# Patient Record
Sex: FEMALE | Race: WHITE | Employment: FULL TIME | URBAN - METROPOLITAN AREA
[De-identification: names, ages, dates, MRNs, and addresses within clinical notes are randomized per-mention and may not be internally consistent; named-entity substitution may affect disease eponyms.]

---

## 2017-03-13 ENCOUNTER — OFFICE VISIT (OUTPATIENT)
Dept: FAMILY MEDICINE CLINIC | Age: 47
End: 2017-03-13

## 2017-03-13 VITALS
DIASTOLIC BLOOD PRESSURE: 72 MMHG | OXYGEN SATURATION: 98 % | TEMPERATURE: 98.9 F | WEIGHT: 165.2 LBS | HEIGHT: 63 IN | SYSTOLIC BLOOD PRESSURE: 114 MMHG | BODY MASS INDEX: 29.27 KG/M2 | HEART RATE: 69 BPM | RESPIRATION RATE: 18 BRPM

## 2017-03-13 DIAGNOSIS — E55.9 VITAMIN D DEFICIENCY: ICD-10-CM

## 2017-03-13 DIAGNOSIS — R00.2 PALPITATIONS: ICD-10-CM

## 2017-03-13 DIAGNOSIS — F41.0 PANIC ATTACK: ICD-10-CM

## 2017-03-13 DIAGNOSIS — N92.1 MENORRHAGIA WITH IRREGULAR CYCLE: ICD-10-CM

## 2017-03-13 DIAGNOSIS — N30.00 ACUTE CYSTITIS WITHOUT HEMATURIA: Primary | ICD-10-CM

## 2017-03-13 DIAGNOSIS — F41.9 ANXIETY: ICD-10-CM

## 2017-03-13 DIAGNOSIS — E78.5 HYPERLIPIDEMIA LDL GOAL <130: ICD-10-CM

## 2017-03-13 LAB
BILIRUB UR QL STRIP: NEGATIVE
GLUCOSE UR-MCNC: NEGATIVE MG/DL
KETONES P FAST UR STRIP-MCNC: NORMAL MG/DL
PH UR STRIP: 5.5 [PH] (ref 4.6–8)
PROT UR QL STRIP: NEGATIVE MG/DL
SP GR UR STRIP: 1.02 (ref 1–1.03)
UA UROBILINOGEN AMB POC: NORMAL (ref 0.2–1)
URINALYSIS CLARITY POC: CLEAR
URINALYSIS COLOR POC: YELLOW
URINE BLOOD POC: NEGATIVE
URINE LEUKOCYTES POC: NEGATIVE
URINE NITRITES POC: NEGATIVE

## 2017-03-13 RX ORDER — CIPROFLOXACIN 250 MG/1
250 TABLET, FILM COATED ORAL EVERY 12 HOURS
Qty: 20 TAB | Refills: 0 | Status: SHIPPED | OUTPATIENT
Start: 2017-03-13 | End: 2017-03-23

## 2017-03-13 NOTE — MR AVS SNAPSHOT
Visit Information Date & Time Provider Department Dept. Phone Encounter #  
 3/13/2017 10:00 AM Renetta Noel  [facility] [phone] [id number] Your Appointments 5/22/2017  9:00 AM  
COMPLETE PHYSICAL with Star Nguyen  [facility] ([facility]) Appt Note: COMPLETE PHYSICAL  
 [phone]  
[phone]  
  
   
 [address] [phone] Upcoming Health Maintenance Date Due  
 PAP AKA CERVICAL CYTOLOGY 3/3/2017 BREAST CANCER SCRN MAMMOGRAM 8/28/2017 DTaP/Tdap/Td series (3 - Td) 6/23/2024 Allergies as of 3/13/2017  Review Complete On: 3/13/2017 By: Mellissa Owen LPN Severity Noted Reaction Type Reactions Teagan Medium 07/06/2015   Systemic Hives Soy Medium 07/06/2015   Systemic Hives Bees [Sting, Bee]  06/22/2010    Not Reported This Time Cephaeline  06/22/2010    Not Reported This Time Pt. States no allergy Keflex [Cephalexin]  10/18/2010    Rash Tomato  10/18/2010    Hives Pt. States no allergy Current Immunizations  Reviewed on 7/14/2015 Name Date Influenza Vaccine 10/8/2015, 10/10/2014 TDAP Vaccine 7/1/2006 Tdap 6/23/2014  7:45 PM  
  
 Not reviewed this visit You Were Diagnosed With   
  
 Codes Comments Acute cystitis without hematuria    -  Primary ICD-10-CM: N30.00 ICD-9-CM: 595.0 Vitals BP Pulse Temp Resp Height(growth percentile) Weight(growth percentile) 114/72 (BP 1 Location: Right arm, BP Patient Position: Sitting) 69 98.9 °F (37.2 °C) (Oral) 18 5' 3\" (1.6 m) 165 lb 3.2 oz (74.9 kg) LMP SpO2 BMI OB Status Smoking Status 02/10/2017 98% 29.26 kg/m2 Having regular periods Never Smoker BMI and BSA Data Body Mass Index Body Surface Area  
 29.26 kg/m 2 1.82 m 2 Preferred Pharmacy Pharmacy Name Phone [id number] [place], [address] [facility] [phone] Your Updated Medication List  
  
   
This list is accurate as of: 3/13/17 11:12 AM.  Always use your most recent med list.  
  
  
  
  
 ALPRAZolam 0.25 mg tablet Commonly known as:  Melene Reels Take 1 Tab by mouth two (2) times daily as needed for Anxiety. Max Daily Amount: 0.5 mg.  
  
 busPIRone 5 mg tablet Commonly known as:  BUSPAR Take 1 Tab by mouth two (2) times a day. Do not take with the Melene Reels  
  
 butalbital-acetaminophen-caffeine -40 mg per tablet Commonly known as:  Carlitos Gift Take 1 Tab by mouth every six (6) hours as needed for Pain. Max Daily Amount: 4 Tabs. cholecalciferol (vitamin D3) 2,000 unit Tab Take  by mouth. ciprofloxacin HCl 250 mg tablet Commonly known as:  CIPRO Take 1 Tab by mouth every twelve (12) hours for 10 days. EPINEPHrine 0.3 mg/0.3 mL injection Commonly known as:  EPIPEN 2-EDWIGE  
0.3 mL by IntraMUSCular route once as needed for up to 1 dose. fexofenadine 180 mg tablet Commonly known as:  Vijay Manuelito Take  by mouth. FLUoxetine 40 mg capsule Commonly known as:  PROzac  
60 mg daily. HYDROcodone-acetaminophen 5-325 mg per tablet Commonly known as:  Howard Songster Take 1 Tab by mouth nightly as needed for Pain. Max Daily Amount: 1 Tab. hydrocortisone 2.5 % rectal cream  
Commonly known as:  ANUSOL-HC Insert  into rectum two (2) times a day. mometasone 50 mcg/actuation nasal spray Commonly known as:  NASONEX  
2 Sprays daily. naproxen 500 mg tablet Commonly known as:  NAPROSYN Take 1 Tab by mouth two (2) times daily (with meals). SOMA 350 mg tablet Generic drug:  carisoprodol Take 350 mg by mouth four (4) times daily. Indications: Ortho-on-call Prescriptions Sent to Pharmacy  Refills  
 ciprofloxacin HCl (CIPRO) 250 mg tablet 0  
 Sig: Take 1 Tab by mouth every twelve (12) hours for 10 days. Class: Normal  
 Pharmacy: [id number] [facility], [address] [place] [address] #: [phone] Route: Oral  
  
We Performed the Following AMB POC URINALYSIS DIP STICK AUTO W/ MICRO [50794 CPT(R)] CULTURE, URINE [id number] CPT(R)] Patient Instructions Frequent Urination: Care Instructions Your Care Instructions An urge to urinate frequently but usually passing only small amounts of urine is a common symptom of urinary problems, such as urinary tract infections. The bladder may become inflamed. This can cause the urge to urinate. You may try to urinate more often than usual to try to soothe that urge. Frequent urination also may be caused by sexually transmitted infections (STIs) or kidney stones. Or it may happen when something irritates the tube that carries urine from the bladder to the outside of the body (urethra). It may also be a sign of diabetes. The cause may be hard to find. You may need tests. Follow-up care is a key part of your treatment and safety. Be sure to make and go to all appointments, and call your doctor if you are having problems. It's also a good idea to know your test results and keep a list of the medicines you take. How can you care for yourself at home? · Drink extra water and juices such as cranberry and blueberry juices for the next day or two. This will help make the urine less concentrated. And it may help wash out any bacteria that may be causing an infection. (If you have kidney, heart, or liver disease and have to limit fluids, talk with your doctor before you increase the amount of fluids you drink.) · Avoid drinks that are carbonated or have caffeine. They can irritate the bladder. For women: · Urinate right after you have sex. · After you go to the bathroom, wipe from front to back. · Avoid douches, bubble baths, and feminine hygiene sprays.  And avoid other feminine hygiene products that have deodorants. When should you call for help? Call your doctor now or seek immediate medical care if: 
· You have new symptoms, such as fever, nausea, or vomiting. · You have new or worse symptoms of a urinary problem. For example: ¨ You have blood or pus in your urine. ¨ You have chills or body aches. ¨ It hurts to urinate. ¨ You have groin or belly pain. ¨ You have pain in your back just below your rib cage (the flank area). Watch closely for changes in your health, and be sure to contact your doctor if you feel thirstier than usual. 
Where can you learn more? Go to http://keshawn-merissa.info/. Enter 959 [id number] in the search box to learn more about \"Frequent Urination: Care Instructions. \" Current as of: August 12, 2016 Content Version: 11.1 © 7836-6896 [employer]. Care instructions adapted under license by [employer] (which disclaims liability or warranty for this information). If you have questions about a medical condition or this instruction, always ask your healthcare professional. [place] [address] any warranty or liability for your use of this information. Introducing [facility]! Dear Delio Valerio: Thank you for requesting a Exaj[employer]le account. Our records indicate that you already have an active Exajoule account. You can access your account anytime at [email or web address] Did you know that you can access your hospital and ER discharge instructions at any time in Exajoule? You can also review all of your test results from your hospital stay or ER visit. Additional Information If you have questions, please visit the Frequently Asked Questions section of the Exaj[employer]le website at [email or web address] Remember, Exajoule is NOT to be used for urgent needs. For medical emergencies, dial 911. Now available from your iPhone and Android! Please provide this summary of care documentation to your next provider. Your primary care clinician is listed as Malaika Fraser. If you have any questions after today's visit, please call [phone].

## 2017-03-13 NOTE — PROGRESS NOTES
HISTORY OF PRESENT ILLNESS  HPI  Layne Henley is a 55 y.o. Female with history of anxiety, depression, and panic attacks who presents to office today for UTI. Pt complains of urinary frequency for the past two weeks. Pt states that she typically only drinks water and almond milk. Pt denies frequent UTIs but states that she has had them before. Pt denies burning with urination. Pt states that she had one night where she lay down in bed and experienced palpitations; she denies other episodes. Pt notes that she was on Abilifiy for two months for depression and stopped it one week ago due to side effects. Pt notes that she has been under a lot of stress recently due to her mother being in the hospital.            Past Medical History:   Diagnosis Date    Back pain, acute     Depression     H/O seasonal allergies     Neck pain, musculoskeletal      Past Surgical History:   Procedure Laterality Date    HX CERVICAL DISKECTOMY Left 2015    HX CERVICAL FUSION Left 2015     HX  SECTION      HX GYN  1998    laproscopsy     HX WISDOM TEETH EXTRACTION       Current Outpatient Prescriptions on File Prior to Visit   Medication Sig Dispense Refill    butalbital-acetaminophen-caffeine (FIORICET, ESGIC) -40 mg per tablet Take 1 Tab by mouth every six (6) hours as needed for Pain. Max Daily Amount: 4 Tabs. 20 Tab 0    FLUoxetine (PROZAC) 40 mg capsule 60 mg daily. 2    fexofenadine (ALLEGRA) 180 mg tablet Take  by mouth.  mometasone (NASONEX) 50 mcg/actuation nasal spray 2 Sprays daily.  cholecalciferol, vitamin D3, 2,000 unit tab Take  by mouth.  EPINEPHrine (EPIPEN 2-EDWIGE) 0.3 mg/0.3 mL (1:1,000) injection 0.3 mL by IntraMUSCular route once as needed for up to 1 dose. 2 Each 0     No current facility-administered medications on file prior to visit.       Allergies   Allergen Reactions    Ginger Hives    Soy Hives    Bees [Sting, Bee] Not Reported This Time    Cephaeline Not Reported This Time     Pt. States no allergy    Keflex [Cephalexin] Rash    Tomato Hives     Pt. States no allergy     Family History   Problem Relation Age of Onset    Cancer Father      pancreas, Liver    Cancer Maternal Grandmother     Breast Cancer Maternal Grandmother     Heart Disease Maternal Grandfather     Heart Disease Paternal Grandmother     Stroke Paternal Grandfather     Other Mother      meningioma    Hypertension Mother     Breast Cancer Mother      breast cancer    Anesth Problems Mother      SLOW TO WAKE UP    Stroke Mother     Hypertension Sister     No Known Problems Daughter     No Known Problems Daughter     No Known Problems Son      Social History     Social History    Marital status:      Spouse name: N/A    Number of children: N/A    Years of education: N/A     Social History Main Topics    Smoking status: Never Smoker    Smokeless tobacco: Never Used    Alcohol use 0.0 oz/week     0 Standard drinks or equivalent per week      Comment: occasional    Drug use: No    Sexual activity: Yes     Partners: Male     Birth control/ protection: Surgical      Comment:       Other Topics Concern    None     Social History Narrative             Review of Systems   Constitutional: Negative for chills, diaphoresis, fever, malaise/fatigue and weight loss. Eyes: Negative for blurred vision, double vision, pain and redness. Respiratory: Negative for cough, shortness of breath and wheezing. Cardiovascular: Negative for chest pain, palpitations, orthopnea, claudication, leg swelling and PND. Genitourinary: Positive for frequency. Skin: Negative for itching and rash. Neurological: Negative for dizziness, tingling, tremors, sensory change, speech change, focal weakness, seizures, loss of consciousness, weakness and headaches.      Results for orders placed or performed in visit on 03/13/17   CULTURE, URINE   Result Value Ref Range    Urine Culture, Routine       Lactobacillus species  25,000-50,000 colony forming units per mL      Urine Culture, Routine       Mixed urogenital suad  Less than 10,000 colonies/mL     LIPID PANEL   Result Value Ref Range    Cholesterol, total 192 100 - 199 mg/dL    Triglyceride 77 0 - 149 mg/dL    HDL Cholesterol 62 >39 mg/dL    VLDL, calculated 15 5 - 40 mg/dL    LDL, calculated 115 (H) 0 - 99 mg/dL   CBC W/O DIFF   Result Value Ref Range    WBC 6.2 3.4 - 10.8 x10E3/uL    RBC 4.45 3.77 - 5.28 x10E6/uL    HGB 13.2 11.1 - 15.9 g/dL    HCT 39.9 34.0 - 46.6 %    MCV 90 79 - 97 fL    MCH 29.7 26.6 - 33.0 pg    MCHC 33.1 31.5 - 35.7 g/dL    RDW 13.3 12.3 - 15.4 %    PLATELET 994 393 - 894 x10E3/uL   VITAMIN D, 25 HYDROXY   Result Value Ref Range    VITAMIN D, 25-HYDROXY 30.6 30.0 - 100.0 ng/mL   CVD REPORT   Result Value Ref Range    INTERPRETATION Note    AMB POC URINALYSIS DIP STICK AUTO W/ MICRO   Result Value Ref Range    Color (UA POC) Yellow     Clarity (UA POC) Clear     Glucose (UA POC) Negative Negative    Bilirubin (UA POC) Negative Negative    Ketones (UA POC) Trace Negative    Specific gravity (UA POC) 1.025 1.001 - 1.035    Blood (UA POC) Negative Negative    pH (UA POC) 5.5 4.6 - 8.0    Protein (UA POC) Negative Negative mg/dL    Urobilinogen (UA POC) 0.2 mg/dL 0.2 - 1    Nitrites (UA POC) Negative Negative    Leukocyte esterase (UA POC) Negative Negative             Physical Exam  Visit Vitals    /72 (BP 1 Location: Right arm, BP Patient Position: Sitting)    Pulse 69    Temp 98.9 °F (37.2 °C) (Oral)    Resp 18    Ht 5' 3\" (1.6 m)    Wt 165 lb 3.2 oz (74.9 kg)    LMP 02/10/2017    SpO2 98%    BMI 29.26 kg/m2       Head: Normocephalic, without obvious abnormality, atraumatic  Eyes: conjunctivae/corneas clear. PERRL, EOM's intact.    Neck: supple, symmetrical, trachea midline, no adenopathy, thyroid: not enlarged, symmetric, no tenderness/mass/nodules, no carotid bruit and no JVD  Lungs: clear to auscultation bilaterally  Heart: regular rate and rhythm, S1, S2 normal, no murmur, click, rub or gallop. I listened to the heart for 30-45 seconds and there was no ectopy  Extremities: extremities normal, atraumatic, no cyanosis or edema  Pulses: 2+ and symmetric  Lymph nodes: Cervical, supraclavicular, and axillary nodes normal.  Neurologic: Grossly normal        ASSESSMENT and PLAN    ICD-10-CM ICD-9-CM    1. Acute cystitis without hematuria N30.00 595.0 AMB POC URINALYSIS DIP STICK AUTO W/ MICRO      CULTURE, URINE      ciprofloxacin HCl (CIPRO) 250 mg tablet   2. Hyperlipidemia LDL goal <130 E78.5 272.4 LIPID PANEL      CVD REPORT   3. Vitamin D deficiency E55.9 268.9 VITAMIN D, 25 HYDROXY   4. Menorrhagia with irregular cycle N92.1 626.2 CBC W/O DIFF   5. Panic attack F41.0 300.01    6. Anxiety F41.9 300.00    7. Palpitations R00.2 785.1      Whigham was seen today for urinary frequency, palpitations and fatigue. Diagnoses and all orders for this visit:    Acute cystitis without hematuria  -     AMB POC URINALYSIS DIP STICK AUTO W/ MICRO  -     CULTURE, URINE  -     ciprofloxacin HCl (CIPRO) 250 mg tablet; Take 1 Tab by mouth every twelve (12) hours for 10 days. Hyperlipidemia LDL goal <130  -     LIPID PANEL  -     CVD REPORT    Vitamin D deficiency  -     VITAMIN D, 25 HYDROXY    Menorrhagia with irregular cycle  -     CBC W/O DIFF    Panic attack    Anxiety    Palpitations      Follow-up Disposition:  Return in about 6 months (around 9/13/2017), or if symptoms worsen or fail to improve, for F/U cholesterol. lab results and schedule of future lab studies reviewed with patient  reviewed diet, exercise and weight control  cardiovascular risk and specific lipid/LDL goals reviewed  reviewed medications and side effects in detail  Please call my office if there are any questions- [phone]. I will arrange for follow up after the lab tests done from today return  Recommended a weekly \"heart check. \" I went into detail how to do this. Call for refills on medications as needed. Discussed expected course/resolution/complications of diagnosis in detail with patient. Medication risks/benefits/costs/interactions/alternatives discussed with patient. Pt was given an after visit summary which includes diagnoses, current medications & vitals. Pt expressed understanding with the diagnosis and plan. Total 25 minutes,60 % counseling re: Reviewed symptoms, or lack thereof, of elevated cholesterol. She is fasting; she wanted to do her cholesterol today. I reassured her that there is no sign of any arrhythmia on exam. I suggested she check her pulses whenever this happens to see if it is regular or irregular, fast or slow, etc. I suggested she avoid bladder irritants, and I reviewed those with her. I gave her Cipro to take before we get the urine culture back. I did talk to her about her borderline cholesterol level and discussed the cardiac risk calculation we now do, and we will get back to her about that as well. Also, discussed symptoms of concern that were noted today in the note above, treatment options( including doing nothing), when to follow up before recommended time frame. Also, answered all questions. This document was written by Antonieta Fung, as dictated by Moira Jamison MD.  I have reviewed and agree with the above note and have made corrections where appropriate Jamey Saleh M.D.

## 2017-03-13 NOTE — PROGRESS NOTES
Chief Complaint   Patient presents with    Urinary Frequency     x 1 week    Palpitations     when lying down once    Fatigue     x 2 weeks     1. Have you been to the ER, urgent care clinic since your last visit? Hospitalized since your last visit? No    2. Have you seen or consulted any other health care providers outside of the [address] [place] [address] since your last visit? Include any pap smears or colon screening.  No

## 2017-03-13 NOTE — PATIENT INSTRUCTIONS
Frequent Urination: Care Instructions  Your Care Instructions  An urge to urinate frequently but usually passing only small amounts of urine is a common symptom of urinary problems, such as urinary tract infections. The bladder may become inflamed. This can cause the urge to urinate. You may try to urinate more often than usual to try to soothe that urge. Frequent urination also may be caused by sexually transmitted infections (STIs) or kidney stones. Or it may happen when something irritates the tube that carries urine from the bladder to the outside of the body (urethra). It may also be a sign of diabetes. The cause may be hard to find. You may need tests. Follow-up care is a key part of your treatment and safety. Be sure to make and go to all appointments, and call your doctor if you are having problems. It's also a good idea to know your test results and keep a list of the medicines you take. How can you care for yourself at home? · Drink extra water and juices such as cranberry and blueberry juices for the next day or two. This will help make the urine less concentrated. And it may help wash out any bacteria that may be causing an infection. (If you have kidney, heart, or liver disease and have to limit fluids, talk with your doctor before you increase the amount of fluids you drink.)  · Avoid drinks that are carbonated or have caffeine. They can irritate the bladder. For women:  · Urinate right after you have sex. · After you go to the bathroom, wipe from front to back. · Avoid douches, bubble baths, and feminine hygiene sprays. And avoid other feminine hygiene products that have deodorants. When should you call for help? Call your doctor now or seek immediate medical care if:  · You have new symptoms, such as fever, nausea, or vomiting. · You have new or worse symptoms of a urinary problem. For example:  ¨ You have blood or pus in your urine. ¨ You have chills or body aches.   ¨ It hurts to urinate. ¨ You have groin or belly pain. ¨ You have pain in your back just below your rib cage (the flank area). Watch closely for changes in your health, and be sure to contact your doctor if you feel thirstier than usual.  Where can you learn more? Go to http://keshawn-merissa.info/. Enter [id number] [id number] in the search box to learn more about \"Frequent Urination: Care Instructions. \"  Current as of: August 12, 2016  Content Version: 11.1  © 1976-5533 [employer]. Care instructions adapted under license by [employer] (which disclaims liability or warranty for this information). If you have questions about a medical condition or this instruction, always ask your healthcare professional. [address] any warranty or liability for your use of this information.

## 2017-03-14 LAB
25(OH)D3+25(OH)D2 SERPL-MCNC: 30.6 NG/ML (ref 30–100)
BACTERIA UR CULT: NORMAL
BACTERIA UR CULT: NORMAL
CHOLEST SERPL-MCNC: 192 MG/DL (ref 100–199)
ERYTHROCYTE [DISTWIDTH] IN BLOOD BY AUTOMATED COUNT: 13.3 % (ref 12.3–15.4)
HCT VFR BLD AUTO: 39.9 % (ref 34–46.6)
HDLC SERPL-MCNC: 62 MG/DL
HGB BLD-MCNC: 13.2 G/DL (ref 11.1–15.9)
INTERPRETATION, 910389: NORMAL
LDLC SERPL CALC-MCNC: 115 MG/DL (ref 0–99)
MCH RBC QN AUTO: 29.7 PG (ref 26.6–33)
MCHC RBC AUTO-ENTMCNC: 33.1 G/DL (ref 31.5–35.7)
MCV RBC AUTO: 90 FL (ref 79–97)
PLATELET # BLD AUTO: 310 X10E3/UL (ref 150–379)
RBC # BLD AUTO: 4.45 X10E6/UL (ref 3.77–5.28)
TRIGL SERPL-MCNC: 77 MG/DL (ref 0–149)
VLDLC SERPL CALC-MCNC: 15 MG/DL (ref 5–40)
WBC # BLD AUTO: 6.2 X10E3/UL (ref 3.4–10.8)

## 2017-03-15 NOTE — PROGRESS NOTES
Mild bladder infection, should respond to Cipro. Everything else is OK. Follow up as needed. Drink plenty of water to prevent future bladder infections.

## 2017-05-13 NOTE — TELEPHONE ENCOUNTER
Pt has not seen NP [place] in 2 years, she will need an appointment, unless Dr Familia Ansari or Bhargav Muñoz wants to refill

## 2017-05-15 RX ORDER — HYDROCORTISONE 25 MG/G
CREAM TOPICAL
Qty: 1 TUBE | Refills: 0 | Status: SHIPPED | OUTPATIENT
Start: 2017-05-15 | End: 2018-03-02

## 2017-05-22 ENCOUNTER — OFFICE VISIT (OUTPATIENT)
Dept: FAMILY MEDICINE CLINIC | Age: 47
End: 2017-05-22

## 2017-05-22 VITALS
HEART RATE: 78 BPM | HEIGHT: 63 IN | TEMPERATURE: 98.3 F | DIASTOLIC BLOOD PRESSURE: 72 MMHG | BODY MASS INDEX: 30.3 KG/M2 | OXYGEN SATURATION: 95 % | SYSTOLIC BLOOD PRESSURE: 108 MMHG | WEIGHT: 171 LBS | RESPIRATION RATE: 16 BRPM

## 2017-05-22 DIAGNOSIS — E55.9 VITAMIN D DEFICIENCY: ICD-10-CM

## 2017-05-22 DIAGNOSIS — Z00.00 ROUTINE GENERAL MEDICAL EXAMINATION AT A HEALTH CARE FACILITY: Primary | ICD-10-CM

## 2017-05-22 DIAGNOSIS — R63.5 WEIGHT GAIN: ICD-10-CM

## 2017-05-22 NOTE — PROGRESS NOTES
HISTORY OF PRESENT ILLNESS  Ashley Valencia is a 55 y.o. female. HPI  Chief Complaint   Patient presents with    Complete Physical     Yearly physical.     Ashley Valencia is a 55 y.o. female   Pt presents to office today for a complete physicval. Pt already had a labs 3/13/2017. Pt has been trying to cut out carbs and sweets, more fruits and vegies. Breakfast is raisen brand, fruit or protein bar in between, lunch prepared at work: salads avoid the carbs, snacks between lunch and dinner: fruit or protein bars such as kind Typical dinner is Meat, vegie, salad. Snacks between dinner and bedtime: fruit. Typically 4-5 fruits per day. Veggies per day: 4-5 Protein: 4-5 per day, non fruit carbs: 2-3 per day   Exercise: can only do walking 3 miles 3-4 times per week. The bike messes with her herniated disc. Cant do running or more strenuous exercise   Current goal is to get back in the 140s     States she is still struggling with depression and likely gained some weight with abilify by her psych. More recently she started on wellbutrin but that gave her leg cramps. Currently only on prozac. Next appt in June. She is finalizing divorce as well. Migraines have been fine. Current Outpatient Prescriptions   Medication Sig Dispense Refill    PROCTOSOL HC 2.5 % rectal cream INSERT INTO RECTUM TWO (2) TIMES A DAY. 1 Tube 0    butalbital-acetaminophen-caffeine (FIORICET, ESGIC) -40 mg per tablet Take 1 Tab by mouth every six (6) hours as needed for Pain. Max Daily Amount: 4 Tabs. 20 Tab 0    FLUoxetine (PROZAC) 40 mg capsule 60 mg daily. 2    fexofenadine (ALLEGRA) 180 mg tablet Take  by mouth.  mometasone (NASONEX) 50 mcg/actuation nasal spray 2 Sprays daily.  cholecalciferol, vitamin D3, 2,000 unit tab Take  by mouth.  EPINEPHrine (EPIPEN 2-EDWIGE) 0.3 mg/0.3 mL (1:1,000) injection 0.3 mL by IntraMUSCular route once as needed for up to 1 dose.  2 Each 0     Allergies   Allergen Reactions    Teagan Hives    Soy Hives    Bees [Sting, Bee] Not Reported This Time    Cephaeline Not Reported This Time     Pt. States no allergy    Keflex [Cephalexin] Rash    Tomato Hives     Pt. States no allergy     Past Medical History:   Diagnosis Date    Back pain, acute     Depression     H/O seasonal allergies     Neck pain, musculoskeletal      Past Surgical History:   Procedure Laterality Date    HX CERVICAL DISKECTOMY Left 2015    HX CERVICAL FUSION Left 2015     HX  SECTION      HX GYN  1998    laproscopsy     HX WISDOM TEETH EXTRACTION       Family History   Problem Relation Age of Onset    Cancer Father      pancreas, Liver    Cancer Maternal Grandmother     Breast Cancer Maternal Grandmother     Heart Disease Maternal Grandfather     Heart Disease Paternal Grandmother     Stroke Paternal Grandfather     Other Mother      meningioma    Hypertension Mother     Breast Cancer Mother      breast cancer    Anesth Problems Mother      SLOW TO WAKE UP    Stroke Mother     Hypertension Sister     No Known Problems Daughter     No Known Problems Daughter     No Known Problems Son      Social History   Substance Use Topics    Smoking status: Never Smoker    Smokeless tobacco: Never Used    Alcohol use 0.0 oz/week     0 Standard drinks or equivalent per week      Comment: occasional       Review of Systems   Constitutional: Negative. Negative for chills, diaphoresis, fever, malaise/fatigue and weight loss. HENT: Negative. Negative for congestion, ear discharge, ear pain, hearing loss, nosebleeds, sore throat and tinnitus. Eyes: Negative. Negative for blurred vision, double vision, photophobia, pain, discharge and redness. Respiratory: Negative. Negative for cough, hemoptysis, sputum production, shortness of breath, wheezing and stridor. Cardiovascular: Negative. Negative for chest pain, palpitations, orthopnea, claudication, leg swelling and PND. Gastrointestinal: Negative. Negative for abdominal pain, blood in stool, constipation, diarrhea, heartburn, melena, nausea and vomiting. Genitourinary: Negative. Negative for dysuria, flank pain, frequency, hematuria and urgency. Musculoskeletal: Positive for back pain and neck pain. Negative for falls, joint pain and myalgias. Skin: Negative. Negative for itching and rash. Neurological: Negative. Negative for dizziness, tingling, tremors, sensory change, speech change, focal weakness, seizures, loss of consciousness, weakness and headaches. Endo/Heme/Allergies: Negative. Negative for environmental allergies and polydipsia. Does not bruise/bleed easily. Psychiatric/Behavioral: Positive for depression. Negative for hallucinations, memory loss, substance abuse and suicidal ideas. The patient is not nervous/anxious and does not have insomnia. All other systems reviewed and are negative. Physical Exam   Constitutional: She is oriented to person, place, and time. She appears well-developed and well-nourished. No distress. HENT:   Head: Normocephalic and atraumatic. Right Ear: External ear normal. Tympanic membrane is not bulging. No middle ear effusion. Left Ear: External ear normal. Tympanic membrane is not bulging. No middle ear effusion. Nose: Nose normal. Right sinus exhibits no maxillary sinus tenderness and no frontal sinus tenderness. Left sinus exhibits no maxillary sinus tenderness and no frontal sinus tenderness. Mouth/Throat: Oropharynx is clear and moist. No oropharyngeal exudate. Eyes: Conjunctivae and EOM are normal. Pupils are equal, round, and reactive to light. Right eye exhibits no discharge. Left eye exhibits no discharge. No scleral icterus. Neck: Normal range of motion. Neck supple. No JVD present. No tracheal deviation present. No thyromegaly present. Cardiovascular: Normal rate, regular rhythm, normal heart sounds and intact distal pulses.   Exam reveals no gallop and no friction rub. No murmur heard. Pulmonary/Chest: Effort normal and breath sounds normal. No stridor. No respiratory distress. She has no wheezes. She has no rales. She exhibits no tenderness. Abdominal: Soft. Bowel sounds are normal. She exhibits no distension and no mass. There is no tenderness. There is no rebound and no guarding. Musculoskeletal: She exhibits no edema or tenderness. Lymphadenopathy:     She has no cervical adenopathy. Neurological: She is alert and oriented to person, place, and time. She has normal reflexes. Skin: Skin is warm and dry. No rash noted. She is not diaphoretic. Psychiatric: Her behavior is normal. Judgment and thought content normal.   Tearful when discussing marriage situation. Affect appropriate to mood. Patient appears well groomed. Maintains appropriate eye contact and behavior with provider throughout encounter. Nursing note and vitals reviewed. ASSESSMENT and PLAN    ICD-10-CM ICD-9-CM    1. Routine general medical examination at a health care facility Z00.00 V70.0    2. BMI 30.0-30.9,adult V75.09 W23.45 METABOLIC PANEL, BASIC      TSH RFX ON ABNORMAL TO FREE T4   3. Vitamin D deficiency E55.9 268.9 VITAMIN D, 25 HYDROXY   4. Weight gain T07.5 158.6 METABOLIC PANEL, BASIC      TSH RFX ON ABNORMAL TO FREE T4     Keli Reynolds was seen today for complete physical.    Diagnoses and all orders for this visit:    Routine general medical examination at a health care facility  Healthy lifestyle, prevention of heart diease and cancer screening and prevention discussed with patient as well as nutritional/exercise counseling was provided. Body mass index is 30.29 kg/(m^2). paps are done by [facility]. She is going to schedule the mammogram.   She is going through divorce. Will continue with counseling. Fu as needed on this.    BMI 69.5-08.3,VZETN  -     METABOLIC PANEL, BASIC  -     TSH RFX ON ABNORMAL TO FREE T4  Patient Instructions Calorie goal 3377-3595 calories per day  Add resistance training 20-30 mins 2-3 days per week  Add protein before noon, generally limit the fruits throughout the day as these do add up to extra calories. Almonds, greek yogurt, avocado, salmon, eggs, low fat string cheese. Vitamin D deficiency  -     VITAMIN D, 25 HYDROXY    Weight gain  -     METABOLIC PANEL, BASIC  -     TSH RFX ON ABNORMAL TO FREE T4      Follow-up Disposition:  Return in about 3 months (around 8/22/2017) for weight recheck .

## 2017-05-22 NOTE — MR AVS SNAPSHOT
Visit Information Date & Time Provider Department Dept. Phone Encounter #  
 5[facility]22/2017  [phone] [id number] Follow-up Instructions Return in about 3 months (around 8/22/2017) for weight recheck . Upcoming Health Maintenance Date Due  
 PAP AKA CERVICAL CYTOLOGY 3/3/2017 BREAST CANCER SCRN MAMMOGRAM 8/28/2017 INFLUENZA AGE 9 TO ADULT 8/1/2017 DTaP/Tdap/Td series (3 - Td) 6/23/2024 Allergies as of 5/22/2017  Review Complete On: 5/22/2017 By: Yarelis Elizalde LPN Severity Noted Reaction Type Reactions Teagan Medium 07/06/2015   Systemic Hives Soy Medium 07/06/2015   Systemic Hives Bees [Sting, Bee]  06/22/2010    Not Reported This Time Cephaeline  06/22/2010    Not Reported This Time Pt. States no allergy Keflex [Cephalexin]  10/18/2010    Rash Tomato  10/18/2010    Hives Pt. States no allergy Current Immunizations  Reviewed on 7/14/2015 Name Date Influenza Vaccine 10/8/2015, 10/10/2014 TDAP Vaccine 7/1/2006 Tdap 6/23/2014  7:45 PM  
  
 Not reviewed this visit You Were Diagnosed With   
  
 Codes Comments Routine general medical examination at a health care facility    -  Primary ICD-10-CM: Z00.00 ICD-9-CM: V70.0 BMI 30.0-30.9,adult     ICD-10-CM: Z68.30 ICD-9-CM: V85.30 Vitamin D deficiency     ICD-10-CM: E55.9 ICD-9-CM: 268.9 Weight gain     ICD-10-CM: R63.5 ICD-9-CM: 783.1 Vitals BP Pulse Temp Resp Height(growth percentile) Weight(growth percentile) 108/72 78 98.3 °F (36.8 °C) (Oral) 16 5' 3\" (1.6 m) 171 lb (77.6 kg) LMP SpO2 BMI OB Status Smoking Status 05/17/2017 95% 30.29 kg/m2 Having regular periods Never Smoker Vitals History BMI and BSA Data Body Mass Index Body Surface Area  
 30.29 kg/m 2 1.86 m 2 Preferred Pharmacy Pharmacy Name Phone [id number] [place], [address] [phone] Your Updated Medication List  
  
   
This list is accurate as of: 5/22/17 10:24 AM.  Always use your most recent med list.  
  
  
  
  
 butalbital-acetaminophen-caffeine -40 mg per tablet Commonly known as:  Alan Drummer Take 1 Tab by mouth every six (6) hours as needed for Pain. Max Daily Amount: 4 Tabs. cholecalciferol (vitamin D3) 2,000 unit Tab Take  by mouth. EPINEPHrine 0.3 mg/0.3 mL injection Commonly known as:  EPIPEN 2-EDWIGE  
0.3 mL by IntraMUSCular route once as needed for up to 1 dose. fexofenadine 180 mg tablet Commonly known as:  Kerry Derik Take  by mouth. FLUoxetine 40 mg capsule Commonly known as:  PROzac  
60 mg daily. mometasone 50 mcg/actuation nasal spray Commonly known as:  NASONEX  
2 Sprays daily. PROCTOSOL HC 2.5 % rectal cream  
Generic drug:  hydrocortisone INSERT INTO RECTUM TWO (2) TIMES A DAY. We Performed the Following METABOLIC PANEL, BASIC [11216 CPT(R)] TSH RFX ON ABNORMAL TO FREE T4 [[id number] Custom] VITAMIN D, 25 HYDROXY [id number] CPT(R)] Follow-up Instructions Return in about 3 months (around 8/22/2017) for weight recheck . Patient Instructions Calorie goal 9554-8414 calories per day Add resistance training 20-30 mins 2-3 days per week Add protein before noon, generally limit the fruits throughout the day as these do add up to extra calories. Almonds, greek yogurt, avocado, salmon, eggs, low fat string cheese. Introducing [facility]! Dear Makenzie Ramos: Thank you for requesting a [employer] account. Our records indicate that you already have an active [employer] account. You can access your account anytime at [email or web address] Did you know that you can access your hospital and ER discharge instructions at any time in [employer]?   You can also review all of your test results from your hospital stay or ER visit. Additional Information If you have questions, please visit the Frequently Asked Questions section of the [employer] website at [email or web address] Remember, [employer] is NOT to be used for urgent needs. For medical emergencies, dial 911. Now available from your iPhone and Android! Please provide this summary of care documentation to your next provider. Your primary care clinician is listed as Maria De Jesus Medina. If you have any questions after today's visit, please call [phone].

## 2017-05-22 NOTE — PROGRESS NOTES
Pt presents to office today for a complete physicval. Pt already had a labs 3/13/2017. Chief Complaint   Patient presents with    Complete Physical     Yearly physical.     1. Have you been to the ER, urgent care clinic since your last visit? Hospitalized since your last visit? No    2. Have you seen or consulted any other health care providers outside of the [address] [place] [address] since your last visit? Include any pap smears or colon screening.  No

## 2017-05-22 NOTE — PATIENT INSTRUCTIONS
Calorie goal 4517-3640 calories per day  Add resistance training 20-30 mins 2-3 days per week  Add protein before noon, generally limit the fruits throughout the day as these do add up to extra calories. Almonds, greek yogurt, avocado, salmon, eggs, low fat string cheese.

## 2017-05-23 LAB
25(OH)D3+25(OH)D2 SERPL-MCNC: 21.9 NG/ML (ref 30–100)
BUN SERPL-MCNC: 14 MG/DL (ref 6–24)
BUN/CREAT SERPL: 17 (ref 9–23)
CALCIUM SERPL-MCNC: 9.4 MG/DL (ref 8.7–10.2)
CHLORIDE SERPL-SCNC: 102 MMOL/L (ref 96–106)
CO2 SERPL-SCNC: 25 MMOL/L (ref 18–29)
CREAT SERPL-MCNC: 0.81 MG/DL (ref 0.57–1)
GLUCOSE SERPL-MCNC: 79 MG/DL (ref 65–99)
POTASSIUM SERPL-SCNC: 4.6 MMOL/L (ref 3.5–5.2)
SODIUM SERPL-SCNC: 140 MMOL/L (ref 134–144)
TSH SERPL DL<=0.005 MIU/L-ACNC: 2.66 UIU/ML (ref 0.45–4.5)

## 2017-07-20 ENCOUNTER — OFFICE VISIT (OUTPATIENT)
Dept: FAMILY MEDICINE CLINIC | Age: 47
End: 2017-07-20

## 2017-07-20 VITALS
HEIGHT: 63 IN | BODY MASS INDEX: 30.65 KG/M2 | RESPIRATION RATE: 18 BRPM | DIASTOLIC BLOOD PRESSURE: 74 MMHG | TEMPERATURE: 97.7 F | OXYGEN SATURATION: 95 % | WEIGHT: 173 LBS | SYSTOLIC BLOOD PRESSURE: 128 MMHG | HEART RATE: 76 BPM

## 2017-07-20 DIAGNOSIS — F41.1 GAD (GENERALIZED ANXIETY DISORDER): ICD-10-CM

## 2017-07-20 DIAGNOSIS — R53.83 FATIGUE, UNSPECIFIED TYPE: Primary | ICD-10-CM

## 2017-07-20 RX ORDER — BUPROPION HYDROCHLORIDE 150 MG/1
TABLET ORAL
Refills: 1 | COMMUNITY
Start: 2017-06-08 | End: 2018-03-02

## 2017-07-20 RX ORDER — ALPRAZOLAM 0.25 MG/1
TABLET ORAL
Qty: 30 TAB | Refills: 0 | Status: SHIPPED | OUTPATIENT
Start: 2017-07-20 | End: 2018-03-02

## 2017-07-20 RX ORDER — FLUOXETINE HYDROCHLORIDE 20 MG/1
20 CAPSULE ORAL DAILY
Qty: 30 CAP | Refills: 1 | Status: SHIPPED | OUTPATIENT
Start: 2017-07-20 | End: 2018-03-02

## 2017-07-20 NOTE — PROGRESS NOTES
Aime Cantu [address] [facility]  [address] [facility], [address]  [phone]             Date of visit: 7/20/2017   Subjective:      History obtained from:  the patient. Radha Quesada is a 52 y.o. female who presents today for feeling very tired, crying spells, can't concentrate at work. Much worse in past few days and can't function at work. Not really sad but just very anxious, worried about the future. Sleeping fairly well these days. Has appt with her psychiatrist on Monday. A couple of months ago they had changed her prozac to wellbutrin. They had given her prn hydroxyzine; doesn't work for her anxiety but makes her feel out of it. In the past xanax worked well for her without making her groggy. tsh normal 2 months ago, had a physical then. Has struggled with anxiety on and off for years, since her kids were born    Patient Active Problem List    Diagnosis Date Noted    Stress at home 04/01/2016    Panic attack 04/01/2016    Neck pain on left side 07/24/2013    Radiculopathy of cervical spine 07/24/2013    Anxiety 09/22/2011    Depression 09/22/2011    Insomnia 09/22/2011     Current Outpatient Prescriptions   Medication Sig Dispense Refill    buPROPion XL (WELLBUTRIN XL) 150 mg tablet TAKE 1 TABLET EVERY DAY IN THE MORNING  1    ALPRAZolam (XANAX) 0.25 mg tablet TAKE 1 TABLET ONCE A DAY AS NEEDED FOR ANXIETY 30 Tab 0    FLUoxetine (PROZAC) 20 mg capsule Take 1 Cap by mouth daily. 30 Cap 1    PROCTOSOL HC 2.5 % rectal cream INSERT INTO RECTUM TWO (2) TIMES A DAY. 1 Tube 0    cholecalciferol, vitamin D3, 2,000 unit tab Take  by mouth.  EPINEPHrine (EPIPEN 2-EDWIGE) 0.3 mg/0.3 mL (1:1,000) injection 0.3 mL by IntraMUSCular route once as needed for up to 1 dose. 2 Each 0     Allergies   Allergen Reactions    Ginger Hives    Soy Hives    Bees [Sting, Bee] Not Reported This Time    Cephaeline Not Reported This Time     Pt.  States no allergy    Keflex [Cephalexin] Rash  Tomato Hives     Pt. States no allergy     Past Medical History:   Diagnosis Date    Back pain, acute     Depression     H/O seasonal allergies     Neck pain, musculoskeletal      Past Surgical History:   Procedure Laterality Date    HX CERVICAL DISKECTOMY Left 2015    HX CERVICAL FUSION Left 2015     HX  SECTION      HX GYN  1998    laproscopsy     HX WISDOM TEETH EXTRACTION       Family History   Problem Relation Age of Onset    Cancer Father      pancreas, Liver    Cancer Maternal Grandmother     Breast Cancer Maternal Grandmother     Heart Disease Maternal Grandfather     Heart Disease Paternal Grandmother     Stroke Paternal Grandfather     Other Mother      meningioma    Hypertension Mother     Breast Cancer Mother      breast cancer    Anesth Problems Mother      SLOW TO WAKE UP    Stroke Mother     Hypertension Sister     No Known Problems Daughter     No Known Problems Daughter     No Known Problems Son      Social History   Substance Use Topics    Smoking status: Never Smoker    Smokeless tobacco: Never Used    Alcohol use 0.0 oz/week     0 Standard drinks or equivalent per week      Comment: occasional      Social History     Social History Narrative        Review of Systems  CV denies chest pain     Objective:     Vitals:    17 1038   BP: 128/74   Pulse: 76   Resp: 18   Temp: 97.7 °F (36.5 °C)   TempSrc: Oral   SpO2: 95%   Weight: 173 lb (78.5 kg)   Height: 5' 3\" (1.6 m)     Body mass index is 30.65 kg/(m^2). General: stated age, well developed, well nourished and in NAD  Lungs:  clear to auscultation w/o rales, rhonchi, wheezes w/normal effort and no use of accessory muscles of respiration   Heart: regular rate and rhythm, S1, S2 normal, no murmur, click, rub or gallop  Ext:  No edema noted.    Skin:  Normal. and no rash or abnormalities   Psych: alert and oriented to person, place, time and situation and Speech: appropriate quality, quantity and organization of sentences but is crying at times, has depressed affect    Lab Results   Component Value Date/Time    Cholesterol, total 192 03/13/2017 11:24 AM    HDL Cholesterol 62 03/13/2017 11:24 AM    LDL, calculated 115 03/13/2017 11:24 AM    VLDL, calculated 15 03/13/2017 11:24 AM    Triglyceride 77 03/13/2017 11:24 AM     Lab Results   Component Value Date/Time    Sodium 140 05/22/2017 10:28 AM    Potassium 4.6 05/22/2017 10:28 AM    Chloride 102 05/22/2017 10:28 AM    CO2 25 05/22/2017 10:28 AM    Anion gap 7 07/06/2015 10:13 AM    Glucose 79 05/22/2017 10:28 AM    BUN 14 05/22/2017 10:28 AM    Creatinine 0.81 05/22/2017 10:28 AM    BUN/Creatinine ratio 17 05/22/2017 10:28 AM    GFR est  05/22/2017 10:28 AM    GFR est non-AA 87 05/22/2017 10:28 AM    Calcium 9.4 05/22/2017 10:28 AM    Bilirubin, total 0.2 04/08/2016 09:38 AM    AST (SGOT) 17 04/08/2016 09:38 AM    Alk. phosphatase 53 04/08/2016 09:38 AM    Protein, total 6.8 04/08/2016 09:38 AM    Albumin 4.3 04/08/2016 09:38 AM    Globulin 3.8 11/16/2013 05:59 PM    A-G Ratio 1.7 04/08/2016 09:38 AM    ALT (SGPT) 14 04/08/2016 09:38 AM     Lab Results   Component Value Date/Time    WBC 6.2 03/13/2017 11:24 AM    HGB 13.2 03/13/2017 11:24 AM    HCT 39.9 03/13/2017 11:24 AM    PLATELET 135 63/58/4639 11:24 AM    MCV 90 03/13/2017 11:24 AM     Lab Results   Component Value Date/Time    TSH 2.660 05/22/2017 10:28 AM    TSH 3.100 04/08/2016 09:38 AM    T4, Free 1.07 04/08/2016 09:38 AM     Lab Results   Component Value Date/Time    VITAMIN D, 25-HYDROXY 21.9 05/22/2017 10:28 AM           Assessment/Plan:       ICD-10-CM ICD-9-CM    1. Fatigue, unspecified type R53.83 780.79    2. VERITO (generalized anxiety disorder) F41.1 300.02        Orders Placed This Encounter    buPROPion XL (WELLBUTRIN XL) 150 mg tablet    ALPRAZolam (XANAX) 0.25 mg tablet    FLUoxetine (PROZAC) 20 mg capsule     Fatigue, anxiety, and also some depressive symptoms. wellbutrin not working well enough, will add back low dose prozac and prn xanax short-term as it allows her to function well at work. She will see her psychiatrist and therapist Monday. Reviewed worrisome signs or symptoms for which to call. Wants to try to continue to work but will let me know if she needs a note. I think the xanax will worked as it did before  Prescription monitoring appropriate. Discussed the diagnosis and plan and she expressed understanding.     Follow-up Disposition:  Return if symptoms worsen or fail to improve. (michael be following up with specialist)    Cristofer Stanton MD

## 2017-07-20 NOTE — MR AVS SNAPSHOT
Visit Information Date & Time Provider Department Dept. Phone Encounter #  
 7/20/2017 10:30 AM Sofia Fonseca, [facility] [address] [phone] [id number] Follow-up Instructions Return if symptoms worsen or fail to improve. Upcoming Health Maintenance Date Due  
 PAP AKA CERVICAL CYTOLOGY 3/3/2017 BREAST CANCER SCRN MAMMOGRAM 8/28/2017 INFLUENZA AGE 9 TO ADULT 8/1/2017 DTaP/Tdap/Td series (3 - Td) 6/23/2024 Allergies as of 7/20/2017  Review Complete On: 7/20/2017 By: Sofia Fonseca MD  
  
 Severity Noted Reaction Type Reactions Teagan Medium 07/06/2015   Systemic Hives Soy Medium 07/06/2015   Systemic Hives Bees [Sting, Bee]  06/22/2010    Not Reported This Time Cephaeline  06/22/2010    Not Reported This Time Pt. States no allergy Keflex [Cephalexin]  10/18/2010    Rash Tomato  10/18/2010    Hives Pt. States no allergy Current Immunizations  Reviewed on 7/14/2015 Name Date Influenza Vaccine 10/8/2015, 10/10/2014 TDAP Vaccine 7/1/2006 Tdap 6/23/2014  7:45 PM  
  
 Not reviewed this visit You Were Diagnosed With   
  
 Codes Comments Fatigue, unspecified type    -  Primary ICD-10-CM: R53.83 ICD-9-CM: 780.79 VERITO (generalized anxiety disorder)     ICD-10-CM: F41.1 ICD-9-CM: 300.02 Vitals BP Pulse Temp Resp Height(growth percentile) Weight(growth percentile) 128/74 (BP 1 Location: Right arm, BP Patient Position: Sitting) 76 97.7 °F (36.5 °C) (Oral) 18 5' 3\" (1.6 m) 173 lb (78.5 kg) LMP SpO2 BMI OB Status Smoking Status 06/19/2017 (Exact Date) 95% 30.65 kg/m2 Having regular periods Never Smoker Vitals History BMI and BSA Data Body Mass Index Body Surface Area  
 30.65 kg/m 2 1.87 m 2 Preferred Pharmacy Pharmacy Name Phone [id number] [place], [address] [phone] Your Updated Medication List  
  
   
 This list is accurate as of: 7/20/17 10:59 AM.  Always use your most recent med list.  
  
  
  
  
 ALPRAZolam 0.25 mg tablet Commonly known as:  XANAX  
TAKE 1 TABLET ONCE A DAY AS NEEDED FOR ANXIETY  
  
 buPROPion  mg tablet Commonly known as:  WELLBUTRIN XL  
TAKE 1 TABLET EVERY DAY IN THE MORNING  
  
 cholecalciferol (vitamin D3) 2,000 unit Tab Take  by mouth. EPINEPHrine 0.3 mg/0.3 mL injection Commonly known as:  EPIPEN 2-EDWIGE  
0.3 mL by IntraMUSCular route once as needed for up to 1 dose. FLUoxetine 20 mg capsule Commonly known as:  PROzac Take 1 Cap by mouth daily. PROCTOSOL HC 2.5 % rectal cream  
Generic drug:  hydrocortisone INSERT INTO RECTUM TWO (2) TIMES A DAY. Prescriptions Printed Refills ALPRAZolam (XANAX) 0.25 mg tablet 0 Sig: TAKE 1 TABLET ONCE A DAY AS NEEDED FOR ANXIETY Class: Print Prescriptions Sent to Pharmacy Refills FLUoxetine (PROZAC) 20 mg capsule 1 Sig: Take 1 Cap by mouth daily. Class: Normal  
 Pharmacy: [address] [place] [address] #: [phone] Route: Oral  
  
Follow-up Instructions Return if symptoms worsen or fail to improve. Introducing [facility]! Dear Sebastian Zhong: Thank you for requesting a [employer] account. Our records indicate that you already have an active [employer] account. You can access your account anytime at [email or web address]/"[employer]" Did you know that you can access your hospital and ER discharge instructions at any time in [employer]? You can also review all of your test results from your hospital stay or ER visit. Additional Information If you have questions, please visit the Frequently Asked Questions section of the [employer] website at [email or web address]/"[employer]"/. Remember, [employer] is NOT to be used for urgent needs. For medical emergencies, dial 911. Now available from your iPhone and Android! Please provide this summary of care documentation to your next provider. Your primary care clinician is listed as Randall Forrest. If you have any questions after today's visit, please call [phone].

## 2017-07-20 NOTE — PROGRESS NOTES
Chief Complaint   Patient presents with    Anxiety       Reviewed Record in preparation for visit and have obtained necessary documentation. Identified pt with two pt identifiers (Name @ )    Health Maintenance Due   Topic    PAP AKA CERVICAL CYTOLOGY     BREAST CANCER SCRN MAMMOGRAM          1. Have you been to the ER, urgent care clinic since your last visit? Hospitalized since your last visit? No    2. Have you seen or consulted any other health care providers outside of the [address] [place] [address] since your last visit? Include any pap smears or colon screening.  Sees therapist.

## 2017-08-09 ENCOUNTER — OFFICE VISIT (OUTPATIENT)
Dept: FAMILY MEDICINE CLINIC | Age: 47
End: 2017-08-09

## 2017-08-09 VITALS
OXYGEN SATURATION: 96 % | HEART RATE: 66 BPM | BODY MASS INDEX: 29.95 KG/M2 | DIASTOLIC BLOOD PRESSURE: 72 MMHG | WEIGHT: 169 LBS | TEMPERATURE: 99.2 F | SYSTOLIC BLOOD PRESSURE: 109 MMHG | HEIGHT: 63 IN | RESPIRATION RATE: 16 BRPM

## 2017-08-09 DIAGNOSIS — R11.0 NAUSEA: ICD-10-CM

## 2017-08-09 DIAGNOSIS — A69.20 ERYTHEMA MIGRANS (LYME DISEASE): Primary | ICD-10-CM

## 2017-08-09 RX ORDER — DOXYCYCLINE 100 MG/1
100 TABLET ORAL 2 TIMES DAILY
Qty: 42 TAB | Refills: 0 | Status: SHIPPED | OUTPATIENT
Start: 2017-08-09 | End: 2017-08-30

## 2017-08-09 RX ORDER — ONDANSETRON 8 MG/1
8 TABLET, ORALLY DISINTEGRATING ORAL
Qty: 20 TAB | Refills: 0 | Status: SHIPPED | OUTPATIENT
Start: 2017-08-09 | End: 2018-03-23 | Stop reason: SDUPTHER

## 2017-08-09 NOTE — PROGRESS NOTES
Chief Complaint   Patient presents with    Insect Bite     tick bite.  redness and warm to touch. tick found on 8/6/17      Nausea    Diarrhea     1. Have you been to the ER, urgent care clinic since your last visit? Hospitalized since your last visit? No    2. Have you seen or consulted any other health care providers outside of the [address] [place] [address] since your last visit? Include any pap smears or colon screening.  No

## 2017-08-09 NOTE — MR AVS SNAPSHOT
Visit Information Date & Time Provider Department Dept. Phone Encounter #  
 8/9/2017  5:15 PM Saira Eckert  [facility] [phone] [id number] Follow-up Instructions Return if symptoms worsen or fail to improve. Upcoming Health Maintenance Date Due  
 PAP AKA CERVICAL CYTOLOGY 3/3/2017 INFLUENZA AGE 9 TO ADULT 8/1/2017 BREAST CANCER SCRN MAMMOGRAM 8/28/2017 DTaP/Tdap/Td series (3 - Td) 6/23/2024 Allergies as of 8/9/2017  Review Complete On: 8/9/2017 By: Teresa Vargas LPN Severity Noted Reaction Type Reactions Teagan Medium 07/06/2015   Systemic Hives Soy Medium 07/06/2015   Systemic Hives Bees [Sting, Bee]  06/22/2010    Not Reported This Time Cephaeline  06/22/2010    Not Reported This Time Pt. States no allergy Keflex [Cephalexin]  10/18/2010    Rash Tomato  10/18/2010    Hives Pt. States no allergy Current Immunizations  Reviewed on 7/14/2015 Name Date Influenza Vaccine 10/8/2015, 10/10/2014 TDAP Vaccine 7/1/2006 Tdap 6/23/2014  7:45 PM  
  
 Not reviewed this visit You Were Diagnosed With   
  
 Codes Comments Erythema migrans (Lyme disease)    -  Primary ICD-10-CM: A69.20 ICD-9-CM: 088.81 Nausea     ICD-10-CM: R11.0 ICD-9-CM: 787.02 Vitals BP Pulse Temp Resp Height(growth percentile) Weight(growth percentile) 109/72 (BP 1 Location: Right arm, BP Patient Position: Sitting) 66 99.2 °F (37.3 °C) (Oral) 16 5' 3\" (1.6 m) 169 lb (76.7 kg) LMP SpO2 BMI OB Status Smoking Status 07/22/2017 96% 29.94 kg/m2 Having regular periods Never Smoker Vitals History BMI and BSA Data Body Mass Index Body Surface Area  
 29.94 kg/m 2 1.85 m 2 Preferred Pharmacy Pharmacy Name Phone [id number] [address] [phone] Your Updated Medication List  
  
   
 This list is accurate as of: 8/9/17  5:30 PM.  Always use your most recent med list.  
  
  
  
  
 ALPRAZolam 0.25 mg tablet Commonly known as:  XANAX  
TAKE 1 TABLET ONCE A DAY AS NEEDED FOR ANXIETY  
  
 buPROPion  mg tablet Commonly known as:  WELLBUTRIN XL  
TAKE 1 TABLET EVERY DAY IN THE MORNING  
  
 cholecalciferol (vitamin D3) 2,000 unit Tab Take  by mouth. doxycycline 100 mg tablet Commonly known as:  ADOXA Take 1 Tab by mouth two (2) times a day for 21 days. EPINEPHrine 0.3 mg/0.3 mL injection Commonly known as:  EPIPEN 2-EDWIGE  
0.3 mL by IntraMUSCular route once as needed for up to 1 dose. FLUoxetine 20 mg capsule Commonly known as:  PROzac Take 1 Cap by mouth daily. ondansetron 8 mg disintegrating tablet Commonly known as:  ZOFRAN ODT Take 1 Tab by mouth every eight (8) hours as needed for Nausea. PROCTOSOL HC 2.5 % rectal cream  
Generic drug:  hydrocortisone INSERT INTO RECTUM TWO (2) TIMES A DAY. Prescriptions Sent to Pharmacy Refills  
 doxycycline (ADOXA) 100 mg tablet 0 Sig: Take 1 Tab by mouth two (2) times a day for 21 days. Class: Normal  
 Pharmacy: [address] [place] [address] Ph #: [phone] Route: Oral  
 ondansetron (ZOFRAN ODT) 8 mg disintegrating tablet 0 Sig: Take 1 Tab by mouth every eight (8) hours as needed for Nausea. Class: Normal  
 Pharmacy: [address] [place] [address] Ph #: [phone] Route: Oral  
  
Follow-up Instructions Return if symptoms worsen or fail to improve. Patient Instructions Lyme Disease: Care Instructions Your Care Instructions Lyme disease is a bacterial infection spread by ticks. Antibiotics can treat Lyme disease. If you do not treat Lyme disease, it can lead to problems with your skin, joints, heart, and nervous system. These problems can develop weeks, months, or even years after you get the infection. Your doctor may prescribe antibiotics even if it is not yet certain that you have Lyme disease. Follow-up care is a key part of your treatment and safety. Be sure to make and go to all appointments, and call your doctor if you are having problems. It's also a good idea to know your test results and keep a list of the medicines you take. How can you care for yourself at home? · Take your antibiotics as directed. Do not stop taking them just because you feel better. You need to take the full course of antibiotics. · Take an over-the-counter pain medicine if needed, such as acetaminophen (Tylenol), ibuprofen (Advil, Motrin), or naproxen (Aleve). Read and follow all instructions on the label. To prevent Lyme disease in the future · Avoid ticks: 
¨ Learn where ticks are found in your community, and stay away from those areas if possible. ¨ Cover as much of your body as possible when you work or play in grassy or wooded areas. ¨ Use insect repellents, such as products containing DEET. You can spray them on your skin. ¨ Take steps to control ticks on your property if you live in an area where Lyme disease occurs. Clear leaves, brush, tall grasses, woodpiles, and stone fences from around your house and the edges of your yard or garden. This may help get rid of ticks. · When you come in from outdoors, check your body for ticks, including your groin, head, and underarms. The ticks may be about the size of a poppy seed. If no one else can help you check for ticks on your scalp, comb your hair with a fine-tooth comb. · If you find a tick, remove it quickly. If you can't remove it with your fingers, use tweezers to grasp the tick as close to its mouth (the part in your skin) as possible. Slowly pull the tick straight outdo not twist or yankuntil its mouth releases from your skin. · Ticks can come into your house on clothing, outdoor gear, and pets. These ticks can fall off and attach to you. ¨ Check your clothing and outdoor gear. Remove any ticks you find. Then put your clothing in a clothes dryer on high heat for 1 hour to kill any ticks that might remain. ¨ Check your pets for ticks after they have been outdoors. · When hiking in the woods, carry a small dry jar or ziplock bag. If you find a tick on your body, remove the tick and put it in the jar or bag. Store the container in the freezer so you can give it to your doctor if symptoms develop. The tick can be tested to learn whether it is carrying the bacteria that cause Lyme disease. When should you call for help? Call your doctor now or seek immediate medical care if: 
· You have skipping or pounding heartbeats, a severe headache, or neck pain. Watch closely for changes in your health, and be sure to contact your doctor if: 
· You develop a new rash. · You are very tired. · You have joint pain (especially with redness and swelling). · You do not get better as expected. Where can you learn more? Go to http://keshawn-merissa.info/. Enter Y599 in the search box to learn more about \"Lyme Disease: Care Instructions. \" Current as of: March 3, 2017 Content Version: 11.3 © 7739-0720 [employer]. Care instructions adapted under license by [employer] (which disclaims liability or warranty for this information). If you have questions about a medical condition or this instruction, always ask your healthcare professional. [place] [address] any warranty or liability for your use of this information. Introducing \[facility]\"" & HEALTH SERVICES! Dear Missy Reilly: Thank you for requesting a [employer] account. Our records indicate that you already have an active [employer] account. You can access your account anytime at [email or web address]. [email or web address] Did you know that you can access your hospital and ER discharge instructions at any time in [employer]? You can also review all of your test results from your hospital stay or ER visit. Additional Information If you have questions, please visit the Frequently Asked Questions section of the [employer] website at [email or web address] Remember, [employer] is NOT to be used for urgent needs. For medical emergencies, dial 911. Now available from your iPhone and Android! Please provide this summary of care documentation to your next provider. Your primary care clinician is listed as [facility]. If you have any questions after today's visit, please call [phone].

## 2017-08-09 NOTE — PROGRESS NOTES
Jose Dobbs is a 52 y.o. female who was seen in clinic today (8/9/2017). Subjective:  Tick Bite  Patient complains of tick bite involving the anterior chest. Patient noticed lesion 5 days ago. Appearance of rash at onset: Color of lesion(s): pink, Texture of lesion(s): flat. Rash has changed over time. Associated symptoms: fever, arthralgia, nausea. Denies: abdominal pain, vomiting. Patient has not had previous treatment. Prior to Admission medications    Medication Sig Start Date End Date Taking? Authorizing Provider   doxycycline (ADOXA) 100 mg tablet Take 1 Tab by mouth two (2) times a day for 21 days. 8/9/17 8/30/17 Yes Annie Hayden NP   ondansetron (ZOFRAN ODT) 8 mg disintegrating tablet Take 1 Tab by mouth every eight (8) hours as needed for Nausea. 8/9/17  Yes Annie Hayden NP   buPROPion XL (WELLBUTRIN XL) 150 mg tablet TAKE 1 TABLET EVERY DAY IN THE MORNING 6/8/17  Yes Historical Provider   ALPRAZolam (XANAX) 0.25 mg tablet TAKE 1 TABLET ONCE A DAY AS NEEDED FOR ANXIETY 7/20/17  Yes Sofia Fonseca MD   FLUoxetine (PROZAC) 20 mg capsule Take 1 Cap by mouth daily. 7/20/17  Yes Sofia Fonseca MD   PROCTOSOL HC 2.5 % rectal cream INSERT INTO RECTUM TWO (2) TIMES A DAY. 5/15/17  Yes Jean Paul Araujo MD   cholecalciferol, vitamin D3, 2,000 unit tab Take  by mouth. Yes Historical Provider   EPINEPHrine (EPIPEN 2-EDWIGE) 0.3 mg/0.3 mL (1:1,000) injection 0.3 mL by IntraMUSCular route once as needed for up to 1 dose. 6/26/15  Yes Peyton Dhaliwal MD          Allergies   Allergen Reactions    Ginger Hives    Soy Hives    Bees [Sting, Bee] Not Reported This Time    Cephaeline Not Reported This Time     Pt. States no allergy    Keflex [Cephalexin] Rash    Tomato Hives     Pt. States no allergy           ROS  See HPI    Objective:   Physical Exam   Constitutional: She is oriented to person, place, and time. She appears well-developed and well-nourished.    Cardiovascular: Normal rate, regular rhythm and intact distal pulses. Exam reveals no gallop and no friction rub. No murmur heard. Pulmonary/Chest: Effort normal and breath sounds normal. No respiratory distress. Lymphadenopathy:     She has no cervical adenopathy. Right: No supraclavicular adenopathy present. Left: No supraclavicular adenopathy present. Neurological: She is alert and oriented to person, place, and time. Psychiatric: She has a normal mood and affect. Her behavior is normal. Judgment and thought content normal.   Nursing note and vitals reviewed. Visit Vitals    /72 (BP 1 Location: Right arm, BP Patient Position: Sitting)    Pulse 66    Temp 99.2 °F (37.3 °C) (Oral)    Resp 16    Ht 5' 3\" (1.6 m)    Wt 169 lb (76.7 kg)    LMP 07/22/2017    SpO2 96%    BMI 29.94 kg/m2       Assessment & Plan:  Diagnoses and all orders for this visit:    1. Erythema migrans (Lyme disease)  Warm compresses, begin empirical antibiotic therapy. -     Begin doxycycline (ADOXA) 100 mg tablet; Take 1 Tab by mouth two (2) times a day for 21 days. 2. Nausea  -     ondansetron (ZOFRAN ODT) 8 mg disintegrating tablet; Take 1 Tab by mouth every eight (8) hours as needed for Nausea. I have discussed the diagnosis with the patient and the intended plan as seen in the above orders. The patient has received an after-visit summary along with patient information handout. I have discussed medication side effects and warnings with the patient as well. Follow-up Disposition:  Return if symptoms worsen or fail to improve.         Diamond Agosto NP

## 2017-08-09 NOTE — PATIENT INSTRUCTIONS
Lyme Disease: Care Instructions  Your Care Instructions    Lyme disease is a bacterial infection spread by ticks. Antibiotics can treat Lyme disease. If you do not treat Lyme disease, it can lead to problems with your skin, joints, heart, and nervous system. These problems can develop weeks, months, or even years after you get the infection. Your doctor may prescribe antibiotics even if it is not yet certain that you have Lyme disease. Follow-up care is a key part of your treatment and safety. Be sure to make and go to all appointments, and call your doctor if you are having problems. It's also a good idea to know your test results and keep a list of the medicines you take. How can you care for yourself at home? · Take your antibiotics as directed. Do not stop taking them just because you feel better. You need to take the full course of antibiotics. · Take an over-the-counter pain medicine if needed, such as acetaminophen (Tylenol), ibuprofen (Advil, Motrin), or naproxen (Aleve). Read and follow all instructions on the label. To prevent Lyme disease in the future  · Avoid ticks:  ¨ Learn where ticks are found in your community, and stay away from those areas if possible. ¨ Cover as much of your body as possible when you work or play in grassy or wooded areas. ¨ Use insect repellents, such as products containing DEET. You can spray them on your skin. ¨ Take steps to control ticks on your property if you live in an area where Lyme disease occurs. Clear leaves, brush, tall grasses, woodpiles, and stone fences from around your house and the edges of your yard or garden. This may help get rid of ticks. · When you come in from outdoors, check your body for ticks, including your groin, head, and underarms. The ticks may be about the size of a poppy seed. If no one else can help you check for ticks on your scalp, comb your hair with a fine-tooth comb. · If you find a tick, remove it quickly.  If you can't remove it with your fingers, use tweezers to grasp the tick as close to its mouth (the part in your skin) as possible. Slowly pull the tick straight out--do not twist or yank--until its mouth releases from your skin. · Ticks can come into your house on clothing, outdoor gear, and pets. These ticks can fall off and attach to you. ¨ Check your clothing and outdoor gear. Remove any ticks you find. Then put your clothing in a clothes dryer on high heat for 1 hour to kill any ticks that might remain. ¨ Check your pets for ticks after they have been outdoors. · When hiking in the woods, carry a small dry jar or ziplock bag. If you find a tick on your body, remove the tick and put it in the jar or bag. Store the container in the freezer so you can give it to your doctor if symptoms develop. The tick can be tested to learn whether it is carrying the bacteria that cause Lyme disease. When should you call for help? Call your doctor now or seek immediate medical care if:  · You have skipping or pounding heartbeats, a severe headache, or neck pain. Watch closely for changes in your health, and be sure to contact your doctor if:  · You develop a new rash. · You are very tired. · You have joint pain (especially with redness and swelling). · You do not get better as expected. Where can you learn more? Go to http://keshawn-merissa.info/. Enter Y599 in the search box to learn more about \"Lyme Disease: Care Instructions. \"  Current as of: March 3, 2017  Content Version: 11.3  © 2523-8783 [employer]. Care instructions adapted under license by [employer] (which disclaims liability or warranty for this information). If you have questions about a medical condition or this instruction, always ask your healthcare professional. [address] any warranty or liability for your use of this information.

## 2017-12-13 ENCOUNTER — HOSPITAL ENCOUNTER (OUTPATIENT)
Dept: MAMMOGRAPHY | Age: 47
Discharge: HOME | End: 2017-12-13
Attending: OBSTETRICS & GYNECOLOGY
Payer: COMMERCIAL

## 2017-12-13 DIAGNOSIS — Z12.31 VISIT FOR SCREENING MAMMOGRAM: ICD-10-CM

## 2017-12-13 PROCEDURE — 77067 SCR MAMMO BI INCL CAD: CPT

## 2017-12-20 ENCOUNTER — HOSPITAL ENCOUNTER (OUTPATIENT)
Dept: MAMMOGRAPHY | Age: 47
Discharge: HOME | End: 2017-12-20
Attending: OBSTETRICS & GYNECOLOGY
Payer: COMMERCIAL

## 2017-12-20 DIAGNOSIS — R92.8 ABNORMAL MAMMOGRAM: ICD-10-CM

## 2017-12-20 PROCEDURE — 76642 ULTRASOUND BREAST LIMITED: CPT

## 2017-12-20 PROCEDURE — 77065 DX MAMMO INCL CAD UNI: CPT

## 2018-03-02 ENCOUNTER — OFFICE VISIT (OUTPATIENT)
Dept: FAMILY MEDICINE CLINIC | Age: 48
End: 2018-03-02

## 2018-03-02 VITALS
BODY MASS INDEX: 31.89 KG/M2 | HEART RATE: 78 BPM | WEIGHT: 180 LBS | DIASTOLIC BLOOD PRESSURE: 81 MMHG | SYSTOLIC BLOOD PRESSURE: 115 MMHG | RESPIRATION RATE: 18 BRPM | TEMPERATURE: 97.7 F | OXYGEN SATURATION: 95 % | HEIGHT: 63 IN

## 2018-03-02 DIAGNOSIS — E66.9 OBESITY (BMI 30-39.9): ICD-10-CM

## 2018-03-02 DIAGNOSIS — Z13.1 SCREENING FOR DIABETES MELLITUS: Primary | ICD-10-CM

## 2018-03-02 PROBLEM — S02.401A MAXILLARY SINUS FRACTURE, CLOSED, INITIAL ENCOUNTER (HCC): Status: ACTIVE | Noted: 2018-03-02

## 2018-03-02 PROBLEM — S02.401A MAXILLARY SINUS FRACTURE, CLOSED, INITIAL ENCOUNTER (HCC): Status: RESOLVED | Noted: 2018-03-02 | Resolved: 2018-03-02

## 2018-03-02 LAB — HBA1C MFR BLD HPLC: 5.4 %

## 2018-03-02 RX ORDER — BUPROPION HYDROCHLORIDE 300 MG/1
TABLET ORAL
Refills: 2 | COMMUNITY
Start: 2018-01-23 | End: 2019-11-18 | Stop reason: ALTCHOICE

## 2018-03-02 RX ORDER — FLUOXETINE HYDROCHLORIDE 40 MG/1
CAPSULE ORAL
Refills: 2 | COMMUNITY
Start: 2018-01-23 | End: 2019-11-18 | Stop reason: SDUPTHER

## 2018-03-02 RX ORDER — OXYBUTYNIN CHLORIDE 5 MG/1
TABLET ORAL
Refills: 0 | COMMUNITY
Start: 2017-12-13 | End: 2018-03-02

## 2018-03-02 RX ORDER — BREXPIPRAZOLE 1 MG/1
TABLET ORAL
Refills: 2 | COMMUNITY
Start: 2018-01-15 | End: 2020-05-27 | Stop reason: SDUPTHER

## 2018-03-02 NOTE — PROGRESS NOTES
Chief Complaint   Patient presents with    Cholesterol Problem     1. Have you been to the ER, urgent care clinic since your last visit? Hospitalized since your last visit? No    2. Have you seen or consulted any other health care providers outside of the [address] [place] [address] since your last visit? Include any pap smears or colon screening.  [facility]
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
## 2018-03-02 NOTE — MR AVS SNAPSHOT
[address] [facility] [address] 
 
 
 [address] [place] 
[phone] Patient: Redd Rose MRN: [id number] LRI:[birth year] Visit Information Date & Time Provider Department Dept. Phone Encounter #  
 3/2/2018 10:00 AM Mariana Marin  [facility] [phone] [id number] Follow-up Instructions Return in about 6 months (around 9/2/2018) for Complete Physical.  
  
Your Appointments 9/20/2018  3:15 PM  
ROUTINE CARE with Mariana Marin  [facility] ([facility]) Appt Note: 6 month follow up  
 [address] [id number]  
[phone]  
  
   
 [address] [phone] Upcoming Health Maintenance Date Due  
 PAP AKA CERVICAL CYTOLOGY 3/3/2018* BREAST CANCER SCRN MAMMOGRAM 12/20/2019 DTaP/Tdap/Td series (3 - Td) 6/23/2024 *Topic was postponed. The date shown is not the original due date. Allergies as of 3/2/2018  Review Complete On: 8/9/2017 By: Live Locke NP Severity Noted Reaction Type Reactions Teagan Medium 07/06/2015   Systemic Hives Soy Medium 07/06/2015   Systemic Hives Bees [Sting, Bee]  06/22/2010    Not Reported This Time Cephaeline  06/22/2010    Not Reported This Time Pt. States no allergy Keflex [Cephalexin]  10/18/2010    Rash Tomato  10/18/2010    Hives Pt. States no allergy Current Immunizations  Reviewed on 7/14/2015 Name Date Influenza Vaccine 10/8/2015, 10/10/2014 TDAP Vaccine 7/1/2006 Tdap 6/23/2014  7:45 PM  
  
 Not reviewed this visit You Were Diagnosed With   
  
 Codes Comments Screening for diabetes mellitus    -  Primary ICD-10-CM: Z13.1 ICD-9-CM: V77.1 Obesity (BMI 30-39. 9)     ICD-10-CM: E66.9 ICD-9-CM: 278.00 Vitals BP Pulse Temp Resp Height(growth percentile) Weight(growth percentile) 115/81 (BP 1 Location: Left arm, BP Patient Position: Sitting) 78 97.7 °F (36.5 °C) (Oral) 18 5' 3\" (1.6 m) 180 lb (81.6 kg) SpO2 BMI OB Status Smoking Status 95% 31.89 kg/m2 IUD Never Smoker Vitals History BMI and BSA Data Body Mass Index Body Surface Area  
 31.89 kg/m 2 1.9 m 2 Preferred Pharmacy Pharmacy Name Phone [id number] [place], [address] [phone] Your Updated Medication List  
  
   
This list is accurate as of 3/2/18 12:54 PM.  Always use your most recent med list.  
  
  
  
  
 buPROPion  mg XL tablet Commonly known as:  WELLBUTRIN XL  
TAKE 1 TABLET BY MOUTH EVERY MORNING  
  
 cholecalciferol (vitamin D3) 2,000 unit Tab Take  by mouth. EPINEPHrine 0.3 mg/0.3 mL injection Commonly known as:  EPIPEN 2-EDWIGE  
0.3 mL by IntraMUSCular route once as needed for up to 1 dose. FLUoxetine 40 mg capsule Commonly known as:  PROzac TAKE ONE CAPSULE BY MOUTH ONCE DAILY  
  
 ondansetron 8 mg disintegrating tablet Commonly known as:  ZOFRAN ODT Take 1 Tab by mouth every eight (8) hours as needed for Nausea. REXULTI 1 mg Tab tablet Generic drug:  brexpiprazole TAKE 1 TABLET BY MOUTH EVERY DAY We Performed the Following AMB POC HEMOGLOBIN A1C [89734 CPT(R)] Follow-up Instructions Return in about 6 months (around 9/2/2018) for Complete Physical.  
  
  
Patient Instructions Body Mass Index: Care Instructions Your Care Instructions Body mass index (BMI) can help you see if your weight is raising your risk for health problems. It uses a formula to compare how much you weigh with how tall you are. · A BMI lower than 18.5 is considered underweight. · A BMI between 18.5 and 24.9 is considered healthy. · A BMI between 25 and 29.9 is considered overweight. A BMI of 30 or higher is considered obese. If your BMI is in the normal range, it means that you have a lower risk for weight-related health problems. If your BMI is in the overweight or obese range, you may be at increased risk for weight-related health problems, such as high blood pressure, heart disease, stroke, arthritis or joint pain, and diabetes. If your BMI is in the underweight range, you may be at increased risk for health problems such as fatigue, lower protection (immunity) against illness, muscle loss, bone loss, hair loss, and hormone problems. BMI is just one measure of your risk for weight-related health problems. You may be at higher risk for health problems if you are not active, you eat an unhealthy diet, or you drink too much alcohol or use tobacco products. Follow-up care is a key part of your treatment and safety. Be sure to make and go to all appointments, and call your doctor if you are having problems. It's also a good idea to know your test results and keep a list of the medicines you take. How can you care for yourself at home? · Practice healthy eating habits. This includes eating plenty of fruits, vegetables, whole grains, lean protein, and low-fat dairy. · If your doctor recommends it, get more exercise. Walking is a good choice. Bit by bit, increase the amount you walk every day. Try for at least 30 minutes on most days of the week. · Do not smoke. Smoking can increase your risk for health problems. If you need help quitting, talk to your doctor about stop-smoking programs and medicines. These can increase your chances of quitting for good. · Limit alcohol to 2 drinks a day for men and 1 drink a day for women. Too much alcohol can cause health problems. If you have a BMI higher than 25 · Your doctor may do other tests to check your risk for weight-related health problems.  This may include measuring the distance around your waist. A waist measurement of more than 40 inches in men or 35 inches in women can increase the risk of weight-related health problems. · Talk with your doctor about steps you can take to stay healthy or improve your health. You may need to make lifestyle changes to lose weight and stay healthy, such as changing your diet and getting regular exercise. If you have a BMI lower than 18.5 · Your doctor may do other tests to check your risk for health problems. · Talk with your doctor about steps you can take to stay healthy or improve your health. You may need to make lifestyle changes to gain or maintain weight and stay healthy, such as getting more healthy foods in your diet and doing exercises to build muscle. Where can you learn more? Go to http://keshawn-merissa.info/. Enter S176 in the search box to learn more about \"Body Mass Index: Care Instructions. \" Current as of: October 13, 2016 Content Version: 11.4 © 4761-3875 [employer]. Care instructions adapted under license by [employer] (which disclaims liability or warranty for this information). If you have questions about a medical condition or this instruction, always ask your healthcare professional. [address] any warranty or liability for your use of this information. Introducing [facility]! Dear Zandra Chavez: Thank you for requesting a [employer] account. Our records indicate that you already have an active [employer] account. You can access your account anytime at [email or web address] Did you know that you can access your hospital and ER discharge instructions at any time in [employer]? You can also review all of your test results from your hospital stay or ER visit. Additional Information If you have questions, please visit the Frequently Asked Questions section of the [employer] website at [email or web address] Remember, [employer] is NOT to be used for urgent needs. For medical emergencies, dial 911. Now available from your iPhone and Android! Please provide this summary of care documentation to your next provider. Your primary care clinician is listed as [place] [facility]. If you have any questions after today's visit, please call [phone].

## 2018-03-02 NOTE — PATIENT INSTRUCTIONS
Body Mass Index: Care Instructions  Your Care Instructions    Body mass index (BMI) can help you see if your weight is raising your risk for health problems. It uses a formula to compare how much you weigh with how tall you are. · A BMI lower than 18.5 is considered underweight. · A BMI between 18.5 and 24.9 is considered healthy. · A BMI between 25 and 29.9 is considered overweight. A BMI of 30 or higher is considered obese. If your BMI is in the normal range, it means that you have a lower risk for weight-related health problems. If your BMI is in the overweight or obese range, you may be at increased risk for weight-related health problems, such as high blood pressure, heart disease, stroke, arthritis or joint pain, and diabetes. If your BMI is in the underweight range, you may be at increased risk for health problems such as fatigue, lower protection (immunity) against illness, muscle loss, bone loss, hair loss, and hormone problems. BMI is just one measure of your risk for weight-related health problems. You may be at higher risk for health problems if you are not active, you eat an unhealthy diet, or you drink too much alcohol or use tobacco products. Follow-up care is a key part of your treatment and safety. Be sure to make and go to all appointments, and call your doctor if you are having problems. It's also a good idea to know your test results and keep a list of the medicines you take. How can you care for yourself at home? · Practice healthy eating habits. This includes eating plenty of fruits, vegetables, whole grains, lean protein, and low-fat dairy. · If your doctor recommends it, get more exercise. Walking is a good choice. Bit by bit, increase the amount you walk every day. Try for at least 30 minutes on most days of the week. · Do not smoke. Smoking can increase your risk for health problems. If you need help quitting, talk to your doctor about stop-smoking programs and medicines. These can increase your chances of quitting for good. · Limit alcohol to 2 drinks a day for men and 1 drink a day for women. Too much alcohol can cause health problems. If you have a BMI higher than 25  · Your doctor may do other tests to check your risk for weight-related health problems. This may include measuring the distance around your waist. A waist measurement of more than 40 inches in men or 35 inches in women can increase the risk of weight-related health problems. · Talk with your doctor about steps you can take to stay healthy or improve your health. You may need to make lifestyle changes to lose weight and stay healthy, such as changing your diet and getting regular exercise. If you have a BMI lower than 18.5  · Your doctor may do other tests to check your risk for health problems. · Talk with your doctor about steps you can take to stay healthy or improve your health. You may need to make lifestyle changes to gain or maintain weight and stay healthy, such as getting more healthy foods in your diet and doing exercises to build muscle. Where can you learn more? Go to http://keshawn-merissa.info/. Enter S176 in the search box to learn more about \"Body Mass Index: Care Instructions. \"  Current as of: October 13, 2016  Content Version: 11.4  © 5490-8046 [employer]. Care instructions adapted under license by [employer] (which disclaims liability or warranty for this information). If you have questions about a medical condition or this instruction, always ask your healthcare professional. [address] any warranty or liability for your use of this information.

## 2018-03-23 DIAGNOSIS — R11.0 NAUSEA: ICD-10-CM

## 2018-03-23 RX ORDER — ONDANSETRON 8 MG/1
TABLET, ORALLY DISINTEGRATING ORAL
Qty: 20 TAB | Refills: 0 | Status: SHIPPED | OUTPATIENT
Start: 2018-03-23 | End: 2018-09-27

## 2018-03-25 ENCOUNTER — OFFICE VISIT (OUTPATIENT)
Dept: URGENT CARE | Age: 48
End: 2018-03-25

## 2018-03-25 VITALS
HEART RATE: 78 BPM | WEIGHT: 182.8 LBS | BODY MASS INDEX: 32.39 KG/M2 | TEMPERATURE: 98.3 F | SYSTOLIC BLOOD PRESSURE: 128 MMHG | HEIGHT: 63 IN | RESPIRATION RATE: 16 BRPM | DIASTOLIC BLOOD PRESSURE: 69 MMHG | OXYGEN SATURATION: 97 %

## 2018-03-25 DIAGNOSIS — J01.10 ACUTE NON-RECURRENT FRONTAL SINUSITIS: Primary | ICD-10-CM

## 2018-03-25 RX ORDER — AMOXICILLIN AND CLAVULANATE POTASSIUM 875; 125 MG/1; MG/1
1 TABLET, FILM COATED ORAL EVERY 12 HOURS
Qty: 20 TAB | Refills: 0 | Status: SHIPPED | OUTPATIENT
Start: 2018-03-25 | End: 2018-04-04

## 2018-03-25 NOTE — PROGRESS NOTES
Patient is a 52 y.o. female presenting with sinus pain. The history is provided by the patient. History limited by: nothing. Sinus Pain   This is a new problem. Episode onset: 1 week ago. The problem occurs constantly. The problem has not changed since onset. Associated symptoms include headaches. Pertinent negatives include no chest pain, no abdominal pain and no shortness of breath. Nothing aggravates the symptoms. Nothing relieves the symptoms. Treatments tried: NSAIDs. The treatment provided mild relief. The patient presents to the urgent care clinic with a headache x1 week. States that she awoke with the headache 1 week ago. Endorses photophobia (mild), denies phonophobia. Denies history of headache. States that the pain is currently 6/10 but reports that it waxes and wanes in intensity. Denies ear pain, fevers, chills, cough. Reports some mild rhinorrhea. States that the headache is located around the eyes in the forehead and cheeks. States that she took some advil with mild relief. Also endorses nausea but denies vomiting. States that she has had some mild left hand tingling, no weakness, no arm involvement. Noted that her BP was more elevated than her normal (was 135/90 yesterday, usually about 110/80). Denies blurry vision, denies visual disturbances, denies dizziness.     Past Medical History:   Diagnosis Date    Back pain, acute     Depression     H/O seasonal allergies     Maxillary sinus fracture ([facility] [address] 2016    Neck pain, musculoskeletal         Past Surgical History:   Procedure Laterality Date    HX CERVICAL DISKECTOMY Left 2015    HX CERVICAL FUSION Left 2015     HX  SECTION      HX GYN  1998    laproscopsy     HX WISDOM TEETH EXTRACTION           Family History   Problem Relation Age of Onset    Cancer Father      pancreas, Liver    Cancer Maternal Grandmother     Breast Cancer Maternal Grandmother     Heart Disease Maternal Grandfather     Heart Disease Paternal Grandmother     Stroke Paternal Grandfather     Other Mother      meningioma    Hypertension Mother     Breast Cancer Mother 61   [facility] Anesth Problems Mother      SLOW TO WAKE UP    Stroke Mother     Hypertension Sister     No Known Problems Daughter     No Known Problems Daughter     No Known Problems Son         Social History     Social History    Marital status:      Spouse name: N/A    Number of children: N/A    Years of education: N/A     Occupational History    Not on file. Social History Main Topics    Smoking status: Never Smoker    Smokeless tobacco: Never Used    Alcohol use 0.0 oz/week     0 Standard drinks or equivalent per week      Comment: occasional    Drug use: No    Sexual activity: Yes     Partners: Male     Birth control/ protection: Surgical      Comment:       Other Topics Concern    Not on file     Social History Narrative    Works [employer]    Going through divorce 2017    Member of [employer], has good friends there                ALLERGIES: Teagan; Soy; Bees [sting, bee]; Cephaeline; Keflex [cephalexin]; and Tomato    Review of Systems   Constitutional: Negative for activity change, chills, fatigue and fever. HENT: Positive for congestion, postnasal drip, rhinorrhea and sinus pain. Negative for ear discharge, facial swelling, hearing loss and sore throat. Eyes: Negative for discharge. Respiratory: Negative for cough, chest tightness, shortness of breath and wheezing. Cardiovascular: Negative for chest pain. Gastrointestinal: Positive for nausea. Negative for abdominal distention, abdominal pain and vomiting. Genitourinary: Negative for dysuria. Musculoskeletal: Negative for back pain. Skin: Negative for pallor and rash. Neurological: Positive for headaches. Negative for weakness and numbness.        Vitals:    03/25/18 0949   BP: 128/69   Pulse: 78   Resp: 16   Temp: 98.3 °F (36.8 °C)   SpO2: 97%   Weight: 182 lb 12.8 oz (82.9 kg)   Height: 5' 3\" (1.6 m)       Physical Exam   Constitutional: She is oriented to person, place, and time. She appears well-developed and well-nourished. No distress. HENT:   Head: Normocephalic and atraumatic. Right Ear: External ear normal.   Left Ear: External ear normal.   Nose: Nose normal.   Mouth/Throat: Oropharynx is clear and moist. No oropharyngeal exudate. Eyes: Conjunctivae and EOM are normal. Pupils are equal, round, and reactive to light. Neck: Normal range of motion. Neck supple. Cardiovascular: Normal rate, regular rhythm and normal heart sounds. Pulmonary/Chest: Effort normal and breath sounds normal.   Abdominal: Soft. Bowel sounds are normal.   Musculoskeletal: Normal range of motion.  strength equal bilaterally, radial pulses palpable and equal, sensation equal ant intact bilaterally to upper extremities   Neurological: She is alert and oriented to person, place, and time. Skin: Skin is warm and dry. Psychiatric: She has a normal mood and affect. Nursing note and vitals reviewed. [facility]      ICD-10-CM ICD-9-CM    1. Acute non-recurrent frontal sinusitis J01.10 461.1      Medications Ordered Today   Medications    amoxicillin-clavulanate (AUGMENTIN) 875-125 mg per tablet     Sig: Take 1 Tab by mouth every twelve (12) hours for 10 days. Dispense:  20 Tab     Refill:  0     The patients condition was discussed with the patient and they understand. The patient is to follow up with primary care doctor ,If signs and symptoms become worse the pt is to go to the ER. The patient is to take medications as prescribed. CLINICAL IMPRESSION:  1. Acute non-recurrent frontal sinusitis        Plan:  1. Patient presenting to clinic with frontal headache x1 week, states she awoke with the headache. Reporting some rhinorrhea. No focal neurological deficits. Vitals stable. 2. Will rx augmentin for sinusitis  3.  Follow up with PCP this week to ensure resolution of symptoms  4.  Go to ED with any worsening symptoms       Procedures   Yomi Thompson NP

## 2018-03-25 NOTE — MR AVS SNAPSHOT
[address] [place] [address] 
[phone] Patient: Abimael Cedeno MRN: [id number] FNA:[id number] Visit Information Date & Time Provider Department Dept. Phone Encounter #  
 3/25/2018  9:45 AM [address] [facility] [phone] [id number] Your Appointments 9/20/2018  3:15 PM  
ROUTINE CARE with Ema Lu  [facility] ([address] [facility]) Appt Note: 6 month follow up  
 [address] [address]  
[phone]  
  
   
 [address] [address] Upcoming Health Maintenance Date Due  
 PAP AKA CERVICAL CYTOLOGY 3/3/2017 BREAST CANCER SCRN MAMMOGRAM 12/20/2019 DTaP/Tdap/Td series (3 - Td) 6/23/2024 Allergies as of 3/25/2018  Review Complete On: 3/25/2018 By: Lenka Gabriel RN Severity Noted Reaction Type Reactions Teagan Medium 07/06/2015   Systemic Hives Soy Medium 07/06/2015   Systemic Hives Bees [Sting, Bee]  06/22/2010    Not Reported This Time Cephaeline  06/22/2010    Not Reported This Time Pt. States no allergy Keflex [Cephalexin]  10/18/2010    Rash Tomato  10/18/2010    Hives Pt. States no allergy Current Immunizations  Reviewed on 7/14/2015 Name Date Influenza Vaccine 10/8/2015, 10/10/2014 TDAP Vaccine 7/1/2006 Tdap 6/23/2014  7:45 PM  
  
 Not reviewed this visit You Were Diagnosed With   
  
 Codes Comments Acute non-recurrent frontal sinusitis    -  Primary ICD-10-CM: J01.10 ICD-9-CM: 726.9 Vitals BP Pulse Temp Resp Height(growth percentile) Weight(growth percentile) 128/69 78 98.3 °F (36.8 °C) 16 5' 3\" (1.6 m) 182 lb 12.8 oz (82.9 kg) SpO2 BMI OB Status Smoking Status 97% 32.38 kg/m2 IUD Never Smoker BMI and BSA Data Body Mass Index Body Surface Area  
 32.38 kg/m 2 1.92 m 2 Preferred Pharmacy Pharmacy Name Phone [id number] Your Updated Medication List  
  
   
This list is accurate as of 3/25/18 10:18 AM.  Always use your most recent med list.  
  
  
  
  
 amoxicillin-clavulanate 875-125 mg per tablet Commonly known as:  AUGMENTIN Take 1 Tab by mouth every twelve (12) hours for 10 days. buPROPion  mg XL tablet Commonly known as:  WELLBUTRIN XL  
TAKE 1 TABLET BY MOUTH EVERY MORNING  
  
 cholecalciferol (vitamin D3) 2,000 unit Tab Take  by mouth. EPINEPHrine 0.3 mg/0.3 mL injection Commonly known as:  EPIPEN 2-EDWIGE  
0.3 mL by IntraMUSCular route once as needed for up to 1 dose. FLUoxetine 40 mg capsule Commonly known as:  PROzac TAKE ONE CAPSULE BY MOUTH ONCE DAILY  
  
 ondansetron 8 mg disintegrating tablet Commonly known as:  ZOFRAN ODT  
DISSOLVE 1 TABLET UNDER TONGUE EVERY 8 HOURS AS NEEDED FOR NAUSEA REXULTI 1 mg Tab tablet Generic drug:  brexpiprazole TAKE 1 TABLET BY MOUTH EVERY DAY Prescriptions Sent to Pharmacy Refills  
 amoxicillin-clavulanate (AUGMENTIN) 875-125 mg per tablet 0 Sig: Take 1 Tab by mouth every twelve (12) hours for 10 days. Class: Normal  
 Pharm[facility] [address] [facility] #: [phone] Route: Oral  
  
Patient Instructions Sinusitis: Care Instructions Your Care Instructions Sinusitis is an infection of the lining of the sinus cavities in your head. Sinusitis often follows a cold. It causes pain and pressure in your head and face. In most cases, sinusitis gets better on its own in 1 to 2 weeks. But some mild symptoms may last for several weeks. Sometimes antibiotics are needed. Follow-up care is a key part of your treatment and safety.  Be sure to make and go to all appointments, and call your doctor if you are having problems. It's also a good idea to know your test results and keep a list of the medicines you take. How can you care for yourself at home? · Take an over-the-counter pain medicine, such as acetaminophen (Tylenol), ibuprofen (Advil, Motrin), or naproxen (Aleve). Read and follow all instructions on the label. · If the doctor prescribed antibiotics, take them as directed. Do not stop taking them just because you feel better. You need to take the full course of antibiotics. · Be careful when taking over-the-counter cold or flu medicines and Tylenol at the same time. Many of these medicines have acetaminophen, which is Tylenol. Read the labels to make sure that you are not taking more than the recommended dose. Too much acetaminophen (Tylenol) can be harmful. · Breathe warm, moist air from a steamy shower, a hot bath, or a sink filled with hot water. Avoid cold, dry air. Using a humidifier in your home may help. Follow the directions for cleaning the machine. · Use saline (saltwater) nasal washes to help keep your nasal passages open and wash out mucus and bacteria. You can buy saline nose drops at a grocery store or drugstore. Or you can make your own at home by adding 1 teaspoon of salt and 1 teaspoon of baking soda to 2 cups of distilled water. If you make your own, fill a bulb syringe with the solution, insert the tip into your nostril, and squeeze gently. Shannen Vancehs your nose. · Put a hot, wet towel or a warm gel pack on your face 3 or 4 times a day for 5 to 10 minutes each time. · Try a decongestant nasal spray like oxymetazoline (Afrin). Do not use it for more than 3 days in a row. Using it for more than 3 days can make your congestion worse. When should you call for help? Call your doctor now or seek immediate medical care if: 
? · You have new or worse swelling or redness in your face or around your eyes. ? · You have a new or higher fever. ?Watch closely for changes in your health, and be sure to contact your doctor if: 
? · You have new or worse facial pain. ? · The mucus from your nose becomes thicker (like pus) or has new blood in it. ? · You are not getting better as expected. Where can you learn more? Go to http://keshawn-merissa.info/. Enter P892 in the search box to learn more about \"Sinusitis: Care Instructions. \" Current as of: May 12, 2017 Content Version: 11.4 © 1861-2056 [employer]. Care instructions adapted under license by Familiar (which disclaims liability or warranty for this information). If you have questions about a medical condition or this instruction, always ask your healthcare professional. [address] any warranty or liability for your use of this information. Introducing [facility]! Dear Nader Kelley: Thank you for requesting a BioSET account. Our records indicate that you already have an active BioSET account. You can access your account anytime at [email or web address] Did you know that you can access your hospital and ER discharge instructions at any time in BioSET? You can also review all of your test results from your hospital stay or ER visit. Additional Information If you have questions, please visit the Frequently Asked Questions section of the BioSET website at [email or web address] Remember, BioSET is NOT to be used for urgent needs. For medical emergencies, dial 911. Now available from your iPhone and Android! Please provide this summary of care documentation to your next provider. Your primary care clinician is listed as Latonia Beltran. If you have any questions after today's visit, please call [phone].

## 2018-03-25 NOTE — PATIENT INSTRUCTIONS
Sinusitis: Care Instructions  Your Care Instructions    Sinusitis is an infection of the lining of the sinus cavities in your head. Sinusitis often follows a cold. It causes pain and pressure in your head and face. In most cases, sinusitis gets better on its own in 1 to 2 weeks. But some mild symptoms may last for several weeks. Sometimes antibiotics are needed. Follow-up care is a key part of your treatment and safety. Be sure to make and go to all appointments, and call your doctor if you are having problems. It's also a good idea to know your test results and keep a list of the medicines you take. How can you care for yourself at home? · Take an over-the-counter pain medicine, such as acetaminophen (Tylenol), ibuprofen (Advil, Motrin), or naproxen (Aleve). Read and follow all instructions on the label. · If the doctor prescribed antibiotics, take them as directed. Do not stop taking them just because you feel better. You need to take the full course of antibiotics. · Be careful when taking over-the-counter cold or flu medicines and Tylenol at the same time. Many of these medicines have acetaminophen, which is Tylenol. Read the labels to make sure that you are not taking more than the recommended dose. Too much acetaminophen (Tylenol) can be harmful. · Breathe warm, moist air from a steamy shower, a hot bath, or a sink filled with hot water. Avoid cold, dry air. Using a humidifier in your home may help. Follow the directions for cleaning the machine. · Use saline (saltwater) nasal washes to help keep your nasal passages open and wash out mucus and bacteria. You can buy saline nose drops at a grocery store or drugstore. Or you can make your own at home by adding 1 teaspoon of salt and 1 teaspoon of baking soda to 2 cups of distilled water. If you make your own, fill a bulb syringe with the solution, insert the tip into your nostril, and squeeze gently. Tobias Raider your nose.   · Put a hot, wet towel or a warm gel pack on your face 3 or 4 times a day for 5 to 10 minutes each time. · Try a decongestant nasal spray like oxymetazoline (Afrin). Do not use it for more than 3 days in a row. Using it for more than 3 days can make your congestion worse. When should you call for help? Call your doctor now or seek immediate medical care if:  ? · You have new or worse swelling or redness in your face or around your eyes. ? · You have a new or higher fever. ? Watch closely for changes in your health, and be sure to contact your doctor if:  ? · You have new or worse facial pain. ? · The mucus from your nose becomes thicker (like pus) or has new blood in it. ? · You are not getting better as expected. Where can you learn more? Go to http://keshawn-merissa.info/. Enter N249 in the search box to learn more about \"Sinusitis: Care Instructions. \"  Current as of: May 12, 2017  Content Version: 11.4  © 9118-7432 [employer]. Care instructions adapted under license by apta.me (which disclaims liability or warranty for this information). If you have questions about a medical condition or this instruction, always ask your healthcare professional. [address] any warranty or liability for your use of this information.

## 2018-05-16 DIAGNOSIS — M62.830 SPASM OF MUSCLE, BACK: Primary | ICD-10-CM

## 2018-05-16 RX ORDER — METHYLPREDNISOLONE 4 MG/1
TABLET ORAL
Qty: 1 DOSE PACK | Refills: 0 | Status: SHIPPED | OUTPATIENT
Start: 2018-05-16 | End: 2018-09-27 | Stop reason: ALTCHOICE

## 2018-06-12 ENCOUNTER — HOSPITAL ENCOUNTER (OUTPATIENT)
Dept: PHYSICAL THERAPY | Age: 48
Discharge: HOME | End: 2018-06-12
Payer: COMMERCIAL

## 2018-06-12 PROCEDURE — 97110 THERAPEUTIC EXERCISES: CPT | Performed by: PHYSICAL THERAPIST

## 2018-06-12 PROCEDURE — 97161 PT EVAL LOW COMPLEX 20 MIN: CPT | Performed by: PHYSICAL THERAPIST

## 2018-06-12 NOTE — PROGRESS NOTES
[facility] Physical Therapy  [address]  [place]  Phone: [phone]  Fax: [phone]    Plan of Care/Statement of Necessity for Physical Therapy Services  2-15    Patient name: Nuno Mazariegos  : 1970  Provider#: [id number]  Referral source: Ysabel Winn MD      Medical/Treatment Diagnosis: Lower back pain [M54.5]     Prior Hospitalization: see medical history     Comorbidities: see evaluation  Prior Level of Function:see evaluation  Medications: Verified on Patient Summary List  Start of Care: 2018     Onset Date:see evaluation   The Plan of Care and following information is based on the information from the initial evaluation.     Assessment/ key information: Patient presents with signs and symptoms consistent with acute on chronic LBP  and will benefit from physical therapy to address deficits noted below in problem list.     Evaluation Complexity History LOW Complexity : Zero comorbidities / personal factors that will impact the outcome / POC; Examination LOW Complexity : 1-2 Standardized tests and measures addressing body structure, function, activity limitation and / or participation in recreation  ;Presentation LOW Complexity : Stable, uncomplicated  ;Clinical Decision Making MEDIUM Complexity : FOTO score of 26-74  Overall Complexity Rating: LOW     Problem List: pain affecting function, decrease ROM, decrease strength, decrease ADL/ functional abilitiies, decrease activity tolerance, decrease flexibility/ joint mobility and decrease transfer abilities   Treatment Plan may include any combination of the following: Therapeutic exercise, Therapeutic activities, Neuromuscular re-education, Physical agent/modality, Manual therapy, Patient education and Self Care training  Patient / Family readiness to learn indicated by: asking questions, trying to perform skills and interest  Persons(s) to be included in education: patient (P)  Barriers to Learning/Limitations: None  Patient Goal (s): please see evaluation in Connect Care  Patient Self Reported Health Status: please see paper chart  Rehabilitation Potential: good    Short Term Goals: To be accomplished in 5 treatments:  -Independent in HEP as evidenced on ability to perform at least 5 exercises from HEP using proper form without verbal cuing.   -Pain less than or equal to 8/10 at worst to allow patient to perform ADL's with greater ease  -Demostrate proper posture in order to decrease lumbar pain  -Pt will report compliance with icing 1-2x/day in order to decrease inflammation     Long Term Goals: To be accomplished in 10 treatments:  -AROM lumbar spine full and pain-free all plane  -(-) MARISA (R) hip  -Pain 0/10 to allow patient to do laundry without pain  -Sit to stand x 10 without pain  -FOTO score greater than or equal to 65 to allow patient to get in and out of car without pain    Frequency / Duration: Patient to be seen 1-2 times per week for 8-10 weeks. Patient/ Caregiver education and instruction: self care, activity modification and exercises    [x]  Plan of care has been reviewed with PTA    Certification Period: 6/12/2018 -  9/10/18    Wayne Sanchez. Quinten PT, DPT, CMTPT      8/23/1853 6:81 PM  PT License Number: [id number]  _____________________________________________________________________    I certify that the above Therapy Services are being furnished while the patient is under my care. I agree with the treatment plan and certify that this therapy is necessary.     [de-identified] Signature:____________________  Date:____________Time:_________
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
## 2018-06-14 ENCOUNTER — HOSPITAL ENCOUNTER (OUTPATIENT)
Dept: PHYSICAL THERAPY | Age: 48
Discharge: HOME | End: 2018-06-14
Payer: COMMERCIAL

## 2018-06-14 ENCOUNTER — APPOINTMENT (OUTPATIENT)
Dept: PHYSICAL THERAPY | Age: 48
End: 2018-06-14
Payer: COMMERCIAL

## 2018-06-14 PROCEDURE — 97110 THERAPEUTIC EXERCISES: CPT | Performed by: PHYSICAL THERAPY ASSISTANT

## 2018-06-15 NOTE — PROGRESS NOTES
PT DAILY TREATMENT NOTE 2-15    Patient Name: Arabella Shah  Date:18  : 1970  [x]  Patient  Verified  Payor: Gabbi Brown / Plan: Terry Salazar / Product Type: PPO /    In time:3:00 PM  Out time:3:35 PM  Total Treatment Time (min): 30 (timed 15)  Visit #: 2     Treatment Area: Lower back pain [M54.5]    SUBJECTIVE  Pain Level (0-10 scale): 4/10  Any medication changes, allergies to medications, adverse drug reactions, diagnosis change, or new procedure performed?: [x] No    [] Yes (see summary sheet for update)  Subjective functional status/changes:   [] No changes reported  Patient reports compliance with HEP and ice/heat application. States increased soreness after initial visit but no change in symptoms.      OBJECTIVE    Modality rationale: decrease inflammation, decrease pain and increase tissue extensibility to improve the patients ability to sit to stand, lifting legs to get into pants, getting in and out of car, prolonged sitting, driving, carrying laundry basket   Min Type Additional Details    [] Estim: []Att   []Unatt        []TENS instruct                  []IFC  []Premod   []NMES                     []Other:  []w/US   []w/ice   []w/heat  Position:  Location:    []  Traction: [] Cervical       []Lumbar                       [] Prone          []Supine                       []Intermittent   []Continuous Lbs:  [] before manual  [] after manual  []w/heat    []  Ultrasound: []Continuous   [] Pulsed at:                            []1MHz   []3MHz Location:  W/cm2:    []  Paraffin         Location:  []w/heat   15 []  Ice     [x]  Heat  []  Ice massage Position: supine with LE's elevated on wedge  Location: lumbar spine    []  Laser  []  Other: Position:  Location:    []  Vasopneumatic Device Pressure:       [] lo [] med [] hi   Temperature:    [x] Skin assessment post-treatment:  [x]intact []redness- no adverse reaction    []redness  adverse reaction:     15 min Therapeutic Exercise:  [x] See flow sheet : reviewed HEP   Rationale: increase ROM, increase strength and improve coordination to improve the patients ability to sit to stand, lifting legs to get into pants, getting in and out of car, prolonged sitting, driving, carrying laundry basket     min Manual Therapy:     Rationale: decrease pain, increase ROM, increase tissue extensibility and decrease trigger points  to improve the patients ability to sit to stand, lifting legs to get into pants, getting in and out of car, prolonged sitting, driving, carrying laundry basket            With   [] TE   [] TA   [] neuro   [] other: Patient Education: [x] Review HEP    [] Progressed/Changed HEP based on:   [] positioning   [] body mechanics   [] transfers   [] heat/ice application    [] other:      Other Objective/Functional Measures: nt     Pain Level (0-10 scale) post treatment: 4/10    ASSESSMENT/Changes in Function:     Patient will continue to benefit from skilled PT services to modify and progress therapeutic interventions, address functional mobility deficits, address ROM deficits, address strength deficits, analyze and cue movement patterns and instruct in home and community integration to attain remaining goals. []  See Plan of Care  []  See progress note/recertification  []  See Discharge Summary         Progress towards goals / Updated goals:  Reviewed HEP with patient. Abbreviated session due to therapist having a family emergency.      PLAN  []  Upgrade activities as tolerated     []  Continue plan of care  []  Update interventions per flow sheet       []  Discharge due to:_  []  Other:_      Wandy Burleson, PTA 6/15/2018  3:00 PM

## 2018-06-19 ENCOUNTER — HOSPITAL ENCOUNTER (OUTPATIENT)
Dept: PHYSICAL THERAPY | Age: 48
Discharge: HOME | End: 2018-06-19
Payer: COMMERCIAL

## 2018-06-19 PROCEDURE — 97140 MANUAL THERAPY 1/> REGIONS: CPT | Performed by: PHYSICAL THERAPIST

## 2018-06-19 PROCEDURE — 97110 THERAPEUTIC EXERCISES: CPT | Performed by: PHYSICAL THERAPIST

## 2018-06-19 NOTE — PROGRESS NOTES
PT DAILY TREATMENT NOTE - [facility] 2-15    Patient Name: Perico Nance  Date:2018  : 1970  [x]  Patient  Verified  Payor: Max Rosales / Plan: Aliene Eisenmenger / Product Type: PPO /    In time:530P  Out time:625 P  Total Treatment Time (min): 55  Total Timed Codes (min): 45  Visit #: 3     Treatment Area: Lower back pain [M54.5]    SUBJECTIVE  Pain Level (0-10 scale): 3  Any medication changes, allergies to medications, adverse drug reactions, diagnosis change, or new procedure performed?: [x] No    [] Yes (see summary sheet for update)  Subjective functional status/changes: \"Less low back pain since I started. I'm noticing the pinching in the side of the (L) hip. \"    OBJECTIVE    Modality rationale: decrease edema, decrease inflammation, decrease pain and reduce soreness post therapy in order to improve the patients ability to perform ADL's. Min Type Additional Details    [x]  Ice     []  Heat   Position: supine, michel LE's supported    Location: (R) low back/glutes     [x] Skin assessment post-treatment:  [x]intact []redness- no adverse reaction    []redness  adverse reaction:     25 min Therapeutic Exercise:  [x] See flow sheet :   Rationale: increase ROM, increase strength and improve functional mobility to improve the patients ability to get in and out of car, sit for prolonged periods of time. 20 min Manual Therapy: Objective data taken below. Assessed pelvic symmetry. WNL.  (R) MFR glute max, piriformis. Rationale: decrease pain, increase ROM, increase tissue extensibility, decrease trigger points and improve joint mobility to improve the patients ability to sit to stand.       With   [x] TE   [] TA   [] neuro   [] manual: Patient Education: [x] Review HEP    [] Progressed/Changed HEP based on:   [] positioning   [] body mechanics   [] transfers   [] heat/ice application    [x] other:      Other Objective/Functional Measures:     (R) Hip AROM IR and ER equal to contralat side    (R) hip PROM inc compared to (L)    PROM (R) hip (measured in prone)  IR 75 deg  ER: 30    PROM (L) hip  IR: 50  ER: 35      Pain Level (0-10 scale) post treatment: 3    ASSESSMENT    Patient will continue to benefit from skilled PT services to modify and progress therapeutic interventions, address functional mobility deficits, address ROM deficits, address strength deficits and analyze and address soft tissue restrictions to attain remaining goals. Progress towards goals / Updated goals:  Pt with hypermobility (R) hip contributing to (R) hip pain and (R) sided LBP  PLAN  []  Upgrade activities as tolerated     [x]  Continue plan of care  []  Update interventions per flow sheet       []  Discharge due to:_  []  Other:_      Vince Flores.  Quinten PT, ANAT, CMTPT    5/61/[id number]    PT License Number: [id number]

## 2018-06-26 ENCOUNTER — HOSPITAL ENCOUNTER (OUTPATIENT)
Dept: PHYSICAL THERAPY | Age: 48
Discharge: HOME | End: 2018-06-26
Payer: COMMERCIAL

## 2018-06-26 PROCEDURE — 97140 MANUAL THERAPY 1/> REGIONS: CPT | Performed by: PHYSICAL THERAPIST

## 2018-06-26 PROCEDURE — 97110 THERAPEUTIC EXERCISES: CPT | Performed by: PHYSICAL THERAPIST

## 2018-06-26 NOTE — PROGRESS NOTES
PT DAILY TREATMENT NOTE - [facility] 2-15    Patient Name: Adriana Rodriguez  Date:2018  : 1970  [x]  Patient  Verified  [facility] / Plan: Gabriella Simpson / Product Type: PPO /    In time:600 P  Out time: 650 P  Total Treatment Time (min): 50  Total Timed Codes (min): 40  Visit #: 4    Treatment Area: Lower back pain [M54.5]    SUBJECTIVE  Pain Level (0-10 scale): 2  Any medication changes, allergies to medications, adverse drug reactions, diagnosis change, or new procedure performed?: [x] No    [] Yes (see summary sheet for update)  Subjective functional status/changes:     \"Still less pinching noted. \"    OBJECTIVE    Modality rationale: decrease edema, decrease inflammation, decrease pain and reduce soreness post therapy in order to improve the patients ability to perform ADL's. Min Type Additional Details    [x]  Ice     []  Heat   Position: supine, michel LE's supported    Location: (R) low back/glutes     [x] Skin assessment post-treatment:  [x]intact []redness- no adverse reaction    []redness  adverse reaction:     25 min Therapeutic Exercise:  [x] See flow sheet :   Rationale: increase ROM, increase strength and improve functional mobility to improve the patients ability to get in and out of car, sit for prolonged periods of time. 15 min   Assessed pelvic symmetry. WNL.  (R) MFR glute max, glute med, piriformis. Rationale: decrease pain, increase ROM, increase tissue extensibility, decrease trigger points and improve joint mobility to improve the patients ability to sit to stand.       With   [x] TE   [] TA   [] neuro   [] manual: Patient Education: [x] Review HEP    [] Progressed/Changed HEP based on:   [] positioning   [] body mechanics   [] transfers   [] heat/ice application    [] other:      Other Objective/Functional Measures:     Pain Level (0-10 scale) post treatment: 2    ASSESSMENT    Patient will continue to benefit from skilled PT services to modify and progress therapeutic interventions, address functional mobility deficits, address ROM deficits, address strength deficits and analyze and address soft tissue restrictions to attain remaining goals. Progress towards goals / Updated goals:  Pt with hypermobility (R) hip contributing to (R) hip pain and (R) sided LBP. Pt challenged by addition of deep hip rotator strengthening today. PLAN  []  Upgrade activities as tolerated     [x]  Continue plan of care  []  Update interventions per flow sheet       []  Discharge due to:_  []  Other:_      Serina Pablo.  Quinten PT, DPT, CMTPT    0/21/0710    PT License Number: [id number]

## 2018-06-28 ENCOUNTER — HOSPITAL ENCOUNTER (OUTPATIENT)
Dept: PHYSICAL THERAPY | Age: 48
Discharge: HOME | End: 2018-06-28
Payer: COMMERCIAL

## 2018-06-28 PROCEDURE — 97140 MANUAL THERAPY 1/> REGIONS: CPT | Performed by: PHYSICAL THERAPY ASSISTANT

## 2018-06-28 PROCEDURE — 97110 THERAPEUTIC EXERCISES: CPT | Performed by: PHYSICAL THERAPY ASSISTANT

## 2018-06-28 NOTE — PROGRESS NOTES
PT DAILY TREATMENT NOTE - [facility] 2-15    Patient Name: John Salas  Date:2018  : 1970  [x]  Patient  Verified  Payor: Sulema Puentes / Plan: Barrie Schilder / Product Type: PPO /    In time: 2:30 PM  Out time: 3:20 P  Total Treatment Time (min): 50  Total Timed Codes (min): 40  Visit #: 5    Treatment Area: Lower back pain [M54.5]    SUBJECTIVE  Pain Level (0-10 scale): 3  Any medication changes, allergies to medications, adverse drug reactions, diagnosis change, or new procedure performed?: [x] No    [] Yes (see summary sheet for update)  Subjective functional status/changes:     \"It's getting better but I can still feel it. \"    OBJECTIVE    Modality rationale: decrease edema, decrease inflammation, decrease pain and reduce soreness post therapy in order to improve the patients ability to perform ADL's. Min Type Additional Details    [x]  Ice     []  Heat   Position: supine, michel LE's supported    Location: (R) low back/glutes     [x] Skin assessment post-treatment:  [x]intact []redness- no adverse reaction    []redness  adverse reaction:     25 min Therapeutic Exercise:  [x] See flow sheet : progressed to unilateral march during TrA brace in . Rationale: increase ROM, increase strength and improve functional mobility to improve the patients ability to get in and out of car, sit for prolonged periods of time. 15 min   Assessed pelvic symmetry. WNL.  (R) MFR glute max, glute med, piriformis. Rationale: decrease pain, increase ROM, increase tissue extensibility, decrease trigger points and improve joint mobility to improve the patients ability to sit to stand. With   [x] TE   [] TA   [] neuro   [] manual: Patient Education: [x] Review HEP    [x] Progressed/Changed HEP based on: prone iso hip ER using towel roll, hip IR/ER in prone with TrA brace.   [] positioning   [] body mechanics   [] transfers   [] heat/ice application    [] other:      Other Objective/Functional Measures:     Pain Level (0-10 scale) post treatment: 0/10    ASSESSMENT    Patient will continue to benefit from skilled PT services to modify and progress therapeutic interventions, address functional mobility deficits, address ROM deficits, address strength deficits and analyze and address soft tissue restrictions to attain remaining goals. Progress towards goals / Updated goals:  Gave updated HEP to patient. Continues to be challenged with deep hip rotator strengthening. Pain levels steadily decreasing.     PLAN  []  Upgrade activities as tolerated     [x]  Continue plan of care  []  Update interventions per flow sheet       []  Discharge due to:_  []  Other:_      Safia Cuadra, PTA   6/28/2018

## 2018-07-03 ENCOUNTER — HOSPITAL ENCOUNTER (OUTPATIENT)
Dept: PHYSICAL THERAPY | Age: 48
Discharge: HOME | End: 2018-07-03
Payer: COMMERCIAL

## 2018-07-03 PROCEDURE — 97140 MANUAL THERAPY 1/> REGIONS: CPT | Performed by: PHYSICAL THERAPIST

## 2018-07-03 PROCEDURE — 97110 THERAPEUTIC EXERCISES: CPT | Performed by: PHYSICAL THERAPIST

## 2018-07-03 NOTE — PROGRESS NOTES
PT DAILY TREATMENT NOTE - [facility] 2-15    Patient Name: Yonathan Linares  Date:7/3/2018  : 1970  [x]  Patient  Verified  Payor: Nalini Starr / Plan: Alan Gagnon / Product Type: PPO /    In time: 5:30 PM  Out time: 620 P  Total Treatment Time (min): 50  Total Timed Codes (min): 40  Visit #: 6    Treatment Area: Lower back pain [M54.5]    SUBJECTIVE  Pain Level (0-10 scale): 3/10  Any medication changes, allergies to medications, adverse drug reactions, diagnosis change, or new procedure performed?: [x] No    [] Yes (see summary sheet for update)  Subjective functional status/changes:     Pt reports she is still having pain while driving or while sitting at work but using a lumbar roll. She feels it in her right low back but also a pinch on the front of her hip as well. She feels the pinch in anterior hip while driving or lifting her leg sometimes. OBJECTIVE    + Scour's, FADDIR    + pain with end range hip IR, flexion (lateral/anterior hip). Modality rationale: decrease edema, decrease inflammation, decrease pain and reduce soreness post therapy in order to improve the patients ability to perform ADL's. Min Type Additional Details   10 [x]  Ice     []  Heat   Position: supine, michel LE's supported    Location: (R) low back/glutes     [x] Skin assessment post-treatment:  [x]intact []redness- no adverse reaction    []redness  adverse reaction:     25 min Therapeutic Exercise:  [x] See flow sheet :    Rationale: increase ROM, increase strength and improve functional mobility to improve the patients ability to get in and out of car, sit for prolonged periods of time. 15 min    (R) MFR glute max, glute med, piriformis. Long axis distraction through R LE. Rhythmic stab IR/ER in prone to right hip 30-60 sec x 3.    Rationale: decrease pain, increase ROM, increase tissue extensibility, decrease trigger points and improve joint mobility to improve the patients ability to sit to stand.      With   [x] TE   [] TA   [] neuro   [] manual: Patient Education: [x] Review HEP    [x] Progressed/Changed HEP based on: prone iso hip ER using towel roll, hip IR/ER in prone with TrA brace. [] positioning   [] body mechanics   [] transfers   [] heat/ice application    [] other:      Other Objective/Functional Measures:     Pain Level (0-10 scale) post treatment: 0/10    ASSESSMENT  Pt reports she continues to have pain while sitting, driving although she is using lumbar roll and pain levels since evaluation are significantly decreased. Pt was challenged with rhythmic stab. With R hip but improved stability with 3rd set. Patient will continue to benefit from skilled PT services to modify and progress therapeutic interventions, address functional mobility deficits, address ROM deficits, address strength deficits and analyze and address soft tissue restrictions to attain remaining goals.          Progress towards goals / Updated goals:      PLAN  []  Upgrade activities as tolerated     [x]  Continue plan of care  []  Update interventions per flow sheet       []  Discharge due to:_  []  Other:_      Cristofer Galindo, PT   7/3/2018

## 2018-07-05 ENCOUNTER — APPOINTMENT (OUTPATIENT)
Dept: PHYSICAL THERAPY | Age: 48
End: 2018-07-05
Payer: COMMERCIAL

## 2018-07-17 ENCOUNTER — HOSPITAL ENCOUNTER (OUTPATIENT)
Dept: PHYSICAL THERAPY | Age: 48
Discharge: HOME | End: 2018-07-17
Payer: COMMERCIAL

## 2018-07-17 PROCEDURE — 97110 THERAPEUTIC EXERCISES: CPT | Performed by: PHYSICAL THERAPIST

## 2018-07-17 PROCEDURE — 97140 MANUAL THERAPY 1/> REGIONS: CPT | Performed by: PHYSICAL THERAPIST

## 2018-07-17 NOTE — PROGRESS NOTES
PT DAILY TREATMENT NOTE/RE-ASSESSMENT - [facility] 2-15    Patient Name: Jeana Sorenson  Date:2018  : 1970  [x]  Patient  Verified  Payor: Sulema Puentes / Plan: Barrie Schilder / Product Type: PPO /    In time: 5:30 PM  Out time: 625  Total Treatment Time (min): 55  Total Timed Codes (min): 45  Visit #: 7    Treatment Area: Lower back pain [M54.5]    SUBJECTIVE  Pain Level (0-10 scale): 0/10  Any medication changes, allergies to medications, adverse drug reactions, diagnosis change, or new procedure performed?: [x] No    [] Yes (see summary sheet for update)  Subjective functional status/changes:       Doing so much better. Pain 0-2/10 at worst.  Able to sit to stand without pain, carry laundry basket. Still some pain with prolonged driving. OBJECTIVE      Modality rationale: decrease edema, decrease inflammation, decrease pain and reduce soreness post therapy in order to improve the patients ability to perform ADL's. Min Type Additional Details   10 [x]  Ice     []  Heat   Position:prone one pillow under hips  Location: (low back     [x] Skin assessment post-treatment:  [x]intact []redness- no adverse reaction    []redness  adverse reaction:     35 min Therapeutic Exercise:  [x] See flow sheet : re-assessment performed   Rationale: increase ROM, increase strength and improve functional mobility to improve the patients ability to get in and out of car, sit for prolonged periods of time. 10 min (R) hip Long axis distraction in LPP (R) hip. Rationale: decrease pain, increase ROM, increase tissue extensibility, decrease trigger points and improve joint mobility to improve the patients ability to sit to stand. With   [x] TE   [] TA   [] neuro   [] manual: Patient Education: [x] Review HEP    [x] Progressed/Changed HEP based on: prone iso hip ER using towel roll, hip IR/ER in prone with TrA brace.   [] positioning   [] body mechanics   [] transfers   [] heat/ice application [x] other: discussion re: sitting while putting on pants/shoes instead of standing to dec exacerbation at hip. Discussed postural considerations while taking BP-advised pt to 1/2 kneel or sit in stool>stooping into lumbar flexion to avoid LBP. Other Objective/Functional Measures:   AROM lumbar spine full all planes slight pain with ext causing (R) glute pain  MMT (R) hip 4+/5 all planes not pain  (-) Scours  (-) MARISA  Sit to stand x 10 no pain    Pain Level (0-10 scale) post treatment: 0/10    ASSESSMENT    Progress towards goals / Updated goals:  Short Term Goals: To be accomplished in 5 treatments:  -Independent in HEP as evidenced on ability to perform at least 5 exercises from HEP using proper form without verbal cuing. -MET  -Pain less than or equal to 8/10 at worst to allow patient to perform ADL's with greater ease -MET  -Demostrate proper posture in order to decrease lumbar pain-MET  -Pt will report compliance with icing 1-2x/day in order to decrease inflammation -MET     Long Term Goals: To be accomplished in 10 treatments:  -AROM lumbar spine full and pain-free all plane-PROGRESSING  -(-) MARISA (R) hip-MET  -Pain 0/10 to allow patient to do laundry without pain-MET  -Sit to stand x 10 without pain-MET  -FOTO score greater than or equal to 65 to allow patient to get in and out of car without pain-NOT ASSESSED    Pt with improved AROM, improved (R) hip mobility and special tests, and improved functional ability. Pt still having pain with prolonged sitting and some stooping at work. PLAN  []  Upgrade activities as tolerated     [x]  Continue plan of care  []  Update interventions per flow sheet       []  Discharge due to:_  [x]  Other:_  Dec frequency to 1x/week with plan to D/C within 2-3 tx's with focus on continued core strengthening and postural strengthening to allow pt to sit for prolonged periods of time. Also review work duties that may be causing inc pain  Wilfred Shipley PT   7/17/2018

## 2018-07-19 ENCOUNTER — APPOINTMENT (OUTPATIENT)
Dept: PHYSICAL THERAPY | Age: 48
End: 2018-07-19
Payer: COMMERCIAL

## 2018-08-21 ENCOUNTER — OFFICE VISIT (OUTPATIENT)
Dept: INTERNAL MEDICINE CLINIC | Age: 48
End: 2018-08-21

## 2018-08-21 VITALS
WEIGHT: 182 LBS | SYSTOLIC BLOOD PRESSURE: 92 MMHG | TEMPERATURE: 98.6 F | OXYGEN SATURATION: 98 % | RESPIRATION RATE: 16 BRPM | BODY MASS INDEX: 32.25 KG/M2 | DIASTOLIC BLOOD PRESSURE: 68 MMHG | HEIGHT: 63 IN | HEART RATE: 68 BPM

## 2018-08-21 DIAGNOSIS — R53.83 OTHER FATIGUE: ICD-10-CM

## 2018-08-21 DIAGNOSIS — G47.00 INSOMNIA, UNSPECIFIED TYPE: Primary | ICD-10-CM

## 2018-08-21 DIAGNOSIS — E55.9 VITAMIN D DEFICIENCY: ICD-10-CM

## 2018-08-21 RX ORDER — ZOLPIDEM TARTRATE 5 MG/1
5 TABLET ORAL
Qty: 15 TAB | Refills: 0 | Status: SHIPPED | OUTPATIENT
Start: 2018-08-21 | End: 2018-09-04 | Stop reason: SDUPTHER

## 2018-08-21 NOTE — PROGRESS NOTES
Chief Complaint   Patient presents with    Insomnia     feels very tired but cant sleep at night.  does not know last time her b12  was checked

## 2018-08-23 LAB
25(OH)D3+25(OH)D2 SERPL-MCNC: 26.7 NG/ML (ref 30–100)
ALBUMIN SERPL-MCNC: 4 G/DL (ref 3.5–5.5)
ALBUMIN/GLOB SERPL: 1.4 {RATIO} (ref 1.2–2.2)
ALP SERPL-CCNC: 64 IU/L (ref 39–117)
ALT SERPL-CCNC: 20 IU/L (ref 0–32)
AST SERPL-CCNC: 23 IU/L (ref 0–40)
BASOPHILS # BLD AUTO: 0 X10E3/UL (ref 0–0.2)
BASOPHILS NFR BLD AUTO: 1 %
BILIRUB SERPL-MCNC: <0.2 MG/DL (ref 0–1.2)
BUN SERPL-MCNC: 18 MG/DL (ref 6–24)
BUN/CREAT SERPL: 25 (ref 9–23)
CALCIUM SERPL-MCNC: 8.7 MG/DL (ref 8.7–10.2)
CHLORIDE SERPL-SCNC: 105 MMOL/L (ref 96–106)
CO2 SERPL-SCNC: 21 MMOL/L (ref 20–29)
CREAT SERPL-MCNC: 0.72 MG/DL (ref 0.57–1)
EOSINOPHIL # BLD AUTO: 0.2 X10E3/UL (ref 0–0.4)
EOSINOPHIL NFR BLD AUTO: 5 %
ERYTHROCYTE [DISTWIDTH] IN BLOOD BY AUTOMATED COUNT: 13.8 % (ref 12.3–15.4)
FOLATE SERPL-MCNC: >20 NG/ML
GLOBULIN SER CALC-MCNC: 2.9 G/DL (ref 1.5–4.5)
GLUCOSE SERPL-MCNC: 87 MG/DL (ref 65–99)
HCT VFR BLD AUTO: 37.4 % (ref 34–46.6)
HGB BLD-MCNC: 12.1 G/DL (ref 11.1–15.9)
IMM GRANULOCYTES # BLD: 0 X10E3/UL (ref 0–0.1)
IMM GRANULOCYTES NFR BLD: 0 %
LYMPHOCYTES # BLD AUTO: 1.4 X10E3/UL (ref 0.7–3.1)
LYMPHOCYTES NFR BLD AUTO: 28 %
MCH RBC QN AUTO: 29.4 PG (ref 26.6–33)
MCHC RBC AUTO-ENTMCNC: 32.4 G/DL (ref 31.5–35.7)
MCV RBC AUTO: 91 FL (ref 79–97)
MONOCYTES # BLD AUTO: 0.4 X10E3/UL (ref 0.1–0.9)
MONOCYTES NFR BLD AUTO: 8 %
NEUTROPHILS # BLD AUTO: 2.9 X10E3/UL (ref 1.4–7)
NEUTROPHILS NFR BLD AUTO: 58 %
PLATELET # BLD AUTO: 321 X10E3/UL (ref 150–379)
POTASSIUM SERPL-SCNC: 4.3 MMOL/L (ref 3.5–5.2)
PROT SERPL-MCNC: 6.9 G/DL (ref 6–8.5)
RBC # BLD AUTO: 4.11 X10E6/UL (ref 3.77–5.28)
SODIUM SERPL-SCNC: 140 MMOL/L (ref 134–144)
TSH SERPL DL<=0.005 MIU/L-ACNC: 2.44 UIU/ML (ref 0.45–4.5)
VIT B12 SERPL-MCNC: 821 PG/ML (ref 232–1245)
WBC # BLD AUTO: 5 X10E3/UL (ref 3.4–10.8)

## 2018-08-27 NOTE — PROGRESS NOTES
This note will not be viewable in [address] Sejal Schmitt is a  50 y.o. female presents for visit. Insomnia and B12 level check    Chief Complaint   Patient presents with    Insomnia     feels very tired but cant sleep at night. does not know last time her b12  was checked     HPI  Patient presents with complaint of insomnia for past month or so. She reports she is feeling extremely tired but when she attempts to go to sleep at night she lies there wide-awake. She previously tried trazodone and Benadryl for sleep but had a paradoxical reaction. Patient notes some anxiety at bedtime with racing thoughts. She is followed by psychiatric NP Deyanira Mendoza for the symptoms. Patient recently moved and is active unpacking and working full-time. Interested in a short-term as needed medication to help her sleep. She also reports feeling very fatigued during the day and is concerned her B12 level may be low. Review of Systems   Constitutional: Negative for fever. Respiratory: Negative for shortness of breath. Cardiovascular: Negative for chest pain and palpitations. Psychiatric/Behavioral: The patient has insomnia. Visit Vitals    BP 92/68 (BP 1 Location: Left arm, BP Patient Position: Sitting)    Pulse 68    Temp 98.6 °F (37 °C) (Oral)    Resp 16    Ht 5' 3\" (1.6 m)    Wt 182 lb (82.6 kg)    SpO2 98%    BMI 32.24 kg/m2     Physical Exam   Constitutional: She is oriented to person, place, and time. She appears well-developed and well-nourished. No distress. HENT:   Head: Normocephalic and atraumatic. Right Ear: External ear normal.   Left Ear: External ear normal.   Eyes: Conjunctivae are normal.   Cardiovascular: Normal rate, regular rhythm and normal heart sounds. Pulmonary/Chest: Effort normal and breath sounds normal. She has no wheezes. Musculoskeletal: She exhibits no edema. Neurological: She is alert and oriented to person, place, and time. Skin: Skin is warm and dry. Psychiatric: She has a normal mood and affect. Her behavior is normal.   Nursing note and vitals reviewed. Patient Active Problem List    Diagnosis Date Noted    Stress at home 04/01/2016    Panic attack 04/01/2016    Neck pain on left side 07/24/2013    Radiculopathy of cervical spine 07/24/2013    Anxiety 09/22/2011    Depression 09/22/2011    Insomnia 09/22/2011         ASSESSMENT AND PLAN:      ICD-10-CM ICD-9-CM   1. Insomnia, unspecified type G47.00 780.52   2. Vitamin D deficiency E55.9 268.9   3. Other fatigue R53.83 780.79     Orders Placed This Encounter    METABOLIC PANEL, COMPREHENSIVE    CBC WITH AUTOMATED DIFF    VITAMIN D, 25 HYDROXY    TSH REFLEX TO T4    VITAMIN B12 & FOLATE    zolpidem (AMBIEN) 5 mg tablet     Sig: Take 1 Tab by mouth nightly as needed for Sleep. Max Daily Amount: 5 mg. Indications: SLEEP-ONSET INSOMNIA     Dispense:  15 Tab     Refill:  0     Diagnoses and all orders for this visit:    1. Insomnia, unspecified type  -     zolpidem (AMBIEN) 5 mg tablet; Take 1 Tab by mouth nightly as needed for Sleep. Max Daily Amount: 5 mg. Indications: SLEEP-ONSET INSOMNIA  -     METABOLIC PANEL, COMPREHENSIVE  -     CBC WITH AUTOMATED DIFF  -     TSH REFLEX TO T4       =      Encourage good sleep hygiene. 2. Vitamin D deficiency  -     VITAMIN D, 25 HYDROXY    3. Other fatigue  -     VITAMIN B12 & FOLATE    lab results and schedule of future lab studies reviewed with patient    Continue current treatment pending laboratory data results. Follow-up Disposition:  Return if symptoms worsen or fail to improve. Disclaimer:  Advised her to call back or return to office if symptoms worsen/change/persist.  Discussed expected course/resolution/complications of diagnosis in detail with patient. Medication risks/benefits/alternatives discussed with patient. She was given an after visit summary which includes diagnoses, current medications, & vitals.      Discussed patient instructions and advised to read to all patient instructions regarding care. She expressed understanding with the diagnosis and plan.

## 2018-09-27 ENCOUNTER — OFFICE VISIT (OUTPATIENT)
Dept: FAMILY MEDICINE CLINIC | Age: 48
End: 2018-09-27

## 2018-09-27 VITALS
HEART RATE: 75 BPM | HEIGHT: 63 IN | TEMPERATURE: 98.9 F | DIASTOLIC BLOOD PRESSURE: 75 MMHG | SYSTOLIC BLOOD PRESSURE: 118 MMHG | OXYGEN SATURATION: 96 % | RESPIRATION RATE: 16 BRPM | WEIGHT: 188 LBS | BODY MASS INDEX: 33.31 KG/M2

## 2018-09-27 DIAGNOSIS — F33.1 MODERATE EPISODE OF RECURRENT MAJOR DEPRESSIVE DISORDER (HCC): Primary | ICD-10-CM

## 2018-09-27 DIAGNOSIS — E66.9 OBESITY (BMI 30.0-34.9): ICD-10-CM

## 2018-09-27 NOTE — PROGRESS NOTES
Assessment/Plan:     Diagnoses and all orders for this visit:    1. Moderate episode of recurrent major depressive disorder (HCC)  Stable. Continue current therapy and follow up with psychiatry. 2. Obesity (BMI 30.0-34.9)  I have reviewed/discussed the above normal BMI with the patient. I have recommended the following interventions: dietary management education, guidance, and counseling, encourage exercise, monitor weight and prescribed dietary intake. Given written instructions as well. Follow-up Disposition:  Return in about 3 months (around 12/27/2018) for Follow Up. Discussed expected course/resolution/complications of diagnosis in detail with patient.    Medication risks/benefits/costs/interactions/alternatives discussed with patient.    Pt was given after visit summary which includes diagnoses, current medications & vitals. Pt expressed understanding with the diagnosis and plan          Subjective:      Michael Hodges is a 50 y.o. female who presents for had concerns including weight loss. Obesity  Patient complains of obesity. Patient cites health as reasons for wanting to lose weight. History of Weight Loss Efforts  Highest adult weight: 188  Successful weight loss techniques attempted: none  Unsuccessful weight loss techniques attempted: self-directed dieting   no regular exercise  Current Eating Habits  Number of regular meals per day: 3  Number of snacking episodes per day: 1  Who shops for food? patient  Who prepares food?  patient  Eating precipitated by stress? no  Guilt feelings associated with eating? no  Other Potential Contributing Factors  Use of alcohol: average 0 drinks/week  Use of medications that may cause weight gain antipsychotics (Rexulti)  History of past abuse? none  Psych History: depression  Comorbidities: none    B-smoothie (almond milk unsweetened, isogenix)  L-frozen meal weight watchers  D- skip  Fluids-diet coke (1 day), water)  Exercise-walking 1.5 gonzales        Current Outpatient Prescriptions   Medication Sig Dispense Refill    REXULTI 1 mg tab tablet TAKE 1 TABLET BY MOUTH EVERY DAY  2    buPROPion XL (WELLBUTRIN XL) 300 mg XL tablet TAKE 1 TABLET BY MOUTH EVERY MORNING  2    FLUoxetine (PROZAC) 40 mg capsule TAKE ONE CAPSULE BY MOUTH ONCE DAILY  2    cholecalciferol, vitamin D3, 2,000 unit tab Take  by mouth.  EPINEPHrine (EPIPEN 2-EDWIGE) 0.3 mg/0.3 mL (1:1,000) injection 0.3 mL by IntraMUSCular route once as needed for up to 1 dose. 2 Each 0       Allergies   Allergen Reactions    Ginger Hives    Soy Hives    Bees [Sting, Bee] Not Reported This Time    Cephaeline Not Reported This Time     Pt. States no allergy    Keflex [Cephalexin] Rash    Tomato Hives     Pt. States no allergy       ROS:   Review of Systems   Constitutional: Negative for malaise/fatigue. Eyes: Negative for blurred vision. Respiratory: Negative for shortness of breath. Cardiovascular: Negative for chest pain. Objective:     Visit Vitals    /75 (BP 1 Location: Left arm, BP Patient Position: Sitting)    Pulse 75    Temp 98.9 °F (37.2 °C) (Oral)    Resp 16    Ht 5' 3\" (1.6 m)    Wt 188 lb (85.3 kg)    SpO2 96%    BMI 33.3 kg/m2       Vitals and Nurse Documentation reviewed. Physical Exam   Constitutional: No distress. HENT:   Right Ear: Tympanic membrane is not erythematous and not bulging. No middle ear effusion. Left Ear: Tympanic membrane is not erythematous and not bulging. No middle ear effusion. Nose: No rhinorrhea. Right sinus exhibits no maxillary sinus tenderness and no frontal sinus tenderness. Left sinus exhibits no maxillary sinus tenderness and no frontal sinus tenderness. Mouth/Throat: No oropharyngeal exudate or posterior oropharyngeal erythema. Eyes: EOM and lids are normal.   Cardiovascular: S1 normal and S2 normal.  Exam reveals no gallop and no friction rub. No murmur heard.   Pulmonary/Chest: Breath sounds normal. She has no wheezes. Lymphadenopathy:     She has no cervical adenopathy. Skin: Skin is warm and dry.    Psychiatric: Mood and affect normal.

## 2018-09-27 NOTE — MR AVS SNAPSHOT
[facility] 
 
 
 [address] 
[phone] Patient: Kathy Laurent MRN: [id number] Visit Information Date & Time Provider Department Dept. Phone Encounter #  
 9/27/2018  3:15 PM Tricia Augustin  [facility] [address] [phone] [id number] Follow-up Instructions Return in about 3 months (around 12/27/2018) for Follow Up. Upcoming Health Maintenance Date Due  
 PAP AKA CERVICAL CYTOLOGY 3/3/2017 Influenza Age 5 to Adult 10/27/2018* BREAST CANCER SCRN MAMMOGRAM 12/20/2019 DTaP/Tdap/Td series (3 - Td) 6/23/2024 *Topic was postponed. The date shown is not the original due date. Allergies as of 9/27/2018  Review Complete On: 9/27/2018 By: Tricia Augustin NP Severity Noted Reaction Type Reactions Teagan Medium 07/06/2015   Systemic Hives Soy Medium 07/06/2015   Systemic Hives Bees [Sting, Bee]  06/22/2010    Not Reported This Time Cephaeline  06/22/2010    Not Reported This Time Pt. States no allergy Keflex [Cephalexin]  10/18/2010    Rash Tomato  10/18/2010    Hives Pt. States no allergy Current Immunizations  Reviewed on 7/14/2015 Name Date Influenza Vaccine 10/8/2015, 10/10/2014 TDAP Vaccine 7/1/2006 Tdap 6/23/2014  7:45 PM  
  
 Not reviewed this visit Vitals BP Pulse Temp Resp Height(growth percentile) Weight(growth percentile) 118/75 (BP 1 Location: Left arm, BP Patient Position: Sitting) 75 98.9 °F (37.2 °C) (Oral) 16 5' 3\" (1.6 m) 188 lb (85.3 kg) SpO2 BMI OB Status Smoking Status 96% 33.3 kg/m2 IUD Never Smoker Vitals History BMI and BSA Data Body Mass Index Body Surface Area  
 33.3 kg/m 2 1.95 m 2 Preferred Pharmacy Pharmacy Name Phone [id number] Your Updated Medication List  
  
   
 This list is accurate as of 9/27/18  4:09 PM.  Always use your most recent med list.  
  
  
  
  
 buPROPion  mg XL tablet Commonly known as:  WELLBUTRIN XL  
TAKE 1 TABLET BY MOUTH EVERY MORNING  
  
 cholecalciferol (vitamin D3) 2,000 unit Tab Take  by mouth. EPINEPHrine 0.3 mg/0.3 mL injection Commonly known as:  EPIPEN 2-EDWIGE  
0.3 mL by IntraMUSCular route once as needed for up to 1 dose. FLUoxetine 40 mg capsule Commonly known as:  PROzac TAKE ONE CAPSULE BY MOUTH ONCE DAILY REXULTI 1 mg Tab tablet Generic drug:  brexpiprazole TAKE 1 TABLET BY MOUTH EVERY DAY Follow-up Instructions Return in about 3 months (around 12/27/2018) for Follow Up. Patient Instructions Smoothie: 
1 fruit 1 vegetable 1 protein (silken tofu, Meyer protein powder, high protein almond milk) Water Daily Dozen Gladis Meal Portion 1 fist whole grain (quinoa, farro, whole wheat bread) 2 Fists of fruits or vegetables 1 Protein (beans, chicken, turkey (ground, whole), fish, eggs Healthy Fats A small handful - nuts, seeds, oils, avocado 1/2, hummus, peanut butter Coconut Aminos Introducing [facility]! Dear Hai Mora: Thank you for requesting a Carnet [employer] account. Our records indicate that you already have an active [employer] account. You can access your account anytime at [email or web address] Did you know that you can access your hospital and ER discharge instructions at any time in [employer]? You can also review all of your test results from your hospital stay or ER visit. Additional Information If you have questions, please visit the Frequently Asked Questions section of the [employer] website at [email or web address] Remember, Carnet [employer] Mode is NOT to be used for urgent needs. For medical emergencies, dial 911. Now available from your iPhone and Android! Please provide this summary of care documentation to your next provider. Your primary care clinician is listed as Maria C Lara. If you have any questions after today's visit, please call [phone].

## 2018-09-27 NOTE — PROGRESS NOTES
Chief Complaint   Patient presents with    Complete Physical     1. Have you been to the ER, urgent care clinic since your last visit? Hospitalized since your last visit? No    2. Have you seen or consulted any other health care providers outside of the [address] [place] [address] since your last visit? Include any pap smears or colon screening. Patient was seen for well women's exam 07/2016 at [facility] women.

## 2018-09-27 NOTE — PATIENT INSTRUCTIONS
Smoothie:  1 fruit   1 vegetable  1 protein (silken tofu, Meyer protein powder, high protein almond milk)  Water    Daily Dozen Gladis     Meal Portion  1 fist whole grain (quinoa, farro, whole wheat bread)  2 Fists of fruits or vegetables   1 Protein (beans, chicken, turkey (ground, whole), fish, eggs    Healthy Fats  A small handful - nuts, seeds, oils, avocado 1/2, hummus, peanut butter    Coconut Aminos

## 2018-09-28 PROBLEM — F33.1 MODERATE EPISODE OF RECURRENT MAJOR DEPRESSIVE DISORDER (HCC): Status: ACTIVE | Noted: 2018-09-28

## 2018-11-02 ENCOUNTER — HOSPITAL ENCOUNTER (EMERGENCY)
Age: 48
Discharge: HOME | End: 2018-11-02
Attending: EMERGENCY MEDICINE
Payer: COMMERCIAL

## 2018-11-02 ENCOUNTER — APPOINTMENT (OUTPATIENT)
Dept: CT IMAGING | Age: 48
End: 2018-11-02
Attending: EMERGENCY MEDICINE
Payer: COMMERCIAL

## 2018-11-02 ENCOUNTER — APPOINTMENT (OUTPATIENT)
Dept: GENERAL RADIOLOGY | Age: 48
End: 2018-11-02
Attending: EMERGENCY MEDICINE
Payer: COMMERCIAL

## 2018-11-02 VITALS
TEMPERATURE: 98.1 F | OXYGEN SATURATION: 98 % | HEART RATE: 73 BPM | RESPIRATION RATE: 14 BRPM | WEIGHT: 191.9 LBS | BODY MASS INDEX: 34 KG/M2 | DIASTOLIC BLOOD PRESSURE: 85 MMHG | SYSTOLIC BLOOD PRESSURE: 126 MMHG | HEIGHT: 63 IN

## 2018-11-02 DIAGNOSIS — R07.9 CHEST PAIN, UNSPECIFIED TYPE: Primary | ICD-10-CM

## 2018-11-02 LAB
ALBUMIN SERPL-MCNC: 3.3 G/DL (ref 3.5–5)
ALBUMIN/GLOB SERPL: 0.9 {RATIO} (ref 1.1–2.2)
ALP SERPL-CCNC: 58 U/L (ref 45–117)
ALT SERPL-CCNC: 29 U/L (ref 12–78)
ANION GAP SERPL CALC-SCNC: 7 MMOL/L (ref 5–15)
AST SERPL-CCNC: 22 U/L (ref 15–37)
ATRIAL RATE: 86 BPM
BASOPHILS # BLD: 0.1 K/UL (ref 0–0.1)
BASOPHILS NFR BLD: 1 % (ref 0–1)
BILIRUB SERPL-MCNC: 0.2 MG/DL (ref 0.2–1)
BUN SERPL-MCNC: 24 MG/DL (ref 6–20)
BUN/CREAT SERPL: 23 (ref 12–20)
CALCIUM SERPL-MCNC: 8.3 MG/DL (ref 8.5–10.1)
CALCULATED P AXIS, ECG09: 58 DEGREES
CALCULATED R AXIS, ECG10: 30 DEGREES
CALCULATED T AXIS, ECG11: 47 DEGREES
CHLORIDE SERPL-SCNC: 106 MMOL/L (ref 97–108)
CO2 SERPL-SCNC: 25 MMOL/L (ref 21–32)
COMMENT, HOLDF: NORMAL
CREAT SERPL-MCNC: 1.06 MG/DL (ref 0.55–1.02)
D DIMER PPP FEU-MCNC: 0.94 MG/L FEU (ref 0–0.65)
DIAGNOSIS, 93000: NORMAL
DIFFERENTIAL METHOD BLD: ABNORMAL
EOSINOPHIL # BLD: 0.5 K/UL (ref 0–0.4)
EOSINOPHIL NFR BLD: 5 % (ref 0–7)
ERYTHROCYTE [DISTWIDTH] IN BLOOD BY AUTOMATED COUNT: 12.9 % (ref 11.5–14.5)
GLOBULIN SER CALC-MCNC: 3.8 G/DL (ref 2–4)
GLUCOSE SERPL-MCNC: 98 MG/DL (ref 65–100)
HCT VFR BLD AUTO: 36.9 % (ref 35–47)
HGB BLD-MCNC: 11.9 G/DL (ref 11.5–16)
IMM GRANULOCYTES # BLD: 0 K/UL (ref 0–0.04)
IMM GRANULOCYTES NFR BLD AUTO: 0 % (ref 0–0.5)
LIPASE SERPL-CCNC: 171 U/L (ref 73–393)
LYMPHOCYTES # BLD: 2.7 K/UL (ref 0.8–3.5)
LYMPHOCYTES NFR BLD: 30 % (ref 12–49)
MCH RBC QN AUTO: 29.5 PG (ref 26–34)
MCHC RBC AUTO-ENTMCNC: 32.2 G/DL (ref 30–36.5)
MCV RBC AUTO: 91.3 FL (ref 80–99)
MONOCYTES # BLD: 1 K/UL (ref 0–1)
MONOCYTES NFR BLD: 11 % (ref 5–13)
NEUTS SEG # BLD: 4.9 K/UL (ref 1.8–8)
NEUTS SEG NFR BLD: 54 % (ref 32–75)
NRBC # BLD: 0 K/UL (ref 0–0.01)
NRBC BLD-RTO: 0 PER 100 WBC
P-R INTERVAL, ECG05: 100 MS
PLATELET # BLD AUTO: 325 K/UL (ref 150–400)
PMV BLD AUTO: 9.8 FL (ref 8.9–12.9)
POTASSIUM SERPL-SCNC: 3.5 MMOL/L (ref 3.5–5.1)
PROT SERPL-MCNC: 7.1 G/DL (ref 6.4–8.2)
Q-T INTERVAL, ECG07: 400 MS
QRS DURATION, ECG06: 74 MS
QTC CALCULATION (BEZET), ECG08: 478 MS
RBC # BLD AUTO: 4.04 M/UL (ref 3.8–5.2)
SAMPLES BEING HELD,HOLD: NORMAL
SODIUM SERPL-SCNC: 138 MMOL/L (ref 136–145)
TROPONIN I SERPL-MCNC: <0.05 NG/ML
TROPONIN I SERPL-MCNC: <0.05 NG/ML
VENTRICULAR RATE, ECG03: 86 BPM
WBC # BLD AUTO: 9.2 K/UL (ref 3.6–11)

## 2018-11-02 PROCEDURE — 85025 COMPLETE CBC W/AUTO DIFF WBC: CPT | Performed by: EMERGENCY MEDICINE

## 2018-11-02 PROCEDURE — 96375 TX/PRO/DX INJ NEW DRUG ADDON: CPT

## 2018-11-02 PROCEDURE — 84484 ASSAY OF TROPONIN QUANT: CPT | Performed by: EMERGENCY MEDICINE

## 2018-11-02 PROCEDURE — 71046 X-RAY EXAM CHEST 2 VIEWS: CPT

## 2018-11-02 PROCEDURE — 96374 THER/PROPH/DIAG INJ IV PUSH: CPT

## 2018-11-02 PROCEDURE — 80053 COMPREHEN METABOLIC PANEL: CPT | Performed by: EMERGENCY MEDICINE

## 2018-11-02 PROCEDURE — 71275 CT ANGIOGRAPHY CHEST: CPT

## 2018-11-02 PROCEDURE — 83690 ASSAY OF LIPASE: CPT | Performed by: EMERGENCY MEDICINE

## 2018-11-02 PROCEDURE — 74011250636 HC RX REV CODE- 250/636: Performed by: EMERGENCY MEDICINE

## 2018-11-02 PROCEDURE — 99285 EMERGENCY DEPT VISIT HI MDM: CPT

## 2018-11-02 PROCEDURE — 74011250637 HC RX REV CODE- 250/637: Performed by: EMERGENCY MEDICINE

## 2018-11-02 PROCEDURE — 74011636320 HC RX REV CODE- 636/320

## 2018-11-02 PROCEDURE — 36415 COLL VENOUS BLD VENIPUNCTURE: CPT | Performed by: EMERGENCY MEDICINE

## 2018-11-02 PROCEDURE — 93005 ELECTROCARDIOGRAM TRACING: CPT

## 2018-11-02 PROCEDURE — 85379 FIBRIN DEGRADATION QUANT: CPT | Performed by: EMERGENCY MEDICINE

## 2018-11-02 RX ORDER — GUAIFENESIN 100 MG/5ML
162 LIQUID (ML) ORAL
Status: COMPLETED | OUTPATIENT
Start: 2018-11-02 | End: 2018-11-02

## 2018-11-02 RX ORDER — ONDANSETRON 2 MG/ML
4 INJECTION INTRAMUSCULAR; INTRAVENOUS
Status: COMPLETED | OUTPATIENT
Start: 2018-11-02 | End: 2018-11-02

## 2018-11-02 RX ORDER — DIPHENHYDRAMINE HYDROCHLORIDE 50 MG/ML
25 INJECTION, SOLUTION INTRAMUSCULAR; INTRAVENOUS
Status: COMPLETED | OUTPATIENT
Start: 2018-11-02 | End: 2018-11-02

## 2018-11-02 RX ADMIN — ASPIRIN 81 MG CHEWABLE TABLET 162 MG: 81 TABLET CHEWABLE at 00:38

## 2018-11-02 RX ADMIN — DIPHENHYDRAMINE HYDROCHLORIDE 25 MG: 50 INJECTION, SOLUTION INTRAMUSCULAR; INTRAVENOUS at 01:53

## 2018-11-02 RX ADMIN — ONDANSETRON 4 MG: 2 INJECTION INTRAMUSCULAR; INTRAVENOUS at 00:38

## 2018-11-02 RX ADMIN — IOPAMIDOL 99 ML: 755 INJECTION, SOLUTION INTRAVENOUS at 01:54

## 2018-11-02 NOTE — ED PROVIDER NOTES
The patient presents to the ED with chest pain. The pain began 2 hrs ago while she was watching TV. The pain is in her mid lower chest/epigastric area. The pain is described as a pressure. The pain is described as a constant pressure. Pain is 5/10. She also has shortness of breath. The pain is not increased with a deep breath. She denies increase in pain with physical activity. She had chills and sweats at the onset of pain. She has nausea, but no vomiting. No meds have been taken for her symptoms. She has no personal or family hx PE/DVT. Two of her grandfathers had MIs in their 76s. She had a normal stress test in 2014. She has no cardiologist. She declines any medication for pain at this time. Her  drove her to the ED. She has no other concerns at this time. Past Medical History:  
Diagnosis Date  Back pain, acute  Depression  H/O seasonal allergies  Maxillary sinus fracture ([facility] [address] 2016  Neck pain, musculoskeletal   
 
 
Past Surgical History:  
Procedure Laterality Date  HX CERVICAL DISKECTOMY Left July 13, 2015  HX CERVICAL FUSION Left July 13, 2015 [facility]  
 laproscopsy  HX WISDOM TEETH EXTRACTION Family History:  
Problem Relation Age of Onset  Cancer Father   
     pancreas, Liver  Cancer Maternal Grandmother  Breast Cancer Maternal Grandmother  Heart Disease Maternal Grandfather  Heart Disease Paternal Grandmother  Stroke Paternal Grandfather  Other Mother   
     meningioma  Hypertension Mother  Breast Cancer Mother [address] Problems Mother SLOW TO WAKE UP  
 Stroke Mother  Hypertension Sister  No Known Problems Daughter  No Known Problems Daughter  No Known Problems Son   
 
 
Social History Socioeconomic History  Marital status:  Spouse name: Not on file  Number of children: Not on file  Years of education: Not on file  Highest education level: Not on file Social Needs  Financial resource strain: Not on file  Food insecurity - worry: Not on file  Food insecurity - inability: Not on file  Transportation needs - medical: Not on file  Transportation needs - non-medical: Not on file Occupational History  Not on file Tobacco Use  Smoking status: Never Smoker  Smokeless tobacco: Never Used Substance and Sexual Activity  Alcohol use: Yes Alcohol/week: 0.0 oz  
  Comment: occasional  
 Drug use: No  
 Sexual activity: Yes  
  Partners: Male Birth control/protection: Surgical  
  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 Negative for fever. HENT: Negative for congestion, nosebleeds and sore throat. Eyes: Negative for discharge. Respiratory: Positive for shortness of breath. Negative for cough. Cardiovascular: Positive for chest pain. Gastrointestinal: Positive for nausea. Negative for abdominal pain, diarrhea and vomiting. Genitourinary: Negative for dysuria. Musculoskeletal: Negative. Skin: Negative for rash. Neurological: Negative for weakness and headaches. Hematological: Negative for adenopathy. Psychiatric/Behavioral: Negative. All other systems reviewed and are negative. Vitals:  
 11/02/18 0024 11/02/18 0100 BP: 136/90 126/85 Pulse: 86 76 Resp: 18 15 Temp: 98.4 °F (36.9 °C) SpO2: 98% 96% Weight: 87 kg (191 lb 14.4 oz) Height: 5' 3\" (1.6 m) Physical Exam  
Constitutional: She appears well-developed and well-nourished. HENT:  
Head: Normocephalic and atraumatic.   
Eyes: Conjunctivae are normal.  
 Neck: Normal range of motion. Neck supple. Cardiovascular: Normal rate, regular rhythm and normal heart sounds. Pulmonary/Chest: Effort normal and breath sounds normal.  
Abdominal: Soft. Bowel sounds are normal.  
Neurological: She is alert. Skin: Skin is warm and dry. Psychiatric: She has a normal mood and affect. Nursing note and vitals reviewed. MDM Procedures ED EKG interpretation: 
Rhythm: normal sinus rhythm; and regular . Rate (approx.): 86; Axis: normal; P wave: normal; QRS interval: normal ; ST/T wave: normal; in  Lead: This EKG was interpreted by Yazmin Moore MD,ED Provider. A/P: 
1. Chest pain - much improved in ED. Unclear etiology. Recommend OP follow-up for stress test. 
 
 
Patient's results have been reviewed with them. Patient and/or family have verbally conveyed their understanding and agreement of the patient's signs, symptoms, diagnosis, treatment and prognosis and additionally agree to follow up as recommended or return to the Emergency Room should their condition change prior to follow-up. Discharge instructions have also been provided to the patient with some educational information regarding their diagnosis as well a list of reasons why they would want to return to the ER prior to their follow-up appointment should their condition change.

## 2018-11-02 NOTE — DISCHARGE INSTRUCTIONS
- Please contact Ms. Pricilla NP in the AM and let her know you were see in the ED for chest pain. Follow-up as advised. - Return to ED for any concerns. Chest Pain: Care Instructions  Your Care Instructions    There are many things that can cause chest pain. Some are not serious and will get better on their own in a few days. But some kinds of chest pain need more testing and treatment. Your doctor may have recommended a follow-up visit in the next 8 to 12 hours. If you are not getting better, you may need more tests or treatment. Even though your doctor has released you, you still need to watch for any problems. The doctor carefully checked you, but sometimes problems can develop later. If you have new symptoms or if your symptoms do not get better, get medical care right away. If you have worse or different chest pain or pressure that lasts more than 5 minutes or you passed out (lost consciousness), call 911 or seek other emergency help right away. A medical visit is only one step in your treatment. Even if you feel better, you still need to do what your doctor recommends, such as going to all suggested follow-up appointments and taking medicines exactly as directed. This will help you recover and help prevent future problems. How can you care for yourself at home? · Rest until you feel better. · Take your medicine exactly as prescribed. Call your doctor if you think you are having a problem with your medicine. · Do not drive after taking a prescription pain medicine. When should you call for help? Call 911 if:    · You passed out (lost consciousness).     · You have severe difficulty breathing.     · You have symptoms of a heart attack. These may include:  ? Chest pain or pressure, or a strange feeling in your chest.  ? Sweating. ? Shortness of breath. ? Nausea or vomiting.   ? Pain, pressure, or a strange feeling in your back, neck, jaw, or upper belly or in one or both shoulders or arms.  ? Lightheadedness or sudden weakness. ? A fast or irregular heartbeat. After you call 911, the  may tell you to chew 1 adult-strength or 2 to 4 low-dose aspirin. Wait for an ambulance. Do not try to drive yourself.    Call your doctor today if:    · You have any trouble breathing.     · Your chest pain gets worse.     · You are dizzy or lightheaded, or you feel like you may faint.     · You are not getting better as expected.     · You are having new or different chest pain. Where can you learn more? Go to http://keshawn-merissa.info/. Enter A120 in the search box to learn more about \"Chest Pain: Care Instructions. \"  Current as of: November 20, 2017  Content Version: 11.8  © 7043-5997 [employer]. Care instructions adapted under license by [employer] (which disclaims liability or warranty for this information). If you have questions about a medical condition or this instruction, always ask your healthcare professional. [address] any warranty or liability for your use of this information.

## 2018-11-02 NOTE — ED TRIAGE NOTES
C/o constant epigastric pressure x 1 hour. Does not change with movement or taking a deep breath. No hx of heart problems. Also c/o sob and nausea since this issue began.

## 2018-11-02 NOTE — ED NOTES
Back from CT, states she is feeling better. C/o eye itching and watering after CT contrast.    MD notified.

## 2018-11-05 ENCOUNTER — PATIENT OUTREACH (OUTPATIENT)
Dept: OTHER | Age: 48
End: 2018-11-05

## 2018-11-05 NOTE — PROGRESS NOTES
Patient on [facility] discharge report dated 11/5/18. Left message on voicemail. Will attempt to contact again. Need to complete post-discharge assessment.

## 2018-11-06 ENCOUNTER — PATIENT OUTREACH (OUTPATIENT)
Dept: OTHER | Age: 48
End: 2018-11-06

## 2018-11-06 NOTE — PROGRESS NOTES
11/6/18 11:09 Ms. Cat Dave returned my phone call from yesterday. I explained the program to her, she said she was interested but couldn't talk right now. I told her I could call her back if she wanted or she could call me. She said she would call me because it would have to be in between patients. She said she would call back today.

## 2018-11-08 ENCOUNTER — PATIENT OUTREACH (OUTPATIENT)
Dept: OTHER | Age: 48
End: 2018-11-08

## 2018-11-20 DIAGNOSIS — F41.9 ANXIETY: Primary | ICD-10-CM

## 2018-11-20 RX ORDER — ALPRAZOLAM 0.25 MG/1
TABLET ORAL
Qty: 30 TAB | Refills: 0 | OUTPATIENT
Start: 2018-11-20

## 2018-11-20 RX ORDER — ZOLPIDEM TARTRATE 5 MG/1
5 TABLET ORAL
Qty: 15 TAB | Refills: 0 | Status: SHIPPED | OUTPATIENT
Start: 2018-11-20 | End: 2019-11-18

## 2018-11-20 RX ORDER — ZOLPIDEM TARTRATE 5 MG/1
5 TABLET ORAL DAILY
Refills: 0 | COMMUNITY
Start: 2018-09-04 | End: 2018-11-20 | Stop reason: SDUPTHER

## 2018-11-20 NOTE — TELEPHONE ENCOUNTER
Patient request for refill, states that she is having trouble sleeping.    Last refill 9/4/2018 with Emilie Montanez

## 2018-12-11 ENCOUNTER — DOCUMENTATION ONLY (OUTPATIENT)
Dept: OTHER | Age: 48
End: 2018-12-11

## 2018-12-15 ENCOUNTER — OFFICE VISIT (OUTPATIENT)
Dept: FAMILY MEDICINE CLINIC | Age: 48
End: 2018-12-15

## 2018-12-15 VITALS
TEMPERATURE: 97.6 F | BODY MASS INDEX: 32.43 KG/M2 | OXYGEN SATURATION: 96 % | HEIGHT: 63 IN | WEIGHT: 183 LBS | RESPIRATION RATE: 16 BRPM | SYSTOLIC BLOOD PRESSURE: 105 MMHG | DIASTOLIC BLOOD PRESSURE: 72 MMHG | HEART RATE: 84 BPM

## 2018-12-15 DIAGNOSIS — F33.1 MODERATE EPISODE OF RECURRENT MAJOR DEPRESSIVE DISORDER (HCC): Primary | ICD-10-CM

## 2018-12-15 RX ORDER — HYDROXYZINE 25 MG/1
37.5 TABLET, FILM COATED ORAL
Qty: 30 TAB | Refills: 0 | Status: SHIPPED | OUTPATIENT
Start: 2018-12-15 | End: 2019-04-29 | Stop reason: SDUPTHER

## 2018-12-15 NOTE — PATIENT INSTRUCTIONS
Depression and Chronic Disease: Care Instructions  Your Care Instructions    A chronic disease is one that you have for a long time. Some chronic diseases can be controlled, but they usually cannot be cured. Depression is common in people with chronic diseases, but it often goes unnoticed. Many people have concerns about seeking treatment for a mental health problem. You may think it's a sign of weakness, or you don't want people to know about it. It's important to overcome these reasons for not seeking treatment. Treating depression or anxiety is good for your health. Follow-up care is a key part of your treatment and safety. Be sure to make and go to all appointments, and call your doctor if you are having problems. It's also a good idea to know your test results and keep a list of the medicines you take. How can you care for yourself at home? Watch for symptoms of depression  The symptoms of depression are often subtle at first. You may think they are caused by your disease rather than depression. Or you may think it is normal to be depressed when you have a chronic disease. If you are depressed you may:  · Feel sad or hopeless. · Feel guilty or worthless. · Not enjoy the things you used to enjoy. · Feel hopeless, as though life is not worth living. · Have trouble thinking or remembering. · Have low energy, and you may not eat or sleep well. · Pull away from others. · Think often about death or killing yourself. (Keep the numbers for these national suicide hotlines: 8-521-432-TALK [1-774.768.4600] and 7-448-GUFBPYZ [1-773.438.2013]. )  Get treatment  By treating your depression, you can feel more hopeful and have more energy. If you feel better, you may take better care of yourself, so your health may improve. · Talk to your doctor if you have any changes in mood during treatment for your disease. · Ask your doctor for help.  Counseling, antidepressant medicine, or a combination of the two can help most people with depression. Often a combination works best. Counseling can also help you cope with having a chronic disease. When should you call for help? Call 911 anytime you think you may need emergency care. For example, call if:    · You feel like hurting yourself or someone else.     · Someone you know has depression and is about to attempt or is attempting suicide.   [facility] your doctor now or seek immediate medical care if:    · You hear voices.     · Someone you know has depression and:  ? Starts to give away his or her possessions. ? Uses illegal drugs or drinks alcohol heavily. ? Talks or writes about death, including writing suicide notes or talking about guns, knives, or pills. ? Starts to spend a lot of time alone. ? Acts very aggressively or suddenly appears calm.    Watch closely for changes in your health, and be sure to contact your doctor if:    · You do not get better as expected. Where can you learn more? Go to http://keshawn-merissa.info/. Enter Z884 in the search box to learn more about \"Depression and Chronic Disease: Care Instructions. \"  Current as of: December 7, 2017  Content Version: 11.8  © 6419-9126 [employer]wise[employer]. Care instructions adapted under license by [employer] (which disclaims liability or warranty for this information). If you have questions about a medical condition or this instruction, always ask your healthcare professional. [address] any warranty or liability for your use of this information.

## 2018-12-15 NOTE — PROGRESS NOTES
Patient Name: Yahir Nix   MRN: [id number]    Cecilia Tatum is a 50 y.o. female who presents with the following:     States that her depression has been a lot worse lately due to her recent divorce during holiday time. States that she had to leave work early yesterday as she became very upset. Is currently followed by psychiatry whom she saw last week. Medication changes included increasing her Rexalti from 0.5 mg to 1 mg and was given hydroxyzine as needed anxiety/depression. Also on Wellbutrin and Prozac. States that when she takes 25 mg of the hydroxyzine, it does not help but when she takes 50 mg of hydroxyzine, and makes her too drowsy. Denies any active SI/HI. Has already reached out to her counselor to set up an appointment. Review of Systems   Constitutional: Negative for fever, malaise/fatigue and weight loss. Respiratory: Negative for cough, hemoptysis, shortness of breath and wheezing. Cardiovascular: Negative for chest pain, palpitations, leg swelling and PND. Gastrointestinal: Negative for abdominal pain, constipation, diarrhea, nausea and vomiting. Psychiatric/Behavioral: Positive for depression. Negative for suicidal ideas. The patient's medications, allergies, past medical history, surgical history, family history and social history were reviewed and updated where appropriate. Prior to Admission medications    Medication Sig Start Date End Date Taking? Authorizing Provider   zolpidem (AMBIEN) 5 mg tablet Take 1 Tab by mouth nightly as needed for Sleep.  Max Daily Amount: 5 mg. 11/20/18  Yes Hammondmarisela Castro, JOSELINE   REXULTI 1 mg tab tablet TAKE 1 TABLET BY MOUTH EVERY DAY 1/15/18  Yes Provider, Historical   buPROPion XL (WELLBUTRIN XL) 300 mg XL tablet TAKE 1 TABLET BY MOUTH EVERY MORNING 1/23/18  Yes Provider, Historical   FLUoxetine (PROZAC) 40 mg capsule TAKE ONE CAPSULE BY MOUTH ONCE DAILY 1/23/18  Yes Provider, Historical   cholecalciferol, vitamin D3, 2,000 unit tab Take  by mouth. Yes Provider, Historical   EPINEPHrine (EPIPEN 2-EDWIGE) 0.3 mg/0.3 mL (1:1,000) injection 0.3 mL by IntraMUSCular route once as needed for up to 1 dose. 6/26/15   Horacio Salazar MD       Allergies   Allergen Reactions    Ginger Hives    Soy Hives    Bees [Sting, Bee] Not Reported This Time    Cephaeline Not Reported This Time     Pt. States no allergy    Keflex [Cephalexin] Rash    Soybean Oil Hives    Tomato Hives     Pt. States no allergy           OBJECTIVE    Visit Vitals  /72 (BP 1 Location: Left arm, BP Patient Position: Sitting)   Pulse 84   Temp 97.6 °F (36.4 °C) (Oral)   Resp 16   Ht 5' 3\" (1.6 m)   Wt 183 lb (83 kg)   SpO2 96%   BMI 32.42 kg/m²       Physical Exam   Constitutional: She is oriented to person, place, and time and well-developed, well-nourished, and in no distress. No distress. Eyes: Conjunctivae and EOM are normal. Pupils are equal, round, and reactive to light. Musculoskeletal: Normal range of motion. Neurological: She is alert and oriented to person, place, and time. Gait normal.   Skin: Skin is warm and dry. She is not diaphoretic. Psychiatric: Memory and judgment normal.   Depressed, tearful   Nursing note and vitals reviewed. ASSESSMENT AND PLAN  Amanda Yang is a 50 y.o. female who presents today for:    1. Moderate episode of recurrent major depressive disorder ([address]  Recently underwent several medication changes with psychiatry therefore encourage patient to call psychiatrist this week to update her on her current symptoms. Also would have close follow-up with therapist.  Recommend switching to a total of 37.5 mg of hydroxyzine as needed anxiety to avoid oversedation. If active SI or HI develops, she should go to the ER.  - hydrOXYzine HCl (ATARAX) 25 mg tablet; Take 1.5 Tabs by mouth daily as needed for Anxiety. Dispense: 30 Tab; Refill: 0       There are no discontinued medications.     Follow-up Disposition:  Return if symptoms worsen or fail to improve. Medication risks/benefits/costs/interactions/alternatives discussed with patient. Advised patient to call back or return to office if symptoms worsen/change/persist. If patient cannot reach us or should anything more severe/urgent arise he/she should proceed directly to the nearest emergency department. Discussed expected course/resolution/complications of diagnosis in detail with patient. Patient given a written after visit summary which includes his/her diagnoses, current medications and vitals. Patient expressed understanding with the diagnosis and plan. Aracelis Benavides M.D.

## 2019-01-08 DIAGNOSIS — Z00.00 ANNUAL PHYSICAL EXAM: Primary | ICD-10-CM

## 2019-01-09 LAB
25(OH)D3+25(OH)D2 SERPL-MCNC: 24.6 NG/ML (ref 30–100)
ALBUMIN SERPL-MCNC: 4 G/DL (ref 3.5–5.5)
ALBUMIN/GLOB SERPL: 1.3 {RATIO} (ref 1.2–2.2)
ALP SERPL-CCNC: 59 IU/L (ref 39–117)
ALT SERPL-CCNC: 17 IU/L (ref 0–32)
AST SERPL-CCNC: 21 IU/L (ref 0–40)
BILIRUB SERPL-MCNC: 0.2 MG/DL (ref 0–1.2)
BUN SERPL-MCNC: 16 MG/DL (ref 6–24)
BUN/CREAT SERPL: 19 (ref 9–23)
CALCIUM SERPL-MCNC: 9.1 MG/DL (ref 8.7–10.2)
CHLORIDE SERPL-SCNC: 106 MMOL/L (ref 96–106)
CHOLEST SERPL-MCNC: 220 MG/DL (ref 100–199)
CO2 SERPL-SCNC: 20 MMOL/L (ref 20–29)
CREAT SERPL-MCNC: 0.85 MG/DL (ref 0.57–1)
ERYTHROCYTE [DISTWIDTH] IN BLOOD BY AUTOMATED COUNT: 13.4 % (ref 12.3–15.4)
GLOBULIN SER CALC-MCNC: 3 G/DL (ref 1.5–4.5)
GLUCOSE SERPL-MCNC: 80 MG/DL (ref 65–99)
HCT VFR BLD AUTO: 37.5 % (ref 34–46.6)
HDLC SERPL-MCNC: 54 MG/DL
HGB BLD-MCNC: 12.4 G/DL (ref 11.1–15.9)
LDLC SERPL CALC-MCNC: 128 MG/DL (ref 0–99)
MCH RBC QN AUTO: 29.5 PG (ref 26.6–33)
MCHC RBC AUTO-ENTMCNC: 33.1 G/DL (ref 31.5–35.7)
MCV RBC AUTO: 89 FL (ref 79–97)
PLATELET # BLD AUTO: 344 X10E3/UL (ref 150–379)
POTASSIUM SERPL-SCNC: 4.6 MMOL/L (ref 3.5–5.2)
PROT SERPL-MCNC: 7 G/DL (ref 6–8.5)
RBC # BLD AUTO: 4.2 X10E6/UL (ref 3.77–5.28)
SODIUM SERPL-SCNC: 140 MMOL/L (ref 134–144)
TRIGL SERPL-MCNC: 190 MG/DL (ref 0–149)
TSH SERPL DL<=0.005 MIU/L-ACNC: 4.13 UIU/ML (ref 0.45–4.5)
VLDLC SERPL CALC-MCNC: 38 MG/DL (ref 5–40)
WBC # BLD AUTO: 6.1 X10E3/UL (ref 3.4–10.8)

## 2019-01-22 ENCOUNTER — OFFICE VISIT (OUTPATIENT)
Dept: PRIMARY CARE CLINIC | Age: 49
End: 2019-01-22

## 2019-01-22 VITALS
HEART RATE: 77 BPM | DIASTOLIC BLOOD PRESSURE: 73 MMHG | RESPIRATION RATE: 16 BRPM | OXYGEN SATURATION: 98 % | SYSTOLIC BLOOD PRESSURE: 108 MMHG | TEMPERATURE: 98 F | BODY MASS INDEX: 32.43 KG/M2 | HEIGHT: 63 IN | WEIGHT: 183 LBS

## 2019-01-22 DIAGNOSIS — Z79.899 HIGH RISK MEDICATION USE: ICD-10-CM

## 2019-01-22 DIAGNOSIS — E78.2 MIXED HYPERLIPIDEMIA: ICD-10-CM

## 2019-01-22 DIAGNOSIS — Z00.00 ROUTINE PHYSICAL EXAMINATION: Primary | ICD-10-CM

## 2019-01-22 DIAGNOSIS — Z71.3 WEIGHT LOSS COUNSELING, ENCOUNTER FOR: ICD-10-CM

## 2019-01-22 DIAGNOSIS — E55.9 VITAMIN D DEFICIENCY: ICD-10-CM

## 2019-01-22 DIAGNOSIS — E66.09 CLASS 1 OBESITY DUE TO EXCESS CALORIES WITHOUT SERIOUS COMORBIDITY WITH BODY MASS INDEX (BMI) OF 32.0 TO 32.9 IN ADULT: ICD-10-CM

## 2019-01-22 RX ORDER — ERGOCALCIFEROL 1.25 MG/1
50000 CAPSULE ORAL
Qty: 8 CAP | Refills: 0 | Status: SHIPPED | OUTPATIENT
Start: 2019-01-22 | End: 2019-03-28 | Stop reason: SDUPTHER

## 2019-01-22 RX ORDER — PHENTERMINE HYDROCHLORIDE 37.5 MG/1
37.5 TABLET ORAL
Qty: 14 TAB | Refills: 0 | Status: SHIPPED | OUTPATIENT
Start: 2019-01-22 | End: 2019-02-14 | Stop reason: ALTCHOICE

## 2019-01-22 NOTE — PROGRESS NOTES
Visit Vitals /73 (BP 1 Location: Left arm, BP Patient Position: Sitting) Pulse 77 Temp 98 °F (36.7 °C) (Oral) Resp 16 Ht 5' 3\" (1.6 m) Wt 183 lb (83 kg) SpO2 98% BMI 32.42 kg/m² Chief Complaint Patient presents with  Labs Review Lab Results 1. Have you been to the ER, urgent care clinic since your last visit? Hospitalized since your last visit? Denies 2. Have you seen or consulted any other health care providers outside of the [address] [place] [address] since your last visit? Include any pap smears or colon screening. Denies

## 2019-01-22 NOTE — PROGRESS NOTES
This note will not be viewable in [address] Esteban Vann is a  50 y.o. female presents for visit. Chief Complaint Patient presents with  Complete Physical  
 Abnormal Lab Results  Weight Gain HPI Patient presents for a complete physical and to review abnormal lab results. Laboratory data results on January 8, 2019 reveal abnormal lipid panel. Total cholesterol is 220, triglycerides 190, , and HDL 54. Vitamin D level is slightly down at 24. All other labs look good. She is taking OTC vitamin D3 2000 iu daily. Patient is requesting medication to assist in weight loss. Reports gaining about 20 pounds after psychiatry prescribed Rexulti to treat her depression/anxiety. Reports depression and anxiety are well controlled with medication and counseling however she is continuing to have difficulty losing the extra weight gained. Patient is exercising and generally eats healthy. Body mass  index is 32.42. Review of Systems Constitutional: Negative. HENT: Negative. Eyes: Negative. Respiratory: Negative. Cardiovascular: Negative. Gastrointestinal: Negative. Genitourinary: Negative. Musculoskeletal: Positive for back pain. Skin: Negative. Neurological: Negative. Endo/Heme/Allergies: Negative. Psychiatric/Behavioral: Positive for depression (controlled with medication). The patient is nervous/anxious (controlled with medication). Visit Vitals /73 (BP 1 Location: Left arm, BP Patient Position: Sitting) Pulse 77 Temp 98 °F (36.7 °C) (Oral) Resp 16 Ht 5' 3\" (1.6 m) Wt 183 lb (83 kg) SpO2 98% BMI 32.42 kg/m² Physical Exam  
Constitutional: She is oriented to person, place, and time. Vital signs are normal. She appears well-developed and well-nourished. obese HENT:  
Head: Normocephalic and atraumatic.   
Right Ear: Hearing, tympanic membrane, external ear and ear canal normal.  
 Left Ear: Hearing, tympanic membrane, external ear and ear canal normal.  
Nose: Mucosal edema (scant dry blood) present. No rhinorrhea. Right sinus exhibits no maxillary sinus tenderness and no frontal sinus tenderness. Left sinus exhibits no maxillary sinus tenderness and no frontal sinus tenderness. Mouth/Throat: Uvula is midline, oropharynx is clear and moist and mucous membranes are normal.  
Eyes: Conjunctivae, EOM and lids are normal. Pupils are equal, round, and reactive to light. Right eye exhibits no discharge and no exudate. Left eye exhibits no discharge and no exudate. No scleral icterus. Neck: Trachea normal and normal range of motion. Neck supple. Cardiovascular: Normal rate, regular rhythm, S1 normal, S2 normal, normal heart sounds and normal pulses. No murmur heard. Pulmonary/Chest: Effort normal and breath sounds normal. No accessory muscle usage. No respiratory distress. Abdominal: Soft. Normal appearance and bowel sounds are normal. There is no tenderness. Musculoskeletal: Normal range of motion. Lymphadenopathy:  
  She has no cervical adenopathy. Neurological: She is alert and oriented to person, place, and time. She has normal strength and normal reflexes. No cranial nerve deficit or sensory deficit. Skin: Skin is warm, dry and intact. No rash noted. Psychiatric: She has a normal mood and affect. Her speech is normal and behavior is normal. Judgment and thought content normal.  
Nursing note and vitals reviewed. Lab Results Component Value Date/Time WBC 6.1 01/08/2019 08:19 AM  
 HGB 12.4 01/08/2019 08:19 AM  
 HCT 37.5 01/08/2019 08:19 AM  
 PLATELET 113 10/88/4084 08:19 AM  
 MCV 89 01/08/2019 08:19 AM  
 
Lab Results Component Value Date/Time Cholesterol, total 220 (H) 01/08/2019 08:19 AM  
 HDL Cholesterol 54 01/08/2019 08:19 AM  
 LDL, calculated 128 (H) 01/08/2019 08:19 AM  
 Triglyceride 190 (H) 01/08/2019 08:19 AM  
 
Lab Results Component Value Date/Time TSH 4.130 01/08/2019 08:19 AM  
 T4, Free 1.07 04/08/2016 09:38 AM  
  
Lab Results Component Value Date/Time Sodium 140 01/08/2019 08:19 AM  
 Potassium 4.6 01/08/2019 08:19 AM  
 Chloride 106 01/08/2019 08:19 AM  
 CO2 20 01/08/2019 08:19 AM  
 Anion gap 7 11/02/2018 12:26 AM  
 Glucose 80 01/08/2019 08:19 AM  
 BUN 16 01/08/2019 08:19 AM  
 Creatinine 0.85 01/08/2019 08:19 AM  
 BUN/Creatinine ratio 19 01/08/2019 08:19 AM  
 GFR est AA 94 01/08/2019 08:19 AM  
 GFR est non-AA 81 01/08/2019 08:19 AM  
 Calcium 9.1 01/08/2019 08:19 AM  
 Bilirubin, total 0.2 01/08/2019 08:19 AM  
 ALT (SGPT) 17 01/08/2019 08:19 AM  
 AST (SGOT) 21 01/08/2019 08:19 AM  
 Alk. phosphatase 59 01/08/2019 08:19 AM  
 Protein, total 7.0 01/08/2019 08:19 AM  
 Albumin 4.0 01/08/2019 08:19 AM  
 Globulin 3.8 11/02/2018 12:26 AM  
 A-G Ratio 1.3 01/08/2019 08:19 AM  
  
 
 
 
 
Patient Active Problem List  
 Diagnosis Date Noted  Moderate episode of recurrent major depressive disorder ([facility] [address] 09/28/2018  Stress at home 04/01/2016  Panic attack 04/01/2016  Neck pain on left side 07/24/2013  Radiculopathy of cervical spine 07/24/2013  Anxiety 09/22/2011  Insomnia 09/22/2011 ASSESSMENT AND PLAN: 
 
  ICD-10-CM ICD-9-CM 1. Routine physical examination Z00.00 V70.0 2. Class 1 obesity due to excess calories without serious comorbidity with body mass index (BMI) of 32.0 to 32.9 in adult E66.09 278.00  
 Z68.32 V85.32  
3. Mixed hyperlipidemia E78.2 272.2 4. Vitamin D deficiency E55.9 268.9  
5. Weight loss counseling, encounter for Z71.3 V65.3 6. High risk medication use Z79.899 V58.69 Orders Placed This Encounter  phentermine (ADIPEX-P) 37.5 mg tablet Sig: Take 1 Tab by mouth every morning. Max Daily Amount: 37.5 mg.  
  Dispense:  14 Tab Refill:  0  
 ergocalciferol (ERGOCALCIFEROL) 50,000 unit capsule Sig: Take 1 Cap by mouth every seven (7) days for 60 days. Dispense:  8 Cap Refill:  0 Diagnoses and all orders for this visit: 1. Routine physical [id number] in 1 year 2. Class 1 obesity due to excess calories without serious comorbidity with body mass index (BMI) of 32.0 to 32.9 in adult -     phentermine (ADIPEX-P) 37.5 mg tablet; Take 1 Tab by mouth every morning. Max Daily Amount: 37.5 mg. 
 
3. Mixed hyperlipidemia-continue to work on weight loss and heart healthy diet and exercise. 4. Vitamin D deficiency 
-     ergocalciferol (ERGOCALCIFEROL) 50,000 unit capsule; Take 1 Cap by mouth every seven (7) days for 60 days. 5. Weight loss counseling, encounter for -     phentermine (ADIPEX-P) 37.5 mg tablet; Take 1 Tab by mouth every morning. Max Daily Amount: 37.5 mg. 
 
6. High risk medication use -     phentermine (ADIPEX-P) 37.5 mg tablet; Take 1 Tab by mouth every morning. Max Daily Amount: 37.5 mg. 
 
 
 
the following changes in treatment are made: Start phentermine 37.5 mg p.o. every morning times 2 weeks. Return to the office for an EKG after that. If normal will prescribe 30-day prescription of phentermine and follow-up after that. Start high-dose vitamin D therapy times 2 months. Continue OTC vitamin D 3 after that. 
lab results and schedule of future lab studies reviewed with patient 
reviewed diet, exercise and weight control Discontinue phentermine if chest pain, elevated blood pressure, palpitations, headache. Follow-up Disposition: 
Return in about 2 weeks (around 2/5/2019), or if symptoms worsen or fail to improve, for phentermine folllow up and EKG. Disclaimer: 
Advised her to call back or return to office if symptoms worsen/change/persist. 
Discussed expected course/resolution/complications of diagnosis in detail with patient. Medication risks/benefits/alternatives discussed with patient. She was given an after visit summary which includes diagnoses, current medications, & vitals. Discussed patient instructions and advised to read to all patient instructions regarding care. She expressed understanding with the diagnosis and plan.

## 2019-01-22 NOTE — PATIENT INSTRUCTIONS
Phentermine (By mouth) Phentermine (FEN-ter-meen) Helps you lose weight when used for a short time. Brand Name(s): Jett Garrido There may be other brand names for this medicine. When This Medicine Should Not Be Used: This medicine is not right for everyone. Do not use it if you had an allergic reaction to phentermine or similar medicines, or if you are pregnant. How to Use This Medicine:  
Dissolving Tablet, Capsule, Long Acting Capsule, Tablet, Dissolving Tablet Your doctor will tell you how much medicine to use. Do not use more than directed. This medicine is not for long-term use. To avoid trouble sleeping, always take this medicine in the morning and never at bedtime or late in the evening. Take the capsule 2 hours after breakfast. 
Take the extended-release capsule before breakfast. 
Take the disintegrating tablet in the morning, with or without food. Take the phentermine tablet before breakfast or 1 to 2 hours after breakfast. 
Take Lomaira tablet 30 minutes before meals. Swallow the extended-release capsule whole. Do not crush, break, or chew it. If you are using the disintegrating tablet, make sure your hands are dry before you handle the tablet. Place the tablet on your tongue. It should melt quickly. After the tablet has melted, swallow or take a drink of water. Tablet: Swallow whole. Do not crush, break, or chew it. Carefully follow your doctor's instructions about any special diet. Missed dose: Take a dose as soon as you remember. If it is almost time for your next dose, wait until then and take a regular dose. Do not take extra medicine to make up for a missed dose. Store the medicine in a closed container at room temperature, away from heat, moisture, and direct light. Drugs and Foods to Avoid: Ask your doctor or pharmacist before using any other medicine, including over-the-counter medicines, vitamins, and herbal products. Do not use this medicine and an MAO inhibitor (MAOI) within 14 days of each other. Some medicines can affect how phentermine works. Tell your doctor if you are using any of the following: Amphetamine medicine (including dextroamphetamine, methamphetamine) Diet pills Insulin or diabetes medicine Medicine to treat depression (including fluoxetine, fluvoxamine, paroxetine, sertraline) Do not drink alcohol while you are using this medicine. Warnings While Using This Medicine: It is not safe to take this medicine during pregnancy. It could harm an unborn baby. Tell your doctor right away if you become pregnant. Tell your doctor if you are breastfeeding, or if you have kidney disease, diabetes, glaucoma, congestive heart failure, heart or blood vessel disease, high blood pressure, overactive thyroid, or a history of stroke or drug abuse. Tell your doctor if have allergies to aspirin or tartrazine. This medicine may cause the following problems: 
Primary pulmonary hypertension (a serious lung problem) Heart valve disease Changes in blood sugar levels This medicine may make you dizzy or drowsy. Do not drive or do anything else that could be dangerous until you know how this medicine affects you. This medicine can be habit-forming. Do not use more than your prescribed dose. Call your doctor if you think your medicine is not working. Do not stop using this medicine suddenly. Your doctor will need to slowly decrease your dose before you stop it completely. Your doctor will check your progress and the effects of this medicine at regular visits. Keep all appointments. Keep all medicine out of the reach of children. Never share your medicine with anyone. Possible Side Effects While Using This Medicine:  
Call your doctor right away if you notice any of these side effects:  
Allergic reaction: Itching or hives, swelling in your face or hands, swelling or tingling in your mouth or throat, chest tightness, trouble breathing Chest pain, fainting, trouble breathing Fast, slow, pounding, or uneven heartbeat Seizures or tremors Severe headache Swelling of your feet or lower legs If you notice these less serious side effects, talk with your doctor:  
Changes in sex drive Dizziness, drowsiness, mild headache Dry mouth or a bad taste in your mouth Nausea, vomiting, diarrhea, constipation, stomach cramps Restlessness or nervousness, trouble sleeping If you notice other side effects that you think are caused by this medicine, tell your doctor. Call your doctor for medical advice about side effects. You may report side effects to FDA at 4-039-OKL-3779 © 2017 [facility] Information is for End User's use only and may not be sold, redistributed or otherwise used for commercial purposes. The above information is an  only. It is not intended as medical advice for individual conditions or treatments. Talk to your doctor, nurse or pharmacist before following any medical regimen to see if it is safe and effective for you. ·  
· © 2019 [employer], [employer]. and/or its affiliates. All Rights Reserved. Sources and main effects of dietary fat Type of fat Chief food sources Leading food contributors in diets of adults in the [place]* Effects on cholesterol Effects on coronary heart disease Trans fatty acids, from partially hydrogenated vegetable oils Stick and full-fat margarine; commercial baked goods; deep-fried foods Fast food; margarines; commercial baked goods (sweet rolls, cookies, donuts) Increases LDL cholesterol, lowers HDL cholesterol Increases risk of coronary heart disease Saturated fatty acids Dairy foods; red meat; some plant oils (coconut, palm) Dairy foods, especially cheese, milk, ice cream; red meat Increases total cholesterol May increase risk of coronary heart disease Monounsaturated fatty acids Vegetable sources (canola, olive oil); also from meat, dairy Beef; margarines; chicken; olive oil Lowers LDL cholesterol and triglycerides, maintains HDL cholesterol Probably has no association Polyunsaturated fatty acids; n-6 Safflower, sunflower, and corn oils Mayonnaise; margarines; salad dressing; nuts; chicken; peanut butter Lowers LDL cholesterol and triglycerides, increases HDL May reduce risk of coronary heart disease Polyunsaturated fatty acids; n-3 Canola, soybean, flaxseed, walnut oil, wheat germ, vegetables of cabbage family For longer-chain n-3 fatty acids: seafood, especially fatty fish  Alpha-linolenic acid (18:3): mayonnaise, salad dressing, margarines, beef; longer-chain n-3: tuna, other dark fish, shrimp Lowers LDL cholesterol and triglycerides, maintains HDL cholesterol May reduce risk of coronary heart disease Leading food contributors combines the fatty acid content of the food with the frequency with which individuals eat that food. LDL: low-density lipoprotein; HDL: high-density lipoprotein. * Data from participants in the ongoing Nurses' Health Study (women) and Health Professionals' Follow-up Study (men). ·  
 
×

## 2019-02-14 ENCOUNTER — OFFICE VISIT (OUTPATIENT)
Dept: PRIMARY CARE CLINIC | Age: 49
End: 2019-02-14

## 2019-02-14 VITALS
HEART RATE: 98 BPM | TEMPERATURE: 98.5 F | SYSTOLIC BLOOD PRESSURE: 111 MMHG | OXYGEN SATURATION: 97 % | WEIGHT: 178 LBS | BODY MASS INDEX: 31.54 KG/M2 | DIASTOLIC BLOOD PRESSURE: 77 MMHG | HEIGHT: 63 IN | RESPIRATION RATE: 16 BRPM

## 2019-02-14 DIAGNOSIS — Z87.19 HISTORY OF BLOODY STOOLS: ICD-10-CM

## 2019-02-14 DIAGNOSIS — R10.84 GENERALIZED ABDOMINAL PAIN: Primary | ICD-10-CM

## 2019-02-14 NOTE — PROGRESS NOTES
This note will not be viewable in [address] Dillon Rosenbaum is a  50 y.o. female presents for visit. Chief Complaint   Patient presents with    Abdominal Pain     also states that she was needing np to listen to abdomin as she had diarrhea all night monday and it is still gurgling and she is not eating very much. Patient presents with concern about a bloody mucousy stool that occurred 4 days ago. She thinks it may have been related to eating leftover food from the previous day. She has not had a bowel movement since that time and reports generalized mild abdominal pain. Eating bland food. Denies fever chills, nausea or vomiting. Abdominal Pain   The history is provided by the patient. This is a new problem. The current episode started more than 2 days ago (4 DAYS). The problem occurs constantly. The problem has been gradually improving. Associated symptoms include abdominal pain. Pertinent negatives include no chest pain, no headaches and no shortness of breath. The symptoms are aggravated by eating. Nothing relieves the symptoms. Review of Systems   Respiratory: Negative for shortness of breath. Cardiovascular: Negative for chest pain. Gastrointestinal: Positive for abdominal pain. Negative for nausea and vomiting. Neurological: Negative for headaches. Visit Vitals  /77 (BP 1 Location: Left arm, BP Patient Position: Sitting)   Pulse 98   Temp 98.5 °F (36.9 °C) (Oral)   Resp 16   Ht 5' 3\" (1.6 m)   Wt 178 lb (80.7 kg)   SpO2 97%   BMI 31.53 kg/m²     Physical Exam   Constitutional: She is oriented to person, place, and time. She appears well-developed and well-nourished. No distress. HENT:   Head: Normocephalic and atraumatic. Right Ear: Tympanic membrane and external ear normal.   Left Ear: Tympanic membrane and external ear normal.   Nose: Nose normal.   Eyes: Conjunctivae are normal.   Cardiovascular: Normal rate, regular rhythm and normal heart sounds. Pulmonary/Chest: Effort normal and breath sounds normal. She has no wheezes. Abdominal: Soft. Normal appearance and bowel sounds are normal. There is no hepatosplenomegaly. There is generalized tenderness. There is no CVA tenderness. Mild tenderness. Lymphadenopathy:     She has no cervical adenopathy. Neurological: She is alert and oriented to person, place, and time. Skin: Skin is warm and dry. Psychiatric: She has a normal mood and affect. Her speech is normal and behavior is normal.   Nursing note and vitals reviewed. Patient Active Problem List    Diagnosis Date Noted    Moderate episode of recurrent major depressive disorder ([facility] [address] 09/28/2018    Stress at home 04/01/2016    Panic attack 04/01/2016    Neck pain on left side 07/24/2013    Radiculopathy of cervical spine 07/24/2013    Anxiety 09/22/2011    Insomnia 09/22/2011         ASSESSMENT AND PLAN:      ICD-10-CM ICD-9-CM   1. Generalized abdominal pain R10.84 789.07   2. History of bloody stools Z87.19 V12.79     Orders Placed This Encounter   [address] [place] [address]     Referral Priority:   Routine     Referral Type:   Consultation     Referral Reason:   Specialty Services Required     Referral Location:   [facility]     Referred to Provider:   Delma Villalobos MD     Requested Specialty:   Gastroenterology     Number of Visits Requested:   1     Diagnoses and all orders for this visit:    1. Generalized abdominal pain  -     REFERRAL TO GASTROENTEROLOGY       -      Fannin diet as tolerated. 2. History of bloody stools  -     REFERRAL TO GASTROENTEROLOGY        -     Continue to observe for changes in bowel or bladder. reviewed diet, exercise and weight control        Follow-up Disposition:  Return if symptoms worsen or fail to improve.       Disclaimer:  Advised her to call back or return to office if symptoms worsen/change/persist.  Discussed expected course/resolution/complications of diagnosis in detail with patient. Medication risks/benefits/alternatives discussed with patient. She was given an after visit summary which includes diagnoses, current medications, & vitals. Discussed patient instructions and advised to read to all patient instructions regarding care. She expressed understanding with the diagnosis and plan.

## 2019-02-14 NOTE — PROGRESS NOTES
Chief Complaint   Patient presents with    Abdominal Pain     also states that she was needing np to listen to abdomin as she had diarrhea all night monday and it is still gurgling and she is not eating very much.

## 2019-02-20 ENCOUNTER — HOSPITAL ENCOUNTER (OUTPATIENT)
Dept: MAMMOGRAPHY | Age: 49
Discharge: HOME | End: 2019-02-20
Attending: NURSE PRACTITIONER | Admitting: NURSE PRACTITIONER
Payer: COMMERCIAL

## 2019-02-20 DIAGNOSIS — Z12.39 SCREENING BREAST EXAMINATION: ICD-10-CM

## 2019-02-20 PROCEDURE — 77063 BREAST TOMOSYNTHESIS BI: CPT

## 2019-02-27 DIAGNOSIS — R19.4 FREQUENT BOWEL MOVEMENTS: Primary | ICD-10-CM

## 2019-02-27 DIAGNOSIS — R19.4 CHANGE IN BOWEL HABITS: ICD-10-CM

## 2019-02-28 LAB
ADENOVIRUS F 40/41: NOT DETECTED
ASTROVIRUS: NOT DETECTED
C DIFFICILE TOXIN A/B: NOT DETECTED
CAMPYLOBACTER: NOT DETECTED
CRYPTOSPORIDIUM, CRYPTOSPORIDIUM: NOT DETECTED
CYCLOSPORA CAYETANENSIS: NOT DETECTED
E COLI O157: NORMAL
ENTAMOEBA HISTOLYTICA: NOT DETECTED
ENTEROAGGREGATIVE E COLI: NOT DETECTED
ENTEROPATHOGENIC E COLI (EPEC), EPEC: NOT DETECTED
ENTEROTOXIGENIC E COLI (ETEC), ETEC: NOT DETECTED
GIARDIA LAMBLIA: NOT DETECTED
NOROVIRUS GI/GII: NOT DETECTED
PLESIOMONAS SHIGELLOIDES: NOT DETECTED
ROTAVIRUS A: NOT DETECTED
SALMONELLA: NOT DETECTED
SAPOVIRUS: NOT DETECTED
SHIGA-TOXIN-PRODUCING E COLI: NOT DETECTED
SHIGELLA/ENTEROINVASIVE E COLI (EIEC), EIEC: NOT DETECTED
VIBRIO CHOLERAE: NOT DETECTED
VIBRIO: NOT DETECTED
YERSINIA ENTEROCOLITICA: NOT DETECTED

## 2019-03-23 ENCOUNTER — OFFICE VISIT (OUTPATIENT)
Dept: FAMILY MEDICINE CLINIC | Age: 49
End: 2019-03-23

## 2019-03-23 VITALS
OXYGEN SATURATION: 99 % | DIASTOLIC BLOOD PRESSURE: 69 MMHG | HEIGHT: 63 IN | HEART RATE: 65 BPM | SYSTOLIC BLOOD PRESSURE: 119 MMHG | BODY MASS INDEX: 32.43 KG/M2 | TEMPERATURE: 98.7 F | WEIGHT: 183 LBS | RESPIRATION RATE: 16 BRPM

## 2019-03-23 DIAGNOSIS — L50.9 HIVES: Primary | ICD-10-CM

## 2019-03-23 NOTE — PROGRESS NOTES
Chief Complaint   Patient presents with   Claire Baker presents in office today with c/o hives to bilateral arm, back, and abdomen, onset 1 day ago    1. Have you been to the ER, urgent care clinic since your last visit? Hospitalized since your last visit? No    2. Have you seen or consulted any other health care providers outside of the [address] [place] [address] since your last visit? Include any pap smears or colon screening.  No

## 2019-03-23 NOTE — PATIENT INSTRUCTIONS
Hives: Care Instructions  Your Care Instructions  Hives are raised, red, itchy patches of skin. They are also called wheals or welts. They usually have red borders and pale centers. Hives range in size from ¼ inch to 3 inches or more across. They may seem to move from place to place on the skin. Several hives may form a large area of raised, red skin. You can get hives after an insect sting, after taking medicine or eating certain foods, or because of infection or stress. Other causes include plants, things you breathe in, makeup, heat, cold, sunlight, and latex. You cannot spread hives to other people. Hives may last a few minutes or a few days, but a single spot may last less than 36 hours. Follow-up care is a key part of your treatment and safety. Be sure to make and go to all appointments, and call your doctor if you are having problems. It's also a good idea to know your test results and keep a list of the medicines you take. How can you care for yourself at home? · Avoid whatever you think may have caused your hives, such as a certain food or medicine. However, you may not know the cause. · Put a cool, wet towel on the area to relieve itching. · Take an over-the-counter antihistamine, such as diphenhydramine (Benadryl), cetirizine (Zyrtec), or loratadine (Claritin), to help stop the hives and calm the itching. Read and follow directions on the label. These medicines can make you feel sleepy. Do not drive while using them. · Stay away from strong soaps, detergents, and chemicals. These can make itching worse. When should you call for help? Call 911 anytime you think you may need emergency care. For example, call if:    · You have symptoms of a severe allergic reaction. These may include:  ? Sudden raised, red areas (hives) all over your body. ? Swelling of the throat, mouth, lips, or tongue. ? Trouble breathing. ? Passing out (losing consciousness).  Or you may feel very lightheaded or suddenly feel weak, confused, or restless.    Call your doctor now or seek immediate medical care if:    · You have symptoms of an allergic reaction, such as:  ? A rash or hives (raised, red areas on the skin). ? Itching. ? Swelling. ? Belly pain, nausea, or vomiting.     · You get hives after you start a new medicine.     · Hives have not gone away after 24 hours.    Watch closely for changes in your health, and be sure to contact your doctor if:    · You do not get better as expected. Where can you learn more? Go to http://keshawn-merissa.info/. Enter N297 in the search box to learn more about \"Hives: Care Instructions. \"  Current as of: September 23, 2018  Content Version: 11.9  © 8272-4430 [employer]. Care instructions adapted under license by [employer] (which disclaims liability or warranty for this information). If you have questions about a medical condition or this instruction, always ask your healthcare professional. [address] any warranty or liability for your use of this information.

## 2019-03-23 NOTE — PROGRESS NOTES
Assessment/Plan:     Diagnoses and all orders for this visit:    1. Hives  -     REFERRAL TO ENT-OTOLARYNGOLOGY    She will continue otc Allegra and Zantac. Refer to ENT for food allergy testing. Follow-up and Dispositions    · Return if symptoms worsen or fail to improve. Discussed expected course/resolution/complications of diagnosis in detail with patient.    Medication risks/benefits/costs/interactions/alternatives discussed with patient.    Pt was given after visit summary which includes diagnoses, current medications & vitals. Pt expressed understanding with the diagnosis and plan          Subjective:      Juliano Mabry is a 50 y.o. female who presents for had concerns including Hives. Rash  Patient complains of rash involving the armsbilateral. Rash started 2 days ago. Appearance of rash at onset: Other appearance: red rash. Rash has not changed over time Initial distribution: armsbilateral.  Discomfort associated with rash: pruritic. Associated symptoms: no associated symptoms. Denies: fever, arthralgia. Patient has not had previous evaluation of rash. Patient has not had previous treatment. Response to treatment. Patient has not had contacts with similar rash. Patient has not identified precipitant. Patient has had new exposures (soaps, lotions, laundry detergents, foods, medications, plants, insects or animals.)    Current Outpatient Medications   Medication Sig Dispense Refill    ergocalciferol (ERGOCALCIFEROL) 50,000 unit capsule Take 1 Cap by mouth every seven (7) days for 60 days. 8 Cap 0    hydrOXYzine HCl (ATARAX) 25 mg tablet Take 1.5 Tabs by mouth daily as needed for Anxiety. 30 Tab 0    zolpidem (AMBIEN) 5 mg tablet Take 1 Tab by mouth nightly as needed for Sleep.  Max Daily Amount: 5 mg. 15 Tab 0    REXULTI 1 mg tab tablet TAKE 1 TABLET BY MOUTH EVERY DAY  2    buPROPion XL (WELLBUTRIN XL) 300 mg XL tablet TAKE 1 TABLET BY MOUTH EVERY MORNING  2    FLUoxetine (PROZAC) 40 mg capsule TAKE ONE CAPSULE BY MOUTH ONCE DAILY  2    cholecalciferol, vitamin D3, 2,000 unit tab Take  by mouth.  EPINEPHrine (EPIPEN 2-EDWIGE) 0.3 mg/0.3 mL (1:1,000) injection 0.3 mL by IntraMUSCular route once as needed for up to 1 dose. 2 Each 0       Allergies   Allergen Reactions    Ginger Hives    Soy Hives    Bees [Sting, Bee] Not Reported This Time    Cephaeline Not Reported This Time     Pt. States no allergy    Keflex [Cephalexin] Rash    Soybean Oil Hives    Tomato Hives     Pt. States no allergy       ROS:   Review of Systems   Constitutional: Negative for fever. Respiratory: Negative for shortness of breath and wheezing. Cardiovascular: Negative for chest pain. Skin: Positive for itching and rash. Objective:     Visit Vitals  /69   Pulse 65   Temp 98.7 °F (37.1 °C) (Oral)   Resp 16   Ht 5' 3\" (1.6 m)   Wt 183 lb (83 kg)   SpO2 99%   BMI 32.42 kg/m²       Vitals and Nurse Documentation reviewed. Physical Exam   Constitutional: No distress. Cardiovascular: S1 normal and S2 normal. Exam reveals no gallop and no friction rub. No murmur heard. Pulmonary/Chest: Breath sounds normal. No respiratory distress. Skin: Skin is warm and dry. Rash (urticaria scattered on the upper extremities) noted.    Psychiatric: Mood and affect normal.

## 2019-03-28 DIAGNOSIS — E55.9 VITAMIN D DEFICIENCY: ICD-10-CM

## 2019-03-28 RX ORDER — ERGOCALCIFEROL 1.25 MG/1
CAPSULE ORAL
Qty: 8 CAP | Refills: 0 | Status: SHIPPED | OUTPATIENT
Start: 2019-03-28 | End: 2019-11-18

## 2019-04-25 ENCOUNTER — ANESTHESIA (OUTPATIENT)
Dept: ENDOSCOPY | Age: 49
End: 2019-04-25
Payer: COMMERCIAL

## 2019-04-25 ENCOUNTER — HOSPITAL ENCOUNTER (OUTPATIENT)
Age: 49
Setting detail: OUTPATIENT SURGERY
Discharge: HOME | End: 2019-04-25
Attending: INTERNAL MEDICINE | Admitting: INTERNAL MEDICINE
Payer: COMMERCIAL

## 2019-04-25 ENCOUNTER — ANESTHESIA EVENT (OUTPATIENT)
Dept: ENDOSCOPY | Age: 49
End: 2019-04-25
Payer: COMMERCIAL

## 2019-04-25 VITALS
BODY MASS INDEX: 32.3 KG/M2 | RESPIRATION RATE: 16 BRPM | HEART RATE: 82 BPM | TEMPERATURE: 97.8 F | SYSTOLIC BLOOD PRESSURE: 113 MMHG | OXYGEN SATURATION: 99 % | WEIGHT: 182.31 LBS | HEIGHT: 63 IN | DIASTOLIC BLOOD PRESSURE: 70 MMHG

## 2019-04-25 LAB — HCG UR QL: NEGATIVE

## 2019-04-25 PROCEDURE — 76060000031 HC ANESTHESIA FIRST 0.5 HR: Performed by: INTERNAL MEDICINE

## 2019-04-25 PROCEDURE — 76040000019: Performed by: INTERNAL MEDICINE

## 2019-04-25 PROCEDURE — 74011250636 HC RX REV CODE- 250/636: Performed by: INTERNAL MEDICINE

## 2019-04-25 PROCEDURE — 74011250636 HC RX REV CODE- 250/636

## 2019-04-25 PROCEDURE — 81025 URINE PREGNANCY TEST: CPT

## 2019-04-25 RX ORDER — PROPOFOL 10 MG/ML
INJECTION, EMULSION INTRAVENOUS AS NEEDED
Status: DISCONTINUED | OUTPATIENT
Start: 2019-04-25 | End: 2019-04-25 | Stop reason: HOSPADM

## 2019-04-25 RX ORDER — DEXTROMETHORPHAN/PSEUDOEPHED 2.5-7.5/.8
1.2 DROPS ORAL
Status: DISCONTINUED | OUTPATIENT
Start: 2019-04-25 | End: 2019-04-25 | Stop reason: HOSPADM

## 2019-04-25 RX ORDER — SODIUM CHLORIDE 0.9 % (FLUSH) 0.9 %
5-40 SYRINGE (ML) INJECTION AS NEEDED
Status: DISCONTINUED | OUTPATIENT
Start: 2019-04-25 | End: 2019-04-25 | Stop reason: HOSPADM

## 2019-04-25 RX ORDER — EPINEPHRINE 0.1 MG/ML
1 INJECTION INTRACARDIAC; INTRAVENOUS
Status: DISCONTINUED | OUTPATIENT
Start: 2019-04-25 | End: 2019-04-25 | Stop reason: HOSPADM

## 2019-04-25 RX ORDER — NALOXONE HYDROCHLORIDE 0.4 MG/ML
0.4 INJECTION, SOLUTION INTRAMUSCULAR; INTRAVENOUS; SUBCUTANEOUS
Status: DISCONTINUED | OUTPATIENT
Start: 2019-04-25 | End: 2019-04-25 | Stop reason: HOSPADM

## 2019-04-25 RX ORDER — SODIUM CHLORIDE 0.9 % (FLUSH) 0.9 %
5-40 SYRINGE (ML) INJECTION EVERY 8 HOURS
Status: DISCONTINUED | OUTPATIENT
Start: 2019-04-25 | End: 2019-04-25 | Stop reason: HOSPADM

## 2019-04-25 RX ORDER — LIDOCAINE HYDROCHLORIDE 20 MG/ML
INJECTION, SOLUTION EPIDURAL; INFILTRATION; INTRACAUDAL; PERINEURAL AS NEEDED
Status: DISCONTINUED | OUTPATIENT
Start: 2019-04-25 | End: 2019-04-25 | Stop reason: HOSPADM

## 2019-04-25 RX ORDER — FENTANYL CITRATE 50 UG/ML
25 INJECTION, SOLUTION INTRAMUSCULAR; INTRAVENOUS
Status: DISCONTINUED | OUTPATIENT
Start: 2019-04-25 | End: 2019-04-25 | Stop reason: HOSPADM

## 2019-04-25 RX ORDER — MIDAZOLAM HYDROCHLORIDE 1 MG/ML
.25-5 INJECTION, SOLUTION INTRAMUSCULAR; INTRAVENOUS
Status: DISCONTINUED | OUTPATIENT
Start: 2019-04-25 | End: 2019-04-25 | Stop reason: HOSPADM

## 2019-04-25 RX ORDER — ATROPINE SULFATE 0.1 MG/ML
0.5 INJECTION INTRAVENOUS
Status: DISCONTINUED | OUTPATIENT
Start: 2019-04-25 | End: 2019-04-25 | Stop reason: HOSPADM

## 2019-04-25 RX ORDER — SODIUM CHLORIDE 9 MG/ML
75 INJECTION, SOLUTION INTRAVENOUS CONTINUOUS
Status: DISCONTINUED | OUTPATIENT
Start: 2019-04-25 | End: 2019-04-25 | Stop reason: HOSPADM

## 2019-04-25 RX ORDER — FLUMAZENIL 0.1 MG/ML
0.2 INJECTION INTRAVENOUS
Status: DISCONTINUED | OUTPATIENT
Start: 2019-04-25 | End: 2019-04-25 | Stop reason: HOSPADM

## 2019-04-25 RX ADMIN — LIDOCAINE HYDROCHLORIDE 40 MG: 20 INJECTION, SOLUTION EPIDURAL; INFILTRATION; INTRACAUDAL; PERINEURAL at 10:44

## 2019-04-25 RX ADMIN — SODIUM CHLORIDE 75 ML/HR: 900 INJECTION, SOLUTION INTRAVENOUS at 10:06

## 2019-04-25 RX ADMIN — PROPOFOL 360 MG: 10 INJECTION, EMULSION INTRAVENOUS at 11:01

## 2019-04-25 NOTE — H&P
Gastroenterology Outpatient History and Physical 
 
Patient: Isidro Casiano Physician: Yash Barahona MD 
 
Chief Complaint: rectal bleeding, lower abd pain History of Present Illness: 48yo F with BRBPR and lower pain. No prior colonoscopy History: 
Past Medical History:  
Diagnosis Date  Back pain, acute  Depression  H/O seasonal allergies  Maxillary sinus fracture ([facility] [address] 2016  Neck pain, musculoskeletal   
  
Past Surgical History:  
Procedure Laterality Date  HX CERVICAL DISKECTOMY Left July 13, 2015  HX CERVICAL FUSION Left July 13, 2015 [facility]  
 laproscopsy  HX WISDOM TEETH EXTRACTION Social History Socioeconomic History  Marital status:  Spouse name: Not on file  Number of children: Not on file  Years of education: Not on file  Highest education level: Not on file Tobacco Use  Smoking status: Never Smoker  Smokeless tobacco: Never Used Substance and Sexual Activity  Alcohol use: Yes Alcohol/week: 0.0 oz  
  Comment: occasional  
 Drug use: No  
 Sexual activity: Yes  
  Partners: Male Birth control/protection: Surgical  
  Comment:  Social History Narrative Works [employer]. Going through divorce 2017 Member of [employer] [email or web address]  [employer], has good friends there Family History Problem Relation Age of Onset  Cancer Father   
     pancreas, Liver  Cancer Maternal Grandmother  Breast Cancer Maternal Grandmother  Heart Disease Maternal Grandfather  Heart Disease Paternal Grandmother  Stroke Paternal Grandfather  Other Mother   
     meningioma  Hypertension Mother  Breast Cancer Mother 61 Yash Barahona Problems Mother SLOW TO WAKE UP  
 Stroke Mother  Hypertension Sister  No Known Problems Daughter  No Known Problems Daughter  No Known Problems Son  Breast Cancer Maternal Aunt  Breast Cancer Maternal Aunt  Breast Cancer Maternal Aunt Patient Active Problem List  
Diagnosis Code  Anxiety F41.9  Insomnia G47.00  
 Neck pain on left side M54.2  Radiculopathy of cervical spine M54.12  Stress at home F43.9  Panic attack F41.0  Moderate episode of recurrent major depressive disorder (HCC) F33.1 Allergies: Allergies Allergen Reactions  Ginger Hives  Soy Hives  Bees [Sting, Bee] Not Reported This Time  Cephaeline Not Reported This Time Pt. States no allergy  Keflex [Cephalexin] Rash  Soybean Oil Hives  Tomato Hives Pt. States no allergy Medications:  
Prior to Admission medications Medication Sig Start Date End Date Taking? Authorizing Provider  
hydrOXYzine HCl (ATARAX) 25 mg tablet Take 1.5 Tabs by mouth daily as needed for Anxiety. 12/15/18  Yes Candace Mejia MD  
ergocalciferol (ERGOCALCIFEROL) 50,000 unit capsule TAKE 1 CAP BY MOUTH EVERY SEVEN (7) DAYS FOR 2 MONTHS 3/28/19   Randal Magdaleno NP  
zolpidem (AMBIEN) 5 mg tablet Take 1 Tab by mouth nightly as needed for Sleep. Max Daily Amount: 5 mg. 11/20/18   Shannen Allen NP  
REXULTI 1 mg tab tablet TAKE 1 TABLET BY MOUTH EVERY DAY 1/15/18   Provider, Historical  
buPROPion XL (WELLBUTRIN XL) 300 mg XL tablet TAKE 1 TABLET BY MOUTH EVERY MORNING 1/23/18   Provider, Historical  
FLUoxetine (PROZAC) 40 mg capsule TAKE ONE CAPSULE BY MOUTH ONCE DAILY 1/23/18   Provider, Historical  
cholecalciferol, vitamin D3, 2,000 unit tab Take  by mouth. Provider, Historical  
EPINEPHrine (EPIPEN 2-EDWIGE) 0.3 mg/0.3 mL (1:1,000) injection 0.3 mL by IntraMUSCular route once as needed for up to 1 dose. 6/26/15   Jaime Cross MD  
 
Physical Exam:  
Vital Signs: Blood pressure 114/70, pulse 80, temperature 98.4 °F (36.9 °C), resp. rate 16, height 5' 3\" (1.6 m), weight 82.7 kg (182 lb 5 oz), last menstrual period 01/25/2019, SpO2 99 %, not currently breastfeeding. General: well developed, well nourished HEENT: unremarkable Heart: regular rhythm no mumur Lungs: clear Abdominal:  benign Neurological: unremarkable Extremities: no edema Findings/Diagnosis: rectal bleeding, lower abd pain Plan of Care/Planned Procedure: Colonoscopy with conscious/deep sedation Signed: 
Erik Opitz, MD 4/25/2019

## 2019-04-25 NOTE — ANESTHESIA POSTPROCEDURE EVALUATION
Procedure(s): 
COLONOSCOPY. 
 
total IV anesthesia Anesthesia Post Evaluation Patient location during evaluation: PACU Note status: Adequate. Level of consciousness: responsive to verbal stimuli and sleepy but conscious Pain management: satisfactory to patient Airway patency: patent Anesthetic complications: no 
Cardiovascular status: acceptable Respiratory status: acceptable Hydration status: acceptable Comments: +Post-Anesthesia Evaluation and Assessment Patient: Isidro Casiano MRN: [id number]  SSN: [id number] YOB: 1970  Age: 50 y.o. Sex: female Cardiovascular Function/Vital Signs /70   Pulse 82   Temp 36.6 °C (97.8 °F)   Resp 16   Ht 5' 3\" (1.6 m)   Wt 82.7 kg (182 lb 5 oz)   SpO2 99%   Breastfeeding? No   BMI 32.30 kg/m² Patient is status post Procedure(s): 
COLONOSCOPY. Nausea/Vomiting: Controlled. Postoperative hydration reviewed and adequate. Pain: 
Pain Scale 1: Numeric (0 - 10) (04/25/19 1130) Pain Intensity 1: 0 (04/25/19 1130) Managed. Neurological Status: At baseline. Mental Status and Level of Consciousness: Arousable. Pulmonary Status:  
O2 Device: Room air (04/25/19 1130) Adequate oxygenation and airway patent. Complications related to anesthesia: None Post-anesthesia assessment completed. No concerns. Signed By: Prema Sandy DO  
 4/25/2019 Post anesthesia nausea and vomiting:  controlled Vitals Value Taken Time /70 4/25/2019 11:30 AM  
Temp 36.6 °C (97.8 °F) 4/25/2019 11:08 AM  
Pulse 0 4/25/2019 11:32 AM  
Resp 0 4/25/2019 11:32 AM  
SpO2 99 % 4/25/2019 11:31 AM  
Vitals shown include unvalidated device data.

## 2019-04-25 NOTE — DISCHARGE INSTRUCTIONS
Regino Delgado  [id number]  1970    COLON DISCHARGE INSTRUCTIONS  Discomfort:  Redness at IV site- apply warm compress to area; if redness or soreness persist- contact your physician  There may be a slight amount of blood passed from the rectum  Gaseous discomfort- walking, belching will help relieve any discomfort  You may not operate a vehicle for 12 hours  You may not engage in an occupation involving machinery or appliances for rest of today  You may not drink alcoholic beverages for at least 12 hours  Avoid making any critical decisions for at least 24 hour  DIET:   High fiber diet. - however -  remember your colon is empty and a heavy meal will produce gas. Avoid these foods:  vegetables, fried / greasy foods, carbonated drinks for today  MEDICATION:         ACTIVITY:  You may not resume your normal daily activities until tomorrow AM; it is recommended that you spend the remainder of the day resting -  avoid any strenuous activity. CALL M.D.   ANY SIGN OF:   Increasing pain, nausea, vomiting  Abdominal distension (swelling)  New increased bleeding (oral or rectal)  Fever (chills)    IMPRESSION:  -Normal terminal ileum  -Small grade 1 internal hemorrhoids; these are felt to be the source of sporadic rectal bleeding noted  -Normal colonic mucosa otherwise, without masses, polyps, or inflammation noted    Follow-up Instructions:   Call Dr. John Briscoe if questions arise regarding your procedure  Telephone # [phone]  Repeat colonoscopy in 10 years    Nicole Alcantar MD

## 2019-04-25 NOTE — PROGRESS NOTES
Barby Meza 1970 
[id number] Situation: 
Verbal report received from: yari lane rn 
Procedure: Procedure(s): 
COLONOSCOPY Background: 
 
Preoperative diagnosis: RECTAL BLEEDING, CHANGE IN BOWEL HABITS, LOWER ABDOMINAL BESSIE N Postoperative diagnosis: hemorrhoids :  Dr. Belinda Gosselin Assistant(s): Endoscopy Technician-1: Segun Mcdermott Endoscopy RN-1: Junito Sabillon RN Specimens: * No specimens in log * H. Pylori  no Assessment: 
Intra-procedure medications Anesthesia gave intra-procedure sedation and medications, see anesthesia flow sheet yes Intravenous fluids: NS@ Lauro Awkward Vital signs stable  yes Abdominal assessment: round and soft  yes Recommendation: 
Discharge patient per MD order yes. Family or Friend  yes Permission to share finding with family or friend yes

## 2019-04-25 NOTE — ANESTHESIA PREPROCEDURE EVALUATION
Relevant Problems No relevant active problems Anesthetic History No history of anesthetic complications Review of Systems / Medical History Patient summary reviewed, nursing notes reviewed and pertinent labs reviewed Pulmonary Within defined limits Neuro/Psych Psychiatric history Cardiovascular Within defined limits Exercise tolerance: >4 METS 
  
GI/Hepatic/Renal 
Within defined limits Endo/Other Obesity Other Findings Comments: Neck and back pain Radiculopathy of cervical spine-cervical fusion surgery Stress at home Panic attack Moderate episode of recurrent major depressive disorder (HCC) Physical Exam 
 
Airway Mallampati: III 
TM Distance: < 4 cm Neck ROM: decreased range of motion Mouth opening: Diminished (comment) Cardiovascular Regular rate and rhythm,  S1 and S2 normal,  no murmur, click, rub, or gallop Dental 
No notable dental hx Pulmonary Breath sounds clear to auscultation Abdominal 
GI exam deferred Other Findings Anesthetic Plan ASA: 2 Anesthesia type: MAC Anesthetic plan and risks discussed with: Patient

## 2019-04-25 NOTE — PROGRESS NOTES
Anesthesia reports 360 mg Propofol, 40 mg Lidocaine and 400 ml of Normal Saline were given during procedure. Received report from anesthesia staff on vital signs and status of patient.

## 2019-04-25 NOTE — PROCEDURES
NAME:  Jodi King   :   1970   MRN:   [id number]     Date/Time:  2019 11:00 AM    Colonoscopy Operative Report    Procedure Type:   Colonoscopy --diagnostic     Indications:     Lower rectal bleeding  Pre-operative Diagnosis: see indication above  Post-operative Diagnosis:  See findings below  :  Jamal Colmenares MD  Referring Provider: -Yayo Bauman NP    Exam:  Airway: clear, no airway problems anticipated  Heart: RRR, without gallops or rubs  Lungs: clear bilaterally without wheezes, crackles, or rhonchi  Abdomen: soft, nontender, nondistended, bowel sounds present  Mental Status: awake, alert and oriented to person, place and time    Sedation:  MAC anesthesia Propofol 360mg IV  Procedure Details:  After informed consent was obtained with all risks and benefits of procedure explained and preoperative exam completed, the patient was taken to the endoscopy suite and placed in the left lateral decubitus position. Upon sequential sedation as per above, a digital rectal exam was performed demonstrating internal hemorrhoids. The Olympus videocolonoscope  was inserted in the rectum and carefully advanced to the cecum, which was identified by the ileocecal valve and appendiceal orifice. The distal terminal ileum was evaluated. The quality of preparation was adequate. The colonoscope was slowly withdrawn with careful evaluation between folds. Retroflexion in the rectum was completed demonstrating internal hemorrhoids. Findings:     -Normal terminal ileum  -Small grade 1 internal hemorrhoids; these are felt to be the source of sporadic rectal bleeding noted  -Normal colonic mucosa otherwise, without masses, polyps, or inflammation noted    Specimen Removed:  None. Complications: None. EBL:  None.     Impression:    -Normal terminal ileum  -Small grade 1 internal hemorrhoids; these are felt to be the source of sporadic rectal bleeding noted  -Normal colonic mucosa otherwise, without masses, polyps, or inflammation noted    Recommendations: --For colon cancer screening in this average-risk patient, colonoscopy may be repeated in 10 years. High fiber diet. Resume normal medication(s). Discharge Disposition:  Home in the company of a  when able to ambulate.     Andriy Cerna MD

## 2019-04-29 DIAGNOSIS — F33.1 MODERATE EPISODE OF RECURRENT MAJOR DEPRESSIVE DISORDER (HCC): ICD-10-CM

## 2019-04-29 RX ORDER — HYDROXYZINE 25 MG/1
37.5 TABLET, FILM COATED ORAL
Qty: 30 TAB | Refills: 0 | Status: SHIPPED | OUTPATIENT
Start: 2019-04-29 | End: 2020-04-06 | Stop reason: ALTCHOICE

## 2019-05-17 DIAGNOSIS — M54.40 CHRONIC LOW BACK PAIN WITH SCIATICA, SCIATICA LATERALITY UNSPECIFIED, UNSPECIFIED BACK PAIN LATERALITY: Primary | ICD-10-CM

## 2019-05-17 DIAGNOSIS — G89.29 CHRONIC LOW BACK PAIN WITH SCIATICA, SCIATICA LATERALITY UNSPECIFIED, UNSPECIFIED BACK PAIN LATERALITY: Primary | ICD-10-CM

## 2019-05-17 RX ORDER — CYCLOBENZAPRINE HCL 10 MG
10 TABLET ORAL
Qty: 30 TAB | Refills: 0 | Status: SHIPPED | OUTPATIENT
Start: 2019-05-17 | End: 2019-11-18

## 2019-05-21 ENCOUNTER — OFFICE VISIT (OUTPATIENT)
Dept: PRIMARY CARE CLINIC | Age: 49
End: 2019-05-21

## 2019-05-21 VITALS
SYSTOLIC BLOOD PRESSURE: 103 MMHG | DIASTOLIC BLOOD PRESSURE: 72 MMHG | TEMPERATURE: 98.6 F | HEART RATE: 85 BPM | OXYGEN SATURATION: 96 % | HEIGHT: 63 IN | WEIGHT: 182 LBS | BODY MASS INDEX: 32.25 KG/M2 | RESPIRATION RATE: 16 BRPM

## 2019-05-21 DIAGNOSIS — S93.422A SPRAIN OF LEFT MEDIAL ANKLE JOINT, INITIAL ENCOUNTER: Primary | ICD-10-CM
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
## 2019-05-21 NOTE — PROGRESS NOTES
Chief Complaint   Patient presents with    Fall     patient was walking dog this am and fell in a hole in the yard, states that she twisted her ankle and is not sure if it is broken

## 2019-05-21 NOTE — PROGRESS NOTES
This note will not be viewable in [address] Patricia Ramachandran is a  50 y.o. female presents for visit. Chief Complaint   Patient presents with    Fall     patient was walking dog this am and fell in a hole in the yard, states that she twisted her ankle and is not sure if it is broken       Patient presents for evaluation of left ankle pain after inadvertently stepping in a hole this morning while walking a dog. She reports her left ankle everted with her fall. Reports she heard a snap. Ambulatory after the fall. Rates pain at moderate level. Fall   The history is provided by the patient. The accident occurred 1 to 2 hours ago. The fall occurred while walking. She fell from a height of 1 - 2 ft. She landed on dirt (shallow hole in the ground). There was no blood loss. Point of impact: left ankle. Pain location: left ankle. The pain is at a severity of 4/10. She was ambulatory at the scene. There was no entrapment after the fall. There was no drug use involved in the accident. There was no alcohol use involved in the accident. Pertinent negatives include no visual change, no numbness, no abdominal pain, no nausea, no vomiting, no headaches, no extremity weakness, no hearing loss, no loss of consciousness, no tingling and no laceration. The risk factors include none. The symptoms are aggravated by use of injured limb. She has tried NSAIDs for the symptoms. The treatment provided moderate relief. Review of Systems   Gastrointestinal: Negative for abdominal pain, nausea and vomiting. Musculoskeletal: Positive for joint pain (left ankle). Negative for extremity weakness. Neurological: Negative for tingling, loss of consciousness, numbness and headaches.         Past Medical History:   Diagnosis Date    Back pain, acute     Depression     H/O seasonal allergies     Maxillary sinus fracture ([facility] [address] 2016    Neck pain, musculoskeletal       Past Surgical History:   Procedure Laterality Date  COLONOSCOPY N/A 2019    COLON[facility]i [facility]ui [facility]  2019         HX CERVICAL DISKECTOMY Left 2015    HX CERVICAL FUSION Left 2015     HX  SECTION      HX GYN  1998    laproscopsy     HX WISDOM TEETH EXTRACTION          Social History     Tobacco Use    Smoking status: Never Smoker    Smokeless tobacco: Never Used   Substance Use Topics    Alcohol use: Yes     Alcohol/week: 0.0 oz     Comment: occasional      Social History     Social History Narrative    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    Member of [employer], has good friends there     Family History   Problem Relation Age of Onset    Cancer Father         pancreas, Liver    Cancer Maternal Grandmother     Breast Cancer Maternal Grandmother     Heart Disease Maternal Grandfather     Heart Disease Paternal Grandmother     Stroke Paternal Grandfather     Other Mother         meningioma    Hypertension Mother     Breast Cancer Mother 61   [facility] Anesth Problems Mother         SLOW TO WAKE UP    Stroke Mother     Hypertension Sister     No Known Problems Daughter     No Known Problems Daughter     No Known Problems Son     Breast Cancer Maternal Aunt     Breast Cancer Maternal Aunt     Breast Cancer Maternal Aunt       Prior to Admission medications    Medication Sig Start Date End Date Taking? Authorizing Provider   ergocalciferol (ERGOCALCIFEROL) 50,000 unit capsule TAKE 1 CAP BY MOUTH EVERY SEVEN (7) DAYS FOR 2 MONTHS 3/28/19  Yes Lali Magdaleno NP   REXULTI 1 mg tab tablet TAKE 1 TABLET BY MOUTH EVERY DAY 1/15/18  Yes Provider, Historical   cyclobenzaprine (FLEXERIL) 10 mg tablet Take 1 Tab by mouth three (3) times daily as needed for Muscle Spasm(s). Indications: muscle spasm 19   Lali Magdaleno NP   hydrOXYzine HCl (ATARAX) 25 mg tablet Take 1.5 Tabs by mouth daily as needed for Anxiety.  19 Yulia Pleitez, NP   zolpidem (AMBIEN) 5 mg tablet Take 1 Tab by mouth nightly as needed for Sleep. Max Daily Amount: 5 mg. 11/20/18   Sudhir Nguyen NP   buPROPion XL (WELLBUTRIN XL) 300 mg XL tablet TAKE 1 TABLET BY MOUTH EVERY MORNING 1/23/18   Provider, Historical   FLUoxetine (PROZAC) 40 mg capsule TAKE ONE CAPSULE BY MOUTH ONCE DAILY 1/23/18   Provider, Historical   cholecalciferol, vitamin D3, 2,000 unit tab Take  by mouth. Provider, Historical   EPINEPHrine (EPIPEN 2-EDWIGE) 0.3 mg/0.3 mL (1:1,000) injection 0.3 mL by IntraMUSCular route once as needed for up to 1 dose. 6/26/15   Evelyn Herndon MD      Allergies   Allergen Reactions    Ginger Hives    Soy Hives    Bees [Sting, Bee] Not Reported This Time    Cephaeline Not Reported This Time     Pt. States no allergy    Keflex [Cephalexin] Rash    Soybean Oil Hives    Tomato Hives     Pt. States no allergy          Visit Vitals  /72 (BP 1 Location: Left arm, BP Patient Position: Sitting)   Pulse 85   Temp 98.6 °F (37 °C) (Oral)   Resp 16   Ht 5' 3\" (1.6 m)   Wt 182 lb (82.6 kg)   LMP 04/30/2019   SpO2 96%   BMI 32.24 kg/m²     Physical Exam   Constitutional: She is oriented to person, place, and time. She appears well-developed and well-nourished. No distress. HENT:   Head: Normocephalic and atraumatic. Right Ear: External ear normal.   Left Ear: External ear normal.   Eyes: Conjunctivae are normal.   Cardiovascular: Normal rate, regular rhythm, normal heart sounds and intact distal pulses. Pulmonary/Chest: Effort normal and breath sounds normal. She has no wheezes. Musculoskeletal: She exhibits no edema. Left ankle: She exhibits swelling (minimal). She exhibits normal range of motion, no ecchymosis, no deformity, no laceration and normal pulse. Tenderness. Medial malleolus (mild) tenderness found. No lateral malleolus tenderness found. Feet:    Neurological: She is alert and oriented to person, place, and time. Gait normal.   Skin: Skin is warm and dry. No laceration noted. Psychiatric: She has a normal mood and affect. Her behavior is normal.   Nursing note and vitals reviewed. ASSESSMENT AND PLAN:  Patient Active Problem List    Diagnosis Date Noted    Moderate episode of recurrent major depressive disorder ([facility] [address] 09/28/2018    Stress at home 04/01/2016    Panic attack 04/01/2016    Neck pain on left side 07/24/2013    Radiculopathy of cervical spine 07/24/2013    Anxiety 09/22/2011    Insomnia 09/22/2011       ICD-10-CM ICD-9-CM   1. Sprain of left medial ankle joint, initial encounter S93.422A 845.01   2. Fall, initial encounter [address] XXXA E888.9     No orders of the defined types were placed in this encounter. Diagnoses and all orders for this visit:    1. Sprain of left medial ankle joint, initial encounter        -Grade 1 ankle sprain. RICE-able to ambulate normally. Ice x48 hours and compression wrap. Range of motion exercises after acute pain is resolved. Ibuprofen as needed    2. Fall, initial encounter       -   Fall precautions. Follow-up and Dispositions    · Return if symptoms worsen or fail to improve. Disclaimer:  Advised her to call back or return to office if symptoms worsen/change/persist.  Discussed expected course/resolution/complications of diagnosis in detail with patient. Medication risks/benefits/costs/interactions/alternatives discussed with patient. She was given an after visit summary which includes diagnoses, current medications, & vitals. She expressed understanding with the diagnosis and plan.

## 2019-05-29 DIAGNOSIS — M62.830 BACK SPASM: Primary | ICD-10-CM

## 2019-05-29 RX ORDER — METHYLPREDNISOLONE 4 MG/1
TABLET ORAL
Qty: 1 DOSE PACK | Refills: 0 | Status: SHIPPED | OUTPATIENT
Start: 2019-05-29 | End: 2019-11-18 | Stop reason: ALTCHOICE

## 2019-06-04 DIAGNOSIS — M51.26 LUMBAR HERNIATED DISC: Primary | ICD-10-CM

## 2019-06-04 RX ORDER — PREDNISONE 20 MG/1
TABLET ORAL
Qty: 12 TAB | Refills: 0 | Status: SHIPPED | OUTPATIENT
Start: 2019-06-04 | End: 2019-07-29 | Stop reason: ALTCHOICE

## 2019-07-19 DIAGNOSIS — G47.00 INSOMNIA, UNSPECIFIED TYPE: Primary | ICD-10-CM

## 2019-07-19 RX ORDER — ALPRAZOLAM 0.25 MG/1
0.25 TABLET ORAL
Qty: 20 TAB | Refills: 0 | Status: SHIPPED | OUTPATIENT
Start: 2019-07-19 | End: 2019-10-09 | Stop reason: SDUPTHER

## 2019-07-29 ENCOUNTER — OFFICE VISIT (OUTPATIENT)
Dept: FAMILY MEDICINE CLINIC | Age: 49
End: 2019-07-29

## 2019-07-29 VITALS
TEMPERATURE: 97.8 F | WEIGHT: 188.2 LBS | RESPIRATION RATE: 16 BRPM | OXYGEN SATURATION: 97 % | HEART RATE: 61 BPM | BODY MASS INDEX: 33.35 KG/M2 | DIASTOLIC BLOOD PRESSURE: 64 MMHG | HEIGHT: 63 IN | SYSTOLIC BLOOD PRESSURE: 110 MMHG

## 2019-07-29 DIAGNOSIS — R11.0 NAUSEA: Primary | ICD-10-CM

## 2019-07-29 RX ORDER — ONDANSETRON 8 MG/1
8 TABLET, ORALLY DISINTEGRATING ORAL
Qty: 20 TAB | Refills: 0 | Status: SHIPPED | OUTPATIENT
Start: 2019-07-29 | End: 2020-05-18 | Stop reason: ALTCHOICE

## 2019-07-29 NOTE — PROGRESS NOTES
Chief Complaint   Patient presents with    Nausea    Fatigue     1. Have you been to the ER, urgent care clinic since your last visit? Hospitalized since your last visit? No    2. Have you seen or consulted any other health care providers outside of the [address] [place] [address] since your last visit? Include any pap smears or colon screening. Yes colonoscopy GI specialists (may)      Patient presents in office with c/o nausea, fatigue and generalized discomfort.

## 2019-07-29 NOTE — PROGRESS NOTES
HISTORY OF PRESENT ILLNESS  Hien Jansen is a 52 y.o. female. HPI  C/o nausea on and off x 2 weeks. Had episode of hives prior to symptom onset. Also had been on prednisone for left hip pain. Denies vomiting, weight loss or abdominal pain. Denies possibility of pregnancy. Currently perimenopausal with irregular menses. No new meds. No history of GERD. Patient Active Problem List   Diagnosis Code    Anxiety F41.9    Insomnia G47.00    Neck pain on left side M54.2    Radiculopathy of cervical spine M54.12    Stress at home F43.9    Panic attack F41.0    Moderate episode of recurrent major depressive disorder (HCC) F33.1     Current Outpatient Medications   Medication Sig    ondansetron (ZOFRAN ODT) 8 mg disintegrating tablet Take 1 Tab by mouth every eight (8) hours as needed for Nausea.  ALPRAZolam (XANAX) 0.25 mg tablet Take 1 Tab by mouth nightly as needed for Anxiety or Sleep. Max Daily Amount: 0.25 mg.    hydrOXYzine HCl (ATARAX) 25 mg tablet Take 1.5 Tabs by mouth daily as needed for Anxiety.  REXULTI 1 mg tab tablet TAKE 1 TABLET BY MOUTH EVERY DAY    FLUoxetine (PROZAC) 40 mg capsule TAKE ONE CAPSULE BY MOUTH ONCE DAILY    cholecalciferol, vitamin D3, 2,000 unit tab Take  by mouth.  EPINEPHrine (EPIPEN 2-EDWIGE) 0.3 mg/0.3 mL (1:1,000) injection 0.3 mL by IntraMUSCular route once as needed for up to 1 dose.  methylPREDNISolone (MEDROL DOSEPACK) 4 mg tablet As directed.  cyclobenzaprine (FLEXERIL) 10 mg tablet Take 1 Tab by mouth three (3) times daily as needed for Muscle Spasm(s). Indications: muscle spasm    ergocalciferol (ERGOCALCIFEROL) 50,000 unit capsule TAKE 1 CAP BY MOUTH EVERY SEVEN (7) DAYS FOR 2 MONTHS    zolpidem (AMBIEN) 5 mg tablet Take 1 Tab by mouth nightly as needed for Sleep. Max Daily Amount: 5 mg.  buPROPion XL (WELLBUTRIN XL) 300 mg XL tablet TAKE 1 TABLET BY MOUTH EVERY MORNING     No current facility-administered medications for this visit. Social History     Tobacco Use    Smoking status: Never Smoker    Smokeless tobacco: Never Used   Substance Use Topics    Alcohol use: Yes     Alcohol/week: 0.0 standard drinks     Comment: occasional    Drug use: No     Blood pressure 110/64, pulse 61, temperature 97.8 °F (36.6 °C), temperature source Oral, resp. rate 16, height 5' 3\" (1.6 m), weight 188 lb 3.2 oz (85.4 kg), last menstrual period 07/02/2019, SpO2 97 %. Review of Systems   Constitutional: Positive for malaise/fatigue (mild fatigue). Negative for chills, fever and weight loss. Respiratory: Negative for cough and shortness of breath. Cardiovascular: Negative for chest pain and palpitations. Gastrointestinal: Positive for nausea. Negative for abdominal pain, blood in stool, constipation, diarrhea, heartburn, melena and vomiting. Genitourinary: Negative. Neurological: Negative for dizziness. All other systems reviewed and are negative. Physical Exam   Constitutional: No distress. Neck: Neck supple. Cardiovascular: Normal rate, regular rhythm and normal heart sounds. Pulmonary/Chest: Effort normal and breath sounds normal.   Abdominal: Soft. She exhibits no distension. There is no tenderness. There is no guarding. Lymphadenopathy:     She has no cervical adenopathy. Skin: Skin is warm and dry. ASSESSMENT and PLAN  Diagnoses and all orders for this visit:    1. Nausea  -     ondansetron (ZOFRAN ODT) 8 mg disintegrating tablet; Take 1 Tab by mouth every eight (8) hours as needed for Nausea. Etiology unclear- possible gastritis from recent prednisone use. Trial zantac 150 mg daily x 8 weeks. Avoid spicy foods. Zofran prn. Follow up prn if sx worsen or FTI.

## 2019-08-05 DIAGNOSIS — R11.0 NAUSEA: ICD-10-CM

## 2019-08-05 DIAGNOSIS — R53.81 MALAISE: Primary | ICD-10-CM

## 2019-08-06 DIAGNOSIS — R53.81 MALAISE: ICD-10-CM

## 2019-08-06 DIAGNOSIS — R11.0 NAUSEA: Primary | ICD-10-CM

## 2019-08-07 LAB
ALBUMIN SERPL-MCNC: 3.9 G/DL (ref 3.5–5.5)
ALP SERPL-CCNC: 56 IU/L (ref 39–117)
ALT SERPL-CCNC: 15 IU/L (ref 0–32)
AST SERPL-CCNC: 20 IU/L (ref 0–40)
B BURGDOR IGG+IGM SER-ACNC: <0.91 ISR (ref 0–0.9)
BASOPHILS # BLD AUTO: 0.1 X10E3/UL (ref 0–0.2)
BASOPHILS NFR BLD AUTO: 1 %
BILIRUB DIRECT SERPL-MCNC: 0.05 MG/DL (ref 0–0.4)
BILIRUB SERPL-MCNC: <0.2 MG/DL (ref 0–1.2)
BUN SERPL-MCNC: 18 MG/DL (ref 6–24)
BUN/CREAT SERPL: 24 (ref 9–23)
CALCIUM SERPL-MCNC: 9.2 MG/DL (ref 8.7–10.2)
CHLORIDE SERPL-SCNC: 102 MMOL/L (ref 96–106)
CO2 SERPL-SCNC: 25 MMOL/L (ref 20–29)
CREAT SERPL-MCNC: 0.76 MG/DL (ref 0.57–1)
EOSINOPHIL # BLD AUTO: 0.5 X10E3/UL (ref 0–0.4)
EOSINOPHIL NFR BLD AUTO: 8 %
ERYTHROCYTE [DISTWIDTH] IN BLOOD BY AUTOMATED COUNT: 13.5 % (ref 12.3–15.4)
GLUCOSE SERPL-MCNC: 90 MG/DL (ref 65–99)
HCT VFR BLD AUTO: 36.9 % (ref 34–46.6)
HGB BLD-MCNC: 12.4 G/DL (ref 11.1–15.9)
IMM GRANULOCYTES # BLD AUTO: 0 X10E3/UL (ref 0–0.1)
IMM GRANULOCYTES NFR BLD AUTO: 0 %
LIPASE SERPL-CCNC: 26 U/L (ref 14–72)
LYMPHOCYTES # BLD AUTO: 1.4 X10E3/UL (ref 0.7–3.1)
LYMPHOCYTES NFR BLD AUTO: 25 %
MCH RBC QN AUTO: 29.5 PG (ref 26.6–33)
MCHC RBC AUTO-ENTMCNC: 33.6 G/DL (ref 31.5–35.7)
MCV RBC AUTO: 88 FL (ref 79–97)
MONOCYTES # BLD AUTO: 0.5 X10E3/UL (ref 0.1–0.9)
MONOCYTES NFR BLD AUTO: 10 %
NEUTROPHILS # BLD AUTO: 3 X10E3/UL (ref 1.4–7)
NEUTROPHILS NFR BLD AUTO: 56 %
PLATELET # BLD AUTO: 340 X10E3/UL (ref 150–450)
POTASSIUM SERPL-SCNC: 4.5 MMOL/L (ref 3.5–5.2)
PROT SERPL-MCNC: 6.7 G/DL (ref 6–8.5)
RBC # BLD AUTO: 4.21 X10E6/UL (ref 3.77–5.28)
SODIUM SERPL-SCNC: 141 MMOL/L (ref 134–144)
WBC # BLD AUTO: 5.5 X10E3/UL (ref 3.4–10.8)

## 2019-10-09 ENCOUNTER — PATIENT MESSAGE (OUTPATIENT)
Dept: PRIMARY CARE CLINIC | Age: 49
End: 2019-10-09

## 2019-10-09 DIAGNOSIS — G47.00 INSOMNIA, UNSPECIFIED TYPE: ICD-10-CM

## 2019-10-09 RX ORDER — ALPRAZOLAM 0.25 MG/1
0.25 TABLET ORAL
Qty: 20 TAB | Refills: 0 | Status: SHIPPED | OUTPATIENT
Start: 2019-10-09 | End: 2020-05-18 | Stop reason: ALTCHOICE

## 2019-11-18 ENCOUNTER — OFFICE VISIT (OUTPATIENT)
Dept: FAMILY MEDICINE CLINIC | Age: 49
End: 2019-11-18

## 2019-11-18 VITALS
DIASTOLIC BLOOD PRESSURE: 82 MMHG | TEMPERATURE: 98.2 F | HEIGHT: 63 IN | OXYGEN SATURATION: 98 % | WEIGHT: 187 LBS | RESPIRATION RATE: 18 BRPM | HEART RATE: 95 BPM | BODY MASS INDEX: 33.13 KG/M2 | SYSTOLIC BLOOD PRESSURE: 118 MMHG

## 2019-11-18 DIAGNOSIS — F33.1 MODERATE EPISODE OF RECURRENT MAJOR DEPRESSIVE DISORDER (HCC): Primary | ICD-10-CM

## 2019-11-18 RX ORDER — FLUOXETINE 10 MG/1
10 CAPSULE ORAL DAILY
Qty: 60 CAP | Refills: 0 | Status: SHIPPED | OUTPATIENT
Start: 2019-11-18 | End: 2020-05-27 | Stop reason: SDUPTHER

## 2019-11-18 NOTE — PROGRESS NOTES
Assessment/Plan:     Diagnoses and all orders for this visit:    1. Moderate episode of recurrent major depressive disorder (HCC)  -     FLUoxetine (PROZAC) 10 mg capsule; Take 1 Cap by mouth daily. After 7 days, take 2 cap by mouth daily.  -     REFERRAL TO PSYCHIATRY    Restart treatment as above. Refer to psychiatry to establish care. Follow-up and Dispositions    · Return in about 4 weeks (around 12/16/2019) for Follow Up. Discussed expected course/resolution/complications of diagnosis in detail with patient. Medication risks/benefits/costs/interactions/alternatives discussed with patient. Pt was given after visit summary which includes diagnoses, current medications & vitals. Pt expressed understanding with the diagnosis and plan          Subjective:      Bo Alexandre is a 52 y.o. female who presents for had concerns including Anxiety. Depression  Patient complains of depression. She complains of depressed mood, fatigue, feelings of worthlessness/guilt, difficulty concentrating and hopelessness. Onset was approximately 5 years ago, unchanged since that time. She denies current suicidal and homicidal plan or intent. Family history significant for nothing. Possible organic causes contributing are: none. Risk factors: previous episode of depression Previous treatment includes Prozac, Rexulti, Wellbutrin, Atarax, Xanax and individual therapy. She complains of the following side effects from the treatment: none. She has been off therapy for 6 months. Mother passed last month and mood has significantly worsened. Restarting counseling today. Support network through friends and one of her sisters.      Patient Active Problem List   Diagnosis Code    Anxiety F41.9    Insomnia G47.00    Neck pain on left side M54.2    Radiculopathy of cervical spine M54.12    Stress at home F43.9    Panic attack F41.0    Moderate episode of recurrent major depressive disorder ([facility] [address] F33.1 Current Outpatient Medications   Medication Sig Dispense Refill    FLUoxetine (PROZAC) 10 mg capsule Take 1 Cap by mouth daily. After 7 days, take 2 cap by mouth daily. 60 Cap 0    ALPRAZolam (XANAX) 0.25 mg tablet Take 1 Tab by mouth nightly as needed for Anxiety or Sleep. Max Daily Amount: 0.25 mg. 20 Tab 0    hydrOXYzine HCl (ATARAX) 25 mg tablet Take 1.5 Tabs by mouth daily as needed for Anxiety. 30 Tab 0    cholecalciferol, vitamin D3, 2,000 unit tab Take  by mouth.  EPINEPHrine (EPIPEN 2-EDWIGE) 0.3 mg/0.3 mL (1:1,000) injection 0.3 mL by IntraMUSCular route once as needed for up to 1 dose. 2 Each 0    ondansetron (ZOFRAN ODT) 8 mg disintegrating tablet Take 1 Tab by mouth every eight (8) hours as needed for Nausea. 20 Tab 0    REXULTI 1 mg tab tablet TAKE 1 TABLET BY MOUTH EVERY DAY  2       Allergies   Allergen Reactions    Ginger Hives    Soy Hives    Bees [Sting, Bee] Not Reported This Time    Cephaeline Not Reported This Time     Pt. States no allergy    Keflex [Cephalexin] Rash    Soybean Oil Hives    Tomato Hives     Pt. States no allergy       ROS:   Review of Systems   Constitutional: Negative for malaise/fatigue. Eyes: Negative for blurred vision. Respiratory: Negative for shortness of breath. Cardiovascular: Negative for chest pain. Psychiatric/Behavioral: Positive for depression. Negative for hallucinations, substance abuse and suicidal ideas. The patient is nervous/anxious. The patient does not have insomnia. Objective:     Visit Vitals  /82 (BP 1 Location: Left arm, BP Patient Position: Sitting)   Pulse 95   Temp 98.2 °F (36.8 °C) (Oral)   Resp 18   Ht 5' 3\" (1.6 m)   Wt 187 lb (84.8 kg)   SpO2 98%   BMI 33.13 kg/m²       Vitals and Nurse Documentation reviewed. Physical Exam   Constitutional: No distress. Cardiovascular: S1 normal and S2 normal. Exam reveals no gallop and no friction rub. No murmur heard.   Pulmonary/Chest: Breath sounds normal. No respiratory distress. Skin: Skin is warm and dry. Psychiatric: Affect normal. She exhibits a depressed mood. Tearful.

## 2019-11-18 NOTE — PROGRESS NOTES
Chief Complaint   Patient presents with    Anxiety     1. Have you been to the ER, urgent care clinic since your last visit? Hospitalized since your last visit? No    2. Have you seen or consulted any other health care providers outside of the [address] [place] [address] since your last visit? Include any pap smears or colon screening.  No

## 2019-11-18 NOTE — LETTER
NOTIFICATION RETURN TO WORK / SCHOOL 
 
11/18/2019 1:49 PM 
 
Ms. Tiara Rosenbaum [address] [facility] To Whom It May Concern: 
 
Tiara Rosenbaum is currently under the care of KAYLAN Carreno. She will return to work/school on:11/20/19 If there are questions or concerns please have the patient contact our office. Sincerely, Gilmer Moore NP

## 2019-11-18 NOTE — PATIENT INSTRUCTIONS

## 2020-04-02 ENCOUNTER — TELEPHONE (OUTPATIENT)
Dept: SLEEP MEDICINE | Age: 50
End: 2020-04-02

## 2020-04-02 ENCOUNTER — VIRTUAL VISIT (OUTPATIENT)
Dept: SLEEP MEDICINE | Age: 50
End: 2020-04-02

## 2020-04-02 ENCOUNTER — DOCUMENTATION ONLY (OUTPATIENT)
Dept: SLEEP MEDICINE | Age: 50
End: 2020-04-02

## 2020-04-02 VITALS
SYSTOLIC BLOOD PRESSURE: 110 MMHG | WEIGHT: 182 LBS | HEIGHT: 63 IN | DIASTOLIC BLOOD PRESSURE: 68 MMHG | BODY MASS INDEX: 32.25 KG/M2

## 2020-04-02 DIAGNOSIS — G47.33 OSA (OBSTRUCTIVE SLEEP APNEA): Primary | ICD-10-CM

## 2020-04-02 DIAGNOSIS — Z86.59 H/O: DEPRESSION: ICD-10-CM

## 2020-04-02 NOTE — PATIENT INSTRUCTIONS
[address] [place]. [address] [facility], [address] Tel.  [phone] Fax. [address] [place], [address] [facility] Tel.  [phone] Fax. [phone] [address] [place] Tel.  [phone] Fax. [phone] Sleep Apnea: After Your Visit Your Care Instructions Sleep apnea occurs when you frequently stop breathing for 10 seconds or longer during sleep. It can be mild to severe, based on the number of times per hour that you stop breathing or have slowed breathing. Blocked or narrowed airways in your nose, mouth, or throat can cause sleep apnea. Your airway can become blocked when your throat muscles and tongue relax during sleep. Sleep apnea is common, occurring in 1 out of 20 individuals. Individuals having any of the following characteristics should be evaluated and treated right away due to high risk and detrimental consequences from untreated sleep apnea: 
1. Obesity 2. Congestive Heart failure 3. Atrial Fibrillation 4. Uncontrolled Hypertension 5. Type II Diabetes 6. Night-time Arrhythmias 7. Stroke 8. Pulmonary Hypertension 9. High-risk Driving Populations (pilots, truck drivers, etc.) 10. Patients Considering Weight-loss Surgery How do you know you have sleep apnea? You probably have sleep apnea if you answer 'yes' to 3 or more of the following questions: S - Have you been told that you Snore? T - Are you often Tired during the day? O - Has anyone Observed you stop breathing while sleeping? P- Do you have (or are being treated for) high blood Pressure? B - Are you obese (Body Mass Index > 35)? A - Is your Age 48years old or older? N - Is your Neck size greater than 16 inches? G - Are you male Gender? A sleep physician can prescribe a breathing device that prevents tissues in the throat from blocking your airway.  Or your doctor may recommend using a dental device (oral breathing device) to help keep your airway open. In some cases, surgery may be needed to remove enlarged tissues in the throat. Follow-up care is a key part of your treatment and safety. Be sure to make and go to all appointments, and call your doctor if you are having problems. It's also a good idea to know your test results and keep a list of the medicines you take. How can you care for yourself at home? · Lose weight, if needed. It may reduce the number of times you stop breathing or have slowed breathing. · Go to bed at the same time every night. · Sleep on your side. It may stop mild apnea. If you tend to roll onto your back, sew a pocket in the back of your pajama top. Put a tennis ball into the pocket, and stitch the pocket shut. This will help keep you from sleeping on your back. · Avoid alcohol and medicines such as sleeping pills and sedatives before bed. · Do not smoke. Smoking can make sleep apnea worse. If you need help quitting, talk to your doctor about stop-smoking programs and medicines. These can increase your chances of quitting for good. · Prop up the head of your bed 4 to 6 inches by putting bricks under the legs of the bed. · Treat breathing problems, such as a stuffy nose, caused by a cold or allergies. · Use a continuous positive airway pressure (CPAP) breathing machine if lifestyle changes do not help your apnea and your doctor recommends it. The machine keeps your airway from closing when you sleep. · If CPAP does not help you, ask your doctor whether you should try other breathing machines. A bilevel positive airway pressure machine has two types of air pressureâone for breathing in and one for breathing out. Another device raises or lowers air pressure as needed while you breathe. · If your nose feels dry or bleeds when using one of these machines, talk with your doctor about increasing moisture in the air. A humidifier may help.  
· If your nose is runny or stuffy from using a breathing machine, talk with your doctor about using decongestants or a corticosteroid nasal spray. When should you call for help? Watch closely for changes in your health, and be sure to contact your doctor if: 
· You still have sleep apnea even though you have made lifestyle changes. · You are thinking of trying a device such as CPAP. · You are having problems using a CPAP or similar machine. Where can you learn more? Go to Wanderu. Enter L101 in the search box to learn more about \"Sleep Apnea: After Your Visit. \"  
© 6026-4925 [employer]wise, [employer]orporated. Care instructions adapted under license by [facility] (which disclaims liability or warranty for this information). This care instruction is for use with your licensed healthcare professional. If you have questions about a medical condition or this instruction, always ask your healthcare professional. Mike Gave any warranty or liability for your use of this information. PROPER SLEEP HYGIENE What to avoid · Do not have drinks with caffeine, such as coffee or black tea, for 8 hours before bed. · Do not smoke or use other types of tobacco near bedtime. Nicotine is a stimulant and can keep you awake. · Avoid drinking alcohol late in the evening, because it can cause you to wake in the middle of the night. · Do not eat a big meal close to bedtime. If you are hungry, eat a light snack. · Do not drink a lot of water close to bedtime, because the need to urinate may wake you up during the night. · Do not read or watch TV in bed. Use the bed only for sleeping and sexual activity. What to try · Go to bed at the same time every night, and wake up at the same time every morning. Do not take naps during the day. · Keep your bedroom quiet, dark, and cool. · Get regular exercise, but not within 3 to 4 hours of your bedtime. Baljinder Bahena · Sleep on a comfortable pillow and mattress. · If watching the clock makes you anxious, turn it facing away from you so you cannot see the time. · If you worry when you lie down, start a worry book. Well before bedtime, write down your worries, and then set the book and your concerns aside. · Try meditation or other relaxation techniques before you go to bed. · If you cannot fall asleep, get up and go to another room until you feel sleepy. Do something relaxing. Repeat your bedtime routine before you go to bed again. · Make your house quiet and calm about an hour before bedtime. Turn down the lights, turn off the TV, log off the computer, and turn down the volume on music. This can help you relax after a busy day. Drowsy Driving The [place] [address] cites drowsiness as a causing factor in more than 733,444 police reported crashes annually, resulting in 76,000 injuries and 1,500 deaths. Other surveys suggest 55% of people polled have driven while drowsy in the past year, 23% had fallen asleep but not crashed, 3% crashed, and 2% had and accident due to drowsy driving. Who is at risk? Young Drivers: One study of drowsy driving accidents states that 55% of the drivers were under 25 years. Of those, 75% were male. Shift Workers and Travelers: People who work overnight or travel across time zones frequently are at higher risk of experiencing Circadian Rhythm Disorders. They are trying to work and function when their body is programed to sleep. Sleep Deprived: Lack of sleep has a serious impact on your ability to pay attention or focus on a task. Consistently getting less than the average of 8 hours your body needs creates partial or cumulative sleep deprivation. Untreated Sleep Disorders: Sleep Apnea, Narcolepsy, R.L.S., and other sleep disorders (untreated) prevent a person from getting enough restful sleep.  This leads to excessive daytime sleepiness and increases the risk for drowsy driving accidents by up to 7 times. Medications / Alcohol: Even over the counter medications can cause drowsiness. Medications that impair a drivers attention should have a warning label. Alcohol naturally makes you sleepy and on its own can cause accidents. Combined with excessive drowsiness its effects are amplified. Signs of Drowsy Driving: * You don't remember driving the last few miles * You may drift out of your chandni * You are unable to focus and your thoughts wander * You may yawn more often than normal 
 * You have difficulty keeping your eyes open / nodding off * Missing traffic signs, speeding, or tailgating Prevention-  
Good sleep hygiene, lifestyle and behavioral choices have the most impact on drowsy driving. There is no substitute for sleep and the average person requires 8 hours nightly. If you find yourself driving drowsy, stop and sleep. Consider the sleep hygiene tips provided during your visit as well. Medication Refill Policy: Refills for all medications require 1 week advance notice. Please have your pharmacy fax a refill request. We are unable to fax, or call in \"controled substance\" medications and you will need to pick these prescriptions up from our office. [facility] Activation Thank you for requesting access to [facility]. Please follow the instructions below to securely access and download your online medical record. [facility] allows you to send messages to your doctor, view your test results, renew your prescriptions, schedule appointments, and more. How Do I Sign Up? 1. In your internet browser, go to [email or web address]. 2. Click on the First Time User? Click Here link in the Sign In box. You will see the New Member Sign Up page. 3. Enter your [facility] Access Code exactly as it appears below. You will not need to use this code after youve completed the sign-up process.  If you do not sign up before the expiration date, you must request a new code. [facility] Access Code: Activation code not generated Current [facility] Status: Active (This is the date your [facility] access code will ) 4. Enter the last four digits of your Social Security Number (xxxx) and Date of Birth (mm/dd/yyyy) as indicated and click Submit. You will be taken to the next sign-up page. 5. Create a Zipcart ID. This will be your [facility] login ID and cannot be changed, so think of one that is secure and easy to remember. 6. Create a [facility] password. You can change your password at any time. 7. Enter your Password Reset Question and Answer. This can be used at a later time if you forget your password. 8. Enter your e-mail address. You will receive e-mail notification when new information is available in [address] 9. Click Sign Up. You can now view and download portions of your medical record. 10. Click the Download Summary menu link to download a portable copy of your medical information. Additional Information If you have questions, please call [phone]. Remember, [facility] is NOT to be used for urgent needs. For medical emergencies, dial 911.

## 2020-04-02 NOTE — PROGRESS NOTES
217 [facility] [place]. [place], [address]  Tel.  [phone]  Fax. 100 [facility] 60  [place], [address] [facility]  Tel.  [phone]  Fax. [phone] [address]  Tel.  [phone]  Fax. [phone]         Subjective:        Tom Morrell is a 52 y.o. female who was seen by synchronous (real-time) audio-video technology on 4/2/2020 after verifying identity using drivers license. .        She complains of snoring associated with poor sleep quality along with day tiredness. Symptoms began 10 years ago, gradually worsening since that time. She usually can fall asleep in 30 minutes sometimes hours. She remains in bed laying awake worrying about her inability to fall asleep. Family or house members note snoring. She denies completely or partially paralyzed while falling asleep or waking up. Tom Morrell does wake up frequently at night. She is not bothered by waking up too early and left unable to get back to sleep. She actually sleeps about 5 hours at night and wakes up about 4 times during the night. She does work shifts:  First Shift. Carren Holiday indicates she does get too little sleep at night. Her bedtime is 2230. She awakens at 36. She does take naps. She takes 1 naps a week lasting 1. She has the following observed behaviors: Loud snoring, Twitching of legs or feet;  . Other remarks:      [place] Sleepiness Score: 4       Allergies   Allergen Reactions    Ginger Hives    Soy Hives    Bees [Sting, Bee] Not Reported This Time    Cephaeline Not Reported This Time     Pt. States no allergy    Keflex [Cephalexin] Rash    Soybean Oil Hives    Tomato Hives     Pt. States no allergy         Current Outpatient Medications:     FLUoxetine (PROZAC) 10 mg capsule, Take 1 Cap by mouth daily. After 7 days, take 2 cap by mouth daily. , Disp: 60 Cap, Rfl: 0    ALPRAZolam (XANAX) 0.25 mg tablet, Take 1 Tab by mouth nightly as needed for Anxiety or Sleep. Max Daily Amount: 0.25 mg., Disp: 20 Tab, Rfl: 0    hydrOXYzine HCl (ATARAX) 25 mg tablet, Take 1.5 Tabs by mouth daily as needed for Anxiety. , Disp: 30 Tab, Rfl: 0    REXULTI 1 mg tab tablet, TAKE 1 TABLET BY MOUTH EVERY DAY, Disp: , Rfl: 2    cholecalciferol, vitamin D3, 2,000 unit tab, Take  by mouth., Disp: , Rfl:     EPINEPHrine (EPIPEN 2-EDWIGE) 0.3 mg/0.3 mL (1:1,000) injection, 0.3 mL by IntraMUSCular route once as needed for up to 1 dose., Disp: 2 Each, Rfl: 0    ondansetron (ZOFRAN ODT) 8 mg disintegrating tablet, Take 1 Tab by mouth every eight (8) hours as needed for Nausea., Disp: 20 Tab, Rfl: 0     She  has a past medical history of Back pain, acute, Depression, H/O seasonal allergies, Maxillary sinus fracture ([facility] [address] (), and Neck pain, musculoskeletal. She also has no past medical history of Trauma. She  has a past surgical history that includes hx gyn (); hx wisdom teeth extraction; hx  section; hx cervical diskectomy (Left, 2015); hx cervical fusion (Left, 2015 ); colonoscopy,diagnostic (2019); and colonoscopy (N/A, 2019). She family history includes Anesth Problems in her mother; Breast Cancer in her maternal aunt, maternal aunt, maternal aunt, and maternal grandmother; Breast Cancer (age of onset: 61) in her mother; Cancer in her father and maternal grandmother; Heart Disease in her maternal grandfather and paternal grandmother; Hypertension in her mother and sister; No Known Problems in her daughter, daughter, and son; Other in her mother; Stroke in her mother and paternal grandfather. She  reports that she has never smoked. She has never used smokeless tobacco. She reports current alcohol use. She reports that she does not use drugs.      Review of Systems:  Constitutional: + significant weight gain  Eyes:  No blurred vision  CVS:  No significant chest pain  Pulm:  No significant shortness of breath  GI:  No significant nausea or vomiting  :  No significant nocturia  Musculoskeletal:  No significant joint pain at night  Skin:  No significant rashes  Neuro:  No significant dizziness   Psych:  No active mood issues    Sleep Review of Systems: notable for difficulty falling asleep; frequent awakenings at night;  regular dreaming noted; no nightmares ; no early morning headaches; no memory problems; no concentration issues; no history of any automobile or occupational accidents due to daytime drowsiness. Objective:     Visit Vitals  /68   Ht 5' 3\" (1.6 m)   Wt 182 lb (82.6 kg)   BMI 32.24 kg/m²         Vital Signs, Height and Weight were provided by the patient. Due to this being a [employer] evaluation, many elements of the physical examination are unable to be assessed. General:   Alert, oriented, not in distress   Respiratory  Appeared to be breathing comfortably   Neuro:  Fluent Speech; No obvious facial asymmetry    Psych:  Normal affect ,  Normal countenance ;           Assessment:       ICD-10-CM ICD-9-CM    1. LESTER (obstructive sleep apnea) G47.33 327.23 SLEEP STUDY UNATTENDED, 4 CHANNEL   2. BMI 32.0-32.9,adult Z68.32 V85.32    3. H/O: depression Z86.59 V11.8          Plan:       * Sleep testing was ordered for initial evaluation. * She was provided information on sleep apnea including coresponding risk factors and the importance of proper treatment. * Treatment options if indicated were reviewed today. Patient agrees to a trial of PAP therapy if indicated. * Counseling was provided regarding proper sleep hygiene, appropriate sleep schedule, need for sleep environment safety and safe driving. * Counseling was provided regarding paradoxical intention by delaying bedtime to 11:00 pm. Stimulus control therapy and to curtail time in bed closer to actual sleep time (current reports of sleeping 4-5 hours while bed time is 9 hours). * Effect of sleep disturbance on weight was reviewed.  We have recommended a dedicated weight loss through appropriate diet and an exercise regiment as significant weight reduction has been shown to reduce severity of obstructive sleep apnea. * Patient agrees to telephone [phone] follow-up by myself or lead sleep technologist shortly after sleep study to review results and plan final management.     (patient has given permission for a message to be left regarding test results and further management if patient cannot be cannot be reached directly). Thank you for allowing us to participate in your patient's medical care. We'll keep you updated on these investigations. Pursuant to the emergency declaration under the [facility], [facility] waiver authority and the [employer] Act, this Virtual  Visit was conducted, with patient's consent, to reduce the patient's risk of exposure to COVID-19 and provide continuity of care for an established patient. Services were provided through a video synchronous discussion virtually to substitute for in-person clinic visit. Rufus Burgos MD, FAASM  Electronically signed.  04/02/20

## 2020-04-06 ENCOUNTER — OFFICE VISIT (OUTPATIENT)
Dept: SLEEP MEDICINE | Age: 50
End: 2020-04-06

## 2020-04-06 ENCOUNTER — HOSPITAL ENCOUNTER (OUTPATIENT)
Dept: SLEEP MEDICINE | Age: 50
Discharge: HOME | End: 2020-04-06
Payer: COMMERCIAL

## 2020-04-06 DIAGNOSIS — H92.01 OTALGIA, RIGHT EAR: Primary | ICD-10-CM

## 2020-04-06 DIAGNOSIS — G47.33 OBSTRUCTIVE SLEEP APNEA (ADULT) (PEDIATRIC): Primary | ICD-10-CM

## 2020-04-06 PROCEDURE — 95806 SLEEP STUDY UNATT&RESP EFFT: CPT | Performed by: INTERNAL MEDICINE

## 2020-04-06 RX ORDER — NEOMYCIN SULFATE, POLYMYXIN B SULFATE AND HYDROCORTISONE 10; 3.5; 1 MG/ML; MG/ML; [USP'U]/ML
3 SUSPENSION/ DROPS AURICULAR (OTIC) 4 TIMES DAILY
Qty: 10 ML | Refills: 0 | Status: SHIPPED | OUTPATIENT
Start: 2020-04-06 | End: 2020-12-11 | Stop reason: ALTCHOICE

## 2020-04-06 RX ORDER — AZITHROMYCIN 250 MG/1
TABLET, FILM COATED ORAL
Qty: 6 TAB | Refills: 0 | Status: SHIPPED | OUTPATIENT
Start: 2020-04-06 | End: 2020-05-18 | Stop reason: ALTCHOICE

## 2020-04-06 NOTE — PATIENT INSTRUCTIONS
217 [facility]. [place], [facility]  Tel.  [phone]  Fax. 100 [facility] 60  [place], [address] [facility]  Tel.  [phone]  Fax. [phone] 9250 [facility] [address]   Tel.  [phone]  Fax. [phone]       S>Renata Henao is a 52 y.o. female seen today to receive a home sleep testing unit (HST). · Patient was educated on proper hookup and operation of the HST. · Instruction forms and documentation were reviewed and signed. · The patient demonstrated good understanding of the HST.    O>    There were no vitals taken for this visit. A>  1. Obstructive sleep apnea (adult) (pediatric)          P>  · General information regarding operations and maintenance of the device was provided. · She was provided information on sleep apnea including coresponding risk factors and the importance of proper treatment. · Follow-up appointment was made to return the HST. She will be contacted once the results have been reviewed. · She was asked to contact our office for any problems regarding her home sleep test study.

## 2020-04-06 NOTE — PROGRESS NOTES
[address] [facility] [address] [place]. [place], [address]  Tel.  [phone]  Fax. 100 [facility] 60  [place], [address] [facility]  Tel.  [phone]  Fax. [phone] 9[address] [facility] [place] [facility]  Tel.  [phone]  Fax. [phone]       S>Renata Disla is a 52 y.o. female seen today to receive a home sleep testing unit (HST). · Patient was educated on proper hookup and operation of the HST. · Instruction forms and documentation were reviewed and signed. · The patient demonstrated good understanding of the HST.    O>    There were no vitals taken for this visit. A>  No diagnosis found. P>  · General information regarding operations and maintenance of the device was provided. · She was provided information on sleep apnea including coresponding risk factors and the importance of proper treatment. · Follow-up appointment was made to return the HST. She will be contacted once the results have been reviewed. · She was asked to contact our office for any problems regarding her home sleep test study.

## 2020-04-07 ENCOUNTER — DOCUMENTATION ONLY (OUTPATIENT)
Dept: SLEEP MEDICINE | Age: 50
End: 2020-04-07

## 2020-04-14 ENCOUNTER — DOCUMENTATION ONLY (OUTPATIENT)
Dept: SLEEP MEDICINE | Age: 50
End: 2020-04-14

## 2020-04-14 ENCOUNTER — TELEPHONE (OUTPATIENT)
Dept: SLEEP MEDICINE | Age: 50
End: 2020-04-14

## 2020-04-14 DIAGNOSIS — G47.33 OSA (OBSTRUCTIVE SLEEP APNEA): Primary | ICD-10-CM

## 2020-04-14 NOTE — TELEPHONE ENCOUNTER
Mary Pineda is to be contacted by lead sleep technologist regarding results of Sleep Testing which was indicative of an average AHI of 10 per hour with an SpO2 agustina of 89% and SpO2 of < 88% being 0 minutes. An APAP prescription has been written and patient will be contacted by office staff regarding follow-up  in 2-3 months after initiation of therapy. Encounter Diagnosis   Name Primary?  LESTER (obstructive sleep apnea) Yes       Orders Placed This Encounter    AMB SUPPLY ORDER     Diagnosis: Obstructive Sleep Apnea ICD-10 Code (G47.33)    Positive Airway Pressure Therapy: Duration of need: 99 months. ResMed APAP Device with Heated Humidifer [id number] / [id number]. Minimum Pressure: 4 cmH2O, Maximum Pressure: 20 cmH2O.  Nasal Cushion (Replace) 2 per month.  Nasal Interface Mask 1 every 3 months.  Headgear 1 every 6 months.  Filter(s) Disposable 2 per month.  Filter(s) Non-Disposable 1 every 6 months. [address] for [employer] (Replace) 1 every 6 months.  Tubing with heating element 1 every 3 months. Perform Mask Fitting per patient preference and comfort - replace as above. Carmela Marinelli MD, FAASM; NPI: [id number]  Electronically signed. 04/14/20

## 2020-04-15 ENCOUNTER — DOCUMENTATION ONLY (OUTPATIENT)
Dept: SLEEP MEDICINE | Age: 50
End: 2020-04-15

## 2020-05-05 ENCOUNTER — TELEPHONE (OUTPATIENT)
Dept: SLEEP MEDICINE | Age: 50
End: 2020-05-05

## 2020-05-05 NOTE — TELEPHONE ENCOUNTER
Patient called stating she had heard from DME a week or so ago but still no PAP. Called to follow up. Spoke with a woman named Corrina Ramos. She said she didn't even see her order in the system. I expressed that shes already be reached out to once already so it had to have been received but went ahead and refaxed to her direct line [phone]. She said she would call me when she gets it to verify. Relayed all this info to the patient.

## 2020-05-18 ENCOUNTER — VIRTUAL VISIT (OUTPATIENT)
Dept: FAMILY MEDICINE CLINIC | Age: 50
End: 2020-05-18

## 2020-05-18 DIAGNOSIS — F41.8 DEPRESSION WITH ANXIETY: ICD-10-CM

## 2020-05-18 DIAGNOSIS — G25.81 RESTLESS LEG SYNDROME: ICD-10-CM

## 2020-05-18 DIAGNOSIS — G47.30 SLEEP APNEA, UNSPECIFIED TYPE: ICD-10-CM

## 2020-05-18 DIAGNOSIS — G47.00 INSOMNIA, UNSPECIFIED TYPE: Primary | ICD-10-CM

## 2020-05-18 RX ORDER — PRAMIPEXOLE DIHYDROCHLORIDE 0.12 MG/1
TABLET ORAL
Qty: 30 TAB | Refills: 1 | Status: SHIPPED | OUTPATIENT
Start: 2020-05-18 | End: 2020-05-26

## 2020-05-18 NOTE — PROGRESS NOTES
HISTORY OF PRESENT ILLNESS  Avi Foster is a 52 y.o. female. Acute virtual office visit. Consent: Avi Foster, who was seen by synchronous (real-time) audio-video technology, and/or her healthcare decision maker, is aware that this patient-initiated, Telehealth encounter on 5/18/2020 is a billable service, with coverage as determined by her insurance carrier. She is aware that she may receive a bill and has provided verbal consent to proceed: Yes. Avi Foster is a 52 y.o. female who was evaluated by an audio-video encounter for concerns as above. Patient identification was verified prior to start of the visit. A caregiver was present when appropriate. Due to this being a TeleHealth encounter (During ZXSS-85 public health emergency), evaluation of the following organ systems was limited: Vitals/Constitutional/EENT/Resp/CV/GI//MS/Neuro/Skin/Heme-Lymph-Imm. Pursuant to the emergency declaration under the [facility], [address]5 waiver authority and the [employer] Act, this Virtual Visit was conducted, with patient's (and/or legal guardian's) consent, to reduce the patient's risk of exposure to COVID-19 and provide necessary medical care. Services were provided through a synchronous discussion virtually to substitute for in-person clinic visit. I was in the office. The patient was at home. HPI  Has had ongoing problem with insomnia. Difficulty falling and staying asleep, tried melatonin, temazepam and trazodone without sx relief. Had sleep study testing last month, diagnosed with sleep apnea, APAP recommended. Reports delay in obtaining device. Also having mild exacerbation of depression/anxiety. Was on prozac and rexulti prescribed by psych. Since health insurance changed, no longer seeing psych. Meds discontinued. Has upcoming appointment with new psychiatrist in July. C/o restless legs. Sensation of needing to get up and walk around in the middle of the night. Works as nurse, currently furloughed. Denies excessive caffeine use, napping throughout the day. Has been doing some walking for exercise. Past Medical History:   Diagnosis Date    Back pain, acute     Depression     H/O seasonal allergies     [facility]illary sinus fracture ([facility] [address] 2016    Neck pain, musculoskeletal      Patient Active Problem List   Diagnosis Code    Anxiety F41.9    Insomnia G47.00    Neck pain on left side M54.2    Radiculopathy of cervical spine M54.12    Stress at home F43.9    Panic attack F41.0    Moderate episode of recurrent major depressive disorder (HCC) F33.1     Current Outpatient Medications   Medication Sig    pramipexole (MIRAPEX) 0.125 mg tablet Take 1 tablet nightly. May increase to tablets after 1 week if no symptom improvement.  cholecalciferol, vitamin D3, 2,000 unit tab Take  by mouth.  EPINEPHrine (EPIPEN 2-EDWIGE) 0.3 mg/0.3 mL (1:1,000) injection 0.3 mL by IntraMUSCular route once as needed for up to 1 dose.  neomycin-polymyxin-hydrocortisone, buffered, (PEDIOTIC) 3.5-10,000-1 mg/mL-unit/mL-% otic suspension Administer 3 Drops in left ear four (4) times daily. Indications: outer ear inflammation due to allergy or infection    FLUoxetine (PROZAC) 10 mg capsule Take 1 Cap by mouth daily. After 7 days, take 2 cap by mouth daily.  REXULTI 1 mg tab tablet TAKE 1 TABLET BY MOUTH EVERY DAY     No current facility-administered medications for this visit. Social History     Tobacco Use    Smoking status: Never Smoker    Smokeless tobacco: Never Used   Substance Use Topics    Alcohol use: Yes     Alcohol/week: 0.0 standard drinks     Comment: occasional    Drug use: No       Review of Systems   Constitutional: Positive for malaise/fatigue (daytime fatigue). Respiratory: Negative. Cardiovascular: Negative. Psychiatric/Behavioral: Positive for depression.  Negative for hallucinations, substance abuse and suicidal ideas. The patient is nervous/anxious and has insomnia. Physical Exam  Constitutional:       General: She is not in acute distress. Pulmonary:      Effort: Pulmonary effort is normal.   Neurological:      Mental Status: She is alert. Psychiatric:         Mood and Affect: Mood normal.         Behavior: Behavior normal.         Thought Content: Thought content normal.         ASSESSMENT and PLAN  Diagnoses and all orders for this visit:    1. Insomnia, unspecified type  Sleep apnea may be a factor. Awaiting APAP device. Sleep hygiene reviewed. 2. Restless leg syndrome  -     pramipexole (MIRAPEX) 0.125 mg tablet; Take 1 tablet nightly. May increase to tablets after 1 week if no symptom improvement. May be contributing to insomnia. Trial mirapex as directed. 3. Sleep apnea, unspecified type  As above. 4. Depression with anxiety  Exacerbated by insomnia, being furloughed. Follow up with psych as schedule. Consider resuming antidepressants prior to psych appointment if symptoms do not improve. Follow up 4 weeks or sooner prn.

## 2020-05-18 NOTE — PROGRESS NOTES
Chief Complaint   Patient presents with    Insomnia    Restless Leg Syndrome    Medication Evaluation     anxiety and depression concerns     1. Have you been to the ER, urgent care clinic since your last visit? Hospitalized since your last visit? No    2. Have you seen or consulted any other health care providers outside of the [address] [place] [address] since your last visit? Include any pap smears or colon screening.  No

## 2020-05-19 ENCOUNTER — TELEPHONE (OUTPATIENT)
Dept: SLEEP MEDICINE | Age: 50
End: 2020-05-19

## 2020-05-19 NOTE — TELEPHONE ENCOUNTER
Patient left a message on the voicemail regarding sending orders over to [facility] for pap supplies. Called patient and informed her that the information requested has been faxed. Also sent a message in [place] to the patient.

## 2020-05-25 DIAGNOSIS — G25.81 RESTLESS LEG SYNDROME: ICD-10-CM

## 2020-05-26 RX ORDER — PRAMIPEXOLE DIHYDROCHLORIDE 0.12 MG/1
TABLET ORAL
Qty: 30 TAB | Refills: 1 | Status: SHIPPED | OUTPATIENT
Start: 2020-05-26 | End: 2020-12-11 | Stop reason: ALTCHOICE

## 2020-05-27 DIAGNOSIS — F33.1 MODERATE EPISODE OF RECURRENT MAJOR DEPRESSIVE DISORDER (HCC): ICD-10-CM

## 2020-05-27 RX ORDER — BREXPIPRAZOLE 1 MG/1
1 TABLET ORAL DAILY
Qty: 30 TAB | Refills: 1 | Status: SHIPPED | OUTPATIENT
Start: 2020-05-27 | End: 2020-08-18 | Stop reason: SDUPTHER

## 2020-05-27 RX ORDER — FLUOXETINE 10 MG/1
10 CAPSULE ORAL DAILY
Qty: 30 CAP | Refills: 1 | Status: SHIPPED | OUTPATIENT
Start: 2020-05-27 | End: 2020-08-18 | Stop reason: SDUPTHER

## 2020-07-01 ENCOUNTER — NURSE TRIAGE (OUTPATIENT)
Dept: OTHER | Facility: CLINIC | Age: 50
End: 2020-07-01

## 2020-07-01 NOTE — TELEPHONE ENCOUNTER
Employee called in to report having sore throat, headache and diarrhea. Patient informed of disposition. Provided patient with telehealth options and walk-in clinics. Care advice as documented. Instructed patient to call back with worsening symptoms. Patient verbalized understanding and denies any further questions/concerns. Email sent to manager and occupational team. Transferred patient to occupational health team. Please do not respond to the triage nurse through this encounter. Any subsequent communication should be directly with the patient.     Reason for Disposition   [1] COVID-19 infection suspected by caller or triager AND [2] mild symptoms (cough, fever, or others) AND [0] no complications or SOB    Protocols used: CORONAVIRUS (COVID-19) [facility]AGNOSED OR SUSPECTED[facility]

## 2020-07-07 ENCOUNTER — DOCUMENTATION ONLY (OUTPATIENT)
Dept: SLEEP MEDICINE | Age: 50
End: 2020-07-07

## 2020-07-08 NOTE — PROGRESS NOTES
Order for PAP device sent to [facility] 5/18/2020. No record of setup in chart, no setup online. Email sent to [address] for setup confirm and device access.     Luis Henriquez NP, [facility]  07/07/20

## 2020-08-18 ENCOUNTER — VIRTUAL VISIT (OUTPATIENT)
Dept: BEHAVIORAL/MENTAL HEALTH CLINIC | Age: 50
End: 2020-08-18
Payer: COMMERCIAL

## 2020-08-18 DIAGNOSIS — F41.9 ANXIETY: ICD-10-CM

## 2020-08-18 DIAGNOSIS — F33.1 MODERATE EPISODE OF RECURRENT MAJOR DEPRESSIVE DISORDER (HCC): Primary | ICD-10-CM

## 2020-08-18 PROCEDURE — 99204 OFFICE O/P NEW MOD 45 MIN: CPT | Performed by: NURSE PRACTITIONER

## 2020-08-18 RX ORDER — BREXPIPRAZOLE 1 MG/1
1 TABLET ORAL DAILY
Qty: 30 TAB | Refills: 2 | Status: SHIPPED | OUTPATIENT
Start: 2020-08-18 | End: 2020-12-11 | Stop reason: SDUPTHER

## 2020-08-18 RX ORDER — FLUOXETINE HYDROCHLORIDE 40 MG/1
40 CAPSULE ORAL DAILY
Qty: 30 CAP | Refills: 2 | Status: SHIPPED | OUTPATIENT
Start: 2020-08-18 | End: 2020-11-09 | Stop reason: SDUPTHER
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
## 2020-08-21 ENCOUNTER — EMPLOYEE WELLNESS (OUTPATIENT)
Dept: OTHER | Facility: CLINIC | Age: 50
End: 2020-08-21

## 2020-08-21 LAB
CHOLEST SERPL-MCNC: 225 MG/DL
GLUCOSE SERPL-MCNC: 90 MG/DL (ref 65–100)
HDLC SERPL-MCNC: 51 MG/DL
LDLC SERPL CALC-MCNC: 143.8 MG/DL (ref 0–100)
TRIGL SERPL-MCNC: 151 MG/DL (ref ?–150)

## 2020-10-08 DIAGNOSIS — F33.1 MODERATE EPISODE OF RECURRENT MAJOR DEPRESSIVE DISORDER (HCC): ICD-10-CM

## 2020-10-08 RX ORDER — FLUOXETINE HYDROCHLORIDE 40 MG/1
CAPSULE ORAL
Qty: 30 CAP | Refills: 2 | OUTPATIENT
Start: 2020-10-08

## 2020-10-13 ENCOUNTER — VIRTUAL VISIT (OUTPATIENT)
Dept: BEHAVIORAL/MENTAL HEALTH CLINIC | Age: 50
End: 2020-10-13
Payer: COMMERCIAL

## 2020-10-13 DIAGNOSIS — F33.1 MODERATE EPISODE OF RECURRENT MAJOR DEPRESSIVE DISORDER (HCC): Primary | ICD-10-CM

## 2020-10-13 DIAGNOSIS — F41.9 ANXIETY: ICD-10-CM

## 2020-10-13 PROCEDURE — 99214 OFFICE O/P EST MOD 30 MIN: CPT | Performed by: NURSE PRACTITIONER

## 2020-10-13 RX ORDER — FLUOXETINE HYDROCHLORIDE 20 MG/1
20 CAPSULE ORAL DAILY
Qty: 30 CAP | Refills: 2 | Status: SHIPPED | OUTPATIENT
Start: 2020-10-13 | End: 2020-11-09 | Stop reason: SDUPTHER

## 2020-10-13 NOTE — PROGRESS NOTES
CHIEF COMPLAINT:  Elvie Sanchez is a 48 y.o. female and was seen today for follow-up of psychiatric condition and psychotropic medication management. HPI:    Quentin reports the following psychiatric symptoms:  depression and anxiety. The symptoms have been present for months and are of moderate severity. The symptoms occur less frequently and less severely with current medications. Pt reports an improvement with higher dose of prozac. Overall she is doing better but continues with SE's that are bothersome and since running out of rexulti she is not sleeping. Met with pt via video telehealth for appt today. FAMILY/SOCIAL HX: no changes reported    REVIEW OF SYSTEMS:  Psychiatric:  depression, anxiety  Appetite:good   Sleep: fitful and poor with DIS (difficulty initiating sleep)   Neuro: RLS         Side Effects:  RLS since starting rexulti    MENTAL STATUS EXAM:   Sensorium  oriented to time, place and person   Relations cooperative   Appearance:  age appropriate and casually dressed   Motor Behavior:  gait stable and within normal limits   Speech:  normal volume   Thought Process: goal directed   Thought Content free of delusions and free of hallucinations   Suicidal ideations no intention   Homicidal ideations no intention   Mood:  \"ok\"   Affect:  wnl/anxious   Memory recent  adequate   Memory remote:  adequate   Concentration:  adequate   Abstraction:  abstract   Insight:  good   Reliability good   Judgment:  good     MEDICAL DECISION MAKING:  Problems addressed today:    ICD-10-CM ICD-9-CM    1. Moderate episode of recurrent major depressive disorder (HCC)  F33.1 296.32    2. Anxiety  F41.9 300.00        Assessment:   Quentin is responding to treatment overall. Currently depression and anxiety symptoms are improving. Reviewed treatment goals and ultimately would like to get pt off of rexulti due to RLS. Will increase prozac today and dec rexulti to 0.5 mg. Agreed to review in approx 2 months.  If symptoms remain stable will wean off of rexulti and try to use prozac as monotherapy. Reviewed sleep problems which are a risk factor for mood/anxiety exacerbations. Discussed past med trials and would use alprazolam prn which helps pt fall asleep. Plan:   1. Current Outpatient Medications   Medication Sig Dispense Refill    FLUoxetine (PROzac) 20 mg capsule Take 1 Cap by mouth daily. Take with 40 mg cap for total dose 60 mg. 30 Cap 2    brexpiprazole (Rexulti) 0.5 mg tab tablet Take 1 Tab by mouth daily. 30 Tab 2    FLUoxetine (PROzac) 40 mg capsule Take 1 Cap by mouth daily. 30 Cap 2    Rexulti 1 mg tab tablet Take 1 Tab by mouth daily. 30 Tab 2    pramipexole (MIRAPEX) 0.125 mg tablet TAKE 1 TABLET NIGHTLY. MAY INCREASE TOTWO TABLETS AFTER 1 WEEK IF NO SYMPTOM IMPROVEMENT. 30 Tab 1    neomycin-polymyxin-hydrocortisone, buffered, (PEDIOTIC) 3.5-10,000-1 mg/mL-unit/mL-% otic suspension Administer 3 Drops in left ear four (4) times daily. Indications: outer ear inflammation due to allergy or infection 10 mL 0    cholecalciferol, vitamin D3, 2,000 unit tab Take  by mouth.  EPINEPHrine (EPIPEN 2-EDWIGE) 0.3 mg/0.3 mL (1:1,000) injection 0.3 mL by IntraMUSCular route once as needed for up to 1 dose. 2 Each 0          medication changes made today: inc prozac 60 mg, dec rexulti 0.5 mg    2. Counseling and coordination of care including instructions for treatment, risks/benefits, risk factor reduction and patient/family education. She agrees with the plan. Patient instructed to call with any side effects, questions or issues. 3.    Follow-up and Dispositions    · Return in about 2 months (around 12/13/2020). César Franz, who was evaluated through a synchronous (real-time) audio-video encounter, and/or her healthcare decision maker, is aware that it is a billable service, with coverage as determined by her insurance carrier.  She provided verbal consent to proceed: Yes, and patient identification was verified. It was conducted pursuant to the emergency declaration under the [facility], [address] [place] [address] authority and the [employer] and [employer] General Act. A caregiver was present when appropriate. Ability to conduct physical exam was limited. I was at home. The patient was at home.             10/13/2020  Abby Daniel NP

## 2020-10-23 ENCOUNTER — VIRTUAL VISIT (OUTPATIENT)
Dept: SLEEP MEDICINE | Age: 50
End: 2020-10-23
Payer: COMMERCIAL

## 2020-10-23 ENCOUNTER — TELEPHONE (OUTPATIENT)
Dept: SLEEP MEDICINE | Age: 50
End: 2020-10-23

## 2020-10-23 ENCOUNTER — DOCUMENTATION ONLY (OUTPATIENT)
Dept: SLEEP MEDICINE | Age: 50
End: 2020-10-23

## 2020-10-23 DIAGNOSIS — Z86.59 H/O: DEPRESSION: ICD-10-CM

## 2020-10-23 DIAGNOSIS — G47.33 OSA (OBSTRUCTIVE SLEEP APNEA): Primary | ICD-10-CM

## 2020-10-23 PROCEDURE — 99213 OFFICE O/P EST LOW 20 MIN: CPT | Performed by: NURSE PRACTITIONER

## 2020-10-23 RX ORDER — LEVOTHYROXINE SODIUM 50 UG/1
50 TABLET ORAL
COMMUNITY
Start: 2020-07-16 | End: 2021-08-30 | Stop reason: SDUPTHER

## 2020-10-23 NOTE — PROGRESS NOTES
[facility] [place] [place] [place], [address]   Tel.  [phone]   Fax. [facility]   [place], [address] [facility]   Tel.  [phone]   Fax. [phone] [address]   Tel.  [phone]   Fax. [phone]       Stephanie Ornelas is a 48 y.o. female who was seen by synchronous (real-time) audio-video technology on 10/23/2020. Consent:  She and/or her healthcare decision maker is aware that this patient-initiated Telehealth encounter is a billable service, with coverage as determined by her insurance carrier. She is aware that she may receive a bill and has provided verbal consent to proceed: Yes    I was in the office while conducting this encounter. Patient verified with 's license in chart. Subjective:     Stephanie Ornelas is seen for a sleep disorder follow-up, last seen by Dr. Troy Isidro on 4/2/2020, prior notes reviewed in detail. Home sleep test 4/2020 showed AHI of 10.0/hr with a lowest SaO2 of 89%, duration of SaO2 < 88% 0 min. She is here today for a face to face visit to qualify for PAP device. Order was initial sent to [facility] and not fulfilled. She reports she has experienced daytime sleepiness for 10+ year (s) and it has worsened. The sleepiness is described as being mild, she has not been taking naps during the day. 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 The severity of the day time fatigue has impacted the ability to function normally during the day. She reports she has been snoring for a number of years and her snoring has stayed the same. Flaquito Kohli. Blake Johnston has noted problems with concentration and memory, she reports she has experienced elevated blood pressure, non-restorative sleep, early morning headaches, witnessed apnea. The patient notes she retires at 9:30-10 pm and awakens at 6 am and that she will experience frequent awakening from sleep.  In general she is able to return to sleep after awakening, she tends to awaken spontaneously. [place] Sleepiness Score: 2 and Modified F.O.S.Q. Score Total / 2: 16 which reflects mild daytime sleepiness. Allergies   Allergen Reactions    Teagan Hives    Soy Hives    Bees [Sting, Bee] Not Reported This Time    Cephaeline Not Reported This Time     Pt. States no allergy    Keflex [Cephalexin] Rash    Soybean Oil Hives    Tomato Hives     Pt. States no allergy       She has a current medication list which includes the following prescription(s): levothyroxine, fluoxetine, brexpiprazole, fluoxetine, cholecalciferol (vitamin d3), epinephrine, rexulti, pramipexole, and neomycin-polymyxin-hydrocortisone (buffered). .      She  has a past medical history of Back pain, acute, Depression, H/O seasonal allergies, Maxillary sinus fracture ([facility] [address] (2016), and Neck pain, musculoskeletal. She also has no past medical history of Trauma. Sleep Review of Systems: notable for Negative difficulty falling asleep; Positive awakenings at night; Negative chest pain; Negative shortness of breath; Negative significant joint pain at night; Negative significant muscle pain at night; Negative rashes or itching; Negative heartburn; Negative significant mood issues; 0 afternoon naps per week    Objective:   Vitals reported by patient     Patient-Reported Vitals 10/23/2020   Patient-Reported Weight 189   Patient-Reported Temperature 98.2   Patient-Reported Systolic  420   Patient-Reported Diastolic 80        Calculated BMI 32    Physical Exam completed by visual and auditory observation of patient with verbal input from patient.     General:   Alert, oriented, not in acute distress   Eyes:  Anicteric Sclerae; no obvious strabismus   Nose:  No obvious nasal septum deviation    Neck:   Midline trachea, no visible mass   Chest/Lungs:  Respiratory effort normal, no visualized signs of difficulty breathing or respiratory distress   CVS:  No JVD   Extremities:  No obvious rashes noted on face, neck, or hands   Neuro:  No facial asymmetry, no focal deficits; no obvious tremor    Psych:  Normal affect,  normal countenance       Assessment:       ICD-10-CM ICD-9-CM    1. LESTER (obstructive sleep apnea)  G47.33 327.23 AMB SUPPLY ORDER   2. H/O: depression  Z86.59 V11.8    3. BMI 32.0-32.9,adult  Z68.32 V85.32        Patient has a history and examination consistent with the diagnosis of sleep apnea. Plan:     Follow-up and Dispositions    · Return in about 11 weeks (around 1/8/2021) for first adherence on new device. * She was provided information on sleep apnea including corresponding risk factors and the importance of proper treatment, she would like to proceed with PAP therapy. Patient will be seen in follow-up in 6-8 weeks after PAP setup to gauge treatment response and adherence to therapy. Orders Placed This Encounter    AMB SUPPLY ORDER     Diagnosis: (G47.33) LESTER (obstructive sleep apnea)  (primary encounter diagnosis)      Positive Airway Pressure Therapy: Duration of need: 99 months.   ResMed APAP Device: Minimum Pressure: 4 cmH2O, Maximum Pressure: 16 cmH2O. Ramp Time: 20 Minutes.  EPR: 2.   Heated Humidifier  Arnulfo Modem Access  Length of Need: 99 months    Mask Fitting evaluation. Mask for patient preference      Nasal Pillows Combo Mask (Replace) 2 per month.  Nasal Pillows (Replace) 2 per month.  Nasal Cushion (Replace) 2 per month.  Nasal Interface Mask 1 every 3 months.  Full Face Mask 1 every 3 months.  Full Face Mask Cushion 1 per month.  Headgear 1 every 6 months.  Positive Airway Pressure chinstrap 1 every 6 months.  Tubing with heating element 1 every 3 months.  Filter(s) Disposable 2 per month.  Filter(s) Non-Disposable 1 every 6 months. .   [facility] for Humidifier (Replace) 1 every 6 months.       Billy Duran BIPIN; NPI: [id number]    Electronically signed. Date:- 10/23/20       * All of her questions were addressed. 2. H/O Depression - continue on her current regimen. 3.  Recommended a dedicated weight loss program through appropriate diet and exercise regimen as significant weight reduction has been shown to reduce severity of obstructive sleep apnea. Patient's phone number [phone] (home)  was reviewed and confirmed for accuracy. She gives permission for messages regarding results and appointments to be left at that number. Micheline Thompson is a 48 y.o. female being evaluated by a Virtual Visit (video visit) encounter to address concerns as mentioned above. A caregiver was present when appropriate. Due to this being a TeleHealth encounter (During WXVVJ-95 public health emergency), evaluation of the following organ systems was limited: Vitals/Constitutional/EENT/Resp/CV/GI//MS/Neuro/Skin/Heme-Lymph-Imm. Pursuant to the emergency declaration under the [facility], [address] [place] [address] [facility] authority and the [employer] and [employer] Act, this Virtual Visit was conducted with patient's (and/or legal guardian's) consent, to reduce the risk of exposure to COVID-19 and provide necessary medical care. Services were provided through a video synchronous discussion virtually to substitute for in-person encounter. BIPIN Oakes, [address] [place] [address]  Electronically signed.  09/30/19

## 2020-10-23 NOTE — TELEPHONE ENCOUNTER
Called and spoke with Diane Tian, whom I believe is the manager of [address]. They have a few documented calls where they have been unable to reach patient to set up. Order has since . They will need a new order and todays office note faxed to F: [phone]. I will call patient and explain that they will be reaching out and suggest clearing voicemail so they can leave her a message if she misses their call OR to call them in 2 weeks and follow up if she hasn't spoken with them by then. Requesting new order for PAP setup.

## 2020-10-23 NOTE — PATIENT INSTRUCTIONS
217 [facility]., [place]. [address], [address] Tel.  [phone] Fax. 100 [facility] [address] [place], [address] Tel.  [phone] Fax. [phone] [address] Tel.  [phone] Fax. [phone] Learning About CPAP for Sleep Apnea What is CPAP? CPAP is a small machine that you use at home every night while you sleep. It increases air pressure in your throat to keep your airway open. When you have sleep apnea, this can help you sleep better so you feel much better. CPAP stands for \"continuous positive airway pressure. \" The CPAP machine will have one of the following: · A mask that covers your nose and mouth · Prongs that fit into your nose · A mask that covers your nose only, the most common type. This type is called NCPAP. The N stands for \"nasal.\" Why is it done? CPAP is usually the best treatment for obstructive sleep apnea. It is the first treatment choice and the most widely used. Your doctor may suggest CPAP if you have: · Moderate to severe sleep apnea. · Sleep apnea and coronary artery disease (CAD) or heart failure. How does it help? · CPAP can help you have more normal sleep, so you feel less sleepy and more alert during the daytime. · CPAP may help keep heart failure or other heart problems from getting worse. · NCPAP may help lower your blood pressure. · If you use CPAP, your bed partner may also sleep better because you are not snoring or restless. What are the side effects? Some people who use CPAP have: · A dry or stuffy nose and a sore throat. · Irritated skin on the face. · Sore eyes. · Bloating. If you have any of these problems, work with your doctor to fix them. Here are some things you can try: · Be sure the mask or nasal prongs fit well. · See if your doctor can adjust the pressure of your CPAP. · If your nose is dry, try a humidifier. · If your nose is runny or stuffy, try decongestant medicine or a steroid nasal spray. If these things do not help, you might try a different type of machine. Some machines have air pressure that adjusts on its own. Others have air pressures that are different when you breathe in than when you breathe out. This may reduce discomfort caused by too much pressure in your nose. Where can you learn more? Go to EdeniQ.be Enter A839 in the search box to learn more about \"Learning About CPAP for Sleep Apnea. \"  
© 6278-9619 Healthwise, Incorporated. Care instructions adapted under license by [employer] (which disclaims liability or warranty for this information). This care instruction is for use with your licensed healthcare professional. If you have questions about a medical condition or this instruction, always ask your healthcare professional. [address] any warranty or liability for your use of this information. Content Version: 7.4.79746; Last Revised: January 11, 2010 PROPER SLEEP HYGIENE What to avoid · Do not have drinks with caffeine, such as coffee or black tea, for 8 hours before bed. · Do not smoke or use other types of tobacco near bedtime. Nicotine is a stimulant and can keep you awake. · Avoid drinking alcohol late in the evening, because it can cause you to wake in the middle of the night. · Do not eat a big meal close to bedtime. If you are hungry, eat a light snack. · Do not drink a lot of water close to bedtime, because the need to urinate may wake you up during the night. · Do not read or watch TV in bed. Use the bed only for sleeping and sexual activity. What to try · Go to bed at the same time every night, and wake up at the same time every morning. Do not take naps during the day. · Keep your bedroom quiet, dark, and cool. · Get regular exercise, but not within 3 to 4 hours of your bedtime. Hernán Fult[place] · Sleep on a comfortable pillow and mattress. · If watching the clock makes you anxious, turn it facing away from you so you cannot see the time. · If you worry when you lie down, start a worry book. Well before bedtime, write down your worries, and then set the book and your concerns aside. · Try meditation or other relaxation techniques before you go to bed. · If you cannot fall asleep, get up and go to another room until you feel sleepy. Do something relaxing. Repeat your bedtime routine before you go to bed again. · Make your house quiet and calm about an hour before bedtime. Turn down the lights, turn off the TV, log off the computer, and turn down the volume on music. This can help you relax after a busy day. Drowsy Driving: The [facility] cites drowsiness as a causing factor in more than 861,999 police reported crashes annually, resulting in 76,000 injuries and 1,500 deaths. Other surveys suggest 55% of people polled have driven while drowsy in the past year, 23% had fallen asleep but not crashed, 3% crashed, and 2% had and accident due to drowsy driving. Who is at risk? Young Drivers: One study of drowsy driving accidents states that 55% of the drivers were under 25 years. Of those, 75% were male. Shift Workers and Travelers: People who work overnight or travel across time zones frequently are at higher risk of experiencing Circadian Rhythm Disorders. They are trying to work and function when their body is programed to sleep. Sleep Deprived: Lack of sleep has a serious impact on your ability to pay attention or focus on a task. Consistently getting less than the average of 8 hours your body needs creates partial or cumulative sleep deprivation.   
Untreated Sleep Disorders: Sleep Apnea, Narcolepsy, R.L.S., and other sleep disorders (untreated) prevent a person from getting enough restful sleep. This leads to excessive daytime sleepiness and increases the risk for drowsy driving accidents by up to 7 times. Medications / Alcohol: Even over the counter medications can cause drowsiness. Medications that impair a drivers attention should have a warning label. Alcohol naturally makes you sleepy and on its own can cause accidents. Combined with excessive drowsiness its effects are amplified. Signs of Drowsy Driving: * You don't remember driving the last few miles * You may drift out of your chandni * You are unable to focus and your thoughts wander * You may yawn more often than normal 
 * You have difficulty keeping your eyes open / nodding off * Missing traffic signs, speeding, or tailgating Prevention-  
Good sleep hygiene, lifestyle and behavioral choices have the most impact on drowsy driving. There is no substitute for sleep and the average person requires 8 hours nightly. If you find yourself driving drowsy, stop and sleep. Consider the sleep hygiene tips provided during your visit as well. Medication Refill Policy: Refills for all medications require 1 week advance notice. Please have your pharmacy fax a refill request. We are unable to fax, or call in \"controled substance\" medications and you will need to pick these prescriptions up from our office. [employer] Activation Thank you for requesting access to [employer]. Please follow the instructions below to securely access and download your online medical record. [employer] allows you to send messages to your doctor, view your test results, renew your prescriptions, schedule appointments, and more. How Do I Sign Up? 1. In your internet browser, go to [email or web address]. 2. Click on the First Time User? Click Here link in the Sign In box. You will see the New Member Sign Up page. 3. Enter your [employer] Access Code exactly as it appears below.  You will not need to use this code after youve completed the sign-up process. If you do not sign up before the expiration date, you must request a new code. Solid Sound Access Code: Activation code not generated Current Solid Sound Status: Active (This is the date your [employer] access code will ) 4. Enter the last four digits of your Social Security Number (xxxx) and Date of Birth (mm/dd/yyyy) as indicated and click Submit. You will be taken to the next sign-up page. 5. Create a DigiMeldt ID. This will be your Solid Sound login ID and cannot be changed, so think of one that is secure and easy to remember. 6. Create a Solid Sound password. You can change your password at any time. 7. Enter your Password Reset Question and Answer. This can be used at a later time if you forget your password. 8. Enter your e-mail address. You will receive e-mail notification when new information is available in [address]. 9. Click Sign Up. You can now view and download portions of your medical record. 10. Click the Download Summary menu link to download a portable copy of your medical information. Additional Information If you have questions, please call [phone]. Remember, [employer] is NOT to be used for urgent needs. For medical emergencies, dial 911.

## 2020-11-09 ENCOUNTER — TRANSCRIBE ORDER (OUTPATIENT)
Dept: SCHEDULING | Age: 50
End: 2020-11-09

## 2020-11-09 DIAGNOSIS — F33.1 MODERATE EPISODE OF RECURRENT MAJOR DEPRESSIVE DISORDER (HCC): ICD-10-CM

## 2020-11-09 DIAGNOSIS — Z12.31 VISIT FOR SCREENING MAMMOGRAM: Primary | ICD-10-CM

## 2020-11-09 RX ORDER — FLUOXETINE HYDROCHLORIDE 20 MG/1
20 CAPSULE ORAL DAILY
Qty: 90 CAP | Refills: 0 | Status: SHIPPED | OUTPATIENT
Start: 2020-11-09 | End: 2021-02-16 | Stop reason: SDUPTHER

## 2020-11-09 RX ORDER — FLUOXETINE HYDROCHLORIDE 40 MG/1
40 CAPSULE ORAL DAILY
Qty: 90 CAP | Refills: 0 | Status: SHIPPED | OUTPATIENT
Start: 2020-11-09 | End: 2021-02-16 | Stop reason: SDUPTHER

## 2020-11-30 DIAGNOSIS — F33.1 MODERATE EPISODE OF RECURRENT MAJOR DEPRESSIVE DISORDER (HCC): ICD-10-CM

## 2020-11-30 RX ORDER — HYDROXYZINE 25 MG/1
37.5 TABLET, FILM COATED ORAL
Qty: 30 TAB | Refills: 0 | Status: SHIPPED | OUTPATIENT
Start: 2020-11-30 | End: 2020-12-17 | Stop reason: CLARIF

## 2020-12-10 ENCOUNTER — TELEPHONE (OUTPATIENT)
Dept: BEHAVIORAL/MENTAL HEALTH CLINIC | Age: 50
End: 2020-12-10

## 2020-12-10 ENCOUNTER — PATIENT MESSAGE (OUTPATIENT)
Dept: BEHAVIORAL/MENTAL HEALTH CLINIC | Age: 50
End: 2020-12-10
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
## 2020-12-10 NOTE — TELEPHONE ENCOUNTER
----- Message from Abigail Deleon. [facility] sent at 12/10/2020 10:16 AM EST -----  Regarding: Non-Urgent Medical Question  Contact: [phone]  I have had two deaths of people close to me in the last 2 weeks. My anxiety is high.

## 2020-12-11 ENCOUNTER — APPOINTMENT (OUTPATIENT)
Dept: GENERAL RADIOLOGY | Age: 50
End: 2020-12-11
Attending: EMERGENCY MEDICINE
Payer: COMMERCIAL

## 2020-12-11 ENCOUNTER — HOSPITAL ENCOUNTER (EMERGENCY)
Age: 50
Discharge: HOME | End: 2020-12-11
Attending: EMERGENCY MEDICINE
Payer: COMMERCIAL

## 2020-12-11 ENCOUNTER — OFFICE VISIT (OUTPATIENT)
Dept: PRIMARY CARE CLINIC | Age: 50
End: 2020-12-11
Payer: COMMERCIAL

## 2020-12-11 VITALS
HEART RATE: 72 BPM | SYSTOLIC BLOOD PRESSURE: 104 MMHG | OXYGEN SATURATION: 97 % | RESPIRATION RATE: 15 BRPM | WEIGHT: 187.17 LBS | HEIGHT: 63 IN | BODY MASS INDEX: 33.16 KG/M2 | DIASTOLIC BLOOD PRESSURE: 83 MMHG | TEMPERATURE: 98.1 F

## 2020-12-11 VITALS
RESPIRATION RATE: 16 BRPM | HEART RATE: 72 BPM | DIASTOLIC BLOOD PRESSURE: 81 MMHG | TEMPERATURE: 97.3 F | OXYGEN SATURATION: 97 % | BODY MASS INDEX: 32.92 KG/M2 | HEIGHT: 63 IN | SYSTOLIC BLOOD PRESSURE: 121 MMHG | WEIGHT: 185.8 LBS

## 2020-12-11 DIAGNOSIS — F41.1 GAD (GENERALIZED ANXIETY DISORDER): ICD-10-CM

## 2020-12-11 DIAGNOSIS — R07.89 CHEST TIGHTNESS: Primary | ICD-10-CM

## 2020-12-11 DIAGNOSIS — F43.0 STRESS REACTION: ICD-10-CM

## 2020-12-11 DIAGNOSIS — Z76.89 ESTABLISHING CARE WITH NEW DOCTOR, ENCOUNTER FOR: ICD-10-CM

## 2020-12-11 DIAGNOSIS — R07.9 CHEST PAIN, UNSPECIFIED TYPE: Primary | ICD-10-CM

## 2020-12-11 DIAGNOSIS — F32.A DEPRESSION, UNSPECIFIED DEPRESSION TYPE: ICD-10-CM

## 2020-12-11 LAB
ALBUMIN SERPL-MCNC: 3.6 G/DL (ref 3.5–5)
ALBUMIN/GLOB SERPL: 0.8 {RATIO} (ref 1.1–2.2)
ALP SERPL-CCNC: 78 U/L (ref 45–117)
ALT SERPL-CCNC: 27 U/L (ref 12–78)
ANION GAP SERPL CALC-SCNC: 13 MMOL/L (ref 5–15)
AST SERPL-CCNC: 26 U/L (ref 15–37)
ATRIAL RATE: 69 BPM
BASOPHILS # BLD: 0.1 K/UL (ref 0–0.1)
BASOPHILS NFR BLD: 2 % (ref 0–1)
BILIRUB SERPL-MCNC: 0.3 MG/DL (ref 0.2–1)
BUN SERPL-MCNC: 17 MG/DL (ref 6–20)
BUN/CREAT SERPL: 17 (ref 12–20)
CALCIUM SERPL-MCNC: 8.7 MG/DL (ref 8.5–10.1)
CALCULATED P AXIS, ECG09: 61 DEGREES
CALCULATED R AXIS, ECG10: 8 DEGREES
CALCULATED T AXIS, ECG11: 25 DEGREES
CHLORIDE SERPL-SCNC: 102 MMOL/L (ref 97–108)
CO2 SERPL-SCNC: 25 MMOL/L (ref 21–32)
CREAT SERPL-MCNC: 0.99 MG/DL (ref 0.55–1.02)
D DIMER PPP FEU-MCNC: 0.62 MG/L FEU (ref 0–0.65)
DIAGNOSIS, 93000: NORMAL
DIFFERENTIAL METHOD BLD: ABNORMAL
EOSINOPHIL # BLD: 0.2 K/UL (ref 0–0.4)
EOSINOPHIL NFR BLD: 4 % (ref 0–7)
ERYTHROCYTE [DISTWIDTH] IN BLOOD BY AUTOMATED COUNT: 12.9 % (ref 11.5–14.5)
GLOBULIN SER CALC-MCNC: 4.3 G/DL (ref 2–4)
GLUCOSE SERPL-MCNC: 88 MG/DL (ref 65–100)
HCT VFR BLD AUTO: 40 % (ref 35–47)
HGB BLD-MCNC: 12.5 G/DL (ref 11.5–16)
IMM GRANULOCYTES # BLD AUTO: 0 K/UL (ref 0–0.04)
IMM GRANULOCYTES NFR BLD AUTO: 0 % (ref 0–0.5)
LYMPHOCYTES # BLD: 1.9 K/UL (ref 0.8–3.5)
LYMPHOCYTES NFR BLD: 33 % (ref 12–49)
MCH RBC QN AUTO: 28.5 PG (ref 26–34)
MCHC RBC AUTO-ENTMCNC: 31.3 G/DL (ref 30–36.5)
MCV RBC AUTO: 91.1 FL (ref 80–99)
MONOCYTES # BLD: 0.6 K/UL (ref 0–1)
MONOCYTES NFR BLD: 11 % (ref 5–13)
NEUTS SEG # BLD: 2.9 K/UL (ref 1.8–8)
NEUTS SEG NFR BLD: 50 % (ref 32–75)
NRBC # BLD: 0 K/UL (ref 0–0.01)
NRBC BLD-RTO: 0 PER 100 WBC
P-R INTERVAL, ECG05: 130 MS
PLATELET # BLD AUTO: 323 K/UL (ref 150–400)
PMV BLD AUTO: 9.6 FL (ref 8.9–12.9)
POTASSIUM SERPL-SCNC: 4 MMOL/L (ref 3.5–5.1)
PROT SERPL-MCNC: 7.9 G/DL (ref 6.4–8.2)
Q-T INTERVAL, ECG07: 416 MS
QRS DURATION, ECG06: 74 MS
QTC CALCULATION (BEZET), ECG08: 445 MS
RBC # BLD AUTO: 4.39 M/UL (ref 3.8–5.2)
SODIUM SERPL-SCNC: 140 MMOL/L (ref 136–145)
TROPONIN I SERPL-MCNC: <0.05 NG/ML
VENTRICULAR RATE, ECG03: 69 BPM
WBC # BLD AUTO: 5.9 K/UL (ref 3.6–11)

## 2020-12-11 PROCEDURE — 99284 EMERGENCY DEPT VISIT MOD MDM: CPT

## 2020-12-11 PROCEDURE — 85379 FIBRIN DEGRADATION QUANT: CPT

## 2020-12-11 PROCEDURE — 93005 ELECTROCARDIOGRAM TRACING: CPT

## 2020-12-11 PROCEDURE — 71046 X-RAY EXAM CHEST 2 VIEWS: CPT

## 2020-12-11 PROCEDURE — 80053 COMPREHEN METABOLIC PANEL: CPT

## 2020-12-11 PROCEDURE — 99213 OFFICE O/P EST LOW 20 MIN: CPT | Performed by: FAMILY MEDICINE

## 2020-12-11 PROCEDURE — 84484 ASSAY OF TROPONIN QUANT: CPT

## 2020-12-11 PROCEDURE — 36415 COLL VENOUS BLD VENIPUNCTURE: CPT

## 2020-12-11 PROCEDURE — 93000 ELECTROCARDIOGRAM COMPLETE: CPT | Performed by: FAMILY MEDICINE

## 2020-12-11 PROCEDURE — 85025 COMPLETE CBC W/AUTO DIFF WBC: CPT

## 2020-12-11 NOTE — ED TRIAGE NOTES
Triage: pt c/o anxiety x2 weeks with chest tightness. Pt went to see PCP where they performed EKG and had abnormal reading. Pt was referred here. Denies fever, cough, SOB, N/V/D.
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
## 2020-12-11 NOTE — ED PROVIDER NOTES
The history is provided by the patient. Chest Pain (Angina)    This is a recurrent problem. The current episode started more than 1 week ago. The problem has not changed since onset. The problem occurs daily. The pain is associated with normal activity, stress and emotional upset. The pain is present in the substernal region and epigastric region. The pain is mild. The quality of the pain is described as pressure-like. The pain radiates to the epigastrium. The symptoms are aggravated by stress. Associated symptoms include diaphoresis, dizziness, lower extremity edema and weakness. Pertinent negatives include no back pain, no claudication, no cough, no exertional chest pressure, no fever, no headaches, no hemoptysis, no irregular heartbeat, no leg pain, no malaise/fatigue, no nausea, no near-syncope, no numbness, no palpitations, no PND, no shortness of breath, no sputum production and no vomiting. She has tried rest and aspirin for the symptoms. The treatment provided mild relief. Risk factors include obesity and stress. Her past medical history does not include aneurysm, cancer, DM, DVT, HTN, PE or CHF. Procedural history includes echocardiogram.     Patient reports that she has been under a lot of stress these past couple of weeks. She states her best friend's son of 25years of age  recently and they had his  this week. And she states that the death of a 44-year-old friend whose  will be next week.     Past Medical History:   Diagnosis Date    Back pain, acute     Depression     H/O seasonal allergies     Maxillary sinus fracture ([address] 2016    Neck pain, musculoskeletal        Past Surgical History:   Procedure Laterality Date    COLONOSCOPY N/A 2019    COLONOSCOPY performed by Abimael Iqbal [facility] at [facility] ENDOSCOPY    COLONOSCOPY,DIAGNOSTIC  2019         HX CERVICAL DISKECTOMY Left 2015    HX CERVICAL FUSION Left 2015    [address] laproscopsy     HX WISDOM TEETH EXTRACTION           Family History:   Problem Relation Age of Onset    Cancer Father         pancreas, Liver    Cancer Maternal Grandmother     Breast Cancer Maternal Grandmother     Heart Disease Maternal Grandfather     Heart Disease Paternal Grandmother     Stroke Paternal Grandfather     Other Mother         meningioma    Hypertension Mother     Breast Cancer Mother 61   [facility] Anesth Problems Mother         SLOW TO WAKE UP    Stroke Mother     Hypertension Sister     No Known Problems Daughter     No Known Problems Daughter     No Known Problems Son     Breast Cancer Maternal Aunt     Breast Cancer Maternal Aunt     Breast Cancer Maternal Aunt        Social History     Socioeconomic History    Marital status:      Spouse name: Not on file    Number of children: Not on file    Years of education: Not on file    Highest education level: Not on file   Occupational History    Not on file   Social Needs    Financial resource strain: Not on file    Food insecurity     Worry: Not on file     Inability: Not on file    Transportation needs     Medical: Not on file     Non-medical: Not on file   Tobacco Use    Smoking status: Never Smoker    Smokeless tobacco: Never Used   Substance and Sexual Activity    Alcohol use:  Yes     Alcohol/week: 0.0 standard drinks     Comment: occasional    Drug use: No    Sexual activity: Yes     Partners: Male     Birth control/protection: Surgical     Comment:     Lifestyle    Physical activity     Days per week: Not on file     Minutes per session: Not on file    Stress: Not on file   Relationships    Social connections     Talks on phone: Not on file     Gets together: Not on file     Attends Baptism service: Not on file     Active member of club or organization: Not on file     Attends meetings of clubs or organizations: Not on file     Relationship status: Not on file    Intimate partner violence Fear of current or ex partner: Not on file     Emotionally abused: Not on file     Physically abused: Not on file     Forced sexual activity: Not on file   Other Topics Concern    Not on file   Social History Narrative    Works L[employer]    Going through divorce 2017    Member of [employer] [facility] [employer], has good friends there         ALLERGIES: Latex; Teagan; Soy; Bees [sting, bee]; Cephaeline; Keflex [cephalexin]; Soybean oil; and Tomato    Review of Systems   Constitutional: Positive for diaphoresis. Negative for fever and malaise/fatigue. HENT: Negative. Eyes: Negative. Respiratory: Negative for cough, hemoptysis, sputum production and shortness of breath. Cardiovascular: Positive for chest pain. Negative for palpitations, claudication, PND and near-syncope. Gastrointestinal: Negative for nausea and vomiting. Genitourinary: Negative. Musculoskeletal: Negative for back pain. Neurological: Positive for dizziness and weakness. Negative for numbness and headaches. Hematological: Bruises/bleeds easily. Has noted easier bruising with minimal trauma since taking a couple of doses of aspirin earlier this week. Psychiatric/Behavioral: Negative. All other systems reviewed and are negative. There were no vitals filed for this visit. Physical Exam  Vitals signs and nursing note reviewed. Constitutional:       Appearance: Normal appearance. She is obese. HENT:      Head: Normocephalic and atraumatic. Nose: Nose normal.   Eyes:      Extraocular Movements: Extraocular movements intact. Conjunctiva/sclera: Conjunctivae normal.      Pupils: Pupils are equal, round, and reactive to light. Neck:      Musculoskeletal: Normal range of motion. Cardiovascular:      Rate and Rhythm: Normal rate and regular rhythm. Heart sounds: No murmur. No friction rub. No gallop.     Pulmonary:      Effort: Pulmonary effort is normal.      Breath sounds: Normal breath sounds. Abdominal:      General: Bowel sounds are normal.      Palpations: Abdomen is soft. Skin:     General: Skin is warm and dry. Capillary Refill: Capillary refill takes less than 2 seconds. Neurological:      General: No focal deficit present. Mental Status: She is alert and oriented to person, place, and time. Psychiatric:         Mood and Affect: Mood normal.         Behavior: Behavior normal.         Thought Content: Thought content normal.          MDM  Number of Diagnoses or Management Options     Amount and/or Complexity of Data Reviewed  Clinical lab tests: reviewed  Tests in the radiology section of CPT®: reviewed  Tests in the medicine section of CPT®: reviewed      ED Course as of Dec 11 1048   Fri Dec 11, 2020   1016 Review of EKG performed at 839 this morning does not reveal any acute findings consistent with STEMI. Current EKG shows a normal sinus rhythm with slightly low voltage but no ST segment changes. [AZ]   1047 D-dimer, troponin, CBC, electrolytes, and EKG, are all within normal limits and do not show any acute findings. The patient has a heart score of 2. She will be discharged to follow-up with her primary care physician to schedule for further cardiac evaluation on an outpatient basis.     [AZ]      ED Course User Index  [AZ] Sejal Jordan MD       LABORATORY TESTS:  Recent Results (from the past 12 hour(s))   EKG, 12 LEAD, INITIAL    Collection Time: 12/11/20  9:36 AM   Result Value Ref Range    Ventricular Rate 69 BPM    Atrial Rate 69 BPM    P-R Interval 130 ms    QRS Duration 74 ms    Q-T Interval 416 ms    QTC Calculation (Bezet) 445 ms    Calculated P Axis 61 degrees    Calculated R Axis 8 degrees    Calculated T Axis 25 degrees    Diagnosis       Normal sinus rhythm  Low voltage QRS  Borderline ECG  When compared with ECG of 02-NOV-2018 00:20,  No significant change was found  Confirmed by [facility] Lee BARKER M.D., Sera Merino ([id number]) on 12/11/2020 10:48:20 AM     CBC WITH AUTOMATED DIFF    Collection Time: 12/11/20  9:38 AM   Result Value Ref Range    WBC 5.9 3.6 - 11.0 K/uL    RBC 4.39 3.80 - 5.20 M/uL    HGB 12.5 11.5 - 16.0 g/dL    HCT 40.0 35.0 - 47.0 %    MCV 91.1 80.0 - 99.0 FL    MCH 28.5 26.0 - 34.0 PG    MCHC 31.3 30.0 - 36.5 g/dL    RDW 12.9 11.5 - 14.5 %    PLATELET 027 221 - 020 K/uL    MPV 9.6 8.9 - 12.9 FL    NRBC 0.0 0  WBC    ABSOLUTE NRBC 0.00 0.00 - 0.01 K/uL    NEUTROPHILS 50 32 - 75 %    LYMPHOCYTES 33 12 - 49 %    MONOCYTES 11 5 - 13 %    EOSINOPHILS 4 0 - 7 %    BASOPHILS 2 (H) 0 - 1 %    IMMATURE GRANULOCYTES 0 0.0 - 0.5 %    ABS. NEUTROPHILS 2.9 1.8 - 8.0 K/UL    ABS. LYMPHOCYTES 1.9 0.8 - 3.5 K/UL    ABS. MONOCYTES 0.6 0.0 - 1.0 K/UL    ABS. EOSINOPHILS 0.2 0.0 - 0.4 K/UL    ABS. BASOPHILS 0.1 0.0 - 0.1 K/UL    ABS. IMM. GRANS. 0.0 0.00 - 0.04 K/UL    DF AUTOMATED     D DIMER    Collection Time: 12/11/20  9:38 AM   Result Value Ref Range    D-dimer 0.62 0.00 - 0.65 mg/L FEU   METABOLIC PANEL, COMPREHENSIVE    Collection Time: 12/11/20  9:38 AM   Result Value Ref Range    Sodium 140 136 - 145 mmol/L    Potassium 4.0 3.5 - 5.1 mmol/L    Chloride 102 97 - 108 mmol/L    CO2 25 21 - 32 mmol/L    Anion gap 13 5 - 15 mmol/L    Glucose 88 65 - 100 mg/dL    BUN 17 6 - 20 MG/DL    Creatinine 0.99 0.55 - 1.02 MG/DL    BUN/Creatinine ratio 17 12 - 20      GFR est AA >60 >60 ml/min/1.73m2    GFR est non-AA 59 (L) >60 ml/min/1.73m2    Calcium 8.7 8.5 - 10.1 MG/DL    Bilirubin, total 0.3 0.2 - 1.0 MG/DL    ALT (SGPT) 27 12 - 78 U/L    AST (SGOT) 26 15 - 37 U/L    Alk.  phosphatase 78 45 - 117 U/L    Protein, total 7.9 6.4 - 8.2 g/dL    Albumin 3.6 3.5 - 5.0 g/dL    Globulin 4.3 (H) 2.0 - 4.0 g/dL    A-G Ratio 0.8 (L) 1.1 - 2.2     TROPONIN I    Collection Time: 12/11/20  9:38 AM   Result Value Ref Range    Troponin-I, Qt. <0.05 <0.05 ng/mL       IMAGING RESULTS:   XR CHEST PA LAT   Final Result   Impression: No acute process or change compared to the prior exam. MEDICATIONS GIVEN:  Medications - No data to display    IMPRESSION:  1. Chest pain, unspecified type    2. Stress reaction        PLAN:  1. Discharge to home. 2. Start baby aspirin once a day  3. Follow up with primary care physician for further referrals  4.  Return to ED if worse

## 2020-12-11 NOTE — Clinical Note
[address] 
[facility] 
[address] [address] [phone] Work/School Note Date: 12/11/2020 To Whom It May concern: 
 
Elvie Sanchez was seen and treated today in the emergency room by the following provider(s): 
Attending Provider: Branden Lomas MD. Elvie Sanchez is excused from work/school on 12/11/20 and 12/12/20. She is medically clear to return to work/school on 12/13/2020.   
 
 
Sincerely, 
 
 
 
 
Elida Sprague MD

## 2020-12-11 NOTE — PROGRESS NOTES
Papo Echevarria is a 48 y.o. female    Chief Complaint   Patient presents with    Establish Care    Anxiety     with chest tightness, due to 2 deaths in the family over the last 2 weeks. 1. Have you been to the ER, urgent care clinic since your last visit? Hospitalized since your last visit? No    2. Have you seen or consulted any other health care providers outside of the [address] [place] [address] since your last visit? Include any pap smears or colon screening. No    No flowsheet data found.      Health Maintenance Due   Topic Date Due    PAP AKA CERVICAL CYTOLOGY  10/10/2019    Breast Cancer Screen Mammogram  02/20/2020    Shingrix Vaccine Age 50> (1 of 2) 05/26/2020

## 2020-12-11 NOTE — PATIENT INSTRUCTIONS

## 2020-12-12 ENCOUNTER — PATIENT OUTREACH (OUTPATIENT)
Dept: OTHER | Age: 50
End: 2020-12-12

## 2020-12-12 ENCOUNTER — TELEPHONE (OUTPATIENT)
Dept: PRIMARY CARE CLINIC | Age: 50
End: 2020-12-12

## 2020-12-12 NOTE — PROGRESS NOTES
2020, Per chart review, S/P  ED visit(at [facility] ED) related to chest pain. Recent anxiety/stress and recent deaths(best friend's son of 25years of age  recently and the death of a 51-year-old friend). Provide instruction on [facility], [phone]   Patient on report as eligible for Case Management. Left discreet message on voicemail with this ACM contact information and request for return call. PLAN: Will attempt to contact again next week to offer [address] [place] [address] Management services.

## 2020-12-12 NOTE — TELEPHONE ENCOUNTER
Spoke with patient. Was not able to schedule appt with Cards yesterday but will work on scheduling something on Monday when the office opens again. She is currently taking atarax 37.5mg daily. Discussed she could take Atarax 25mg 1 tab PO BID PRN anxiety over the weekend. Reviewed EKG recent QTc is normal. Recommend she call behavioral health specialist she sees on Monday to see if they have further recommendations or would like to change her regimen.  She denies any SI/ HI.

## 2020-12-14 ENCOUNTER — PATIENT OUTREACH (OUTPATIENT)
Dept: OTHER | Age: 50
End: 2020-12-14

## 2020-12-14 NOTE — PROGRESS NOTES
2020, Received return message from associate and returned call to her, confirmed HIPAA. Yamile Edmondson allowed this writer/ACM to explain ACM program and role. Yamile Edmondson states has PCP and referral to Cardiology(Dr Marie Larry, [phone]), however, admits to feelings of anxiety, Provided instruction on importance of self care, both mentally and physically and provided contact information for [facility], [phone]. Karan Castro understanding, agrees to call Cardiology and [facility] as needed. Yamile Edmondson denies need for CM support at this time, agrees to keep ACM contact information and call with any further questions or CM needs. PLAN: Will not make further outreach attempts at this time, remain available for return call or further CM needs. See Previous Documentation:  2020, Per chart review, S/P  ED visit(at [facility] ED) related to chest pain. Recent anxiety/stress and recent deaths(best friend's son of 25years of age  recently and the death of a 70-year-old friend). Provide instruction on [facility], [phone]   Patient on report as eligible for Case Management. Left discreet message on voicemail with this ACM contact information and request for return call. PLAN: Will attempt to contact again next week to offer [address] [place] [address] Management services.

## 2020-12-17 DIAGNOSIS — G47.00 INSOMNIA, UNSPECIFIED TYPE: ICD-10-CM

## 2020-12-17 RX ORDER — ALPRAZOLAM 0.25 MG/1
0.25 TABLET ORAL
Qty: 20 TAB | Refills: 0 | Status: SHIPPED | OUTPATIENT
Start: 2020-12-17 | End: 2021-01-13

## 2020-12-28 ENCOUNTER — VIRTUAL VISIT (OUTPATIENT)
Dept: BEHAVIORAL/MENTAL HEALTH CLINIC | Age: 50
End: 2020-12-28
Payer: COMMERCIAL

## 2020-12-28 DIAGNOSIS — F41.9 ANXIETY: ICD-10-CM

## 2020-12-28 DIAGNOSIS — F33.1 MODERATE EPISODE OF RECURRENT MAJOR DEPRESSIVE DISORDER (HCC): Primary | ICD-10-CM

## 2020-12-28 PROCEDURE — 99214 OFFICE O/P EST MOD 30 MIN: CPT | Performed by: NURSE PRACTITIONER

## 2020-12-28 RX ORDER — HYDROXYZINE 25 MG/1
37.5 TABLET, FILM COATED ORAL
Qty: 30 TAB | Refills: 1 | Status: SHIPPED | OUTPATIENT
Start: 2020-12-28 | End: 2021-04-28 | Stop reason: SDUPTHER

## 2020-12-28 NOTE — PROGRESS NOTES
CHIEF COMPLAINT:  Sena Chase is a 48 y.o. female and was seen today for follow-up of psychiatric condition and psychotropic medication management. HPI:    Quentin reports the following psychiatric symptoms by hx:  depression and anxiety. Overall depression symptoms have been improving with higher dose of prozac. Pt reports she has noticed some increased anxiety. She reports limited benefit from PRN use of medications for anxiety and she did f/u with her PCP due to chest pain. Currently depression and anxiety symptoms are of moderate/low severity. The symptoms occur intermittently. Pt reports medications are somewhat beneficial. Met with pt via video telehelath for appt today. FAMILY/SOCIAL HX: x2 deaths that have impacted her    REVIEW OF SYSTEMS:  Psychiatric:  depression, anxiety  Appetite:good, working on weight loss and increasing activity   Sleep: fitful, has noticed increased anxiety in am and pm, sleeping about 6-7 hours, often does not feel rested   Neuro: RLS      Side Effects:  none    MENTAL STATUS EXAM:   Sensorium  oriented to time, place and person   Relations cooperative   Appearance:  age appropriate and casually dressed   Motor Behavior:  gait stable and within normal limits   Speech:  normal volume   Thought Process: goal directed   Thought Content free of delusions and free of hallucinations   Suicidal ideations no intention   Homicidal ideations no intention   Mood:  anxious   Affect:  anxious   Memory recent  adequate   Memory remote:  adequate   Concentration:  adequate   Abstraction:  abstract   Insight:  good   Reliability good   Judgment:  good     MEDICAL DECISION MAKING:  Problems addressed today:    ICD-10-CM ICD-9-CM    1. Moderate episode of recurrent major depressive disorder (HCC)  F33.1 296.32 hydrOXYzine HCL (ATARAX) 25 mg tablet   2. Anxiety  F41.9 300.00        Assessment:   Quentin is responding to treatment overall.  Depression symptoms are improving with use of higher dose of prozac. She reports she has not been impacted by decreased dose of rexulti. Overall she reports PRN medications have not been as helpful as they were previously. Pt has had increased anxiety due to stressors but has not noticed benefit from use of hydroxyzine or alprazolam. Discussed possibility of underlying anxiety/grief due to stressors. Discussed current medications and dosages. Will dc rexulti at this time and pt aware she can try alprazolam prn 0.5 mg or hydroxyzine 50 mg for anxiety. Reviewed benzo safety and PRN guidelines. Discussed importance of following through with medical eval for chest pain. Reviewed treatment goals and target symptoms to monitor for. Reinforced importance of being out in nature, walking/exercise, journaling and re-starting ind therapy. Plan:   1. Current Outpatient Medications   Medication Sig Dispense Refill    hydrOXYzine HCL (ATARAX) 25 mg tablet Take 1.5 Tabs by mouth daily as needed for Anxiety. 30 Tab 1    ALPRAZolam (XANAX) 0.25 mg tablet Take 1 Tab by mouth nightly as needed for Anxiety or Sleep. Max Daily Amount: 0.25 mg. 20 Tab 0    FLUoxetine (PROzac) 40 mg capsule Take 1 Cap by mouth daily. 90 Cap 0    FLUoxetine (PROzac) 20 mg capsule Take 1 Cap by mouth daily. Take with 40 mg cap for total dose 60 mg. 90 Cap 0    levothyroxine (SYNTHROID) 50 mcg tablet Take 50 mcg by mouth.  cholecalciferol, vitamin D3, 2,000 unit tab Take  by mouth.  EPINEPHrine (EPIPEN 2-EDWIGE) 0.3 mg/0.3 mL (1:1,000) injection 0.3 mL by IntraMUSCular route once as needed for up to 1 dose. 2 Each 0          medication changes made today: cont prozac, dc rexulti, cont alprazolam prn and can increase to 0.5 mg, cont hydroxyzine prn and can increase to 50 mg    2. Counseling and coordination of care including instructions for treatment, risks/benefits, risk factor reduction and patient/family education. She agrees with the plan.  Patient instructed to call with any side effects, questions or issues. 3.    Follow-up and Dispositions    · Return in about 3 months (around 3/28/2021). Ethan Benavides, who was evaluated through a synchronous (real-time) audio-video encounter, and/or her healthcare decision maker, is aware that it is a billable service, with coverage as determined by her insurance carrier. She provided verbal consent to proceed: Yes, and patient identification was verified. It was conducted pursuant to the emergency declaration under the [address] [place] [address], [address] [place] [address] authority and the [employer] Act. A caregiver was present when appropriate. Ability to conduct physical exam was limited. I was in the office. The patient was at home.       12/28/2020  Ritchie Elder NP

## 2020-12-30 ENCOUNTER — OFFICE VISIT (OUTPATIENT)
Dept: CARDIOLOGY CLINIC | Age: 50
End: 2020-12-30
Payer: COMMERCIAL

## 2020-12-30 VITALS
SYSTOLIC BLOOD PRESSURE: 120 MMHG | WEIGHT: 187 LBS | HEIGHT: 63 IN | DIASTOLIC BLOOD PRESSURE: 74 MMHG | BODY MASS INDEX: 33.13 KG/M2 | HEART RATE: 80 BPM | RESPIRATION RATE: 16 BRPM

## 2020-12-30 DIAGNOSIS — R07.9 CHEST PAIN, UNSPECIFIED TYPE: Primary | ICD-10-CM

## 2020-12-30 DIAGNOSIS — R06.09 DOE (DYSPNEA ON EXERTION): ICD-10-CM

## 2020-12-30 PROCEDURE — 93000 ELECTROCARDIOGRAM COMPLETE: CPT | Performed by: SPECIALIST

## 2020-12-30 PROCEDURE — 99244 OFF/OP CNSLTJ NEW/EST MOD 40: CPT | Performed by: SPECIALIST

## 2020-12-30 RX ORDER — ORAL SEMAGLUTIDE 7 MG/1
TABLET ORAL
COMMUNITY
Start: 2020-12-01 | End: 2021-05-22

## 2020-12-30 RX ORDER — OMEPRAZOLE 20 MG/1
20 CAPSULE, DELAYED RELEASE ORAL DAILY
Qty: 42 CAP | Refills: 0 | Status: SHIPPED | OUTPATIENT
Start: 2020-12-30 | End: 2021-05-15

## 2020-12-30 NOTE — PATIENT INSTRUCTIONS
You will be scheduled for nuclear stress testing after your appointment today. Wear comfortable clothing (shorts or pants with a shirt or blouse- no underwire bras) and walking or athletic shoes. Do not eat or drink anything, except water, for at least 2 hours prior to your appointment. Avoid tobacco products for at least 6 hours prior to your test. 
 
Do not eat or drink anything containing caffeine, including but not limited to the following: chocolate, regular and decaffeinated coffee, soft drinks, or tea for at least 124 hours prior to your test. 
 
Do hold your scheduled medications prior to your test. If you are a diabetic, please ask your physician how to adjust your food and insulin prior to your test. Please bring all medications you are currently taking. You will need to inform our office if you are pregnant, nursing, or think you may be pregnant. Your test will be performed on a 1 day protocol. This is determined by your height, weight, and other risk factors. For a 2 day test, please allow for 2 hours in the office each day. For a 1 day test, please allow for 4 hours in the office that day. The radioactive isotope used for your testing is different from any of the dyes that are commonly used in x-ray procedures, and is ordered specially for your test. Please call to cancel or reschedule your appointment at least 24 hours prior to your scheduled appointment to avoid being billed for the expensive isotope. You will be scheduled for an Echo after you appointment today. Take Prilosec 20 mg for 6 week You will need to follow up in clinic with Dr. James Hernandez one week after testing.

## 2020-12-30 NOTE — Clinical Note
12/30/2020 Patient: Keli Child YOB: 1970 Date of Visit: 12/30/2020 Smith Plunkett [facility] [address] [facility] [address] [id number] [address] [employer] Dear Sindi Eller DO, Thank you for referring Ms. Loren Balderrama to [facility] for evaluation. My notes for this consultation are attached. If you have questions, please do not hesitate to call me. I look forward to following your patient along with you.  
 
 
Sincerely, 
 
Leann Lowe MD

## 2020-12-30 NOTE — PROGRESS NOTES
Visit Vitals  /74 (BP 1 Location: Left arm, BP Patient Position: Sitting)   Pulse 80   Resp 16   Ht 5' 3\" (1.6 m)   Wt 187 lb (84.8 kg)   BMI 33.13 kg/m²     Cardiologist: Dr. Tom Driscoll    Last appt: Visit date not found  Future Appointments   Date Time Provider Carine Addis   1/22/2021 10:30 AM [facility] MAMMO 1 SPTMAMMO SPT       Requested Prescriptions     Signed Prescriptions Disp Refills    omeprazole (PRILOSEC) 20 mg capsule 42 Cap 0     Sig: Take 1 Cap by mouth daily.          Refills VO per Dr. Tom Driscoll.

## 2020-12-30 NOTE — PROGRESS NOTES
Lucy Jansen     1970       Omari Boston MD, [facility]  Date of Visit-12/30/2020   PCP is DO Aime Trivedi [facility] [place]  [facility]  HPI:  Lucy Jansen is a 48 y.o. female    Referred for chest tightness and abnormal EKG. Pt notes that she has been having some chest tightness so she went to have a physical and was sent to the ER. The ER said that she had anxiety. She notes she has been under some stress as she has lost two people close to her this year and she believes she is starting menopause. She notices the chest discomfort more in the morning though she does have it during this office visit. She describes the discomfort as a pulling in the center of her chest. She denies it is related to exertion. She has had it everyday since about 2-3 weeks ago, though the episodes vary in length. She reports that one day she did notice burping helped to relieve the discomfort. She states her cholesterol is borderline. She recently started Rybelsus and states she has lost 8 lbs. Her grandmother and grandfather got heart disease in their [de-identified]. Denies edema, syncope or shortness of breath at rest, has no tachycardia, palpitations or sense of arrhythmia. Echo 12/9/14 EF 65%. EKG: SR WNL    Assessment/Plan:       Future Appointments   Date Time Provider Carine Mancini   1/22/2021 10:30 AM [facility] MAMMO 1 SPTMAMMO SPT   1/29/2021  9:00 AM CHRIS BONNER BS AMB   1/29/2021 10:00 AM NUCLEARCHRIS AMB   2/12/2021  1:00 PM Omari Pitts MD CAVREY BS AMB        Pt with chest pain and AGUILAR. Seems to have persistent pain in the lower sternum. Risk factors are moderate. She has some AGUILAR and generalized fatigue. I will obtain an echo and exercise nuclear and suggest she start over-the-counter Prilosec. Will do a VV with her a week after her testing.      Patient Instructions   You will be scheduled for nuclear stress testing after your appointment today. Wear comfortable clothing (shorts or pants with a shirt or blouse- no underwire bras) and walking or athletic shoes. Do not eat or drink anything, except water, for at least 2 hours prior to your appointment. Avoid tobacco products for at least 6 hours prior to your test.    Do not eat or drink anything containing caffeine, including but not limited to the following: chocolate, regular and decaffeinated coffee, soft drinks, or tea for at least 124 hours prior to your test.    Do hold your scheduled medications prior to your test. If you are a diabetic, please ask your physician how to adjust your food and insulin prior to your test. Please bring all medications you are currently taking. You will need to inform our office if you are pregnant, nursing, or think you may be pregnant. Your test will be performed on a 1 day protocol. This is determined by your height, weight, and other risk factors. For a 2 day test, please allow for 2 hours in the office each day. For a 1 day test, please allow for 4 hours in the office that day. The radioactive isotope used for your testing is different from any of the dyes that are commonly used in x-ray procedures, and is ordered specially for your test. Please call to cancel or reschedule your appointment at least 24 hours prior to your scheduled appointment to avoid being billed for the expensive isotope. You will be scheduled for an Echo after you appointment today. Take Prilosec 20 mg for 6 week    You will need to follow up in clinic with Dr. Rodolfo Delgadillo one week after testing. Impression:   1. Chest pain, unspecified type    2. AGUILAR (dyspnea on exertion)       Cardiac History:   No specialty comments available. Medical history includes hypothyroidism, chest tightness with a ER visit , cervical fusion , laparoscopy ,  section .   No prior cath blood transfusion or ulcers    Social history =non-smoker occasional social alcohol no caffeine. Has 3 children lives at home with one of the children who is 6 is a nurse enjoys cooking baking hiking and pets    Family history = mother is  of broken bone cancer at 76 father  of pancreas and liver cancer at 44 1 sister is 54 with hypertension    Review of systems positive chest tightness, needing glasses, last menstrual period  and since then  has had some night sweats  ROS-except as noted above. . A complete cardiac and respiratory are reviewed and negative except as above ; Resp-denies wheezing  or productive cough,. Const- No unusual weight loss or fever; Neuro-no recent seizure or CVA ; GI- No BRBPR, abdom pain, bloating ; - no  hematuria   see supplement sheet, initialed and to be scanned by staff  Past Medical History:   Diagnosis Date    Back pain, acute     Depression     H/O seasonal allergies     Maxillary sinus fracture ([facility] [address]     Neck pain, musculoskeletal       Social Hx= reports that she has never smoked. She has never used smokeless tobacco. She reports current alcohol use. She reports that she does not use drugs. Allergies   Allergen Reactions    Latex Anaphylaxis    Teagan Hives    Soy Hives    Bees [Sting, Bee] Not Reported This Time    Cephaeline Not Reported This Time     Pt. States no allergy    Keflex [Cephalexin] Rash    Soybean Oil Hives    Tomato Hives     Pt. States no allergy      Exam and Labs:  /74 (BP 1 Location: Left arm, BP Patient Position: Sitting)   Pulse 80   Resp 16   Ht 5' 3\" (1.6 m)   Wt 187 lb (84.8 kg)   BMI 33.13 kg/m² Constitutional:  NAD, comfortable  Head: NC,AT. Eyes: No scleral icterus. Neck:  Neck supple. No JVD present. Throat: moist mucous membranes. Chest: Effort normal & normal respiratory excursion . Neurological: alert, conversant and oriented . Skin: Skin is not cold. No obvious systemic rash noted. Not diaphoretic. No erythema.  Psychiatric:  Grossly normal mood and affect. Behavior appears normal. Extremities:  no clubbing or cyanosis. Abdomen: non distended    Lungs:breath sounds normal. No stridor. distress, wheezes or  Rales. Heart: normal rate, regular rhythm, normal S1, S2, no murmurs, rubs, clicks or gallops , PMI non displaced. Edema: Edema is none. Lab Results   Component Value Date/Time    Cholesterol, total 225 (H) 08/21/2020 08:48 AM    HDL Cholesterol 51 08/21/2020 08:48 AM    LDL, calculated 143.8 (H) 08/21/2020 08:48 AM    Triglyceride 151 (H) 08/21/2020 08:48 AM     Lab Results   Component Value Date/Time    Sodium 140 12/11/2020 09:38 AM    Potassium 4.0 12/11/2020 09:38 AM    Chloride 102 12/11/2020 09:38 AM    CO2 25 12/11/2020 09:38 AM    Anion gap 13 12/11/2020 09:38 AM    Glucose 88 12/11/2020 09:38 AM    BUN 17 12/11/2020 09:38 AM    Creatinine 0.99 12/11/2020 09:38 AM    BUN/Creatinine ratio 17 12/11/2020 09:38 AM    GFR est AA >60 12/11/2020 09:38 AM    GFR est non-AA 59 (L) 12/11/2020 09:38 AM    Calcium 8.7 12/11/2020 09:38 AM      Wt Readings from Last 3 Encounters:   12/30/20 187 lb (84.8 kg)   12/11/20 187 lb 2.7 oz (84.9 kg)   12/11/20 185 lb 12.8 oz (84.3 kg)      BP Readings from Last 3 Encounters:   12/30/20 120/74   12/11/20 104/83   12/11/20 121/81      Current Outpatient Medications   Medication Sig    Rybelsus 7 mg tablet PLEASE SEE ATTACHED FOR DETAILED DIRECTIONS    hydrOXYzine HCL (ATARAX) 25 mg tablet Take 1.5 Tabs by mouth daily as needed for Anxiety.  ALPRAZolam (XANAX) 0.25 mg tablet Take 1 Tab by mouth nightly as needed for Anxiety or Sleep. Max Daily Amount: 0.25 mg.    FLUoxetine (PROzac) 40 mg capsule Take 1 Cap by mouth daily.  FLUoxetine (PROzac) 20 mg capsule Take 1 Cap by mouth daily. Take with 40 mg cap for total dose 60 mg.    levothyroxine (SYNTHROID) 50 mcg tablet Take 50 mcg by mouth.  cholecalciferol, vitamin D3, 2,000 unit tab Take  by mouth.     EPINEPHrine (EPIPEN 2-EDWIGE) 0.3 mg/0.3 mL (1:1,000) injection 0.3 mL by IntraMUSCular route once as needed for up to 1 dose. No current facility-administered medications for this visit. Impression see above.       Written by Ann Kwon, as dictated by Sandy Arroyo MD.

## 2021-01-13 ENCOUNTER — HOSPITAL ENCOUNTER (EMERGENCY)
Age: 51
Discharge: HOME | End: 2021-01-13
Attending: EMERGENCY MEDICINE
Payer: COMMERCIAL

## 2021-01-13 VITALS
SYSTOLIC BLOOD PRESSURE: 130 MMHG | WEIGHT: 190.7 LBS | HEART RATE: 80 BPM | RESPIRATION RATE: 16 BRPM | OXYGEN SATURATION: 94 % | DIASTOLIC BLOOD PRESSURE: 84 MMHG | HEIGHT: 63 IN | BODY MASS INDEX: 33.79 KG/M2 | TEMPERATURE: 98.2 F

## 2021-01-13 DIAGNOSIS — M54.16 LUMBAR RADICULOPATHY: Primary | ICD-10-CM

## 2021-01-13 PROCEDURE — 74011250636 HC RX REV CODE- 250/636: Performed by: EMERGENCY MEDICINE

## 2021-01-13 PROCEDURE — 99282 EMERGENCY DEPT VISIT SF MDM: CPT

## 2021-01-13 PROCEDURE — 96372 THER/PROPH/DIAG INJ SC/IM: CPT

## 2021-01-13 RX ORDER — METHOCARBAMOL 750 MG/1
750 TABLET, FILM COATED ORAL
COMMUNITY
Start: 2021-01-12 | End: 2021-01-22

## 2021-01-13 RX ORDER — LIDOCAINE 50 MG/G
PATCH TOPICAL
Qty: 15 EACH | Refills: 0 | Status: SHIPPED | OUTPATIENT
Start: 2021-01-13 | End: 2021-01-13

## 2021-01-13 RX ORDER — KETOROLAC TROMETHAMINE 30 MG/ML
60 INJECTION, SOLUTION INTRAMUSCULAR; INTRAVENOUS ONCE
Status: COMPLETED | OUTPATIENT
Start: 2021-01-13 | End: 2021-01-13

## 2021-01-13 RX ORDER — HYDROCODONE BITARTRATE AND ACETAMINOPHEN 5; 325 MG/1; MG/1
1 TABLET ORAL
Qty: 12 TAB | Refills: 0 | Status: SHIPPED | OUTPATIENT
Start: 2021-01-13 | End: 2021-01-16

## 2021-01-13 RX ORDER — METHYLPREDNISOLONE 4 MG/1
TABLET ORAL
COMMUNITY
Start: 2021-01-12 | End: 2021-01-22 | Stop reason: SDUPTHER

## 2021-01-13 RX ORDER — LIDOCAINE 50 MG/G
PATCH TOPICAL
Qty: 15 EACH | Refills: 0 | Status: SHIPPED
Start: 2021-01-13 | End: 2021-05-15

## 2021-01-13 RX ORDER — HYDROCODONE BITARTRATE AND ACETAMINOPHEN 5; 325 MG/1; MG/1
1 TABLET ORAL
Qty: 12 TAB | Refills: 0 | Status: SHIPPED | OUTPATIENT
Start: 2021-01-13 | End: 2021-01-13

## 2021-01-13 RX ADMIN — KETOROLAC TROMETHAMINE 60 MG: 30 INJECTION, SOLUTION INTRAMUSCULAR at 04:20

## 2021-01-13 NOTE — ED PROVIDER NOTES
History of lumbar radicular pain, depression, allergies. She presents with complaints of midline low back pain that radiates to her right hip. It began approximately 2 days ago. It has been constant and worse with movement. She has had similar pain in the past with a bulging disc. She states that she moved furniture approximately 1 week ago. She went to Ortho on-call yesterday and was prescribed prednisone and Robaxin. She took these without relief. She has also been taking ibuprofen without relief. She gets intermittent numbness and tingling in her right leg. She remains ambulatory. She denies other complaints.            Past Medical History:   Diagnosis Date    Back pain, acute     Depression     H/O seasonal allergies     Maxillary sinus fracture ([facility] [address] 2016    Neck pain, musculoskeletal        Past Surgical History:   Procedure Laterality Date    COLONOSCOPY N/A 2019    COLONOSCOPY performed by Ronnell Henderson MD at [facility] ENDOSCOPY    COLONOSCOPY,DIAGNOSTIC  2019         HX CERVICAL DISKECTOMY Left 2015    HX CERVICAL FUSION Left 2015     HX  SECTION      HX GYN  1998    laproscopsy     HX WISDOM TEETH EXTRACTION           Family History:   Problem Relation Age of Onset   Justina Stabs Cancer Father         pancreas, Liver    Cancer Maternal Grandmother     Breast Cancer Maternal Grandmother     Heart Disease Maternal Grandfather     Heart Disease Paternal Grandmother     Stroke Paternal Grandfather     Other Mother         meningioma    Hypertension Mother     Breast Cancer Mother 61   Justina Stabs Anesth Problems Mother         SLOW TO WAKE UP    Stroke Mother     Hypertension Sister     No Known Problems Daughter     No Known Problems Daughter     No Known Problems Son     Breast Cancer Maternal Aunt     Breast Cancer Maternal Aunt     Breast Cancer Maternal Aunt        Social History     Socioeconomic History    Marital status:      Spouse name: Not on file    Number of children: Not on file    Years of education: Not on file    Highest education level: Not on file   Occupational History    Not on file   Social Needs    Financial resource strain: Not on file    Food insecurity     Worry: Not on file     Inability: Not on file    Transportation needs     Medical: Not on file     Non-medical: Not on file   Tobacco Use    Smoking status: Never Smoker    Smokeless tobacco: Never Used   Substance and Sexual Activity    Alcohol use: Yes     Alcohol/week: 0.0 standard drinks     Comment: occasional    Drug use: No    Sexual activity: Yes     Partners: Male     Birth control/protection: Surgical     Comment:     Lifestyle    Physical activity     Days per week: Not on file     Minutes per session: Not on file    Stress: Not on file   Relationships    Social connections     Talks on phone: Not on file     Gets together: Not on file     Attends [employer] service: Not on file     Active member of club or organization: Not on file     Attends meetings of clubs or organizations: Not on file     Relationship status: Not on file    Intimate partner violence     Fear of current or ex partner: Not on file     Emotionally abused: Not on file     Physically abused: Not on file     Forced sexual activity: Not on file   Other Topics Concern    Not on file   Social History Narrative    Works [place] [employer]    Going through divorce 2017    Member of [employer]  [employer], has good friends there         ALLERGIES: Latex; Teagan; Soy; Bees [sting, bee]; Cephaeline; Keflex [cephalexin]; Soybean oil; and Tomato    Review of Systems   All other systems reviewed and are negative. Vitals:    01/13/21 0353   BP: 130/84   Pulse: 80   Resp: 16   Temp: 98.2 °F (36.8 °C)   SpO2: 94%   Weight: 86.5 kg (190 lb 11.2 oz)   Height: 5' 3\" (1.6 m)            Physical Exam  Vitals signs and nursing note reviewed. Constitutional:       Appearance: She is well-developed. Comments: Standing beside the stretcher during my exam.   HENT:      Head: Normocephalic and atraumatic. Eyes:      Conjunctiva/sclera: Conjunctivae normal.   Neck:      Trachea: No tracheal deviation. Cardiovascular:      Rate and Rhythm: Normal rate and regular rhythm. Pulmonary:      Effort: Pulmonary effort is normal.      Breath sounds: Normal breath sounds. Abdominal:      General: There is no distension. Musculoskeletal:      Comments: No significant back tenderness. Skin:     General: Skin is dry. Neurological:      Mental Status: She is alert. MDM       Procedures    Assessment/plan: Low back pain radiating to right hip -consistent with previous diagnosis of lumbar radiculopathy. Reassuring appearance/exam with stable vital signs. No neurological red flags. Home with continued anti-inflammatory medicine, Robaxin, and prednisone. I will offer Lidoderm patches and limited hydrocodone as she appears miserable and has not been able to sleep. She has follow-up later this month with spine.   Annie Gomes MD  4:13 AM

## 2021-01-14 ENCOUNTER — PATIENT OUTREACH (OUTPATIENT)
Dept: OTHER | Age: 51
End: 2021-01-14

## 2021-01-14 NOTE — PROGRESS NOTES
Verified  and address for HIPAA security. Introduced [employer] for patient. Patient does not identify any Care Management needs at this time and declines services. Chart Review: [facility] ED  History of lumbar radicular pain, depression, allergies. She presents with complaints of midline low back pain that radiates to her right hip. It began approximately 2 days ago. It has been constant and worse with movement. She has had similar pain in the past with a bulging disc. She states that she moved furniture approximately 1 week ago. She went to Ortho on-call yesterday and was prescribed prednisone and Robaxin. She took these without relief. She has also been taking ibuprofen without relief. She gets intermittent numbness and tingling in her right leg. She remains ambulatory. She denies other complaints.     Patient states that she is doing well enough that additional support is not needed at this time. Patient had no other questions or concerns.  Contact information provided and reminded her that I can be reached if any needs arise in the future

## 2021-01-19 ENCOUNTER — HOSPITAL ENCOUNTER (OUTPATIENT)
Dept: PHYSICAL THERAPY | Age: 51
Discharge: HOME | End: 2021-01-19
Payer: COMMERCIAL

## 2021-01-19 PROCEDURE — 97161 PT EVAL LOW COMPLEX 20 MIN: CPT | Performed by: PHYSICAL THERAPIST

## 2021-01-19 PROCEDURE — 97110 THERAPEUTIC EXERCISES: CPT | Performed by: PHYSICAL THERAPIST

## 2021-01-19 NOTE — PROGRESS NOTES
[facility] Physical Therapy and Sports Medicine  [address] [facility], [address]  Phone: [phone]  Fax: [phone]    PT INITIAL EVALUATION NOTE - [facility] 2-15    Patient Name: Ju Mirza  Date:2021  : 1970  [x]  Patient  Verified   Payor: [employer] [facility] / Plan: [facility] [employer] [address] / Product Type: Commerical /    In time: 1035 A  Out time: 11:10 A  Total Treatment Time (min): 35  Total Timed Codes (min): 25  1:1 Treatment Time ( only): 25   Visit #: 1     Treatment Area: Low back pain [M54.5]    SUBJECTIVE    Any medication changes, allergies to medications, adverse drug reactions, diagnosis change, or new procedure performed?: [] No    [x] Yes (see summary sheet for update)    Current symptoms/chief complaint and date of onset/injury:   Pt presents with LBP, R LE/hip symptoms. She has a history of herniated disc. She felt a \"tweak\" in lower back at [place] when she lifted a box. She started having inc'd pain-- went to Ortho on Call after work on -- no imaging, given prednisone. She had a lot of pain that night, went to [facility] ER. Was given toradol shot. Symptoms have been \"tolerable\" since. She followed up with MD at [facility]- had x-rays \"changed a little bit. I have a herniated disc\". Last MRI in 2019. She has been through PT before. She alternates between ice and heat at home. 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.   Prior level of function:  Chronic LBP  Patient goal(s): \"relief of pain\"     Objective:      Posture: Fair sitting, standing posture    Gait: No significant gait abnormalities    Active Movements:  ROM  AROM Comments:pain, area   Forward flexion  Fingers mid tibia inc'd p! Extension  WNL No change   SB right WNL inc'd p! SB left WNL No change     Strength:      R (0-5) L (0-5)   Hip Flexion (L1,2) 4 5   Knee Extension (L3,4) 5 5   Knee Flexion (S1,2) 5 5   Ankle Dorsiflexion (L4) 5 5   Sidelying hip abduction 4 4   100% supine bridge without symptoms  100% active supine SLR R and L without symptoms    Palpation: Mod to max TTP R glute max/med/piriformis, R GT    Flexibility Deficits:  HS tight bilat        Outcome Measure: Patient presents with a FOTO score of NT.      10 min Therapeutic Exercise:  [x] See flow sheet : instructed in HEP   Rationale: increase ROM, increase strength and improve balance to improve the patients ability to perform daily chores with dec'd symptoms    10 min -- ice. Lumbar.           With   [x] TE   [] TA   [] neuro   [] other: Patient Education: [x] Review HEP    [] Progressed/Changed HEP based on:   [x] positioning   [] body mechanics   [] transfers   [x] heat/ice application    [x] other: jing/phys; PT POC; importance of performing HEP in order to maximize benefit of PT; use of ice for 10-15 min     Pain Level (0-10 scale) post treatment:  \"good\"    Assessment:   [x] See POC  [] Other:  Plan:   [x] See POC  [] Other  [] Discharge due to:     Danny Abebe PT, DPT     1/19/2021     10:28 AM

## 2021-01-19 NOTE — PROGRESS NOTES
Physical Therapy at [facility],   a part of [address] [place] [address] Ave  [place]  Phone: [phone]  Fax: [phone]    Plan of Care/Statement of Necessity for Physical Therapy Services  2-15    Patient name: Patrick Flores  : 1970  Provider#: [id number]  Referral source: Tomas Dhaliwal MD      Medical/Treatment Diagnosis: Low back pain [M54.5]     Prior Hospitalization: see medical history     Comorbidities: see evaluation  Prior Level of Function: see evaluation  Medications: Verified on Patient Summary List    Start of Care: 2021      Onset Date: acute on chronic; Dec 2020       The [address] [place] [address] and following information is based on the information from the initial evaluation. Assessment/ key information: Pt is a 48year old female presenting with LBP, occasional R LE radicular symptoms. Past MRI +herniated disc, per pt report.  She will benefit from PT to address problem list below    Evaluation Complexity History MEDIUM  Complexity : 1-2 comorbidities / personal factors will impact the outcome/ POC ; Examination LOW Complexity : 1-2 Standardized tests and measures addressing body structure, function, activity limitation and / or participation in recreation  ;Presentation LOW Complexity : Stable, uncomplicated  ;Clinical Decision Making MEDIUM Complexity : FOTO score of 26-74  Overall Complexity Rating: LOW     Problem List: pain affecting function, decrease ROM, decrease strength, impaired gait/ balance, decrease ADL/ functional abilitiies, decrease activity tolerance and decrease flexibility/ joint mobility   Treatment Plan may include any combination of the following: Therapeutic exercise, Therapeutic activities, Neuromuscular re-education, Physical agent/modality, Gait/balance training, Manual therapy, Patient education, Self Care training and Functional mobility training  Patient / Family readiness to learn indicated by: asking questions, trying to perform skills and interest  Persons(s) to be included in education: patient (P)  Barriers to Learning/Limitations: None  Patient Goal (s): see evaluation  Patient Self Reported Health Status: good  Rehabilitation Potential: good    Short Term Goals: To be accomplished in 2-3 weeks:  1) Pt will be independent in initial HEP  2) Pt will report >/=25% improvement in symptoms    Long Term Goals: To be accomplished in 6-8 weeks:  1) Pt will report being able to sit for >/=30 min without symptoms  2) Pt will report being able to work full shift without back pain  3) Pt will improve bilat hip abduction strength to >/=4+/5 in order to assist in daily activities, exercise  4) Pt will report >/=75% improvement in symptoms    Frequency / Duration: Patient to be seen 1-2 times per week for 6-8 weeks. Patient/ Caregiver education and instruction: self care, activity modification and exercises    [x]  Plan of care has been reviewed with RENEE Jacobson, PT 1/19/2021   ________________________________________________________________________    I certify that the above Therapy Services are being furnished while the patient is under my care. I agree with the treatment plan and certify that this therapy is necessary.     [de-identified] Signature:____________________  Date:____________Time: _________

## 2021-01-22 ENCOUNTER — OFFICE VISIT (OUTPATIENT)
Dept: PRIMARY CARE CLINIC | Age: 51
End: 2021-01-22
Payer: COMMERCIAL

## 2021-01-22 ENCOUNTER — TRANSCRIBE ORDER (OUTPATIENT)
Dept: GENERAL RADIOLOGY | Age: 51
End: 2021-01-22

## 2021-01-22 ENCOUNTER — HOSPITAL ENCOUNTER (OUTPATIENT)
Dept: MAMMOGRAPHY | Age: 51
Discharge: HOME | End: 2021-01-22
Attending: OBSTETRICS & GYNECOLOGY
Payer: COMMERCIAL

## 2021-01-22 ENCOUNTER — PATIENT MESSAGE (OUTPATIENT)
Dept: PRIMARY CARE CLINIC | Age: 51
End: 2021-01-22

## 2021-01-22 VITALS
HEART RATE: 74 BPM | BODY MASS INDEX: 36.56 KG/M2 | DIASTOLIC BLOOD PRESSURE: 87 MMHG | TEMPERATURE: 97.1 F | WEIGHT: 186.2 LBS | SYSTOLIC BLOOD PRESSURE: 135 MMHG | HEIGHT: 60 IN | OXYGEN SATURATION: 95 % | RESPIRATION RATE: 18 BRPM

## 2021-01-22 DIAGNOSIS — Z12.31 VISIT FOR SCREENING MAMMOGRAM: ICD-10-CM

## 2021-01-22 DIAGNOSIS — M54.42 CHRONIC BILATERAL LOW BACK PAIN WITH BILATERAL SCIATICA: Primary | ICD-10-CM

## 2021-01-22 DIAGNOSIS — G89.29 CHRONIC BILATERAL LOW BACK PAIN WITH BILATERAL SCIATICA: Primary | ICD-10-CM

## 2021-01-22 DIAGNOSIS — M54.41 CHRONIC BILATERAL LOW BACK PAIN WITH BILATERAL SCIATICA: Primary | ICD-10-CM

## 2021-01-22 DIAGNOSIS — Z12.31 VISIT FOR SCREENING MAMMOGRAM: Primary | ICD-10-CM

## 2021-01-22 PROCEDURE — 99213 OFFICE O/P EST LOW 20 MIN: CPT | Performed by: FAMILY MEDICINE

## 2021-01-22 PROCEDURE — 77063 BREAST TOMOSYNTHESIS BI: CPT

## 2021-01-22 RX ORDER — METHYLPREDNISOLONE 4 MG/1
TABLET ORAL
Qty: 1 DOSE PACK | Refills: 0 | Status: SHIPPED
Start: 2021-01-22 | End: 2021-05-15

## 2021-01-22 NOTE — PATIENT INSTRUCTIONS
Please call to schedule the MRI. Someone kiel call you within the next 2 business days to schedule your imaging. They will contact you from [phone]. If you do not hear from scheduling within 48 hours please contact central scheduling at [phone] to schedule your imaging.  
  
Learning About How to Have a Healthy Back What causes back pain? Back pain is often caused by overuse, strain, or injury. For example, people often hurt their backs playing sports or working in the yard, being jolted in a car accident, or lifting something too heavy. Aging plays a part too. Your bones and muscles tend to lose strength as you age, which makes injury more likely. The spongy discs between the bones of the spine (vertebrae) may suffer from wear and tear and no longer provide enough cushion between the bones. A disc that bulges or breaks open (herniated disc) can press on nerves, causing back pain. In some people, back pain is the result of arthritis, broken vertebrae caused by bone loss (osteoporosis), illness, or a spine problem. Although most people have back pain at one time or another, there are steps you can take to make it less likely. How can you have a healthy back? Reduce stress on your back through good posture Slumping or slouching alone may not cause low back pain. But after the back has been strained or injured, bad posture can make pain worse. · Sleep in a position that maintains your back's normal curves and on a mattress that feels comfortable. Sleep on your side with a pillow between your knees, or sleep on your back with a pillow under your knees. These positions can reduce strain on your back. · Stand and sit up straight. \"Good posture\" generally means your ears, shoulders, and hips are in a straight line. · If you must stand for a long time, put one foot on a stool, ledge, or box. Switch feet every now and then. · Sit in a chair that is low enough to let you place both feet flat on the floor with both knees nearly level with your hips. If your chair or desk is too high, use a footrest to raise your knees. Place a small pillow, a rolled-up towel, or a lumbar roll in the curve of your back if you need extra support. · Try a kneeling chair, which helps tilt your hips forward. This takes pressure off your lower back. · Try sitting on an exercise ball. It can rock from side to side, which helps keep your back loose. · When driving, keep your knees nearly level with your hips. Sit straight, and drive with both hands on the steering wheel. Your arms should be in a slightly bent position. Reduce stress on your back through careful lifting · Squat down, bending at the hips and knees only. If you need to, put one knee to the floor and extend your other knee in front of you, bent at a right angle (half kneeling). · Press your chest straight forward. This helps keep your upper back straight while keeping a slight arch in your low back. · Hold the load as close to your body as possible, at the level of your belly button (navel). · Use your feet to change direction, taking small steps. · Lead with your hips as you change direction. Keep your shoulders in line with your hips as you move. · Set down your load carefully, squatting with your knees and hips only. Exercise and stretch your back · Do some exercise on most days of the week, if your doctor says it is okay. You can walk, run, swim, or cycle. · Stretch your back muscles.  Here are a few exercises to try: 
? Lie on your back, and gently pull one bent knee to your chest. Put that foot back on the floor, and then pull the other knee to your chest. 
 ? Do pelvic tilts. Lie on your back with your knees bent. Tighten your stomach muscles. Pull your belly button (navel) in and up toward your ribs. You should feel like your back is pressing to the floor and your hips and pelvis are slightly lifting off the floor. Hold for 6 seconds while breathing smoothly. ? Sit with your back flat against a wall. · Keep your core muscles strong. The muscles of your back, belly (abdomen), and buttocks support your spine. ? Pull in your belly and imagine pulling your navel toward your spine. Hold this for 6 seconds, then relax. Remember to keep breathing normally as you tense your muscles. ? Do curl-ups. Always do them with your knees bent. Keep your low back on the floor, and curl your shoulders toward your knees using a smooth, slow motion. Keep your arms folded across your chest. If this bothers your neck, try putting your hands behind your neck (not your head), with your elbows spread apart. ? Lie on your back with your knees bent and your feet flat on the floor. Tighten your belly muscles, and then push with your feet and raise your buttocks up a few inches. Hold this position 6 seconds as you continue to breathe normally, then lower yourself slowly to the floor. Repeat 8 to 12 times. ? If you like group exercise, try Pilates or yoga. These classes have poses that strengthen the core muscles. Lead a healthy lifestyle · Stay at a healthy weight to avoid strain on your back. · Do not smoke. Smoking increases the risk of osteoporosis, which weakens the spine. If you need help quitting, talk to your doctor about stop-smoking programs and medicines. These can increase your chances of quitting for good. Where can you learn more? Go to http://www.[employer].com/ Enter L315 in the search box to learn more about \"Learning About How to Have a Healthy Back. \" Current as of: March 2, 2020               Content Version: 12.6 © 3104-1349 [employer]. Care instructions adapted under license by [employer] (which disclaims liability or warranty for this information). If you have questions about a medical condition or this instruction, always ask your healthcare professional. [address] any warranty or liability for your use of this information. 
  
Back Pain, Emergency or Urgent Symptoms: Care Instructions Your Care Instructions Many people have back pain at one time or another. In most cases, pain gets better with self-care that includes over-the-counter pain medicine, ice, heat, and exercises. Unless you have symptoms of a severe injury or heart attack, you may be able to give yourself a few days before you call a doctor. But some back problems are very serious. Do not ignore symptoms that need to be checked right away. Follow-up care is a key part of your treatment and safety. Be sure to make and go to all appointments, and call your doctor if you are having problems. It's also a good idea to know your test results and keep a list of the medicines you take. How can you care for yourself at home? · Sit or lie in positions that are most comfortable and that reduce your pain. Try one of these positions when you lie down: ? Lie on your back with your knees bent and supported by large pillows. ? Lie on the floor with your legs on the seat of a sofa or chair. ? Lie on your side with your knees and hips bent and a pillow between your legs. ? Lie on your stomach if it does not make pain worse. · Do not sit up in bed, and avoid soft couches and twisted positions. Bed rest can help relieve pain at first, but it delays healing. Avoid bed rest after the first day. · Change positions every 30 minutes. If you must sit for long periods of time, take breaks from sitting. Get up and walk around, or lie flat. · Try using a heating pad on a low or medium setting, for 15 to 20 minutes every 2 or 3 hours. Try a warm shower in place of one session with the heating pad. You can also buy single-use heat wraps that last up to 8 hours. You can also try ice or cold packs on your back for 10 to 20 minutes at a time, several times a day. (Put a thin cloth between the ice pack and your skin.) This reduces pain and makes it easier to be active and exercise. · Take pain medicines exactly as directed. ? If the doctor gave you a prescription medicine for pain, take it as prescribed. ? If you are not taking a prescription pain medicine, ask your doctor if you can take an over-the-counter medicine. When should you call for help? Call 911 anytime you think you may need emergency care. For example, call if: 
  · You are unable to move a leg at all.  
  · You have back pain with severe belly pain.  
  · You have symptoms of a heart attack. These may include: 
? Chest pain or pressure, or a strange feeling in the chest. 
? Sweating. ? Shortness of breath. ? Nausea or vomiting. ? Pain, pressure, or a strange feeling in the back, neck, jaw, or upper belly or in one or both shoulders or arms. ? Lightheadedness or sudden weakness. ? A fast or irregular heartbeat. After you call 911, the  may tell you to chew 1 adult-strength or 2 to 4 low-dose aspirin. Wait for an ambulance. Do not try to drive yourself. Call your doctor now or seek immediate medical care if: 
  · You have new or worse symptoms in your arms, legs, chest, belly, or buttocks. Symptoms may include: 
? Numbness or tingling. ? Weakness. ? Pain.  
  · You lose bladder or bowel control.  
  · You have back pain and: ? You have injured your back while lifting or doing some other activity. Call if the pain is severe, has not gone away after 1 or 2 days, and you cannot do your normal daily activities. ? You have had a back injury before that needed treatment. ? Your pain has lasted longer than 4 weeks. ? You have had weight loss you cannot explain. ? You have a fever. ? You are age 48 or older. ? You have cancer now or have had it before. Watch closely for changes in your health, and be sure to contact your doctor if you are not getting better as expected. Where can you learn more? Go to http://www.gray.com/ Enter G863 in the search box to learn more about \"Back Pain, Emergency or Urgent Symptoms: Care Instructions. \" Current as of: June 26, 2019               Content Version: 12.6 © 0874-5745 [employer]. Care instructions adapted under license by [employer] (which disclaims liability or warranty for this information).  If you have questions about a medical condition or this instruction, always ask your healthcare professional. [address] any warranty or liability for your use of this information.

## 2021-01-22 NOTE — PROGRESS NOTES
Room 7    Identified pt with two pt identifiers(name and ). Reviewed record in preparation for visit and have obtained necessary documentation. All patient medications has been reviewed. Chief Complaint   Patient presents with    Back Pain     Onset 2021 no known injury lower back pain patient stated she noticeds when she lays down that the pain runs from her back down her legs       3 most recent PHQ Screens 2020   PHQ Not Done -   Little interest or pleasure in doing things Several days   Feeling down, depressed, irritable, or hopeless Several days   Total Score PHQ 2 2   Trouble falling or staying asleep, or sleeping too much Several days   Feeling tired or having little energy Several days   Poor appetite, weight loss, or overeating Several days   Feeling bad about yourself - or that you are a failure or have let yourself or your family down Not at all   Trouble concentrating on things such as school, work, reading, or watching TV Not at all   Moving or speaking so slowly that other people could have noticed; or the opposite being so fidgety that others notice Not at all   Thoughts of being better off dead, or hurting yourself in some way Not at all   PHQ 9 Score 5     Abuse Screening Questionnaire 2020   Do you ever feel afraid of your partner? N   Are you in a relationship with someone who physically or mentally threatens you? N   Is it safe for you to go home? Y       Health Maintenance Due   Topic    COVID-19 Vaccine (1 of 2)    Breast Cancer Screen Mammogram     Shingrix Vaccine Age 49> (1 of 2)     Health Maintenance Review: Patient reminded of \"due or due soon\" health maintenance. I have asked the patient to contact his/her primary care provider (PCP) for follow-up on his/her health maintenance. There were no vitals filed for this visit.     Wt Readings from Last 3 Encounters:   21 190 lb 11.2 oz (86.5 kg)   20 187 lb (84.8 kg)   20 187 lb 2.7 oz (84.9 kg) Temp Readings from Last 3 Encounters:   01/13/21 98.2 °F (36.8 °C)   12/11/20 98.1 °F (36.7 °C)   12/11/20 97.3 °F (36.3 °C) (Temporal)     BP Readings from Last 3 Encounters:   01/13/21 130/84   12/30/20 120/74   12/11/20 104/83     Pulse Readings from Last 3 Encounters:   01/13/21 80   12/30/20 80   12/11/20 72       Coordination of Care Questionnaire:   1) Have you been to an emergency room, urgent care, or hospitalized since your last visit? Yes    2. Have seen or consulted any other health care provider since your last visit?   Yes

## 2021-01-22 NOTE — PROGRESS NOTES
HPI     Chief Complaint   Patient presents with    Back Pain     Onset 1/11/2021 no known injury lower back pain patient stated she noticeds when she lays down that the pain runs from her back down her legs     She is a 48 y.o. female who presents for back pain. Went to Ortho on call for radicular symptoms and [facility]. Has tried medrol dose pack, muscle relaxer's, opioids, PT and NSAIDs. States nothing has relieved the pain except the Medrol dose pack but shortly after finishing the dose pack her symptoms returned. Denies weakness but states she has pain in her lower back bilaterally going into both buttocks. Stops just below her buttocks. Slightly worse on the R side. No urinary incontinence or saddle anesthesia. No trauma. Has been out of work this week but is supposed to return on Monday. Review of Systems   Constitutional: Negative for chills and fever. Respiratory: Negative for shortness of breath. Neurological: Negative for weakness and numbness. Reviewed PmHx, RxHx, FmHx, SocHx, AllgHx and updated and dated in the chart. Physical Exam:  Visit Vitals  /87 (BP 1 Location: Left arm, BP Patient Position: Sitting)   Pulse 74   Temp 97.1 °F (36.2 °C) (Temporal)   Resp 18   Ht 5' (1.524 m)   Wt 186 lb 3.2 oz (84.5 kg)   SpO2 95%   BMI 36.36 kg/m²     Physical Exam  Vitals signs and nursing note reviewed. Constitutional:       General: She is not in acute distress. Appearance: Normal appearance. She is not ill-appearing. Cardiovascular:      Rate and Rhythm: Normal rate and regular rhythm. Heart sounds: No murmur. Pulmonary:      Effort: Pulmonary effort is normal. No respiratory distress. Breath sounds: Normal breath sounds. Skin:     General: Skin is warm and dry. Neurological:      General: No focal deficit present. Mental Status: She is alert. Sensory: No sensory deficit. Motor: No weakness.       Coordination: Coordination normal.      Deep Tendon Reflexes: Reflexes normal.      Comments: Strength 5/5 in lower ext. Gait is normal. Seated SLR positive for radicular symptoms in buttocks BL. Gross sensation intact. Patellar reflexes 2+ BL. Tenderness to palpation over lumbar muscles BL and piriformis muscles BL worse on R. Able to lift herself on and off exam table without difficulty or assistance. Psychiatric:         Mood and Affect: Mood normal.         Behavior: Behavior normal.       No results found for this or any previous visit (from the past 12 hour(s)). Assessment / Plan     Diagnoses and all orders for this visit:    1. Chronic bilateral low back pain with bilateral sciatica  -     MRI LUMB SPINE WO CONT; Future  -     REFERRAL TO SPORTS MEDICINE  -     methylPREDNISolone (MEDROL DOSEPACK) 4 mg tablet; Follow instructions on box. Discussed that I do not recommend frequent use of steroids to treat back pain. Will do one more Medrol dose pack over the weekend as all other treatment modalities as this point have not worked including muscle relaxer's, opioids, NSAIDs and PT. Per Ortho XR showed degenerative changes and they recommended MRI if symptoms were not improving. MRI order placed today. She is interested in another steroid injection with Dr. Hector White but was told she needs MRI first before they can do this. Referred to Sports Med to see if she may be a candidate for trigger point injections in the mean time. Discussed ER precautions and given urgent back pain handout. Instructed to call office if any questions/ concerns over the weekend. I have discussed the diagnosis with the patient and the intended plan as seen in the above orders. The patient has received an after-visit summary and questions were answered concerning future plans. I have discussed medication side effects and warnings with the patient as well.     Ron Cue, DO

## 2021-01-28 ENCOUNTER — TELEPHONE (OUTPATIENT)
Dept: CARDIOLOGY CLINIC | Age: 51
End: 2021-01-28

## 2021-01-28 NOTE — TELEPHONE ENCOUNTER
LM to advise pt of appt tomorrow with instruction. Cancel if pt has been around anyone w covid in last month, or new onset symptoms. Call with questions.

## 2021-01-29 ENCOUNTER — ANCILLARY PROCEDURE (OUTPATIENT)
Dept: CARDIOLOGY CLINIC | Age: 51
End: 2021-01-29
Payer: COMMERCIAL

## 2021-01-29 ENCOUNTER — HOSPITAL ENCOUNTER (OUTPATIENT)
Dept: MRI IMAGING | Age: 51
Discharge: HOME | End: 2021-01-29
Attending: FAMILY MEDICINE
Payer: COMMERCIAL

## 2021-01-29 VITALS
DIASTOLIC BLOOD PRESSURE: 86 MMHG | HEIGHT: 63 IN | SYSTOLIC BLOOD PRESSURE: 134 MMHG | BODY MASS INDEX: 33.66 KG/M2 | WEIGHT: 190 LBS

## 2021-01-29 DIAGNOSIS — G89.29 CHRONIC BILATERAL LOW BACK PAIN WITH BILATERAL SCIATICA: ICD-10-CM

## 2021-01-29 DIAGNOSIS — R06.09 DOE (DYSPNEA ON EXERTION): ICD-10-CM

## 2021-01-29 DIAGNOSIS — M54.42 CHRONIC BILATERAL LOW BACK PAIN WITH BILATERAL SCIATICA: ICD-10-CM

## 2021-01-29 DIAGNOSIS — M54.41 CHRONIC BILATERAL LOW BACK PAIN WITH BILATERAL SCIATICA: ICD-10-CM

## 2021-01-29 DIAGNOSIS — R07.9 CHEST PAIN, UNSPECIFIED TYPE: ICD-10-CM

## 2021-01-29 PROCEDURE — 93306 TTE W/DOPPLER COMPLETE: CPT | Performed by: SPECIALIST

## 2021-01-29 PROCEDURE — 72148 MRI LUMBAR SPINE W/O DYE: CPT

## 2021-01-29 NOTE — TELEPHONE ENCOUNTER
Spoke with patient about MRI results. Currently feeling good after 2nd steroid taper. Recommend she follow up with Ortho/ Dr. Rhett Dyson regarding these results. Discussed I will forward this to the NP she saw last at [facility]. All questions/ concerns addressed.

## 2021-01-30 LAB
ECHO AO ASC DIAM: 2.94 CM
ECHO AO ROOT DIAM: 3.14 CM
ECHO AV AREA PEAK VELOCITY: 2.63 CM2
ECHO AV AREA VTI: 2.84 CM2
ECHO AV AREA/BSA PEAK VELOCITY: 1.4 CM2/M2
ECHO AV AREA/BSA VTI: 1.5 CM2/M2
ECHO AV MEAN GRADIENT: 2.47 MMHG
ECHO AV PEAK GRADIENT: 4.71 MMHG
ECHO AV PEAK VELOCITY: 108.5 CM/S
ECHO AV VTI: 23.01 CM
ECHO EST RA PRESSURE: 3 MMHG
ECHO IVC PROX: 1.52 CM
ECHO LA AREA 4C: 15.19 CM2
ECHO LA MAJOR AXIS: 2.99 CM
ECHO LA MINOR AXIS: 1.58 CM
ECHO LA VOL 2C: 43.59 ML (ref 22–52)
ECHO LA VOL 4C: 36.49 ML (ref 22–52)
ECHO LA VOL BP: 43.05 ML (ref 22–52)
ECHO LA VOL/BSA BIPLANE: 22.75 ML/M2 (ref 16–28)
ECHO LA VOLUME INDEX A2C: 23.04 ML/M2 (ref 16–28)
ECHO LA VOLUME INDEX A4C: 19.29 ML/M2 (ref 16–28)
ECHO LV E' LATERAL VELOCITY: 11.88 CM/S
ECHO LV E' SEPTAL VELOCITY: 9.85 CM/S
ECHO LV EDV A2C: 74.64 ML
ECHO LV EDV A4C: 65.38 ML
ECHO LV EDV BP: 70.19 ML (ref 56–104)
ECHO LV EDV INDEX A4C: 34.6 ML/M2
ECHO LV EDV INDEX BP: 37.1 ML/M2
ECHO LV EDV NDEX A2C: 39.4 ML/M2
ECHO LV EJECTION FRACTION A2C: 61 PERCENT
ECHO LV EJECTION FRACTION A4C: 52 PERCENT
ECHO LV EJECTION FRACTION BIPLANE: 56.7 PERCENT (ref 55–100)
ECHO LV ESV A2C: 28.88 ML
ECHO LV ESV A4C: 31.64 ML
ECHO LV ESV BP: 30.38 ML (ref 19–49)
ECHO LV ESV INDEX A2C: 15.3 ML/M2
ECHO LV ESV INDEX A4C: 16.7 ML/M2
ECHO LV ESV INDEX BP: 16.1 ML/M2
ECHO LV INTERNAL DIMENSION DIASTOLIC: 4.97 CM (ref 3.9–5.3)
ECHO LV INTERNAL DIMENSION SYSTOLIC: 2.94 CM
ECHO LV IVSD: 0.98 CM (ref 0.6–0.9)
ECHO LV MASS 2D: 164.7 G (ref 67–162)
ECHO LV MASS INDEX 2D: 87.1 G/M2 (ref 43–95)
ECHO LV POSTERIOR WALL DIASTOLIC: 0.89 CM (ref 0.6–0.9)
ECHO LVOT DIAM: 1.99 CM
ECHO LVOT PEAK GRADIENT: 3.35 MMHG
ECHO LVOT PEAK VELOCITY: 91.49 CM/S
ECHO LVOT SV: 65.4 ML
ECHO LVOT VTI: 21.01 CM
ECHO MV A VELOCITY: 62.43 CM/S
ECHO MV AREA PHT: 4.13 CM2
ECHO MV E DECELERATION TIME (DT): 183.74 MS
ECHO MV E VELOCITY: 79.83 CM/S
ECHO MV E/A RATIO: 1.28
ECHO MV E/E' LATERAL: 6.72
ECHO MV E/E' RATIO (AVERAGED): 7.41
ECHO MV E/E' SEPTAL: 8.1
ECHO MV PRESSURE HALF TIME (PHT): 53.28 MS
ECHO RA MAJOR AXIS: 3.6 CM
ECHO RIGHT VENTRICULAR SYSTOLIC PRESSURE (RVSP): 27 MMHG
ECHO RV INTERNAL DIMENSION: 3.11 CM
ECHO RV TAPSE: 1.8 CM (ref 1.5–2)
ECHO TV REGURGITANT MAX VELOCITY: 247.11 CM/S
ECHO TV REGURGITANT PEAK GRADIENT: 24.45 MMHG
LA VOL DISK BP: 40.07 ML (ref 22–52)
LVOT MG: 1.83 MMHG

## 2021-02-01 ENCOUNTER — HOSPITAL ENCOUNTER (OUTPATIENT)
Dept: PHYSICAL THERAPY | Age: 51
Discharge: HOME | End: 2021-02-01
Payer: COMMERCIAL

## 2021-02-01 PROCEDURE — 97140 MANUAL THERAPY 1/> REGIONS: CPT | Performed by: PHYSICAL THERAPY ASSISTANT

## 2021-02-01 PROCEDURE — 97014 ELECTRIC STIMULATION THERAPY: CPT | Performed by: PHYSICAL THERAPY ASSISTANT

## 2021-02-01 PROCEDURE — 97110 THERAPEUTIC EXERCISES: CPT | Performed by: PHYSICAL THERAPY ASSISTANT

## 2021-02-01 NOTE — PROGRESS NOTES
Your nuclear stress test  Results will be discussed at your follow up visit  Future Appointments  2/8/2021   5:15 PM    Erica Doty, PTA   [facility]  2/12/2021  8:00 AM    [facility] AM[facility]  2/15/2021  6:00 PM    Richard Chamorro, PT      [facility] RE  2/19/2021  8:20 AM    Omari Pitts MD [facility]         [facility] AMB   Your echocardiogram (ultrasound) test of your heart is unremarkable. Your heart function is normal and your heart valves have no significant abnormalities. It looks good!

## 2021-02-01 NOTE — PROGRESS NOTES
PT DAILY TREATMENT NOTE 2-15    Patient Name: José Fairchild  Date:2021  : 1970  [x]  Patient  Verified  Payor: [employer] [place] / Plan: [facility] MEDICAL [address] / Product Type: Commerical /    In time: 5:00 PM  Out time:6:05  Total Treatment Time (min): 65  Visit #:  2    Treatment Area: Low back pain [M54.5]    SUBJECTIVE  Pain Level (0-10 scale): 5/10  Any medication changes, allergies to medications, adverse drug reactions, diagnosis change, or new procedure performed?: [x] No    [] Yes (see summary sheet for update)  Subjective functional status/changes:   [] No changes reported  Patient reports she had an MRI of her lumbar spine and is going to see Dr. Poonam Escamilla 81 about options on 21. States the exercises she has been doing seem to make her pain worse. Difficulty getting comfortable at night with pain radiating to B lateral hips. States she has the least amount of pain when she is standing.     OBJECTIVE    Modality rationale: decrease inflammation, decrease pain, increase tissue extensibility and increase muscle contraction/control to improve the patients ability to sit, sleep, and perform ADL's without pain/limitation   Min Type Additional Details      15 [x] Estim: []Att   [x]Unatt    []TENS instruct                  [x]IFC  []Premod   []NMES                     []Other:  []w/US   []w/ice   [x]w/heat  Position: supine with LE's elevated  Location: lumbar spine       []  Traction: [] Cervical       []Lumbar                       [] Prone          []Supine                       []Intermittent   []Continuous Lbs:  [] before manual  [] after manual  []w/heat    []  Ultrasound: []Continuous   [] Pulsed                       at: []1MHz   []3MHz Location:  W/cm2:    [] Paraffin         Location:   []w/heat   10 [x]  Ice     []  Heat  []  Ice massage Position: prone  Location: lumbar spine    []  Laser  []  Other: Position:  Location:      []  Vasopneumatic Device Pressure: [] lo [] med [] hi   Temperature:      [x] Skin assessment post-treatment:  [x]intact []redness- no adverse reaction    []redness  adverse reaction:         25 min Therapeutic Exercise:  [x] See flow sheet : modified exercises to include: prone glut squeeze, standing lumbar extension   Rationale: increase ROM, increase strength, improve coordination, improve balance and increase proprioception to improve the patients ability to sit, sleep, and perform ADL's without pain/limitation      15 min Manual Therapy:  HL lumbar traction using belt   Rationale: decrease pain, increase ROM, increase tissue extensibility and decrease trigger points  to improve the patients ability to sit, sleep, and perform ADL's without pain/limitation            With   [x] TE   [] TA   [] Neuro   [] SC   [] other: Patient Education: [x] Review HEP    [] Progressed/Changed HEP based on:   [x] positioning   [] body mechanics   [] transfers   [x] heat/ice application    [x] other: encouraged patient to perform standing lumbar extension and prone press ups 3-4x/day if she continues to respond well with extension based exercises. Hold from bridges at this hayden. Also encouraged patient to ice lumbar spine in prone      Other Objective/Functional Measures:   + pain relief lumbar distraction in HL     Pain Level (0-10 scale) post treatment: 2/10    ASSESSMENT/Changes in Function:   Patient responded well to HL traction using the belt and noted decreased pain following. Modified HEP and advised to avoid pain. Patient will continue to benefit from skilled PT services to modify and progress therapeutic interventions, address functional mobility deficits, address ROM deficits, address strength deficits, analyze and address soft tissue restrictions, analyze and cue movement patterns, analyze and modify body mechanics/ergonomics and instruct in home and community integration to attain remaining goals.      []  See Plan of Care  []  See progress note/recertification  []  See Discharge Summary         Progress towards goals / Updated goals:  NT    PLAN  [x]  Upgrade activities as tolerated     [x]  Continue plan of care  []  Update interventions per flow sheet       []  Discharge due to:_  [x]  Other: f/u regarding lumbar distraction      Lali Barrett, PTA 2/1/2021

## 2021-02-03 ENCOUNTER — DOCUMENTATION ONLY (OUTPATIENT)
Dept: PRIMARY CARE CLINIC | Age: 51
End: 2021-02-03

## 2021-02-03 NOTE — PROGRESS NOTES
Spoke with patient who states she has had some numbness over pubic region and sexual dysfunction. Gait is normal in office today. States these symptoms were present at the time the MRI was done. Denies urinary incontinence or numbness of inner thighs. States when she stands the pain is okay and hurts mainly when sitting. Relayed this information to Dr. Liana Crooks to see if she may need earlier appt and waiting to hear back. Did call their front office and there is no availability for this Friday. Spoke with Radiology Dr. Shira Fowler who took a second look at her MRI as well and agreed with first reading that degenerative changes were mild and stated he did not see anything in pelvic region that was abnormal or would correlate with these symptoms. Have reviewed ER precautions.

## 2021-02-08 ENCOUNTER — APPOINTMENT (OUTPATIENT)
Dept: PHYSICAL THERAPY | Age: 51
End: 2021-02-08
Payer: COMMERCIAL

## 2021-02-15 ENCOUNTER — HOSPITAL ENCOUNTER (OUTPATIENT)
Dept: PHYSICAL THERAPY | Age: 51
Discharge: HOME | End: 2021-02-15
Payer: COMMERCIAL

## 2021-02-15 PROCEDURE — 97014 ELECTRIC STIMULATION THERAPY: CPT | Performed by: PHYSICAL THERAPIST

## 2021-02-15 PROCEDURE — 97140 MANUAL THERAPY 1/> REGIONS: CPT | Performed by: PHYSICAL THERAPIST

## 2021-02-15 PROCEDURE — 97110 THERAPEUTIC EXERCISES: CPT | Performed by: PHYSICAL THERAPIST

## 2021-02-15 NOTE — PROGRESS NOTES
PT DAILY TREATMENT NOTE 2-15    Patient Name: Nargis Yan  Date:2/15/2021  : 1970  [x]  Patient  Verified  Payor: [employer] [place] / Plan: [facility] MEDICAL [place] [address] / Product Type: Commerical /    In time: 555 PM  Out time: 650 PM  Total Treatment Time (min): 55 (30 timed)  Visit #:  3    Treatment Area: Low back pain [M54.5]    SUBJECTIVE  Pain Level (0-10 scale): 3/10  Any medication changes, allergies to medications, adverse drug reactions, diagnosis change, or new procedure performed?: [x] No    [] Yes (see summary sheet for update)  Subjective functional status/changes:   [] No changes reported  Patient reports she had an injection last week and her pain is feeling better. She went back to work today which increased her pain.      OBJECTIVE    Modality rationale: decrease inflammation, decrease pain, increase tissue extensibility and increase muscle contraction/control to improve the patients ability to sit, sleep, and perform ADL's without pain/limitation   Min Type Additional Details      15 [x] Estim: []Att   [x]Unatt    []TENS instruct                  [x]IFC  []Premod   []NMES                     []Other:  []w/US   []w/ice   [x]w/heat  Position: supine with LE's elevated  Location: lumbar spine       []  Traction: [] Cervical       []Lumbar                       [] Prone          []Supine                       []Intermittent   []Continuous Lbs:  [] before manual  [] after manual  []w/heat    []  Ultrasound: []Continuous   [] Pulsed                       at: []1MHz   []3MHz Location:  W/cm2:    [] Paraffin         Location:   []w/heat   10 [x]  Ice     []  Heat  []  Ice massage Position: prone  Location: lumbar spine    []  Laser  []  Other: Position:  Location:      []  Vasopneumatic Device Pressure:       [] lo [] med [] hi   Temperature:      [x] Skin assessment post-treatment:  [x]intact []redness- no adverse reaction    []redness  adverse reaction:         20 min Therapeutic Exercise:  [x] See flow sheet : Progressed per flow sheet    Rationale: increase ROM, increase strength, improve coordination, improve balance and increase proprioception to improve the patients ability to sit, sleep, and perform ADL's without pain/limitation      10 min Manual Therapy: ZEUS ga L3,4 grade II, sacral float    Rationale: decrease pain, increase ROM, increase tissue extensibility and decrease trigger points  to improve the patients ability to sit, sleep, and perform ADL's without pain/limitation            With   [x] TE   [] TA   [] Neuro   [] SC   [] other: Patient Education: [x] Review HEP    [] Progressed/Changed HEP based on:   [x] positioning   [] body mechanics   [] transfers   [x] heat/ice application    [x] other: workplace ergonomics to avoid twisting, take short walk daily 5-10 min      Other Objective/Functional Measures:        Pain Level (0-10 scale) post treatment: 2/10    ASSESSMENT/Changes in Function:   Patient tolerated standing exercises well today. Patient will continue to benefit from skilled PT services to modify and progress therapeutic interventions, address functional mobility deficits, address ROM deficits, address strength deficits, analyze and address soft tissue restrictions, analyze and cue movement patterns, analyze and modify body mechanics/ergonomics and instruct in home and community integration to attain remaining goals.      []  See Plan of Care  []  See progress note/recertification  []  See Discharge Summary         Progress towards goals / Updated goals:  NT    PLAN  [x]  Upgrade activities as tolerated     [x]  Continue plan of care  []  Update interventions per flow sheet       []  Discharge due to:_  [x]  Other: f/u regarding lumbar distraction      Maciej Neff, PT 2/15/2021

## 2021-02-16 DIAGNOSIS — F33.1 MODERATE EPISODE OF RECURRENT MAJOR DEPRESSIVE DISORDER (HCC): ICD-10-CM

## 2021-02-17 RX ORDER — FLUOXETINE HYDROCHLORIDE 20 MG/1
20 CAPSULE ORAL DAILY
Qty: 90 CAP | Refills: 0 | Status: SHIPPED | OUTPATIENT
Start: 2021-02-17 | End: 2021-05-22

## 2021-02-17 RX ORDER — FLUOXETINE HYDROCHLORIDE 40 MG/1
40 CAPSULE ORAL DAILY
Qty: 90 CAP | Refills: 0 | Status: SHIPPED | OUTPATIENT
Start: 2021-02-17 | End: 2021-03-24 | Stop reason: DRUGHIGH

## 2021-02-22 ENCOUNTER — HOSPITAL ENCOUNTER (OUTPATIENT)
Dept: PHYSICAL THERAPY | Age: 51
Discharge: HOME | End: 2021-02-22
Payer: COMMERCIAL

## 2021-02-22 PROCEDURE — 97110 THERAPEUTIC EXERCISES: CPT | Performed by: PHYSICAL THERAPIST

## 2021-02-22 PROCEDURE — 97014 ELECTRIC STIMULATION THERAPY: CPT | Performed by: PHYSICAL THERAPIST

## 2021-02-22 PROCEDURE — 97140 MANUAL THERAPY 1/> REGIONS: CPT | Performed by: PHYSICAL THERAPIST

## 2021-02-22 NOTE — PROGRESS NOTES
PT DAILY TREATMENT NOTE 2-15    Patient Name: Mary Pineda  Date:2021  : 1970  [x]  Patient  Verified  Payor: MEDICAL [facility] / Plan: [facility] MEDICAL [place] [address] / Product Type: Commerical /    In time: 540 PM  Out time: 6:35  PM  Total Treatment Time (min): 55 (40 timed)  Visit #:  4    Treatment Area: Low back pain [M54.5]    SUBJECTIVE  Pain Level (0-10 scale): 3/10  Any medication changes, allergies to medications, adverse drug reactions, diagnosis change, or new procedure performed?: [x] No    [] Yes (see summary sheet for update)  Subjective functional status/changes:   [] No changes reported  Patient reports 50% improvement in symptoms since having injection. She occasionally gets pain radiating into her R hip.  Her pain is worse after working full day    OBJECTIVE    Modality rationale: decrease inflammation, decrease pain, increase tissue extensibility and increase muscle contraction/control to improve the patients ability to sit, sleep, and perform ADL's without pain/limitation   Min Type Additional Details      15 [x] Estim: []Att   [x]Unatt    []TENS instruct                  [x]IFC  []Premod   []NMES                     []Other:  []w/US   []w/ice   [x]w/heat  Position: supine with LE's elevated  Location: lumbar spine       []  Traction: [] Cervical       []Lumbar                       [] Prone          []Supine                       []Intermittent   []Continuous Lbs:  [] before manual  [] after manual  []w/heat    []  Ultrasound: []Continuous   [] Pulsed                       at: []1MHz   []3MHz Location:  W/cm2:    [] Paraffin         Location:   []w/heat    [x]  Ice     []  Heat  []  Ice massage Position: prone  Location: lumbar spine    []  Laser  []  Other: Position:  Location:      []  Vasopneumatic Device Pressure:       [] lo [] med [] hi   Temperature:      [x] Skin assessment post-treatment:  [x]intact []redness- no adverse reaction    []redness  adverse reaction:         25 min Therapeutic Exercise:  [x] See flow sheet : Progressed per flow sheet    Rationale: increase ROM, increase strength, improve coordination, improve balance and increase proprioception to improve the patients ability to sit, sleep, and perform ADL's without pain/limitation      15 min Manual Therapy: ZEUS ga L3,4 grade II   hooklying lumbar traction with belt   Rationale: decrease pain, increase ROM, increase tissue extensibility and decrease trigger points  to improve the patients ability to sit, sleep, and perform ADL's without pain/limitation            With   [x] TE   [] TA   [] Neuro   [] SC   [] other: Patient Education: [x] Review HEP    [] Progressed/Changed HEP based on:   [x] positioning   [] body mechanics   [] transfers   [x] heat/ice application    [x] other: workplace ergonomics to avoid twisting, take short walk daily 5-10 min      Other Objective/Functional Measures: n/a     Pain Level (0-10 scale) post treatment: not reported    ASSESSMENT/Changes in Function:   Progressed exercises today; pt ruth without inc'd symptoms  Patient will continue to benefit from skilled PT services to modify and progress therapeutic interventions, address functional mobility deficits, address ROM deficits, address strength deficits, analyze and address soft tissue restrictions, analyze and cue movement patterns, analyze and modify body mechanics/ergonomics and instruct in home and community integration to attain remaining goals.      [x]  See Plan of Care  []  See progress note/recertification  []  See Discharge Summary         Progress towards goals / Updated goals:  NT    PLAN  [x]  Upgrade activities as tolerated     [x]  Continue plan of care  []  Update interventions per flow sheet       []  Discharge due to:_  [x]  Other:       Tammy Pena, PT 2/22/2021

## 2021-02-23 ENCOUNTER — APPOINTMENT (OUTPATIENT)
Dept: PHYSICAL THERAPY | Age: 51
End: 2021-02-23
Payer: COMMERCIAL

## 2021-02-26 ENCOUNTER — ANCILLARY PROCEDURE (OUTPATIENT)
Dept: CARDIOLOGY CLINIC | Age: 51
End: 2021-02-26
Payer: COMMERCIAL

## 2021-02-26 VITALS — BODY MASS INDEX: 33.66 KG/M2 | WEIGHT: 190 LBS

## 2021-02-26 DIAGNOSIS — F43.9 STRESS AT HOME: ICD-10-CM

## 2021-02-26 DIAGNOSIS — G47.00 INSOMNIA, UNSPECIFIED TYPE: ICD-10-CM

## 2021-02-26 DIAGNOSIS — F41.0 PANIC ATTACK: ICD-10-CM

## 2021-02-26 DIAGNOSIS — M54.2 NECK PAIN ON LEFT SIDE: ICD-10-CM

## 2021-02-26 DIAGNOSIS — F41.9 ANXIETY: ICD-10-CM

## 2021-02-26 PROCEDURE — A9500 TC99M SESTAMIBI: HCPCS | Performed by: SPECIALIST

## 2021-02-26 PROCEDURE — 93015 CV STRESS TEST SUPVJ I&R: CPT | Performed by: SPECIALIST

## 2021-02-26 PROCEDURE — 78452 HT MUSCLE IMAGE SPECT MULT: CPT | Performed by: SPECIALIST

## 2021-02-26 RX ORDER — TETRAKIS(2-METHOXYISOBUTYLISOCYANIDE)COPPER(I) TETRAFLUOROBORATE 1 MG/ML
10 INJECTION, POWDER, LYOPHILIZED, FOR SOLUTION INTRAVENOUS ONCE
Status: COMPLETED | OUTPATIENT
Start: 2021-02-26 | End: 2021-02-26

## 2021-02-26 RX ORDER — AMINOPHYLLINE 25 MG/ML
100 INJECTION, SOLUTION INTRAVENOUS ONCE
Status: COMPLETED | OUTPATIENT
Start: 2021-02-26 | End: 2021-02-26

## 2021-02-26 RX ORDER — TETRAKIS(2-METHOXYISOBUTYLISOCYANIDE)COPPER(I) TETRAFLUOROBORATE 1 MG/ML
30 INJECTION, POWDER, LYOPHILIZED, FOR SOLUTION INTRAVENOUS ONCE
Status: COMPLETED | OUTPATIENT
Start: 2021-02-26 | End: 2021-02-26

## 2021-02-26 RX ADMIN — AMINOPHYLLINE 100 MG: 25 INJECTION, SOLUTION INTRAVENOUS at 10:00

## 2021-02-26 RX ADMIN — TETRAKIS(2-METHOXYISOBUTYLISOCYANIDE)COPPER(I) TETRAFLUOROBORATE 26.2 MILLICURIE: 1 INJECTION, POWDER, LYOPHILIZED, FOR SOLUTION INTRAVENOUS at 09:55

## 2021-02-26 RX ADMIN — TETRAKIS(2-METHOXYISOBUTYLISOCYANIDE)COPPER(I) TETRAFLUOROBORATE 8.1 MILLICURIE: 1 INJECTION, POWDER, LYOPHILIZED, FOR SOLUTION INTRAVENOUS at 08:20

## 2021-03-01 ENCOUNTER — HOSPITAL ENCOUNTER (OUTPATIENT)
Dept: PHYSICAL THERAPY | Age: 51
Discharge: HOME | End: 2021-03-01
Payer: COMMERCIAL

## 2021-03-01 PROCEDURE — 97110 THERAPEUTIC EXERCISES: CPT | Performed by: PHYSICAL THERAPIST

## 2021-03-01 PROCEDURE — 97140 MANUAL THERAPY 1/> REGIONS: CPT | Performed by: PHYSICAL THERAPIST

## 2021-03-01 PROCEDURE — 97014 ELECTRIC STIMULATION THERAPY: CPT | Performed by: PHYSICAL THERAPIST

## 2021-03-01 NOTE — PROGRESS NOTES
PT DAILY TREATMENT NOTE 2-15    Patient Name: Hunter Hernández  Date:3/1/2021  : 1970  [x]  Patient  Verified  Payor: MEDICAL [facility] / Plan: [facility] MEDICAL [place] [address] / Product Type: Commerical /    In time: 510 PM  Out time:  615  PM  Total Treatment Time (min): 65 (50 timed)  Visit #:  5    Treatment Area: Low back pain [M54.5]    SUBJECTIVE  Pain Level (0-10 scale): 2/10  Any medication changes, allergies to medications, adverse drug reactions, diagnosis change, or new procedure performed?: [x] No    [] Yes (see summary sheet for update)  Subjective functional status/changes:   [] No changes reported  Patient states that her back is feeling much better; she has really bad restless legs that are bothering her recently  She has not had any radicular symptoms down R LE since injections  Pain is worst at night when lying down/trying to sleep    OBJECTIVE    Modality rationale: decrease inflammation, decrease pain, increase tissue extensibility and increase muscle contraction/control to improve the patients ability to sit, sleep, and perform ADL's without pain/limitation   Min Type Additional Details      15 [x] Estim: []Att   [x]Unatt    []TENS instruct                  [x]IFC  []Premod   []NMES                     []Other:  []w/US   []w/ice   [x]w/heat  Position: prone, pillow under hips  Location: lumbar spine       []  Traction: [] Cervical       []Lumbar                       [] Prone          []Supine                       []Intermittent   []Continuous Lbs:  [] before manual  [] after manual  []w/heat    []  Ultrasound: []Continuous   [] Pulsed                       at: []1MHz   []3MHz Location:  W/cm2:    [] Paraffin         Location:   []w/heat    [x]  Ice     []  Heat  []  Ice massage Position: prone  Location: lumbar spine    []  Laser  []  Other: Position:  Location:      []  Vasopneumatic Device Pressure:       [] lo [] med [] hi   Temperature:      [x] Skin assessment post-treatment:  [x]intact []redness- no adverse reaction    []redness  adverse reaction:         35 min Therapeutic Exercise:  [x] See flow sheet : Progressed per flow sheet    Rationale: increase ROM, increase strength, improve coordination, improve balance and increase proprioception to improve the patients ability to sit, sleep, and perform ADL's without pain/limitation      15 min Manual Therapy:   hooklying lumbar traction with belt   Rationale: decrease pain, increase ROM, increase tissue extensibility and decrease trigger points  to improve the patients ability to sit, sleep, and perform ADL's without pain/limitation            With   [x] TE   [] TA   [] Neuro   [] SC   [] other: Patient Education: [x] Review HEP    [] Progressed/Changed HEP based on:   [x] positioning   [] body mechanics   [] transfers   [x] heat/ice application    [x] other: workplace ergonomics to avoid twisting, take short walk daily 5-10 min      Other Objective/Functional Measures: n/a     Pain Level (0-10 scale) post treatment: not reported    ASSESSMENT/Changes in Function:   ruth progression of exercises well today without aggravation of symptoms  Patient will continue to benefit from skilled PT services to modify and progress therapeutic interventions, address functional mobility deficits, address ROM deficits, address strength deficits, analyze and address soft tissue restrictions, analyze and cue movement patterns, analyze and modify body mechanics/ergonomics and instruct in home and community integration to attain remaining goals. [x]  See Plan of Care  []  See progress note/recertification  []  See Discharge Summary         Progress towards goals / Updated goals:  Short Term Goals: To be accomplished in 2-3 weeks:  1) Pt will be independent in initial HEP  2) Pt will report >/=25% improvement in symptoms     Long Term Goals:  To be accomplished in 6-8 weeks:  1) Pt will report being able to sit for >/=30 min without symptoms  2) Pt will report being able to work full shift without back pain  3) Pt will improve bilat hip abduction strength to >/=4+/5 in order to assist in daily activities, exercise  4) Pt will report >/=75% improvement in symptoms    PLAN  [x]  Upgrade activities as tolerated     [x]  Continue plan of care  []  Update interventions per flow sheet       []  Discharge due to:_  [x]  Other:       Valencia Suh, PT 3/1/2021

## 2021-03-05 ENCOUNTER — OFFICE VISIT (OUTPATIENT)
Dept: CARDIOLOGY CLINIC | Age: 51
End: 2021-03-05
Payer: COMMERCIAL

## 2021-03-05 VITALS
SYSTOLIC BLOOD PRESSURE: 140 MMHG | WEIGHT: 186 LBS | DIASTOLIC BLOOD PRESSURE: 90 MMHG | OXYGEN SATURATION: 97 % | HEART RATE: 83 BPM | RESPIRATION RATE: 16 BRPM | BODY MASS INDEX: 36.52 KG/M2 | HEIGHT: 60 IN

## 2021-03-05 DIAGNOSIS — R07.9 CHEST PAIN, UNSPECIFIED TYPE: Primary | ICD-10-CM

## 2021-03-05 DIAGNOSIS — R06.09 DOE (DYSPNEA ON EXERTION): ICD-10-CM

## 2021-03-05 PROCEDURE — 99213 OFFICE O/P EST LOW 20 MIN: CPT | Performed by: SPECIALIST

## 2021-03-05 NOTE — PROGRESS NOTES
Visit Vitals  BP (!) 140/90 (BP 1 Location: Right arm, BP Patient Position: Sitting, BP Cuff Size: Large adult)   Pulse 83   Resp 16   Ht 5' (1.524 m)   Wt 186 lb (84.4 kg)   SpO2 97%   BMI 36.33 kg/m²

## 2021-03-05 NOTE — PROGRESS NOTES
Zulma Jolley     1970       Omari Rangel MD, [facility]  Date of Visit-3/5/2021   PCP is DO Aime Carey [facility] [place]  [facility]  HPI:  Zulma Jolley is a 48 y.o. female   2 month f/u of chest tightness and abnormal EKG. Stress test 2/26 normal. Echo 1/29 normal.     From a heart standpoint, pt is doing well. She stopped having chest tightness in January. She believes she is starting menopause. she has She has a herniated disc for which she is in PT and getting steroid injections. Denies chest pain, edema, syncope or shortness of breath at rest, has no tachycardia, palpitations or sense of arrhythmia. When seen 12/30/21= having some chest tightness so she went to have a physical and was sent to the ER. The ER said that she had anxiety. Her grandmother and grandfather got heart disease in their [de-identified].  01/29/21   ECHO ADULT COMPLETE 01/30/2021 1/30/2021    Narrative · LV: Estimated LVEF is 60 - 65%. Biplane method used to measure ejection   fraction. Normal cavity size, wall thickness, systolic function (ejection   fraction normal) and diastolic function. Wall motion: normal.  · PA: Pulmonary arterial systolic pressure is 27 mmHg. within normal limits        Signed by: Yola Beckwith MD     01/29/21   NUCLEAR CARDIAC STRESS TEST 02/26/2021 3/1/2021    Narrative · Gated SPECT: Left ventricular function post-stress was normal.   Calculated ejection fraction is 68%. There is no evidence of transient   ischemic dilation (TID). The TID ratio is 1.02.  · Baseline ECG: Normal sinus rhythm. · Myocardial perfusion imaging supports a low risk stress test.  · Left ventricular perfusion is normal.     within normal limits        Signed by: Yola Beckwith MD       Assessment/Plan:     Pt here to review testing. She has had complete resolution of her sx's. She feels no angina or AGUILAR and her sx's may relate to menopause.  She will see us back PRN. Future Appointments   Date Time Provider Carine Mancini   3/24/2021  3:30 PM Allocca, Deb Larry, NP [facility]      F/u PRN     Impression:   1. Chest pain, unspecified type    2. AGUILAR (dyspnea on exertion)       Cardiac History:   No specialty comments available. ROS-except as noted above. . A complete cardiac and respiratory are reviewed and negative except as above ; Resp-denies wheezing  or productive cough,. Const- No unusual weight loss or fever; Neuro-no recent seizure or CVA ; GI- No BRBPR, abdom pain, bloating ; - no  hematuria   see supplement sheet, initialed and to be scanned by staff  Past Medical History:   Diagnosis Date    Back pain, acute     Depression     H/O seasonal allergies     Maxillary sinus fracture ([facility] [address] 2016    Neck pain, musculoskeletal       Social Hx= reports that she has never smoked. She has never used smokeless tobacco. She reports current alcohol use. She reports that she does not use drugs. Exam and Labs:  BP (!) 140/90 (BP 1 Location: Right arm, BP Patient Position: Sitting, BP Cuff Size: Large adult)   Pulse 83   Resp 16   Ht 5' (1.524 m)   Wt 186 lb (84.4 kg)   SpO2 97%   BMI 36.33 kg/m² Constitutional:  NAD, comfortable  Head: NC,AT. Eyes: No scleral icterus. Neck:  Neck supple. No JVD present. Throat: moist mucous membranes. Chest: Effort normal & normal respiratory excursion . Neurological: alert, conversant and oriented . Skin: Skin is not cold. No obvious systemic rash noted. Not diaphoretic. No erythema. Psychiatric:  Grossly normal mood and affect. Behavior appears normal. Extremities:  no clubbing or cyanosis. Abdomen: non distended    Lungs:breath sounds normal. No stridor. distress, wheezes or  Rales. Heart: normal rate, regular rhythm, normal S1, S2, no murmurs, rubs, clicks or gallops , PMI non displaced. Edema: Edema is none.   Lab Results   Component Value Date/Time    Cholesterol, total 225 (H) 08/21/2020 08:48 AM    HDL Cholesterol 51 08/21/2020 08:48 AM    LDL, calculated 143.8 (H) 08/21/2020 08:48 AM    Triglyceride 151 (H) 08/21/2020 08:48 AM     Lab Results   Component Value Date/Time    Sodium 140 12/11/2020 09:38 AM    Potassium 4.0 12/11/2020 09:38 AM    Chloride 102 12/11/2020 09:38 AM    CO2 25 12/11/2020 09:38 AM    Anion gap 13 12/11/2020 09:38 AM    Glucose 88 12/11/2020 09:38 AM    BUN 17 12/11/2020 09:38 AM    Creatinine 0.99 12/11/2020 09:38 AM    BUN/Creatinine ratio 17 12/11/2020 09:38 AM    GFR est AA >60 12/11/2020 09:38 AM    GFR est non-AA 59 (L) 12/11/2020 09:38 AM    Calcium 8.7 12/11/2020 09:38 AM      Wt Readings from Last 3 Encounters:   03/05/21 186 lb (84.4 kg)   02/26/21 190 lb (86.2 kg)   01/29/21 190 lb (86.2 kg)      BP Readings from Last 3 Encounters:   03/05/21 (!) 140/90   01/29/21 134/86   01/22/21 135/87      Current Outpatient Medications   Medication Sig    FLUoxetine (PROzac) 40 mg capsule Take 1 Cap by mouth daily.  FLUoxetine (PROzac) 20 mg capsule Take 1 Cap by mouth daily. Take with 40 mg cap for total dose 60 mg.    methylPREDNISolone (MEDROL DOSEPACK) 4 mg tablet Follow instructions on box.  lidocaine (Lidoderm) 5 % Apply patch to the affected area for 12 hours a day and remove for 12 hours a day.  Rybelsus 7 mg tablet PLEASE SEE ATTACHED FOR DETAILED DIRECTIONS    omeprazole (PRILOSEC) 20 mg capsule Take 1 Cap by mouth daily.  hydrOXYzine HCL (ATARAX) 25 mg tablet Take 1.5 Tabs by mouth daily as needed for Anxiety.  levothyroxine (SYNTHROID) 50 mcg tablet Take 50 mcg by mouth.  cholecalciferol, vitamin D3, 2,000 unit tab Take  by mouth.  EPINEPHrine (EPIPEN 2-EDWIGE) 0.3 mg/0.3 mL (1:1,000) injection 0.3 mL by IntraMUSCular route once as needed for up to 1 dose. No current facility-administered medications for this visit. Impression see above.       Written by Luke Hargrove, as dictated by Lin Vernon MD.

## 2021-03-05 NOTE — Clinical Note
3/5/2021 Patient: Najma Fernandez YOB: 1970 Date of Visit: 3/5/2021 [address] [facility] [address] [facility] [address] [id number] [address] [employer] Dear Bennie Caruso DO, Thank you for referring Ms. Naila Dye to [facility] for evaluation. My notes for this consultation are attached. If you have questions, please do not hesitate to call me. I look forward to following your patient along with you.  
 
 
Sincerely, 
 
Pily Tejada MD

## 2021-03-08 ENCOUNTER — HOSPITAL ENCOUNTER (OUTPATIENT)
Dept: PHYSICAL THERAPY | Age: 51
Discharge: HOME | End: 2021-03-08
Payer: COMMERCIAL

## 2021-03-08 PROCEDURE — 97110 THERAPEUTIC EXERCISES: CPT | Performed by: PHYSICAL THERAPY ASSISTANT

## 2021-03-08 PROCEDURE — 97140 MANUAL THERAPY 1/> REGIONS: CPT | Performed by: PHYSICAL THERAPY ASSISTANT

## 2021-03-08 PROCEDURE — 97014 ELECTRIC STIMULATION THERAPY: CPT | Performed by: PHYSICAL THERAPY ASSISTANT

## 2021-03-08 NOTE — PROGRESS NOTES
PT DAILY TREATMENT NOTE 2-15    Patient Name: Naun Barrera  Date:3/8/2021  : 1970  [x]  Patient  Verified  Payor: MEDICAL [facility] / Plan: [facility] MEDICAL [address] / Product Type: Commerical /    In time: 5:45 PM  Out time:  6:40  PM  Total Treatment Time (min): 55 (43 timed)  Visit #:  6    Treatment Area: Low back pain [M54.5]    SUBJECTIVE  Pain Level (0-10 scale): 2/10  Any medication changes, allergies to medications, adverse drug reactions, diagnosis change, or new procedure performed?: [x] No    [] Yes (see summary sheet for update)  Subjective functional status/changes:   [] No changes reported  Patient \"a little sore today. \" states she will have increased low back pain \"If I sit for too long. \"    OBJECTIVE    Modality rationale: decrease inflammation, decrease pain, increase tissue extensibility and increase muscle contraction/control to improve the patients ability to sit, sleep, and perform ADL's without pain/limitation   Min Type Additional Details      12 [x] Estim: []Att   [x]Unatt    []TENS instruct                  [x]IFC  []Premod   []NMES                     []Other:  []w/US   []w/ice   [x]w/heat  Position: prone, pillow under hips  Location: lumbar spine       []  Traction: [] Cervical       []Lumbar                       [] Prone          []Supine                       []Intermittent   []Continuous Lbs:  [] before manual  [] after manual  []w/heat    []  Ultrasound: []Continuous   [] Pulsed                       at: []1MHz   []3MHz Location:  W/cm2:    [] Paraffin         Location:   []w/heat    [x]  Ice     []  Heat  []  Ice massage Position: prone  Location: lumbar spine    []  Laser  []  Other: Position:  Location:      []  Vasopneumatic Device Pressure:       [] lo [] med [] hi   Temperature:      [x] Skin assessment post-treatment:  [x]intact []redness- no adverse reaction    []redness  adverse reaction:         30 min Therapeutic Exercise:  [x] See flow sheet : Progressed per flow sheet    Rationale: increase ROM, increase strength, improve coordination, improve balance and increase proprioception to improve the patients ability to sit, sleep, and perform ADL's without pain/limitation      13 min Manual Therapy:   hooklying lumbar traction with belt   Rationale: decrease pain, increase ROM, increase tissue extensibility and decrease trigger points  to improve the patients ability to sit, sleep, and perform ADL's without pain/limitation            With   [x] TE   [] TA   [] Neuro   [] SC   [] other: Patient Education: [x] Review HEP    [] Progressed/Changed HEP based on:   [x] positioning   [] body mechanics   [] transfers   [x] heat/ice application    [x] other: workplace ergonomics to avoid twisting, take short walk daily 5-10 min      Other Objective/Functional Measures: n/a     Pain Level (0-10 scale) post treatment: 0/10    ASSESSMENT/Changes in Function:   Progressing well with strength and core/lumbar stability. Pain levels steadily decreasing. Patient will continue to benefit from skilled PT services to modify and progress therapeutic interventions, address functional mobility deficits, address ROM deficits, address strength deficits, analyze and address soft tissue restrictions, analyze and cue movement patterns, analyze and modify body mechanics/ergonomics and instruct in home and community integration to attain remaining goals. [x]  See Plan of Care  []  See progress note/recertification  []  See Discharge Summary         Progress towards goals / Updated goals:  Short Term Goals: To be accomplished in 2-3 weeks:  1) Pt will be independent in initial HEP  2) Pt will report >/=25% improvement in symptoms     Long Term Goals:  To be accomplished in 6-8 weeks:  1) Pt will report being able to sit for >/=30 min without symptoms  2) Pt will report being able to work full shift without back pain  3) Pt will improve bilat hip abduction strength to >/=4+/5 in order to assist in daily activities, exercise  4) Pt will report >/=75% improvement in symptoms    PLAN  [x]  Upgrade activities as tolerated     [x]  Continue plan of care  []  Update interventions per flow sheet       []  Discharge due to:_  [x]  Other:       Sheryle Holster, PTA 3/8/2021

## 2021-03-15 ENCOUNTER — HOSPITAL ENCOUNTER (OUTPATIENT)
Dept: PHYSICAL THERAPY | Age: 51
Discharge: HOME | End: 2021-03-15
Payer: COMMERCIAL

## 2021-03-15 PROCEDURE — 97110 THERAPEUTIC EXERCISES: CPT | Performed by: PHYSICAL THERAPIST

## 2021-03-15 NOTE — PROGRESS NOTES
PT DAILY TREATMENT/Discharge NOTE 2-15    Patient Name: Miguel Zapata  Date:3/15/2021  : 1970  [x]  Patient  Verified  Payor: MEDICAL [facility] / Plan: [facility] MEDICAL [place] [address] [facility] / Product Type: Commerical /    In time: 5:10 PM  Out time:  5:50 P  Total Treatment Time (min): 40 (40 timed)  Visit #:  7    Treatment Area: Low back pain [M54.5]    SUBJECTIVE  Pain Level (0-10 scale): 0/10  Any medication changes, allergies to medications, adverse drug reactions, diagnosis change, or new procedure performed?: [x] No    [] Yes (see summary sheet for update)  Subjective functional status/changes:   [] No changes reported  Patient states that she is feeling good today, no pain. She states occasional pain if she sits for too long (malou at work) or if she does repetitive bending forward motion.  Reports 80% improvement in symptoms since beginning PT.      OBJECTIVE    Modality rationale: decrease inflammation, decrease pain, increase tissue extensibility and increase muscle contraction/control to improve the patients ability to sit, sleep, and perform ADL's without pain/limitation   Min Type Additional Details      0 [x] Estim: []Att   [x]Unatt    []TENS instruct                  [x]IFC  []Premod   []NMES                     []Other:  []w/US   []w/ice   [x]w/heat  Position: prone, pillow under hips  Location: lumbar spine       []  Traction: [] Cervical       []Lumbar                       [] Prone          []Supine                       []Intermittent   []Continuous Lbs:  [] before manual  [] after manual  []w/heat    []  Ultrasound: []Continuous   [] Pulsed                       at: []1MHz   []3MHz Location:  W/cm2:    [] Paraffin         Location:   []w/heat    []  Ice     []  Heat  []  Ice massage Position: prone  Location: lumbar spine    []  Laser  []  Other: Position:  Location:      []  Vasopneumatic Device Pressure:       [] lo [] med [] hi   Temperature:      [x] Skin assessment post-treatment:  [x]intact []redness- no adverse reaction    []redness  adverse reaction:         40 min Therapeutic Exercise:  [x] See flow sheet : Progressed per flow sheet ; objective measurements taken for discharge. Rationale: increase ROM, increase strength, improve coordination, improve balance and increase proprioception to improve the patients ability to sit, sleep, and perform ADL's without pain/limitation      0 min Manual Therapy:   hooklying lumbar traction with belt   Rationale: decrease pain, increase ROM, increase tissue extensibility and decrease trigger points  to improve the patients ability to sit, sleep, and perform ADL's without pain/limitation            With   [x] TE   [] TA   [] Neuro   [] SC   [] other: Patient Education: [x] Review HEP    [] Progressed/Changed HEP based on:   [x] positioning   [] body mechanics   [] transfers   [x] heat/ice application    [x] other: reviewed proper lifting mechanics; importance of frequent standing, walking     Other Objective/Functional Measures:   Lumbar AROM WNL all directions, no pain    Strength  Hip flexion 5/5  Knee flex, ext 5/5     Pain Level (0-10 scale) post treatment: 0/10    ASSESSMENT/Changes in Function:   Pt has completed 7 skilled PT visits. She has met 2/2 STG's, 3/4 LTG's. She reports 80% improvement in symptoms since beginning PT. She demonstrates dec'd pain levels, improvement in ROM, strength, and functional abilities such as sitting compared to initial visit. Pt is independent in HEP and ready for D/C at this time. []  See Plan of Care  []  See progress note/recertification  [x]  See Discharge Summary         Progress towards goals / Updated goals:  Short Term Goals: To be accomplished in 2-3 weeks:  1) Pt will be independent in initial HEP MET  2) Pt will report >/=25% improvement in symptoms MET     Long Term Goals:  To be accomplished in 6-8 weeks:  1) Pt will report being able to sit for >/=30 min without symptoms MET  2) Pt will report being able to work full shift without back pain MET  3) Pt will improve bilat hip abduction strength to >/=4+/5 in order to assist in daily activities, exercise n/a  4) Pt will report >/=75% improvement in symptoms MET    PLAN      [x]  Discharge due to:_pt met/progressing toward set PT goals        Aniket Burnham, PT 3/15/2021

## 2021-03-24 ENCOUNTER — PATIENT MESSAGE (OUTPATIENT)
Dept: PRIMARY CARE CLINIC | Age: 51
End: 2021-03-24

## 2021-03-24 ENCOUNTER — VIRTUAL VISIT (OUTPATIENT)
Dept: BEHAVIORAL/MENTAL HEALTH CLINIC | Age: 51
End: 2021-03-24
Payer: COMMERCIAL

## 2021-03-24 DIAGNOSIS — R11.0 NAUSEA: ICD-10-CM

## 2021-03-24 DIAGNOSIS — F33.1 MODERATE EPISODE OF RECURRENT MAJOR DEPRESSIVE DISORDER (HCC): Primary | ICD-10-CM

## 2021-03-24 DIAGNOSIS — F41.9 ANXIETY: ICD-10-CM

## 2021-03-24 PROCEDURE — 99442 PR PHYS/QHP TELEPHONE EVALUATION 11-20 MIN: CPT | Performed by: NURSE PRACTITIONER

## 2021-03-24 NOTE — PROGRESS NOTES
CHIEF COMPLAINT:  Miguel Zapata is a 48 y.o. female and was seen today for follow-up of psychiatric condition and psychotropic medication management. HPI:    Popeye Quigley reports the following psychiatric symptoms by hx:  depression and anxiety. Overall symptoms have been present for months. Currently symptoms are of moderate severity. The symptoms occur daily. Pt reports medications are beneficial overall. Pt states she was feeling oversedated and so she dropped prozac dose t 20 mg. Met with pt via audio only telehealth for appt today. FAMILY/SOCIAL HX: medical stressors    REVIEW OF SYSTEMS:  Psychiatric:  depression, anxiety  Appetite:good   Sleep: improved   Neuro: no updates      Side Effects:  none    MENTAL STATUS EXAM:   Sensorium  oriented to time, place and person   Relations cooperative   Appearance:  Briefly assessed before video stopped working   Motor Behavior:  not assessed   Speech:  normal volume   Thought Process: goal directed   Thought Content free of delusions and free of hallucinations   Suicidal ideations no intention   Homicidal ideations no intention   Mood:  anxious and depressed   Affect:  anxious and depressed   Memory recent  adequate   Memory remote:  adequate   Concentration:  adequate   Abstraction:  abstract   Insight:  good   Reliability good   Judgment:  good     MEDICAL DECISION MAKING:  Problems addressed today:    ICD-10-CM ICD-9-CM    1. Moderate episode of recurrent major depressive disorder (HCC)  F33.1 296.32    2. Anxiety  F41.9 300.00        Assessment:   Popeye Quigley is responding to treatment. Symptoms are less severe at this time. Discussed current medications and dosages. No changes required. Reviewed possibility pt may have lowered prozac dose too quickly and she may need 40 mg dose. Pt will monitor. She reports she has not had to use prn anxiety medication. Reviewed treatment goals and target symptoms to monitor for. Plan:   1.     Current Outpatient Medications Medication Sig Dispense Refill    FLUoxetine (PROzac) 40 mg capsule Take 1 Cap by mouth daily. 90 Cap 0    FLUoxetine (PROzac) 20 mg capsule Take 1 Cap by mouth daily. Take with 40 mg cap for total dose 60 mg. 90 Cap 0    methylPREDNISolone (MEDROL DOSEPACK) 4 mg tablet Follow instructions on box. 1 Dose Pack 0    lidocaine (Lidoderm) 5 % Apply patch to the affected area for 12 hours a day and remove for 12 hours a day. 15 Each 0    Rybelsus 7 mg tablet PLEASE SEE ATTACHED FOR DETAILED DIRECTIONS      omeprazole (PRILOSEC) 20 mg capsule Take 1 Cap by mouth daily. 42 Cap 0    hydrOXYzine HCL (ATARAX) 25 mg tablet Take 1.5 Tabs by mouth daily as needed for Anxiety. 30 Tab 1    levothyroxine (SYNTHROID) 50 mcg tablet Take 50 mcg by mouth.  cholecalciferol, vitamin D3, 2,000 unit tab Take  by mouth.  EPINEPHrine (EPIPEN 2-EDWIGE) 0.3 mg/0.3 mL (1:1,000) injection 0.3 mL by IntraMUSCular route once as needed for up to 1 dose. 2 Each 0          medication changes made today: cont prozac, rexulti, prn hydroxyzine    2. Counseling and coordination of care including instructions for treatment, risks/benefits, risk factor reduction and patient/family education. She agrees with the plan. Patient instructed to call with any side effects, questions or issues. 3.    Follow-up and Dispositions    · Return in about 3 months (around 6/24/2021). Marianna Harada is a 48 y.o. female, evaluated via audio-only technology on 3/24/2021 for Medication Management      Patient-Reported Vitals 10/23/2020   Patient-Reported Weight 189   Patient-Reported Temperature 98.2   Patient-Reported Systolic  161   Patient-Reported Diastolic 80        Marianna Harada, who was evaluated through a patient-initiated, synchronous (real-time) audio only encounter, and/or her healthcare decision maker, is aware that it is a billable service, with coverage as determined by her insurance carrier.  She provided verbal consent to proceed: Yes. She has not had a related appointment within my department in the past 7 days or scheduled within the next 24 hours.       Total Time: minutes: 11-20 minutes    Iram Richmond is a 48 y.o. female, evaluated via audio-only technology on 3/24/2021 for Medication Management      3/24/2021  Jaimee Larson NP

## 2021-03-25 RX ORDER — ONDANSETRON 8 MG/1
8 TABLET, ORALLY DISINTEGRATING ORAL
Qty: 20 TAB | Refills: 0 | Status: SHIPPED | OUTPATIENT
Start: 2021-03-25 | End: 2022-01-18

## 2021-03-25 NOTE — TELEPHONE ENCOUNTER
From: Najma Fernandez  To: Bennie Caruso DO  Sent: 3/24/2021 11:50 AM EDT  Subject: Prescription Question    Would you be able to fill my old RX for Zofran to [facility]? I only take for headaches as needed.   Thanks

## 2021-03-25 NOTE — TELEPHONE ENCOUNTER
I have refilled the zofran , but it  is for nausea and not for headaches. She can follow up with Dr Malaika Swanson for headaches.

## 2021-04-28 DIAGNOSIS — F33.1 MODERATE EPISODE OF RECURRENT MAJOR DEPRESSIVE DISORDER (HCC): ICD-10-CM

## 2021-04-28 RX ORDER — HYDROXYZINE 25 MG/1
37.5 TABLET, FILM COATED ORAL
Qty: 30 TAB | Refills: 1 | Status: SHIPPED | OUTPATIENT
Start: 2021-04-28 | End: 2021-07-21 | Stop reason: SDUPTHER

## 2021-05-15 ENCOUNTER — OFFICE VISIT (OUTPATIENT)
Dept: URGENT CARE | Age: 51
End: 2021-05-15
Payer: COMMERCIAL

## 2021-05-15 VITALS — RESPIRATION RATE: 17 BRPM | HEART RATE: 88 BPM | OXYGEN SATURATION: 97 % | TEMPERATURE: 97.9 F

## 2021-05-15 DIAGNOSIS — J06.9 ACUTE URI: Primary | ICD-10-CM

## 2021-05-15 LAB — SARS-COV-2 POC: NEGATIVE

## 2021-05-15 PROCEDURE — S9083 URGENT CARE CENTER GLOBAL: HCPCS | Performed by: NURSE PRACTITIONER

## 2021-05-15 PROCEDURE — 87426 SARSCOV CORONAVIRUS AG IA: CPT | Performed by: NURSE PRACTITIONER

## 2021-05-15 NOTE — PROGRESS NOTES
Subjective: (As above and below)       This patient was seen in Flu Clinic at [address] [place] [address] Urgent Care while outdoors at their vehicle due to COVID-19 pandemic with PPE and focused examination in order to decrease community viral transmission. The patient/guardian gave verbal consent to treat. Chief Complaint   Patient presents with    Cold Symptoms     yesterday began with, sore throat bodyaches runny nose      Sally Webb is a 48 y.o. female who presents for evaluation of : runny nose, nasal congestion, body aches. Symptom onset 3-4 days ago . Preceding illness: none. No other identified aggravating or alleviating factors. Symptoms are constant and overall not improved. Promotes no decrease in PO intake of fluids. Denies: severe lethargy, SOB, syncope/near syncope, vomiting/diarrhea, labored breathing, severe headache, measured fever . Recent travel: no  Known Exposure to COVID-19: no; + both covid vaccines  Known flu or strep contact: no known       Review of Systems - negative except as listed above    Reviewed PmHx, RxHx, FmHx, SocHx, AllgHx and updated in chart.   Family History   Problem Relation Age of Onset    Cancer Father         pancreas, Liver    Cancer Maternal Grandmother     Breast Cancer Maternal Grandmother     Heart Disease Maternal Grandfather     Heart Disease Paternal Grandmother     Stroke Paternal Grandfather     Other Mother         meningioma    Hypertension Mother     Breast Cancer Mother 61   Heddie Konig Anesth Problems Mother         SLOW TO WAKE UP    Stroke Mother     Hypertension Sister     No Known Problems Daughter     No Known Problems Daughter     No Known Problems Son     Breast Cancer Maternal Aunt     Breast Cancer Maternal Aunt     Breast Cancer Maternal Aunt        Past Medical History:   Diagnosis Date    Back pain, acute     Depression     H/O seasonal allergies     Maxillary sinus fracture ([facility] [address] 2016    Neck pain, musculoskeletal Social History     Socioeconomic History    Marital status:      Spouse name: Not on file    Number of children: Not on file    Years of education: Not on file    Highest education level: Not on file   Tobacco Use    Smoking status: Never Smoker    Smokeless tobacco: Never Used   Substance and Sexual Activity    Alcohol use: Yes     Alcohol/week: 0.0 standard drinks     Comment: occasional    Drug use: No    Sexual activity: Yes     Partners: Male     Birth control/protection: Surgical     Comment:     Social History Narrative    Works LPN [employer]    Going through divorce 2017    Member of [facility]  AND [facility], has good friends there          Current Outpatient Medications   Medication Sig    hydrOXYzine HCL (ATARAX) 25 mg tablet Take 1.5 Tabs by mouth daily as needed for Anxiety.  ondansetron (ZOFRAN ODT) 8 mg disintegrating tablet Take 1 Tab by mouth every eight (8) hours as needed for Nausea.  brexpiprazole (Rexulti) 0.5 mg tab tablet Take 1 Tab by mouth daily.  FLUoxetine (PROzac) 20 mg capsule Take 1 Cap by mouth daily. Take with 40 mg cap for total dose 60 mg.    Rybelsus 7 mg tablet PLEASE SEE ATTACHED FOR DETAILED DIRECTIONS    levothyroxine (SYNTHROID) 50 mcg tablet Take 50 mcg by mouth.  cholecalciferol, vitamin D3, 2,000 unit tab Take  by mouth.  EPINEPHrine (EPIPEN 2-EDWIGE) 0.3 mg/0.3 mL (1:1,000) injection 0.3 mL by IntraMUSCular route once as needed for up to 1 dose. No current facility-administered medications for this visit. Objective:     Vitals:    05/15/21 0914   Pulse: 88   Resp: 17   Temp: 97.9 °F (36.6 °C)   SpO2: 97%       Physical Exam  General appearance  appears well hydrated and does not appear toxic, no acute distress  Eyes - EOMs intact. Non injected. No scleral icterus   Ears - no external swelling. TMs normal bilat  Nose - nasal congestion. No purulent drainage  Mouth - OP clear without swelling, exudate or lesion.  Mucus membranes moist. Uvula midline. Neck/Lymphatics  trachea midline, full AROM  Chest - Normal breathing effort no wheeze rales, rhonchi or diminishments bilaterally. Heart - RRR, no murmurs  Skin - no observable rashes or pallor  Neurologic- alert and oriented x 3  Psychiatric- normal mood, behavior and though content. Assessment/ Plan:     1. Acute URI    - AMB POC SARS-COV-2  - NOVEL CORONAVIRUS (COVID-19)    Rapid covid 19 test NEGATIVE. Will send off PCR covid 19 test  Suspect viral illness. No evidence suggesting complication of illness at this time. Will discharge home with close monitoring and follow up. Supportive home care for mild symptoms advised- maintain adequate fluid intake, lozenges, over the counter Tylenol (for fever, aches, pains, chills), deep breathing exercises  Recommendation for self isolation/quarantine based on current CDC guidelines      Test Results:  Results for orders placed or performed in visit on 05/15/21   AMB POC SARS-COV-2   Result Value Ref Range    SARS-COV-2 POC Negative Negative       Follow up: We have reviewed worsening/concerning signs and symptoms that may warrant seeking immediate care in the ED setting. For other non-severe changes, non-improvement or questions, patient aware to contact PCP office or consider a virtual online medical consultation.         Sherice Tyson, NP

## 2021-05-16 LAB
SARS-COV-2, NAA 2 DAY TAT: NORMAL
SARS-COV-2, NAA: NOT DETECTED

## 2021-05-22 ENCOUNTER — HOSPITAL ENCOUNTER (EMERGENCY)
Age: 51
Discharge: HOME | End: 2021-05-22
Attending: EMERGENCY MEDICINE
Payer: COMMERCIAL

## 2021-05-22 ENCOUNTER — NURSE TRIAGE (OUTPATIENT)
Dept: OTHER | Facility: CLINIC | Age: 51
End: 2021-05-22

## 2021-05-22 VITALS
TEMPERATURE: 98.5 F | HEART RATE: 70 BPM | RESPIRATION RATE: 15 BRPM | HEIGHT: 63 IN | DIASTOLIC BLOOD PRESSURE: 76 MMHG | SYSTOLIC BLOOD PRESSURE: 126 MMHG | OXYGEN SATURATION: 99 % | BODY MASS INDEX: 33.71 KG/M2 | WEIGHT: 190.26 LBS

## 2021-05-22 DIAGNOSIS — E86.0 DEHYDRATION: ICD-10-CM

## 2021-05-22 DIAGNOSIS — B34.9 VIRAL SYNDROME: Primary | ICD-10-CM

## 2021-05-22 LAB
ALBUMIN SERPL-MCNC: 3.3 G/DL (ref 3.5–5)
ALBUMIN/GLOB SERPL: 0.8 {RATIO} (ref 1.1–2.2)
ALP SERPL-CCNC: 72 U/L (ref 45–117)
ALT SERPL-CCNC: 32 U/L (ref 12–78)
ANION GAP SERPL CALC-SCNC: 9 MMOL/L (ref 5–15)
APPEARANCE UR: ABNORMAL
AST SERPL-CCNC: 17 U/L (ref 15–37)
BACTERIA URNS QL MICRO: NEGATIVE /HPF
BASOPHILS # BLD: 0.1 K/UL (ref 0–0.1)
BASOPHILS NFR BLD: 1 % (ref 0–1)
BILIRUB SERPL-MCNC: 0.2 MG/DL (ref 0.2–1)
BILIRUB UR QL: NEGATIVE
BUN SERPL-MCNC: 16 MG/DL (ref 6–20)
BUN/CREAT SERPL: 16 (ref 12–20)
CALCIUM SERPL-MCNC: 9 MG/DL (ref 8.5–10.1)
CHLORIDE SERPL-SCNC: 106 MMOL/L (ref 97–108)
CO2 SERPL-SCNC: 27 MMOL/L (ref 21–32)
COLOR UR: ABNORMAL
COVID-19 RAPID TEST, COVR: NOT DETECTED
CREAT SERPL-MCNC: 1 MG/DL (ref 0.55–1.02)
DIFFERENTIAL METHOD BLD: NORMAL
EOSINOPHIL # BLD: 0.4 K/UL (ref 0–0.4)
EOSINOPHIL NFR BLD: 5 % (ref 0–7)
EPITH CASTS URNS QL MICRO: ABNORMAL /LPF
ERYTHROCYTE [DISTWIDTH] IN BLOOD BY AUTOMATED COUNT: 12.7 % (ref 11.5–14.5)
FLUAV AG NPH QL IA: NEGATIVE
FLUBV AG NOSE QL IA: NEGATIVE
GLOBULIN SER CALC-MCNC: 4 G/DL (ref 2–4)
GLUCOSE SERPL-MCNC: 97 MG/DL (ref 65–100)
GLUCOSE UR STRIP.AUTO-MCNC: NEGATIVE MG/DL
HCT VFR BLD AUTO: 39.1 % (ref 35–47)
HGB BLD-MCNC: 12.4 G/DL (ref 11.5–16)
HGB UR QL STRIP: NEGATIVE
IMM GRANULOCYTES # BLD AUTO: 0 K/UL (ref 0–0.04)
IMM GRANULOCYTES NFR BLD AUTO: 0 % (ref 0–0.5)
KETONES UR QL STRIP.AUTO: NEGATIVE MG/DL
LEUKOCYTE ESTERASE UR QL STRIP.AUTO: NEGATIVE
LYMPHOCYTES # BLD: 2.8 K/UL (ref 0.8–3.5)
LYMPHOCYTES NFR BLD: 37 % (ref 12–49)
MCH RBC QN AUTO: 29.1 PG (ref 26–34)
MCHC RBC AUTO-ENTMCNC: 31.7 G/DL (ref 30–36.5)
MCV RBC AUTO: 91.8 FL (ref 80–99)
MONOCYTES # BLD: 0.7 K/UL (ref 0–1)
MONOCYTES NFR BLD: 10 % (ref 5–13)
NEUTS SEG # BLD: 3.7 K/UL (ref 1.8–8)
NEUTS SEG NFR BLD: 47 % (ref 32–75)
NITRITE UR QL STRIP.AUTO: NEGATIVE
NRBC # BLD: 0 K/UL (ref 0–0.01)
NRBC BLD-RTO: 0 PER 100 WBC
PH UR STRIP: 6.5 [PH] (ref 5–8)
PLATELET # BLD AUTO: 328 K/UL (ref 150–400)
PMV BLD AUTO: 9.9 FL (ref 8.9–12.9)
POTASSIUM SERPL-SCNC: 4.2 MMOL/L (ref 3.5–5.1)
PROT SERPL-MCNC: 7.3 G/DL (ref 6.4–8.2)
PROT UR STRIP-MCNC: NEGATIVE MG/DL
RBC # BLD AUTO: 4.26 M/UL (ref 3.8–5.2)
RBC #/AREA URNS HPF: ABNORMAL /HPF (ref 0–5)
SODIUM SERPL-SCNC: 142 MMOL/L (ref 136–145)
SOURCE, COVRS: NORMAL
SP GR UR REFRACTOMETRY: 1.02 (ref 1–1.03)
UR CULT HOLD, URHOLD: NORMAL
UROBILINOGEN UR QL STRIP.AUTO: 0.2 EU/DL (ref 0.2–1)
WBC # BLD AUTO: 7.7 K/UL (ref 3.6–11)
WBC URNS QL MICRO: ABNORMAL /HPF (ref 0–4)

## 2021-05-22 PROCEDURE — 87804 INFLUENZA ASSAY W/OPTIC: CPT

## 2021-05-22 PROCEDURE — 36415 COLL VENOUS BLD VENIPUNCTURE: CPT

## 2021-05-22 PROCEDURE — 74011250636 HC RX REV CODE- 250/636: Performed by: EMERGENCY MEDICINE

## 2021-05-22 PROCEDURE — 80053 COMPREHEN METABOLIC PANEL: CPT

## 2021-05-22 PROCEDURE — 81001 URINALYSIS AUTO W/SCOPE: CPT

## 2021-05-22 PROCEDURE — 87635 SARS-COV-2 COVID-19 AMP PRB: CPT

## 2021-05-22 PROCEDURE — 85025 COMPLETE CBC W/AUTO DIFF WBC: CPT

## 2021-05-22 PROCEDURE — 99284 EMERGENCY DEPT VISIT MOD MDM: CPT

## 2021-05-22 PROCEDURE — 96360 HYDRATION IV INFUSION INIT: CPT

## 2021-05-22 RX ADMIN — SODIUM CHLORIDE 1000 ML: 9 INJECTION, SOLUTION INTRAVENOUS at 18:24

## 2021-05-22 NOTE — ED TRIAGE NOTES
Pt reports fever and bodyaches since last Friday. No fever currently. Taking Augmentin for a sinus infection. Covid test negative.

## 2021-05-22 NOTE — ED PROVIDER NOTES
80-year-old female with no significant past medical history presents with complaints of 1 week subjective fever and body aches. Patient reports she was tested for Covid 6 days ago which was negative and started taking Augmentin 4 days ago for presumed sinus infection. Patient reports she is having body aches subjective fevers and nasal congestion. Denies cough, shortness of breath, nausea, vomiting, chest pain, shortness of breath, diarrhea, constipation, urinary complaints. Patient has taken no medications for fever control.     Denies tobacco use, drug use, alcohol use  [facility]           Past Medical History:   Diagnosis Date    Back pain, acute     Depression     Endocrine disease     hypothyroid    H/O seasonal allergies     Maxillary sinus fracture ([facility] [address] 2016    Neck pain, musculoskeletal        Past Surgical History:   Procedure Laterality Date    COLONOSCOPY N/A 2019    COLONOSCOPY performed by Azucena Phillips MD at \[facility]\"" ENDOSCOPY    COLONOSCOPY,DIAGNOSTIC  2019         HX CERVICAL DISKECTOMY Left 2015    HX CERVICAL FUSION Left 2015     HX  SECTION      HX GYN  1998    laproscopsy     HX WISDOM TEETH EXTRACTION           Family History:   Problem Relation Age of Onset   Tyler Varma Cancer Father         pancreas, Liver    Cancer Maternal Grandmother     Breast Cancer Maternal Grandmother     Heart Disease Maternal Grandfather     Heart Disease Paternal Grandmother     Stroke Paternal Grandfather     Other Mother         meningioma    Hypertension Mother     Breast Cancer Mother 61   Tyler Kojo Anesth Problems Mother         SLOW TO WAKE UP    Stroke Mother     Hypertension Sister     No Known Problems Daughter     No Known Problems Daughter     No Known Problems Son     Breast Cancer Maternal Aunt     Breast Cancer Maternal Aunt     Breast Cancer Maternal Aunt        Social History     Socioeconomic History    Marital status:      Spouse name: Not on file    Number of children: Not on file    Years of education: Not on file    Highest education level: Not on file   Occupational History    Not on file   Tobacco Use    Smoking status: Never Smoker    Smokeless tobacco: Never Used   Substance and Sexual Activity    Alcohol use: Never     Alcohol/week: 0.0 standard drinks     Comment: occasional    Drug use: No    Sexual activity: Yes     Partners: Male     Birth control/protection: Surgical     Comment:     Other Topics Concern    Not on file   Social History Narrative    Works LPN [employer]    Going through divorce 2017    Member of [facility]  AND [facility], has good friends there     Social Determinants of Health     Financial Resource Strain:     Difficulty of Paying Living Expenses:    Food Insecurity:     Worried About [address] in the Last Year:     [address] in the Last Year:    Transportation Needs:     Lack of Transportation (Medical):  Lack of Transportation (Non-Medical):    Physical Activity:     Days of Exercise per Week:     Minutes of Exercise per Session:    Stress:     Feeling of Stress :    Social Connections:     Frequency of Communication with Friends and Family:     Frequency of Social Gatherings with Friends and Family:     Attends Oriental orthodox Services:     Active Member of Clubs or Organizations:     Attends Club or Organization Meetings:     Marital Status:    Intimate Partner Violence:     Fear of Current or Ex-Partner:     Emotionally Abused:     Physically Abused:     Sexually Abused: ALLERGIES: Latex; Teagan; Soy; Bees [sting, bee]; Cephaeline; Keflex [cephalexin]; Soybean oil; and Tomato    Review of Systems   Constitutional: Positive for fatigue and fever (Subjective). Negative for chills. HENT: Positive for congestion. Negative for nosebleeds and rhinorrhea. Eyes: Negative for pain and redness. Respiratory: Negative for cough and shortness of breath. Cardiovascular: Negative for chest pain and palpitations. Gastrointestinal: Negative for abdominal pain, nausea and vomiting. Genitourinary: Negative for dysuria, frequency, vaginal bleeding and vaginal pain. Musculoskeletal: Positive for myalgias. Skin: Negative for rash and wound. Neurological: Negative for seizures, syncope and weakness. Hematological: Does not bruise/bleed easily. Psychiatric/Behavioral: Negative for agitation, confusion, dysphoric mood and suicidal ideas. The patient is not nervous/anxious. Vitals:    05/22/21 1653 05/22/21 1834   BP: (!) 141/91 127/82   Pulse: 73 73   Resp: 16 20   Temp: 98.7 °F (37.1 °C) 98.5 °F (36.9 °C)   SpO2: 97% 99%   Weight: 86.3 kg (190 lb 4.1 oz)    Height: 5' 3\" (1.6 m)             Physical Exam  Vitals and nursing note reviewed. Constitutional:       Appearance: She is well-developed. HENT:      Head: Normocephalic and atraumatic. Eyes:      Pupils: Pupils are equal, round, and reactive to light. Neck:      Trachea: No tracheal deviation. Cardiovascular:      Rate and Rhythm: Normal rate and regular rhythm. Heart sounds: Normal heart sounds. Pulmonary:      Effort: Pulmonary effort is normal. No respiratory distress. Breath sounds: Normal breath sounds. No stridor. No wheezing or rales. Chest:      Chest wall: No tenderness. Abdominal:      General: Bowel sounds are normal. There is no distension. Palpations: Abdomen is soft. Tenderness: There is no abdominal tenderness. There is no rebound. Musculoskeletal:         General: No tenderness. Normal range of motion. Cervical back: Normal range of motion and neck supple. Skin:     General: Skin is warm and dry. Coloration: Skin is not pale. Findings: No rash. Neurological:      Mental Status: She is alert and oriented to person, place, and time. Cranial Nerves: No cranial nerve deficit.           MDM  Number of Diagnoses or Management Options  Dehydration  Viral syndrome  Diagnosis management comments: 54-year-old female with no significant past medical history presents with complaints of subjective fever, body aches and nasal congestion x1 week. Patient is afebrile, nontoxic, hemodynamically stable, no respiratory distress, clear to auscultation bilaterally, posterior oropharynx clear, abdomen soft/nontender/nondistended. Planurinalysis, Covid and flu swab, CBC/CMP, IV fluid hydration. Labs unremarkable       Amount and/or Complexity of Data Reviewed  Clinical lab tests: ordered and reviewed    Patient Progress  Patient progress: improved         Procedures      Patient's results have been reviewed with them. Patient and/or family have verbally conveyed their understanding and agreement of the patient's signs, symptoms, diagnosis, treatment and prognosis and additionally agree to follow up as recommended or return to the Emergency Room should their condition change prior to follow-up. Discharge instructions have also been provided to the patient with some educational information regarding their diagnosis as well a list of reasons why they would want to return to the ER prior to their follow-up appointment should their condition change.

## 2021-05-22 NOTE — TELEPHONE ENCOUNTER
Reason for Disposition   [1] Fatigue (i.e., tires easily, decreased energy) AND [2] persists > 1 week    Answer Assessment - Initial Assessment Questions  1. DESCRIPTION: \"Describe how you are feeling. \"      Very fatigued    2. SEVERITY: \"How bad is it? \"  \"Can you stand and walk? \"    - MILD - Feels weak or tired, but does not interfere with work, school or normal activities    - [facility] to stand and walk; weakness interferes with work, school, or normal activities    - SEVERE - Unable to stand or walk  Moderate    3. ONSET:  \"When did the weakness begin? \"  1 week    4. CAUSE: \"What do you think is causing the weakness? \"      Unsure    5. MEDICINES: Bln[place] Guest you recently started a new medicine or had a change in the amount of a medicine? \"  Augmentin for the past 3 days for sinusitis    6. OTHER SYMPTOMS: \"Do you have any other symptoms? \" (e.g., chest pain, fever, cough, SOB, vomiting, diarrhea, bleeding, other areas of pain)  Sinus pressure    7. PREGNANCY: \"Is there any chance you are pregnant? \" \"When was your last menstrual period? \"  no    Protocols used: WEAKNESS (GENERALIZED) AND FATIGUE-ADULT-    Brief description of triage: fatigue    Triage indicates for patient to see PCP within 3 days. Advised if she needs to be seen, she can go to [facility]. Caller said she thought she had to call in before she went to be seen, advised caller triage is a courtesy and if ED is advised and she goes to ED, she can get a discount on copay. Otherwise informed caller she has a right to seek medical care anytime and anyplace without calling in. Care advice provided, patient verbalizes understanding; denies any other questions or concerns; instructed to call back for any new or worsening symptoms. Attention Provider: Thank you for allowing me to participate in the care of your patient. The patient was connected to triage in response to symptoms provided.    Please do not respond through this encounter as the response is not directed to a shared pool.

## 2021-05-24 ENCOUNTER — PATIENT OUTREACH (OUTPATIENT)
Dept: OTHER | Age: 51
End: 2021-05-24

## 2021-05-24 NOTE — PROGRESS NOTES
Patient eligible for [facility] Employee care management. Received notification of discharge on 21 from [facility] ER for dx of viral syndrome. Care Manager contacted the patient, verified  and zip code as identifiers. Provided introduction and explanation of the Nurse Care Manager role. Agreed to CM follow-up and assistance at this time. Care management discharge assessment completed. PMH:   Past Medical History:   Diagnosis Date    Back pain, acute     Depression     Endocrine disease     hypothyroid    H/O seasonal allergies     Maxillary sinus fracture ([address] 2016    Neck pain, musculoskeletal        49 yo patient seen in ER at [facility] for complaints of fever, body aches and fatigue. Reports symptoms ongoing for about 1 week, seen in Urgent Care with negative Covid test, started on Augmentin 4 days prior to ER visit for possible sinus infection. Patient had no improvement in symptoms and went to ER for evaluation. Work up in [place] including Flu and Covid test was negative. Patient was discharged with dx of Viral Syndrome and advised to continue present treatment and follow up with PCP if no improvement. ACM spoke with patient who reports some improvement in symptoms since onset, but still with body aches and fatigue, but no fever. States she has had both Covid vaccines. States still unable to work. Tolerating diet and pushing fluids. Denies any SOB,Cough or other new symptoms. Reviewed Red Flag Symptoms and discharge instructions. Declines to schedule PCP follow up at this time, but agreeable to follow up by ACM later this week and scheduling visit with PCP if no improvement. Red Flag symptoms:  Call 911 or return to ER if:  · You have severe trouble breathing. · You passed out (lost consciousness    Call your doctor if you:  · You seem to be getting much sicker. · You have a new or higher fever. · You have new belly pain, or your pain gets worse. · You have a new rash.       Initial Plan of Care   Goal:   Demonstrates no signs of complications or red flags in 30 days;   Review and discuss importance of monitoring and reporting red flags;   Review medications and when taken with patient to ensure understanding;   Educate patient when to call the physician or 911;   Assess for any barriers with care access or mobility needs at home;  [facility] patient to immediately notify the physician if:    Goal:  Completes all follow-up appointments within 30 days of ED visit;   Educate and encourage importance of FU for prevention of complications or disease progression;   Assess the patients relationship with a PCP and next FU visit scheduled;   Discuss importance of adherence to treatment plan and follow up visits;   Identify any barriers in transportation or access to FU appointments. · Assist patient with making FU appointments as needed;      Review and discussion of initial plan of care with patient, who has provided input to plan, verbalized understanding and agrees with current goals. Potential Barriers to Self-Management or Access:   []  Decline in memory    []  Language barrier  [x]  Emotional   []  Limited mobility  []  Lack of motivation     [] Vision, hearing or cognitive impairment  []  Knowledge    [] Financial Barriers    []  Pain    []  Other     Discharge Instructions:  Reviewed discharge instructions with patient. Patient verbalizes understanding of discharge instructions and importance of follow-up care. Medications:   New Medications at Discharge: none  Changed Medications at Discharge: none  Discontinued Medications at Discharge: none    Current Outpatient Medications   Medication Sig    hydrOXYzine HCL (ATARAX) 25 mg tablet Take 1.5 Tabs by mouth daily as needed for Anxiety.  ondansetron (ZOFRAN ODT) 8 mg disintegrating tablet Take 1 Tab by mouth every eight (8) hours as needed for Nausea.  brexpiprazole (Rexulti) 0.5 mg tab tablet Take 1 Tab by mouth daily.  levothyroxine (SYNTHROID) 50 mcg tablet Take 50 mcg by mouth.  cholecalciferol, vitamin D3, 2,000 unit tab Take  by mouth.  EPINEPHrine (EPIPEN 2-EDWIGE) 0.3 mg/0.3 mL (1:1,000) injection 0.3 mL by IntraMUSCular route once as needed for up to 1 dose. No current facility-administered medications for this visit. Completed a review of medications with patient, who verbalized understanding of how and when to take medications. Barriers to Medication Adherence:  none      Potential Preventive Care Needs    Health Maintenance   Topic Date Due    Hepatitis C Screening  Never done    COVID-19 Vaccine (1) Never done    Shingrix Vaccine Age 50> (1 of 2) Never done    Breast Cancer Screen Mammogram  01/22/2022    PAP AKA CERVICAL CYTOLOGY  11/18/2023    Lipid Screen  01/08/2024    DTaP/Tdap/Td series (3 - Td) 06/23/2024    Colorectal Cancer Screening Combo  04/25/2029    Flu Vaccine  Completed    Pneumococcal 0-64 years  Aged Out         PCP/Specialist follow up:  No future appointments. Reviewed red flags with patient, and patient verbalizes understanding. Patient given an opportunity to ask questions. No other clinical/social/functional needs noted. The patient agrees to contact the PCP office for questions related to their healthcare. The patient expressed thanks, offered no additional questions and ended the call.       Plan for next call:  About 3 or 4 days

## 2021-05-27 ENCOUNTER — PATIENT OUTREACH (OUTPATIENT)
Dept: OTHER | Age: 51
End: 2021-05-27

## 2021-06-04 ENCOUNTER — NURSE TRIAGE (OUTPATIENT)
Dept: OTHER | Facility: CLINIC | Age: 51
End: 2021-06-04

## 2021-06-07 ENCOUNTER — PATIENT OUTREACH (OUTPATIENT)
Dept: OTHER | Age: 51
End: 2021-06-07

## 2021-06-07 NOTE — LETTER
Ms. Kellie Alvarez [phone]    Dear Ms. Kay Youngblood,    My name is Claudia Theodore LPN, Employee Care Manager for [facility]. I am trying to reach you for a follow up call but I keep missing you. We last spoke on 5/24/21 after a recent ER visit. As part of the [address] [place] [address]) program, I appreciate the opportunity to work with you! There is nothing more important than one's health. My role is to come alongside you with support and resources to optimize your health. As a Care Manager, I provide guidance on options available to you and ensure you receive quality healthcare to meet your needs. [facility] team of RNs, Miki and a  provide care coordination, transitions of care, disease management, self-management education, and other resources available to assist you in reaching your healthcare goals. Please contact me at the below number if I can provide you with further assistance or resources.       Sincerely,  Claudia Theodore LPN  [facility] Care Coordinator  [address], [address]  [address]  Office Cell [phone] Fax [email or web address]  [facility] [email or web address]

## 2021-06-07 NOTE — PROGRESS NOTES
HPRP Follow Up. Second unsuccessful telephone attempt to contact patient for HPRP Follow up. Left discreet message on voicemail with this CC contact information. Will plan follow up in about 2 to 3 weeks. Will send UTR letter.

## 2021-06-22 DIAGNOSIS — E03.9 ACQUIRED HYPOTHYROIDISM: Primary | ICD-10-CM

## 2021-06-28 ENCOUNTER — PATIENT OUTREACH (OUTPATIENT)
Dept: OTHER | Age: 51
End: 2021-06-28

## 2021-06-28 NOTE — PROGRESS NOTES
Resolving current episode for case management due to patient lost to follow up. Patient has not been reached after repeated calls and letters. . Letter sent to patient notifying completion of services due to unable to reach. This writer's contact information and information regarding program services included in materials sent. Goals updated to reflect current status as appropriate. Will remain available should patient request re-initiation of case management or transitions of care services.

## 2021-06-28 NOTE — ANCILLARY DISCHARGE INSTRUCTIONS
[facility]  [address]  Phone: [phone]  Fax: [phone]    Discharge Summary  2-15    Patient name: Keli Younger  : 1970  Provider#:[id number]  Referral source: Lindsay Jenkins MD      Medical/Treatment Diagnosis: Low back pain [M54.5]     Prior Hospitalization: see medical history     Comorbidities: see evaluation  Prior Level of Function:see evaluation  Medications: Verified on Patient Summary List    Start of Care: 21      Onset Date:see evaluation   Visits from Start of Care: 7     Missed Visits: see CC  Reporting Period : 21 to 3/15/21    Summary of care:     Lumbar AROM WNL all directions, no pain     Strength  Hip flexion 5/5  Knee flex, ext 5/5                Pain Level (0-10 scale) post treatment: 0/10       Progress towards goals / Updated goals:  Short Term Goals: To be accomplished in 2-3 weeks:  1) Pt will be independent in initial HEP MET  2) Pt will report >/=25% improvement in symptoms MET     Long Term Goals: To be accomplished in 6-8 weeks:  1) Pt will report being able to sit for >/=30 min without symptoms MET  2) Pt will report being able to work full shift without back pain MET  3) Pt will improve bilat hip abduction strength to >/=4+/5 in order to assist in daily activities, exercise n/a  4) Pt will report >/=75% improvement in symptoms MET    ASSESSMENT  Pt has completed 7 skilled PT visits. She has met 2/2 STG's, 3/4 LTG's. She reports 80% improvement in symptoms since beginning PT. She demonstrates dec'd pain levels, improvement in ROM, strength, and functional abilities such as sitting compared to initial visit. Pt is independent in HEP and ready for D/C at this time.        PLAN      [x]?   Discharge due to:_pt met/progressing toward set PT goals    Dav Perez PT 2021 5:27 PM

## 2021-07-08 LAB
CHOLEST SERPL-MCNC: 265 MG/DL
GLUCOSE SERPL-MCNC: 89 MG/DL (ref 65–100)
HDLC SERPL-MCNC: 56 MG/DL
LDLC SERPL CALC-MCNC: 175 MG/DL (ref 0–100)
TRIGL SERPL-MCNC: 170 MG/DL (ref ?–150)

## 2021-07-12 ENCOUNTER — VIRTUAL VISIT (OUTPATIENT)
Dept: BEHAVIORAL/MENTAL HEALTH CLINIC | Age: 51
End: 2021-07-12
Payer: COMMERCIAL

## 2021-07-12 DIAGNOSIS — F33.1 MODERATE EPISODE OF RECURRENT MAJOR DEPRESSIVE DISORDER (HCC): Primary | ICD-10-CM

## 2021-07-12 DIAGNOSIS — F41.9 ANXIETY: ICD-10-CM

## 2021-07-12 PROCEDURE — 99214 OFFICE O/P EST MOD 30 MIN: CPT | Performed by: NURSE PRACTITIONER

## 2021-07-12 RX ORDER — ESCITALOPRAM OXALATE 10 MG/1
10 TABLET ORAL DAILY
Qty: 30 TABLET | Refills: 1 | Status: SHIPPED | OUTPATIENT
Start: 2021-07-12 | End: 2021-08-04 | Stop reason: SDUPTHER

## 2021-07-12 NOTE — PROGRESS NOTES
CHIEF COMPLAINT:  Herman Dahl is a 46 y.o. female and was seen today for follow-up of psychiatric condition and psychotropic medication management. HPI:    Papo Wyatt reports the following psychiatric symptoms by hx:  depression and anxiety. Overall depression symptoms have been increasing in severity. Currently depression is of moderate to mod/high severity. Anxiety symptoms are intermittent and can be mod/high to high severity. Pt reports medications are of some benefit. Met with pt via video telehealth for appt today to review current treatment plan. FAMILY/SOCIAL HX: denies new stressors    REVIEW OF SYSTEMS:  Psychiatric symptoms being monitored for:  depression, anxiety  Appetite:good   Sleep: no change   Neuro: no updates reported    There were no vitals taken for this visit.: virtual    Side Effects:  none    MENTAL STATUS EXAM:   Sensorium  oriented to time, place and person   Relations cooperative   Appearance:  age appropriate and casually dressed   Motor Behavior:  gait stable and within normal limits   Speech:  monotone and soft   Thought Process: goal directed   Thought Content free of delusions and free of hallucinations   Suicidal ideations no intention   Homicidal ideations no intention   Mood:  depressed   Affect:  depressed   Memory recent  adequate   Memory remote:  adequate   Concentration:  adequate   Abstraction:  abstract   Insight:  good   Reliability good   Judgment:  good     MEDICAL DECISION MAKING:  Problems addressed today:    ICD-10-CM ICD-9-CM    1. Moderate episode of recurrent major depressive disorder (HCC)  F33.1 296.32    2. Anxiety  F41.9 300.00        Assessment:   Papo Wyatt is not responding to treatment. Symptoms are exacerbated. Pt reports she has had a progressive exacerbation of depression over the past few weeks. Discussed current medications and dosages. Due to SE's from higher dose of prozac in the past will switch to lexapro.  Once established if she is doing well will work to wean her off of rexulti. Discussed cross taper schedule and reviewed it. Pt reports some prn use of hydroxyzine with benefit. She does experience drowsiness so she does not use it at work. Reviewed treatment goals and target symptoms to monitor for. Plan:   1. Current Outpatient Medications   Medication Sig Dispense Refill    escitalopram oxalate (LEXAPRO) 10 mg tablet Take 1 Tablet by mouth daily. 30 Tablet 1    brexpiprazole (Rexulti) 0.5 mg tab tablet Take 1 Tablet by mouth daily. 30 Tablet 0    hydrOXYzine HCL (ATARAX) 25 mg tablet Take 1.5 Tabs by mouth daily as needed for Anxiety. 30 Tab 1    ondansetron (ZOFRAN ODT) 8 mg disintegrating tablet Take 1 Tab by mouth every eight (8) hours as needed for Nausea. 20 Tab 0    levothyroxine (SYNTHROID) 50 mcg tablet Take 50 mcg by mouth.  cholecalciferol, vitamin D3, 2,000 unit tab Take  by mouth.  EPINEPHrine (EPIPEN 2-EDWIGE) 0.3 mg/0.3 mL (1:1,000) injection 0.3 mL by IntraMUSCular route once as needed for up to 1 dose. 2 Each 0          medication changes made today: cont prozac 40 mg for 1 week and then decrease to 20 mg for 1 week, start lexapro at this time, in 1 week increase to 20 mg if well tolerated, cont rexulti for now, cont prn hydroxyzine    2. Counseling and coordination of care including instructions for treatment, risks/benefits, risk factor reduction and patient/family education. She agrees with the plan. Patient instructed to call with any side effects, questions or issues. 3.    Follow-up and Dispositions    · Return in about 6 weeks (around 8/23/2021). Asher Rojas, who was evaluated through a synchronous (real-time) audio-video encounter, and/or her healthcare decision maker, is aware that it is a billable service, with coverage as determined by her insurance carrier. She provided verbal consent to proceed: Yes, and patient identification was verified.  This visit was conducted pursuant to the emergency declaration under the [address], [phone] waiver authority and the [employer] [facility] and [employer] Act. A caregiver was present when appropriate. Ability to conduct physical exam was limited. The patient was located in a state where the provider was credentialed to provide care.        7/12/2021  Arabella Bryant NP

## 2021-07-21 DIAGNOSIS — F33.1 MODERATE EPISODE OF RECURRENT MAJOR DEPRESSIVE DISORDER (HCC): ICD-10-CM

## 2021-07-21 RX ORDER — HYDROXYZINE 25 MG/1
37.5 TABLET, FILM COATED ORAL
Qty: 30 TABLET | Refills: 1 | Status: SHIPPED | OUTPATIENT
Start: 2021-07-21 | End: 2021-08-31

## 2021-07-22 DIAGNOSIS — E78.5 HYPERLIPIDEMIA, UNSPECIFIED HYPERLIPIDEMIA TYPE: Primary | ICD-10-CM

## 2021-08-04 ENCOUNTER — PATIENT MESSAGE (OUTPATIENT)
Dept: BEHAVIORAL/MENTAL HEALTH CLINIC | Age: 51
End: 2021-08-04

## 2021-08-04 RX ORDER — ESCITALOPRAM OXALATE 10 MG/1
10 TABLET ORAL DAILY
Qty: 30 TABLET | Refills: 1 | Status: SHIPPED | OUTPATIENT
Start: 2021-08-04 | End: 2021-08-31 | Stop reason: SINTOL

## 2021-08-04 NOTE — TELEPHONE ENCOUNTER
From: Dalia Schroeder  To: Tay Crawley NP  Sent: 8/4/2021 11:11 AM EDT  Subject: Prescription Question    I need my Lexapro refilled. It is helping with energy in mood. I take it in the morning but I am having trouble sleeping. The hydroxyzine is not helping,even when I take 2 pills. Thank you  I have an appt scheduled later this month but I can't function at work without sleep.

## 2021-08-06 RX ORDER — ZOSTER VACCINE RECOMBINANT, ADJUVANTED 50 MCG/0.5
0.5 KIT INTRAMUSCULAR ONCE
Qty: 0.5 ML | Refills: 0 | Status: SHIPPED | OUTPATIENT
Start: 2021-08-06 | End: 2021-08-06

## 2021-08-09 DIAGNOSIS — F41.9 ANXIETY: Primary | ICD-10-CM

## 2021-08-09 RX ORDER — ZOLPIDEM TARTRATE 5 MG/1
5 TABLET ORAL DAILY
Qty: 30 TABLET | Refills: 0 | Status: SHIPPED | OUTPATIENT
Start: 2021-08-09 | End: 2021-11-01 | Stop reason: SDUPTHER

## 2021-08-30 ENCOUNTER — PATIENT MESSAGE (OUTPATIENT)
Dept: PRIMARY CARE CLINIC | Age: 51
End: 2021-08-30

## 2021-08-30 DIAGNOSIS — E03.9 ACQUIRED HYPOTHYROIDISM: Primary | ICD-10-CM

## 2021-08-30 NOTE — TELEPHONE ENCOUNTER
From: Brandon Youngblood  To: Elder Congress, DO  Sent: 8/30/2021 10:00 AM EDT  Subject: Prescription Question    I need a refill of Levothyroxine sent to [facility].     Thank You

## 2021-08-31 ENCOUNTER — VIRTUAL VISIT (OUTPATIENT)
Dept: BEHAVIORAL/MENTAL HEALTH CLINIC | Age: 51
End: 2021-08-31
Payer: COMMERCIAL

## 2021-08-31 DIAGNOSIS — F41.9 ANXIETY: ICD-10-CM

## 2021-08-31 DIAGNOSIS — F33.1 MODERATE EPISODE OF RECURRENT MAJOR DEPRESSIVE DISORDER (HCC): Primary | ICD-10-CM

## 2021-08-31 PROCEDURE — 99214 OFFICE O/P EST MOD 30 MIN: CPT | Performed by: NURSE PRACTITIONER

## 2021-08-31 RX ORDER — FLUOXETINE HYDROCHLORIDE 40 MG/1
40 CAPSULE ORAL DAILY
Qty: 90 CAPSULE | Refills: 1 | Status: SHIPPED | OUTPATIENT
Start: 2021-08-31 | End: 2022-05-10 | Stop reason: SDUPTHER

## 2021-08-31 RX ORDER — LEVOTHYROXINE SODIUM 50 UG/1
50 TABLET ORAL
Qty: 90 TABLET | Refills: 0 | Status: SHIPPED | OUTPATIENT
Start: 2021-08-31 | End: 2021-11-24 | Stop reason: SDUPTHER

## 2021-08-31 NOTE — PROGRESS NOTES
CHIEF COMPLAINT:  Tesfaye Kidd is a 46 y.o. female and was seen today for follow-up of psychiatric condition and psychotropic medication management. HPI:    Clifton Ding reports the following psychiatric symptoms by hx:  depression and anxiety. Overall symptoms have been present for months. Currently symptoms are of moderate severity. The symptoms occur more frequently and severely. Pt reports she did not do well with trial of lexapro and she has had significant insomnia. Pt reports she stopped lexapro and re-started prozac. She also stopped rexulti because she ran out. Symptoms made worse by exacerbated insomnia. Met with pt via video telehealth for appt today to review current treatment plan. FAMILY/SOCIAL HX: psychosocial stressors    REVIEW OF SYSTEMS:  Psychiatric symptoms being monitored for:  depression, anxiety  Appetite:no change from normal   Sleep: poor with DIMS (difficulty initiating & maintaining sleep)   Neuro: no updates    There were no vitals taken for this visit.: virtual    Side Effects:  lexapro-hypomania ? MENTAL STATUS EXAM:   Sensorium  oriented to time, place and person   Relations cooperative   Appearance:  age appropriate and casually dressed   Motor Behavior:  gait stable and within normal limits   Speech:  normal volume   Thought Process: goal directed   Thought Content free of delusions and free of hallucinations   Suicidal ideations no intention   Homicidal ideations no intention   Mood:  anxious and depressed   Affect:  anxious and depressed   Memory recent  adequate   Memory remote:  adequate   Concentration:  adequate   Abstraction:  abstract   Insight:  good   Reliability good and fair   Judgment:  good     MEDICAL DECISION MAKING:  Problems addressed today:    ICD-10-CM ICD-9-CM    1. Moderate episode of recurrent major depressive disorder (HCC)  F33.1 296.32 FLUoxetine (PROzac) 40 mg capsule   2.  Anxiety  F41.9 300.00        Assessment:   Clifton Ding is not responding to treatment. Symptoms are exacerbated. Discussed current medications and dosages. As noted she stopped lexapro and re-started prozac. She appears to have had some hypomanic symptoms with lexapro. Will also re-start rexulti and cont prn ambien for insomnia. Reviewed benzo safety and prn guidelines for ambien. Reviewed treatment goals and target symptoms to monitor for. Plan:   1. Current Outpatient Medications   Medication Sig Dispense Refill    FLUoxetine (PROzac) 40 mg capsule Take 1 Capsule by mouth daily. 90 Capsule 1    brexpiprazole (Rexulti) 0.5 mg tab tablet Take 1 Tablet by mouth daily. 90 Tablet 1    zolpidem (AMBIEN) 5 mg tablet Take 1 Tablet by mouth daily. Max Daily Amount: 5 mg. 30 Tablet 0    ondansetron (ZOFRAN ODT) 8 mg disintegrating tablet Take 1 Tab by mouth every eight (8) hours as needed for Nausea. 20 Tab 0    levothyroxine (SYNTHROID) 50 mcg tablet Take 50 mcg by mouth.  cholecalciferol, vitamin D3, 2,000 unit tab Take  by mouth.  EPINEPHrine (EPIPEN 2-EDWIGE) 0.3 mg/0.3 mL (1:1,000) injection 0.3 mL by IntraMUSCular route once as needed for up to 1 dose. 2 Each 0          medication changes made today: re-start prozac, re-start rexulti, cont ambien (consider 10 mg if needed for sleep)    2. Counseling and coordination of care including instructions for treatment, risks/benefits, risk factor reduction and patient/family education. She agrees with the plan. Patient instructed to call with any side effects, questions or issues. 3.    Follow-up and Dispositions    · Return in about 2 months (around 10/31/2021). César Ortizjerichoia, who was evaluated through a synchronous (real-time) audio-video encounter, and/or her healthcare decision maker, is aware that it is a billable service, with coverage as determined by her insurance carrier. She provided verbal consent to proceed: Yes, and patient identification was verified.  This visit was conducted pursuant to the emergency declaration under the [address] [facility], [address] [place] [address] waiver authority and the [employer] Act. A caregiver was present when appropriate. Ability to conduct physical exam was limited. The patient was located in a state where the provider was credentialed to provide care.        8/31/2021  Stacey Pathak, NP

## 2021-10-11 ENCOUNTER — TELEPHONE (OUTPATIENT)
Dept: PRIMARY CARE CLINIC | Age: 51
End: 2021-10-11

## 2021-10-11 NOTE — TELEPHONE ENCOUNTER
LVM for pt instructing her to go to urgent care if symptoms become severe or worsen because we don't have any available appts sooner.

## 2021-10-11 NOTE — TELEPHONE ENCOUNTER
----- Message from Tino Perez sent at 10/8/2021  6:21 PM EDT -----  Regarding: /JUANJO  General Message/Vendor Calls    Caller's first and last name: n/a      Reason for call: treated for herniated disk and now left hand is numb      Callback required yes/no and why: yes      Best contact number(s): [phone]      Details to clarify the request: [address]

## 2021-11-01 ENCOUNTER — OFFICE VISIT (OUTPATIENT)
Dept: BEHAVIORAL/MENTAL HEALTH CLINIC | Age: 51
End: 2021-11-01
Payer: COMMERCIAL

## 2021-11-01 VITALS
BODY MASS INDEX: 33.2 KG/M2 | TEMPERATURE: 97.1 F | WEIGHT: 187.4 LBS | HEIGHT: 63 IN | RESPIRATION RATE: 17 BRPM | HEART RATE: 87 BPM | DIASTOLIC BLOOD PRESSURE: 81 MMHG | SYSTOLIC BLOOD PRESSURE: 112 MMHG | OXYGEN SATURATION: 97 %

## 2021-11-01 DIAGNOSIS — F41.9 ANXIETY: ICD-10-CM

## 2021-11-01 DIAGNOSIS — F33.0 MILD EPISODE OF RECURRENT MAJOR DEPRESSIVE DISORDER (HCC): Primary | ICD-10-CM

## 2021-11-01 PROCEDURE — 99213 OFFICE O/P EST LOW 20 MIN: CPT | Performed by: NURSE PRACTITIONER

## 2021-11-01 RX ORDER — ZOLPIDEM TARTRATE 10 MG/1
10 TABLET ORAL
Qty: 30 TABLET | Refills: 1 | Status: SHIPPED | OUTPATIENT
Start: 2021-11-01 | End: 2022-01-17 | Stop reason: SDUPTHER

## 2021-11-01 NOTE — PROGRESS NOTES
CHIEF COMPLAINT:  Lucy Jansen is a 46 y.o. female and was seen today for follow-up of psychiatric condition and psychotropic medication management. HPI:    Solomon Villalobos reports the following psychiatric symptoms by hx:  depression and anxiety. Overall symptoms have been present for months. Currently symptoms are of mild to moderate severity. The symptoms occur intermittently. Pt reports medications are beneficial. Met with pt for appt today to review current treatment plan. FAMILY/SOCIAL HX: psychosocial stressors    REVIEW OF SYSTEMS:  Psychiatric symptoms being monitored for:  depression, anxiety  Appetite:good   Sleep: poor with DMS (maintaining sleep)   Neuro: denies    Visit Vitals  /81 (BP 1 Location: Left arm, BP Patient Position: Sitting, BP Cuff Size: Large adult)   Pulse 87   Temp 97.1 °F (36.2 °C) (Temporal)   Resp 17   Ht 5' 3\" (1.6 m)   Wt 85 kg (187 lb 6.4 oz)   SpO2 97%   BMI 33.20 kg/m²       Side Effects:  none    MENTAL STATUS EXAM:   Sensorium  oriented to time, place and person   Relations cooperative   Appearance:  age appropriate and casually dressed   Motor Behavior:  gait stable and within normal limits   Speech:  normal volume   Thought Process: goal directed   Thought Content free of delusions and free of hallucinations   Suicidal ideations no intention   Homicidal ideations no intention   Mood:  within normal limits   Affect:  wnl   Memory recent  adequate   Memory remote:  adequate   Concentration:  adequate to impaired   Abstraction:  abstract   Insight:  good   Reliability good   Judgment:  good     MEDICAL DECISION MAKING:  Problems addressed today:    ICD-10-CM ICD-9-CM    1. Mild episode of recurrent major depressive disorder (HCC)  F33.0 296.31    2. Anxiety  F41.9 300.00 zolpidem (AMBIEN) 10 mg tablet    r/o VERITO       Assessment:   Solomon Villalobos is responding to treatment. Symptoms are improving. Discussed current medications and dosages. Will first increase ambien for sleep. Discussed possibility of increasing rexulti and/or prozac if symptoms remain mild to moderate severity after sleep is improved. Reviewed treatment goals and target symptoms to monitor for. Reinforced importance of lifestyle factors she has been working on. Plan:   1. Current Outpatient Medications   Medication Sig Dispense Refill    zolpidem (AMBIEN) 10 mg tablet Take 1 Tablet by mouth nightly as needed for Sleep. Max Daily Amount: 10 mg. 30 Tablet 1    levothyroxine (SYNTHROID) 50 mcg tablet Take 1 Tablet by mouth Daily (before breakfast) for 90 days. 90 Tablet 0    FLUoxetine (PROzac) 40 mg capsule Take 1 Capsule by mouth daily. 90 Capsule 1    brexpiprazole (Rexulti) 0.5 mg tab tablet Take 1 Tablet by mouth daily. 90 Tablet 1    cholecalciferol, vitamin D3, 2,000 unit tab Take  by mouth.  EPINEPHrine (EPIPEN 2-EDWIGE) 0.3 mg/0.3 mL (1:1,000) injection 0.3 mL by IntraMUSCular route once as needed for up to 1 dose. 2 Each 0    valACYclovir (VALTREX) 1 gram tablet Take 1 Tablet by mouth three (3) times daily. (Patient not taking: Reported on 11/1/2021) 21 Tablet 0    ondansetron (ZOFRAN ODT) 8 mg disintegrating tablet Take 1 Tab by mouth every eight (8) hours as needed for Nausea. (Patient not taking: Reported on 11/1/2021) 20 Tab 0          medication changes made today: cont prozac, cont rexulti, increase prn ambien 10 mg    2. Counseling and coordination of care including instructions for treatment, risks/benefits, risk factor reduction and patient/family education. She agrees with the plan. Patient instructed to call with any side effects, questions or issues. 3.    Follow-up and Dispositions    · Return in about 3 months (around 2/1/2022).          11/1/2021  Viviane Calvo NP

## 2021-11-01 NOTE — PROGRESS NOTES
Chief Complaint   Patient presents with    Medication Management     Visit Vitals  /81 (BP 1 Location: Left arm, BP Patient Position: Sitting, BP Cuff Size: Large adult)   Pulse 87   Temp 97.1 °F (36.2 °C) (Temporal)   Resp 17   Ht 5' 3\" (1.6 m)   Wt 85 kg (187 lb 6.4 oz)   SpO2 97%   BMI 33.20 kg/m²     Prior to Admission medications    Medication Sig Start Date End Date Taking? Authorizing Provider   levothyroxine (SYNTHROID) 50 mcg tablet Take 1 Tablet by mouth Daily (before breakfast) for 90 days. 8/31/21 11/29/21 Yes Александр Winter MD   FLUoxetine (PROzac) 40 mg capsule Take 1 Capsule by mouth daily. 8/31/21  Yes Sarai Back NP   brexpiprazole (Rexulti) 0.5 mg tab tablet Take 1 Tablet by mouth daily. 8/31/21  Yes Sarai Back NP   zolpidem (AMBIEN) 5 mg tablet Take 1 Tablet by mouth daily. Max Daily Amount: 5 mg. 8/9/21  Yes Sarai Back NP   cholecalciferol, vitamin D3, 2,000 unit tab Take  by mouth. Yes Provider, Historical   EPINEPHrine (EPIPEN 2-EDWIGE) 0.3 mg/0.3 mL (1:1,000) injection 0.3 mL by IntraMUSCular route once as needed for up to 1 dose. 6/26/15  Yes Don Purcell MD   valACYclovir (VALTREX) 1 gram tablet Take 1 Tablet by mouth three (3) times daily. Patient not taking: Reported on 11/1/2021 10/14/21   Sugey Hawthorne NP   ondansetron (ZOFRAN ODT) 8 mg disintegrating tablet Take 1 Tab by mouth every eight (8) hours as needed for Nausea.   Patient not taking: Reported on 11/1/2021 3/25/21   Mary Alatorre MD

## 2021-12-02 ENCOUNTER — TELEPHONE (OUTPATIENT)
Dept: BEHAVIORAL/MENTAL HEALTH CLINIC | Age: 51
End: 2021-12-02

## 2021-12-02 RX ORDER — HYDROXYZINE 25 MG/1
25 TABLET, FILM COATED ORAL
COMMUNITY
Start: 2021-10-12 | End: 2021-12-02

## 2021-12-02 RX ORDER — HYDROXYZINE 25 MG/1
25 TABLET, FILM COATED ORAL
Qty: 30 TABLET | Refills: 1 | Status: SHIPPED | OUTPATIENT
Start: 2021-12-02 | End: 2022-03-21 | Stop reason: SDUPTHER

## 2021-12-02 NOTE — TELEPHONE ENCOUNTER
Nader Garcia is requesting a refill on Hydroxyzine. You discontinued it in August as a not a current medication. It is not mentioned in your Aug or Nov note, but is mentioned in July note. I was unable to pull it from the reconciliation list.  Please advise.

## 2021-12-28 ENCOUNTER — TELEPHONE (OUTPATIENT)
Dept: PRIMARY CARE CLINIC | Age: 51
End: 2021-12-28

## 2021-12-28 NOTE — TELEPHONE ENCOUNTER
Patient called in wanting an earlier physical appointment than the one already scheduled on Jan 18th. I called her back and left a message and told her she should keep her appt on the 18th because we did not have anything sooner.

## 2021-12-30 ENCOUNTER — APPOINTMENT (OUTPATIENT)
Dept: GENERAL RADIOLOGY | Age: 51
End: 2021-12-30
Attending: EMERGENCY MEDICINE
Payer: COMMERCIAL

## 2021-12-30 ENCOUNTER — HOSPITAL ENCOUNTER (EMERGENCY)
Age: 51
Discharge: HOME | End: 2021-12-30
Attending: EMERGENCY MEDICINE
Payer: COMMERCIAL

## 2021-12-30 VITALS
OXYGEN SATURATION: 96 % | SYSTOLIC BLOOD PRESSURE: 135 MMHG | TEMPERATURE: 98.5 F | HEART RATE: 88 BPM | DIASTOLIC BLOOD PRESSURE: 83 MMHG | RESPIRATION RATE: 18 BRPM

## 2021-12-30 DIAGNOSIS — M89.8X1 CLAVICLE PAIN: Primary | ICD-10-CM

## 2021-12-30 DIAGNOSIS — Z20.822 SUSPECTED COVID-19 VIRUS INFECTION: ICD-10-CM

## 2021-12-30 LAB — SARS-COV-2, COV2: NORMAL

## 2021-12-30 PROCEDURE — U0005 INFEC AGEN DETEC AMPLI PROBE: HCPCS

## 2021-12-30 PROCEDURE — 71046 X-RAY EXAM CHEST 2 VIEWS: CPT

## 2021-12-30 PROCEDURE — 99282 EMERGENCY DEPT VISIT SF MDM: CPT

## 2021-12-30 NOTE — ED NOTES
Pt dc home ambulatory, NAD noted. reviewed discharge instructions with patient. Patient understood instructions.

## 2021-12-30 NOTE — ED TRIAGE NOTES
Triage Note: Patient arrives to ER complaining of headache and sore throat that started today. Was exposed to [place] by son on Sunday. Also complains of left clavicle pain and swelling x 2 weeks.

## 2021-12-30 NOTE — ED PROVIDER NOTES
49-year-old female presents with a chief complaint of left clavicular pain, sore throat and concern for Covid. Her son recently tested positive. She denies shortness of breath or chest pain. She did not have any injury. She has been having the left clavicle pain for the last 2 weeks. She is concerned because her mother had bone cancer. She was vaccinated against Covid.            Past Medical History:   Diagnosis Date    Back pain, acute     Depression     Endocrine disease     hypothyroid    H/O seasonal allergies     Maxillary sinus fracture ([address] 2016    Neck pain, musculoskeletal        Past Surgical History:   Procedure Laterality Date    COLONOSCOPY N/A 2019    COLONOSCOPY performed by Arleen Grant MD at \[facility]"" ENDOSCOPY    COLONOSCOPY,DIAGNOSTIC  2019         HX CERVICAL DISKECTOMY Left 2015    HX CERVICAL FUSION Left 2015     HX  SECTION      HX GYN  1998    laproscopsy     HX WISDOM TEETH EXTRACTION           Family History:   Problem Relation Age of Onset    Cancer Father         pancreas, Liver    Cancer Maternal Grandmother     Breast Cancer Maternal Grandmother     Heart Disease Maternal Grandfather     Heart Disease Paternal Grandmother     Stroke Paternal Grandfather     Other Mother         meningioma    Hypertension Mother     Breast Cancer Mother 61   Genna Coral Anesth Problems Mother         SLOW TO WAKE UP    Stroke Mother     Hypertension Sister     No Known Problems Daughter     No Known Problems Daughter     No Known Problems Son     Breast Cancer Maternal Aunt     Breast Cancer Maternal Aunt     Breast Cancer Maternal Aunt        Social History     Socioeconomic History    Marital status:      Spouse name: Not on file    Number of children: Not on file    Years of education: Not on file    Highest education level: Not on file   Occupational History    Not on file   Tobacco Use    Smoking status: Never Smoker    Smokeless tobacco: Never Used   Vaping Use    Vaping Use: Never used   Substance and Sexual Activity    Alcohol use: Never     Alcohol/week: 0.0 standard drinks     Comment: occasional    Drug use: No    Sexual activity: Yes     Partners: Male     Birth control/protection: Surgical     Comment:     Other Topics Concern    Not on file   Social History Narrative    Works L[employer]    Going through divorce 2017    Member of [facility]  [employer], has good friends there     Social Determinants of Health     Financial Resource Strain:     Difficulty of Paying Living Expenses: Not on file   Food Insecurity:     Worried About [address] in the Last Year: Not on file    [address] in the Last Year: Not on file   Transportation Needs:     Lack of Transportation (Medical): Not on file    Lack of Transportation (Non-Medical): Not on file   Physical Activity:     Days of Exercise per Week: Not on file    Minutes of Exercise per Session: Not on file   Stress:     Feeling of Stress : Not on file   Social Connections:     Frequency of Communication with Friends and Family: Not on file    Frequency of Social Gatherings with Friends and Family: Not on file    Attends [employer] Services: Not on file    Active Member of [address] [place] [address] or Organizations: Not on file    Attends Club or Organization Meetings: Not on file    Marital Status: Not on file   Intimate Partner Violence:     Fear of Current or Ex-Partner: Not on file    Emotionally Abused: Not on file    Physically Abused: Not on file    Sexually Abused: Not on file   Housing Stability:     Unable to Pay for Housing in the Last Year: Not on file    Number of [place] in the Last Year: Not on file    Unstable Housing in the Last Year: Not on file         ALLERGIES: Latex; Teagan; Soy; Bees [sting, bee]; Cephaeline; Keflex [cephalexin]; Soybean oil; and Tomato    Review of Systems   Constitutional: Negative for fever.    HENT: Positive for sore throat. Respiratory: Negative for shortness of breath. Cardiovascular: Negative for chest pain. Gastrointestinal: Negative for abdominal pain. Genitourinary: Negative for dysuria. Musculoskeletal: Negative for back pain. Skin: Negative for wound. Neurological: Negative for headaches. Psychiatric/Behavioral: Negative for confusion. Vitals:    12/30/21 1300   BP: 135/83   Pulse: 88   Resp: 18   Temp: 98.5 °F (36.9 °C)   SpO2: 96%            Physical Exam  Vitals and nursing note reviewed. Constitutional:       General: She is not in acute distress. Appearance: Normal appearance. She is not ill-appearing, toxic-appearing or diaphoretic. HENT:      Head: Normocephalic and atraumatic. Eyes:      Extraocular Movements: Extraocular movements intact. Cardiovascular:      Rate and Rhythm: Normal rate. Pulses: Normal pulses. Heart sounds: Normal heart sounds. Pulmonary:      Effort: Pulmonary effort is normal. No respiratory distress. Breath sounds: Normal breath sounds. Abdominal:      General: There is no distension. Musculoskeletal:         General: Normal range of motion. Cervical back: Normal range of motion. Comments: No tenderness along the left clavicle. Skin:     General: Skin is dry. Neurological:      Mental Status: She is alert and oriented to person, place, and time. Psychiatric:         Mood and Affect: Mood normal.          MDM  Number of Diagnoses or Management Options  Clavicle pain  Suspected COVID-19 virus infection  Diagnosis management comments: Patient presents with tenderness over the left clavicle and concern for Covid. X-ray negative. Work of breathing and oxygen saturations normal.  Discussed my clinical impression(s), any labs and/or radiology results with the patient. I answered any questions and addressed any concerns.  Discussed the importance of following up with their primary care physician and/or specialist(s). Discussed signs or symptoms that would warrant return back to the ER for further evaluation. The patient is agreeable with discharge.        Amount and/or Complexity of Data Reviewed  Tests in the radiology section of CPT®: ordered and reviewed           Procedures

## 2021-12-30 NOTE — DISCHARGE INSTRUCTIONS
Please follow-up with your primary care physician regarding your clavicular pain. You may need additional testing including bone scan despite having a normal chest x-ray today. Thank you for allowing us to provide you with medical care today. We realize that you have many choices for your emergency care needs. We thank you for choosing [facility]. Please choose us in the future for any continued health care needs. The exam and treatment you received in the Emergency Department were for an emergent problem and are not intended as complete care. It is important that you follow up with a doctor, nurse practitioner, or physician's assistant for ongoing care. If your symptoms worsen or you do not improve as expected and you are unable to reach your usual health care provider, you should return to the Emergency Department. We are available 24 hours a day. Please make an appointment with your health care provider(s) for follow up of your Emergency Department visit. Take this sheet with you when you go to your follow-up visit.

## 2022-01-01 LAB
SARS-COV-2, XPLCVT: NOT DETECTED
SOURCE, COVRS: NORMAL

## 2022-01-03 ENCOUNTER — PATIENT OUTREACH (OUTPATIENT)
Dept: OTHER | Age: 52
End: 2022-01-03

## 2022-01-03 NOTE — PROGRESS NOTES
1/3/2022, Per chart review, S/P 12/30/2021 ED visit at [facility]). Per ED provider note, left clavicular pain, sore throat and concern for Covid. Her son recently tested positive. Covid testing in ED, resulted as negative at this time. Patient on report as eligible for Case Management. Left discreet message on voicemail with this ACM contact information and request for return call. PLAN: Will attempt to contact again to offer [address] [place] [address] Management services.

## 2022-01-10 ENCOUNTER — PATIENT OUTREACH (OUTPATIENT)
Dept: OTHER | Age: 52
End: 2022-01-10

## 2022-01-10 NOTE — LETTER
1/10/2022 5:38 PM    Ms. Анна King  [address]         Dear Sherrlyn Olszewski,     My name is Aaron Del Valle RN, Associate Care Manager for [facility] and I have been trying to reach you. The Associate Care Management (ACM) program is a free-of-charge confidential service provided to our associates and their family members covered by the [facility]. The program will provide an associate and his/her family with the [address] expertise to assist in navigating the health care delivery system, provider services, and their overall care needsso as to assure and improve health care interactions and enhance the quality of life. This program is designed to provide you with the opportunity to have a [employer] partner with you for the following services:     1) when you come home from the hospital or emergency room   2) when help is needed to manage your disease   3) when you need assistance coordinating services or appointments  4) when you need additional education, resources or assistance reaching your Be Well Health Program goals/requirements such as Be Well With Diabetes      [address] is dedicated to empowering the good health of its community and improving the quality of care and care experiences for associates and their families. We are committed to safeguarding patient confidentiality and privacy, assuring that every associate has the respect he or she deserves in managing their health. The information shared with your care manager will not be shared with anyone else aside from those you identify as part of your care team, and will only be used to assist you with any identified care needs. Please contact me if you would like this service provided to you.      Sincerely,    Dario Cuevas RN, BSN, [facility]   [address] [facility] [place] [address]  [facility] [phone], [email or web address]

## 2022-01-10 NOTE — PROGRESS NOTES
1/10/2022, S/P 12/30/2021 ED visit at [facility]). Per ED provider note, left clavicular pain, sore throat and concern for Covid.  Her son recently tested positive. Covid testing in ED, resulted as negative at this time. 1/10, Per chart review, no further ED/Hospitalizations. Has PCP appt, 1/18  1/10, Patient identified as eligible for [address] [place] [address] St Management services. Second telephone outreach attempted. Left discreet voicemail with this  confidential contact information. PLAN: Will send UTR letter. Will attempt to contact again in next 1-2 weeks to offer [address] [place] [address] St Management services. See Previous Documentation:  1/3/2022, Per chart review, S/P 12/30/2021 ED visit at [facility]). Per ED provider note, left clavicular pain, sore throat and concern for Covid.  Her son recently tested positive. Covid testing in ED, resulted as negative at this time. Patient on report as eligible for Case Management. Left discreet message on voicemail with this [facility] contact information and request for return call. PLAN: Will attempt to contact again to offer [address] [place] [address] St Management services.

## 2022-01-11 ENCOUNTER — PATIENT OUTREACH (OUTPATIENT)
Dept: OTHER | Age: 52
End: 2022-01-11

## 2022-01-11 NOTE — PROGRESS NOTES
1/11/2022, S/P 12/30/2021 ED visit at [facility]). Per ED provider note, left clavicular pain, sore throat and concern for Covid.  Her son recently tested positive. Covid testing in ED, resulted as negative at this time. Per chart review, no further ED/Hospitalizations. Has PCP appt, 1/18 1/11, This writer/ACM received return call from [facility] with request for return call. 1/11, This writer/ACM returned call to [facility], HIPAA verified. [place] allowed this writer/ACM to explain ACM role/purpose of call, POST ED, to discuss discharge instructions, medications, follow-up appointments and any CM needs. Stevan nava, states has F/U appt on 1/18, plans to keep, and denies questions or concerns at this time. Instruction provided on importance of continued health maintenance/2022 benefit changes, BE WELL and CM support available. Stevan nava, however, again denies need for CM support at this time, agrees to keep ACM contact information to call as needed. PLAN: UTR Letter has been sent. Will not make further telephone outreach calls at this time. Will remain available should she return call or have further CM needs. See Previous Documentation:  1/10/2022, S/P 12/30/2021 ED visit at [facility]). Per ED provider note, left clavicular pain, sore throat and concern for Covid.  Her son recently tested positive. Covid testing in ED, resulted as negative at this time. 1/10, Per chart review, no further ED/Hospitalizations. Has PCP appt, 1/18  1/10, Patient identified as eligible for [address] [place] [address] St Management services. Second telephone outreach attempted. Left discreet voicemail with this CM confidential contact information. PLAN: Will send UTR letter.  Will attempt to contact again in next 1-2 weeks to offer [address] [place] [address] St Management services.       See Previous Documentation:  1/3/2022, Per chart review, S/P 12/30/2021 [facility] Per ED provider note, left clavicular pain, sore throat and concern for Covid.  Her son recently tested positive. Covid testing in ED, resulted as negative at this time.   Patient on report as eligible for Case Management.  Left discreet message on voicemail with this ACM contact information and request for return call.    PLAN: Will attempt to contact again to offer [address] Management services.

## 2022-01-17 DIAGNOSIS — F41.9 ANXIETY: ICD-10-CM

## 2022-01-17 RX ORDER — ZOLPIDEM TARTRATE 10 MG/1
10 TABLET ORAL
Qty: 30 TABLET | Refills: 1 | Status: SHIPPED | OUTPATIENT
Start: 2022-01-17 | End: 2022-02-03

## 2022-01-17 NOTE — TELEPHONE ENCOUNTER
Last visit: 11.01.21  Next visit: 02.03.22    :  11/01/2021 11/01/2021 1 Zolpidem Tartrate 10 Mg Tablet 30.00 30 Pa All   08/09/2021 08/09/2021 1 Zolpidem Tartrate 5 Mg Tablet 30.00 30 Pa All

## 2022-01-18 ENCOUNTER — OFFICE VISIT (OUTPATIENT)
Dept: PRIMARY CARE CLINIC | Age: 52
End: 2022-01-18
Payer: COMMERCIAL

## 2022-01-18 ENCOUNTER — HOSPITAL ENCOUNTER (OUTPATIENT)
Dept: LAB | Age: 52
Discharge: HOME | End: 2022-01-18
Payer: COMMERCIAL

## 2022-01-18 VITALS
BODY MASS INDEX: 33.31 KG/M2 | HEIGHT: 63 IN | TEMPERATURE: 97.5 F | SYSTOLIC BLOOD PRESSURE: 108 MMHG | DIASTOLIC BLOOD PRESSURE: 73 MMHG | RESPIRATION RATE: 17 BRPM | HEART RATE: 73 BPM | OXYGEN SATURATION: 97 % | WEIGHT: 188 LBS

## 2022-01-18 DIAGNOSIS — Z13.1 SCREENING FOR DIABETES MELLITUS (DM): ICD-10-CM

## 2022-01-18 DIAGNOSIS — E03.9 ACQUIRED HYPOTHYROIDISM: ICD-10-CM

## 2022-01-18 DIAGNOSIS — N94.6 DYSMENORRHEA: ICD-10-CM

## 2022-01-18 DIAGNOSIS — Z12.4 ENCOUNTER FOR SCREENING FOR CERVICAL CANCER: ICD-10-CM

## 2022-01-18 DIAGNOSIS — Z01.419 WELL WOMAN EXAM WITH ROUTINE GYNECOLOGICAL EXAM: Primary | ICD-10-CM

## 2022-01-18 DIAGNOSIS — Z11.59 NEED FOR HEPATITIS C SCREENING TEST: ICD-10-CM

## 2022-01-18 DIAGNOSIS — Z12.31 ENCOUNTER FOR SCREENING MAMMOGRAM FOR MALIGNANT NEOPLASM OF BREAST: ICD-10-CM

## 2022-01-18 DIAGNOSIS — E55.9 VITAMIN D DEFICIENCY: ICD-10-CM

## 2022-01-18 DIAGNOSIS — Z13.220 ENCOUNTER FOR LIPID SCREENING FOR CARDIOVASCULAR DISEASE: ICD-10-CM

## 2022-01-18 DIAGNOSIS — Z13.6 ENCOUNTER FOR LIPID SCREENING FOR CARDIOVASCULAR DISEASE: ICD-10-CM

## 2022-01-18 PROCEDURE — 88175 CYTOPATH C/V AUTO FLUID REDO: CPT

## 2022-01-18 PROCEDURE — 99396 PREV VISIT EST AGE 40-64: CPT | Performed by: FAMILY MEDICINE

## 2022-01-18 PROCEDURE — 87624 HPV HI-RISK TYP POOLED RSLT: CPT

## 2022-01-18 PROCEDURE — 99214 OFFICE O/P EST MOD 30 MIN: CPT | Performed by: FAMILY MEDICINE

## 2022-01-18 RX ORDER — ACETAMINOPHEN AND CODEINE PHOSPHATE 120; 12 MG/5ML; MG/5ML
1 SOLUTION ORAL DAILY
Qty: 1 DOSE PACK | Refills: 5 | Status: SHIPPED | OUTPATIENT
Start: 2022-01-18 | End: 2022-01-19 | Stop reason: SDUPTHER

## 2022-01-18 RX ORDER — AMPICILLIN TRIHYDRATE 250 MG
600 CAPSULE ORAL
COMMUNITY
End: 2022-10-06

## 2022-01-18 NOTE — PROGRESS NOTES
Identified pt with two pt identifiers(name and ). Chief Complaint   Patient presents with    Complete Physical     including PAP; declines STD testing today    Other     no chance of pregnancy; would like to start birth control pills        3 most recent PHQ Screens 2020   PHQ Not Done -   Little interest or pleasure in doing things Several days   Feeling down, depressed, irritable, or hopeless Several days   Total Score PHQ 2 2   Trouble falling or staying asleep, or sleeping too much Several days   Feeling tired or having little energy Several days   Poor appetite, weight loss, or overeating Several days   Feeling bad about yourself - or that you are a failure or have let yourself or your family down Not at all   Trouble concentrating on things such as school, work, reading, or watching TV Not at all   Moving or speaking so slowly that other people could have noticed; or the opposite being so fidgety that others notice Not at all   Thoughts of being better off dead, or hurting yourself in some way Not at all   PHQ 9 Score 5        Vitals:    22 1615   BP: 108/73   Pulse: 73   Resp: 17   Temp: 97.5 °F (36.4 °C)   TempSrc: Temporal   SpO2: 97%   Weight: 188 lb (85.3 kg)   Height: 5' 3\" (1.6 m)   PainSc:   0 - No pain   LMP: 2021       Health Maintenance Due   Topic    Hepatitis C Screening     COVID-19 Vaccine (1)    Flu Vaccine (1)    Cervical cancer screen     Shingrix Vaccine Age 50> (2 of 2)    Breast Cancer Screen Mammogram        1. Have you been to the ER, urgent care clinic since your last visit? Hospitalized since your last visit? No    2. Have you seen or consulted any other health care providers outside of the [address] [place] [address] since your last visit? Include any pap smears or colon screening.  No

## 2022-01-18 NOTE — PROGRESS NOTES
HPI:  Stephanie Stafford is a 46 y.o. female presenting for well woman exam.     Wanting to start OCPs. States she is still having periods but have somewhat been irregular. LMP /. Not heavy or painful. Last about 3 days. Has back pain. Last Dental Exam: due in April   Last Eye Exam: scheduled in March    Diet: working on diet, eats lot of fruits and veggies, eats meat and fish  Exercise: not as much since its cold    Occupation: works for [employer]     Tobacco Use: never  Alcohol: socially    OB/ GYN HX:   -  x2, LTCS x1, no complications  Does not want STD testing    Allergies- reviewed: Allergies   Allergen Reactions    Latex Anaphylaxis    Ginger Hives    Soy Hives    Bees [Sting, Bee] Not Reported This Time    Cephaeline Not Reported This Time     Pt. States no allergy    Keflex [Cephalexin] Rash    Shingrix (Pf) [Varicella-Zoster Ge-As01b (Pf)] Swelling    Soybean Oil Hives    Tomato Hives     Pt. States no allergy       Medications- reviewed:   Current Outpatient Medications   Medication Sig    red yeast rice extract 600 mg cap Take 600 mg by mouth now.  zolpidem (AMBIEN) 10 mg tablet Take 1 Tablet by mouth nightly as needed for Sleep. Max Daily Amount: 10 mg.    brexpiprazole (Rexulti) 0.5 mg tab tablet Take 1 Tablet by mouth daily.  hydrOXYzine HCL (ATARAX) 25 mg tablet Take 1 Tablet by mouth three (3) times daily as needed for Anxiety.  levothyroxine (SYNTHROID) 50 mcg tablet Take 1 Tablet by mouth Daily (before breakfast) for 90 days.  FLUoxetine (PROzac) 40 mg capsule Take 1 Capsule by mouth daily.  cholecalciferol, vitamin D3, 2,000 unit tab Take  by mouth.  EPINEPHrine (EPIPEN 2-EDWIGE) 0.3 mg/0.3 mL (1:1,000) injection 0.3 mL by IntraMUSCular route once as needed for up to 1 dose.  norethindrone (MICRONOR) 0.35 mg tab Take 1 Tablet by mouth daily. No current facility-administered medications for this visit.          Past Medical History- reviewed:  Past Medical History:   Diagnosis Date    Back pain, acute     Depression     Endocrine disease     hypothyroid    H/O seasonal allergies     Maxillary sinus fracture ([facility] [address] 2016    Neck pain, musculoskeletal          Past Surgical History- reviewed:   Past Surgical History:   Procedure Laterality Date    COLONOSCOPY N/A 2019    COLONOSCOPY performed by Juan José Montenegro MD at hospitals ENDOSCOPY    COLONOSCOPY,DIAGNOSTIC  2019         HX CERVICAL DISKECTOMY Left 2015    HX CERVICAL FUSION Left 2015     HX  SECTION      HX GYN  1998    laproscopsy     HX WISDOM TEETH EXTRACTION           Family History - reviewed:  Family History   Problem Relation Age of Onset    Cancer Father         pancreas, Liver    Cancer Maternal Grandmother     Breast Cancer Maternal Grandmother     Heart Disease Maternal Grandfather     Heart Disease Paternal Grandmother     Stroke Paternal Grandfather     Other Mother         meningioma    Hypertension Mother     Breast Cancer Mother 61   [facility] Anesth Problems Mother         SLOW TO WAKE UP    Stroke Mother     Hypertension Sister     No Known Problems Daughter     No Known Problems Daughter     No Known Problems Son     Breast Cancer Maternal Aunt     Breast Cancer Maternal Aunt     Breast Cancer Maternal Aunt          Social History - reviewed:  Social History     Socioeconomic History    Marital status:      Spouse name: Not on file    Number of children: Not on file    Years of education: Not on file    Highest education level: Not on file   Occupational History    Not on file   Tobacco Use    Smoking status: Never Smoker    Smokeless tobacco: Never Used   Vaping Use    Vaping Use: Never used   Substance and Sexual Activity    Alcohol use: Never     Alcohol/week: 0.0 standard drinks     Comment: occasional    Drug use: No    Sexual activity: Yes     Partners: Male     Comment:     Other Topics Concern    Not on file   Social History Narrative    Works L[employer]    Going through divorce 2017    Member of [facility]  [employer], has good friends there     Social Determinants of Health     Financial Resource Strain:     Difficulty of Paying Living Expenses: Not on file   Food Insecurity:     Worried About [address] in the Last Year: Not on file    Kassandra of Food in the Last Year: Not on file   Transportation Needs:     Lack of Transportation (Medical): Not on file    Lack of Transportation (Non-Medical):  Not on file   Physical Activity:     Days of Exercise per Week: Not on file    Minutes of Exercise per Session: Not on file   Stress:     Feeling of Stress : Not on file   Social Connections:     Frequency of Communication with Friends and Family: Not on file    Frequency of Social Gatherings with Friends and Family: Not on file    Attends Zoroastrianism Services: Not on file    Active Member of [address] [place] [address] or Organizations: Not on file    Attends Club or Organization Meetings: Not on file    Marital Status: Not on file   Intimate Partner Violence:     Fear of Current or Ex-Partner: Not on file    Emotionally Abused: Not on file    Physically Abused: Not on file    Sexually Abused: Not on file   Housing Stability:     Unable to Pay for Housing in the Last Year: Not on file    Number of [place] in the Last Year: Not on file    Unstable Housing in the Last Year: Not on file         Immunizations - reviewed:   Immunization History   Administered Date(s) Administered    COVID-19, Pfizer Purple top, DILUTE for use, 12+ yrs, 30mcg/0.3mL dose 03/04/2021, 03/25/2021, 12/21/2021    Influenza Vaccine 10/10/2014, 10/08/2015, 10/30/2018, 10/05/2020, 10/21/2021    TDAP Vaccine 07/01/2006    Tdap 06/23/2014    Zoster Recombinant 09/27/2021     Flu: UTD  Tdap: UTD  Pneumovax: not indicated  Zostervax: did not tolerate after 1st shot    Health Maintenance reviewed -  Pap smear - due  Mammogram - due  Colonoscopy - due 2029  DEXA scan - not indicated  HIV testing - not indicated  Hepatitis C testing - desires  Lung cancer screening - not indicated    Review of Systems   No fever, chills, sore throat, cough, abnormal vaginal bleeding or discharge    Physical Exam  Visit Vitals  /73 (BP 1 Location: Left upper arm, BP Patient Position: Sitting, BP Cuff Size: Adult long)   Pulse 73   Temp 97.5 °F (36.4 °C) (Temporal)   Resp 17   Ht 5' 3\" (1.6 m)   Wt 188 lb (85.3 kg)   LMP 12/14/2021 (Exact Date)   SpO2 97%   BMI 33.30 kg/m²     General appearance - alert, well appearing, and in no distress  Eyes - sclera anicteric, left eye normal, right eye normal  Ears - bilateral TM's and external ear canals normal  Nose - Wearing mask during exam  Mouth - Wearing mask during exam  Neck - supple, no significant adenopathy  Chest - clear to auscultation, no wheezes, rales or rhonchi, symmetric air entry  Heart - normal rate, regular rhythm, normal S1, S2, no murmurs, rubs, clicks or gallops  Abdomen - soft, nontender, nondistended, no masses or organomegaly  Neurological - alert, oriented, normal speech, no focal findings or movement disorder noted  Musculoskeletal - full range of motion without pain  Extremities - intact peripheral pulses  Skin - normal coloration and turgor, no rashes, no suspicious skin lesions noted  Pelvic - Exam chaperoned by Keira Escalante LPN. External genitalia normal without rashes or lesions. Pink and moist vaginal mucosa. Scant white discharge. Cervix without lesions or abnormal discharge. Uterus non tender and normal size. No adnexal masses or tenderness. Breast - not examined    POC UPT - neg    Assessment/Plan:    ICD-10-CM ICD-9-CM    1. Well woman exam with routine gynecological exam  Z01.419 V72.31 CBC W/O DIFF      METABOLIC PANEL, COMPREHENSIVE   2. Dysmenorrhea  N94.6 625.3 AMB POC URINE PREGNANCY TEST, VISUAL COLOR COMPARISON   3.  Encounter for screening mammogram for malignant neoplasm of breast  Z12.31 V76.12 ERIBERTO MAMMO BI SCREENING INCL CAD   4. Encounter for screening for cervical cancer  Z12.4 V76.2 PAP IG, APTIMA HPV AND RFX 16/18,45 (307857)   5. Vitamin D deficiency  E55.9 268.9 VITAMIN D, 25 HYDROXY   6. Acquired hypothyroidism  E03.9 244.9 TSH 3RD GENERATION   7. Need for hepatitis C screening test  Z11.59 V73.89 HEPATITIS C AB   8. Screening for diabetes mellitus (DM)  Z13.1 V77.1 HEMOGLOBIN A1C WITH EAG   9. Encounter for lipid screening for cardiovascular disease  Z13.220 V77.91 LIPID PANEL    Z13.6 V81.2      - Discussed treatment options for dysmenorrhea/ abnormal menses including combo OCPs vs progesterone options (mini pill and depo) or referral to OB GYN for mirena vs nexplanon. She chooses mini pill. I have discussed the diagnosis with the patient and the intended plan as seen in the above orders. Patient verbalized understanding of the plan and agrees with the plan. The patient has received an after-visit summary and questions were answered concerning future plans. I have discussed medication side effects and warnings with the patient as well. Informed patient to return to the office if new symptoms arise.     Luciano Jordan, DO

## 2022-01-18 NOTE — PATIENT INSTRUCTIONS
Norethindrone (By mouth)   Norethindrone (nor-ETH-in-drone)  Prevents pregnancy. Also treats menstrual problems and endometriosis. This medicine is commonly called a birth control pill. Brand Name(s): Aygestin, Merlene, Deblitane, Anne-Marie, Emmons Carry, [place], Jolivette, [place] Pack, [place], [place], [place], Norlyroc, Ortho Micronor, Lesdain   There may be other brand names for this medicine. When This Medicine Should Not Be Used: This medicine is not right for everyone. Do not use it if you had an allergic reaction to a progestin drug or if you are pregnant. Do not use it if you have liver disease, liver tumors, or a history of blood clots, stroke, heart attack, or breast cancer. How to Use This Medicine:   Tablet  · Your doctor will tell you how much medicine to use. Do not use more than directed. Different brands of birth control pills have different instructions for when to start. Ask your doctor or pharmacist if you do not understand what day to start taking your brand. · You may take this medicine with food or milk if it upsets your stomach. · Keep your pills in the container you receive from the pharmacy. Take the pills in the order they appear in the container. · Read and follow the patient instructions that come with this medicine. Talk to your doctor or pharmacist if you have any questions. · Take your pill at the same time every day, even during your menstrual period. · Take a dose as soon as you remember. If it is almost time for your next dose, wait until then and take a regular dose. Do not take extra medicine to make up for a missed dose. If you take a pill more than 3 hours late, use another form of birth control for the next 48 hours. · Store the pills in the original package, away from heat, moisture, and direct light. Drugs and Foods to Avoid:   Ask your doctor or pharmacist before using any other medicine, including over-the-counter medicines, vitamins, and herbal products.   · Some foods and medicines can affect how birth control pills work. Tell your doctor if you are also taking any of the following:  ? Bromocriptine, rifampin, Oanh's wort, bosentan, griseofulvin  ? Antibiotic  ? Seizure medicine, including phenytoin, carbamazepine, felbamate, topiramate  ? Protease inhibitor that treats HIV/AIDS  ? Barbiturate (used to treat seizures, anxiety, or insomnia)  Warnings While Using This Medicine:   · Tell your doctor right away if you think you have become pregnant. If you miss two periods in a row, call your doctor for a pregnancy test before you take any more pills. · Tell your doctor if you are breastfeeding or if you have kidney disease, lupus, high blood pressure, high cholesterol, epilepsy, asthma, migraine headaches, diabetes, or a history of depression. · This medicine may cause the following problems:  ? Ectopic (tubal) pregnancy  ? Ovarian cysts that might twist or break  ? Possible risk of breast cancer  ? Benign liver tumor  ? Blood clots, which may lead to stroke or heart attack  · You might have spotting or irregular bleeding when you first start to use this medicine. You might have unplanned bleeding if you miss a dose or are late taking it. Call your doctor if you think there is a problem, such as if you have heavy bleeding. · This medicine will not protect you from HIV/AIDS or other sexually transmitted diseases. · Use a second form of birth control during the first 3 weeks to make sure you are protected from pregnancy. · Smoking increases your risk of heart attack, stroke, or blood clot while using this medicine. Talk to your doctor about these risks. · Tell any doctor or dentist who treats you that you are using this medicine. This medicine may affect certain medical test results. · Keep all medicine out of the reach of children. Never share your medicine with anyone.   Possible Side Effects While Using This Medicine:   Call your doctor right away if you notice any of these side effects:  · Allergic reaction: Itching or hives, swelling in your face or hands, swelling or tingling in your mouth or throat, chest tightness, trouble breathing  · Chest pain, trouble breathing, or coughing up blood  · Numbness or weakness on one side of your body, sudden or severe headache, problems with vision, speech, or walking  · Pain in your lower abdomen  · Severe headache, sensitivity to light or sound, nausea or vomiting  · Trouble seeing, double vision, or other eye problems  · Yellow skin or eyes  If you notice these less serious side effects, talk with your doctor:   · Breast tenderness or swelling  · Headache  · Light spotting or bleeding between periods  · Nausea  If you notice other side effects that you think are caused by this medicine, tell your doctor. Call your doctor for medical advice about side effects. You may report side effects to FDA at 2-621-FDA-3924  © 2017 [facility] Information is for End User's use only and may not be sold, redistributed or otherwise used for commercial purposes. The above information is an  only. It is not intended as medical advice for individual conditions or treatments.  Talk to your doctor, nurse or pharmacist before following any medical regimen to see if it is safe and effective for you.

## 2022-01-19 RX ORDER — ACETAMINOPHEN AND CODEINE PHOSPHATE 120; 12 MG/5ML; MG/5ML
1 SOLUTION ORAL DAILY
Qty: 1 DOSE PACK | Refills: 5 | Status: SHIPPED | OUTPATIENT
Start: 2022-01-19 | End: 2022-08-02 | Stop reason: SDUPTHER

## 2022-01-19 NOTE — TELEPHONE ENCOUNTER
Patient needs medication sent to [facility] the one sent to yesterday can not fill medication.

## 2022-01-19 NOTE — TELEPHONE ENCOUNTER
Requested Prescriptions     Pending Prescriptions Disp Refills    norethindrone (MICRONOR) 0.35 mg tab 1 Dose Pack 5     Sig: Take 1 Tablet by mouth daily. Last office visit: 1/18/21  Last refill: 1/18/21    Forwarded pt's [employer] message about this.

## 2022-01-21 LAB
25(OH)D3 SERPL-MCNC: 38.6 NG/ML (ref 30–100)
ALBUMIN SERPL-MCNC: 3.7 G/DL (ref 3.5–5)
ALBUMIN/GLOB SERPL: 1 {RATIO} (ref 1.1–2.2)
ALP SERPL-CCNC: 80 U/L (ref 45–117)
ALT SERPL-CCNC: 48 U/L (ref 12–78)
ANION GAP SERPL CALC-SCNC: 6 MMOL/L (ref 5–15)
AST SERPL-CCNC: 34 U/L (ref 15–37)
BILIRUB SERPL-MCNC: 0.3 MG/DL (ref 0.2–1)
BUN SERPL-MCNC: 22 MG/DL (ref 6–20)
BUN/CREAT SERPL: 23 (ref 12–20)
CALCIUM SERPL-MCNC: 8.8 MG/DL (ref 8.5–10.1)
CHLORIDE SERPL-SCNC: 105 MMOL/L (ref 97–108)
CHOLEST SERPL-MCNC: 260 MG/DL
CO2 SERPL-SCNC: 26 MMOL/L (ref 21–32)
CREAT SERPL-MCNC: 0.97 MG/DL (ref 0.55–1.02)
ERYTHROCYTE [DISTWIDTH] IN BLOOD BY AUTOMATED COUNT: 13.6 % (ref 11.5–14.5)
EST. AVERAGE GLUCOSE BLD GHB EST-MCNC: 108 MG/DL
GLOBULIN SER CALC-MCNC: 3.7 G/DL (ref 2–4)
GLUCOSE SERPL-MCNC: 80 MG/DL (ref 65–100)
HBA1C MFR BLD: 5.4 % (ref 4–5.6)
HCT VFR BLD AUTO: 44.3 % (ref 35–47)
HCV AB SERPL QL IA: NONREACTIVE
HDLC SERPL-MCNC: 57 MG/DL
HDLC SERPL: 4.6 {RATIO} (ref 0–5)
HGB BLD-MCNC: 13 G/DL (ref 11.5–16)
LDLC SERPL CALC-MCNC: 180.4 MG/DL (ref 0–100)
MCH RBC QN AUTO: 29.1 PG (ref 26–34)
MCHC RBC AUTO-ENTMCNC: 29.3 G/DL (ref 30–36.5)
MCV RBC AUTO: 99.1 FL (ref 80–99)
NRBC # BLD: 0 K/UL (ref 0–0.01)
NRBC BLD-RTO: 0 PER 100 WBC
PLATELET # BLD AUTO: 377 K/UL (ref 150–400)
PMV BLD AUTO: 10.4 FL (ref 8.9–12.9)
POTASSIUM SERPL-SCNC: 4.2 MMOL/L (ref 3.5–5.1)
PROT SERPL-MCNC: 7.4 G/DL (ref 6.4–8.2)
RBC # BLD AUTO: 4.47 M/UL (ref 3.8–5.2)
SODIUM SERPL-SCNC: 137 MMOL/L (ref 136–145)
TRIGL SERPL-MCNC: 113 MG/DL (ref ?–150)
TSH SERPL DL<=0.05 MIU/L-ACNC: 0.96 UIU/ML (ref 0.36–3.74)
VLDLC SERPL CALC-MCNC: 22.6 MG/DL
WBC # BLD AUTO: 5.3 K/UL (ref 3.6–11)

## 2022-01-26 DIAGNOSIS — D75.89 MACROCYTOSIS: Primary | ICD-10-CM

## 2022-02-01 ENCOUNTER — OFFICE VISIT (OUTPATIENT)
Dept: PRIMARY CARE CLINIC | Age: 52
End: 2022-02-01
Payer: COMMERCIAL

## 2022-02-01 VITALS — TEMPERATURE: 97.8 F

## 2022-02-01 DIAGNOSIS — R39.15 URINARY URGENCY: ICD-10-CM

## 2022-02-01 DIAGNOSIS — R35.0 URINARY FREQUENCY: ICD-10-CM

## 2022-02-01 DIAGNOSIS — R30.0 DYSURIA: ICD-10-CM

## 2022-02-01 DIAGNOSIS — N30.01 ACUTE CYSTITIS WITH HEMATURIA: Primary | ICD-10-CM

## 2022-02-01 DIAGNOSIS — N92.6 MISSED PERIOD: ICD-10-CM

## 2022-02-01 LAB
BILIRUB UR QL STRIP: NORMAL
GLUCOSE UR-MCNC: NORMAL MG/DL
HCG URINE, QL. (POC): NEGATIVE
KETONES P FAST UR STRIP-MCNC: NORMAL MG/DL
PH UR STRIP: 5 [PH] (ref 4.6–8)
PROT UR QL STRIP: NORMAL
SP GR UR STRIP: 1.02 (ref 1–1.03)
UA UROBILINOGEN AMB POC: NORMAL (ref 0.2–1)
URINALYSIS CLARITY POC: NORMAL
URINALYSIS COLOR POC: NORMAL
URINE BLOOD POC: NORMAL
URINE LEUKOCYTES POC: NORMAL
URINE NITRITES POC: POSITIVE
VALID INTERNAL CONTROL?: YES

## 2022-02-01 PROCEDURE — 81002 URINALYSIS NONAUTO W/O SCOPE: CPT | Performed by: FAMILY MEDICINE

## 2022-02-01 PROCEDURE — 81025 URINE PREGNANCY TEST: CPT | Performed by: FAMILY MEDICINE

## 2022-02-01 PROCEDURE — 99213 OFFICE O/P EST LOW 20 MIN: CPT | Performed by: FAMILY MEDICINE

## 2022-02-01 RX ORDER — SULFAMETHOXAZOLE AND TRIMETHOPRIM 800; 160 MG/1; MG/1
1 TABLET ORAL 2 TIMES DAILY
Qty: 14 TABLET | Refills: 0 | Status: SHIPPED | OUTPATIENT
Start: 2022-02-01 | End: 2022-02-08

## 2022-02-01 NOTE — PROGRESS NOTES
Subjective:     Chief Complaint   Patient presents with    Urinary Frequency        She  is a 46y.o. year old female who presents today with a c/o urinary frequency, dysuria and blood in urine. She reports that for the past two weeks she was having on off and urinary frequency and urgency but today she notice blood in urine and painful urination. Denies any flank pain, fever, chills. LMP was 12/14. Would like to have a pregnancy test done. Pertinent items are noted in HPI. Objective:     Vitals:    02/01/22 1602   BP: (P) 123/84   Pulse: (P) 79   Temp: 97.8 °F (36.6 °C)   TempSrc: Oral   SpO2: (P) 97%   Weight: (P) 188 lb (85.3 kg)   Height: (P) 5' 3\" (1.6 m)       Physical Examination: General appearance - alert, well appearing, and in no distress, oriented to person, place, and time and overweight  Mental status - alert, oriented to person, place, and time, normal mood, behavior, speech, dress, motor activity, and thought processes  Chest - clear to auscultation, no wheezes, rales or rhonchi, symmetric air entry  Heart - normal rate, regular rhythm, normal S1, S2, no murmurs, rubs, clicks or gallops  Abdomen - no CVA tenderness  Mild suprapubic tenderness. Allergies   Allergen Reactions    Latex Anaphylaxis    Teagan Hives    Soy Hives    Bees [Sting, Bee] Not Reported This Time    Cephaeline Not Reported This Time     Pt. States no allergy    Keflex [Cephalexin] Rash    Shingrix (Pf) [Varicella-Zoster Ge-As01b (Pf)] Swelling    Soybean Oil Hives    Tomato Hives     Pt.  States no allergy      Social History     Socioeconomic History    Marital status:    Tobacco Use    Smoking status: Never Smoker    Smokeless tobacco: Never Used   Vaping Use    Vaping Use: Never used   Substance and Sexual Activity    Alcohol use: Never     Alcohol/week: 0.0 standard drinks     Comment: occasional    Drug use: No    Sexual activity: Yes     Partners: Male     Comment:     Social History Narrative    Works LPN [employer]    Going through divorce 2017    Member of [employer], has good friends there      Family History   Problem Relation Age of Onset    Cancer Father         pancreas, Liver    Cancer Maternal Grandmother     Breast Cancer Maternal Grandmother     Heart Disease Maternal Grandfather     Heart Disease Paternal Grandmother     Stroke Paternal Grandfather     Other Mother         meningioma    Hypertension Mother     Breast Cancer Mother 61   [facility] Anesth Problems Mother         SLOW TO WAKE UP    Stroke Mother     Hypertension Sister     No Known Problems Daughter     No Known Problems Daughter     No Known Problems Son     Breast Cancer Maternal Aunt     Breast Cancer Maternal Aunt     Breast Cancer Maternal Aunt       Past Surgical History:   Procedure Laterality Date    COLONOSCOPY N/A 2019    COLONOSCOPY performed by Juan Rutledge [facility] at [facility]COPY    COLONOSCOPY,DIAGNOSTIC  2019         HX CERVICAL DISKECTOMY Left 2015    HX CERVICAL FUSION Left 2015     HX  SECTION      HX GYN  1998    laproscopsy     HX WISDOM TEETH EXTRACTION        Past Medical History:   Diagnosis Date    Back pain, acute     Depression     Endocrine disease     hypothyroid    H/O seasonal allergies     Maxillary sinus fracture ([facility] 2016    Neck pain, musculoskeletal       Current Outpatient Medications   Medication Sig Dispense Refill    red yeast rice extract 600 mg cap Take 600 mg by mouth now.  brexpiprazole (Rexulti) 0.5 mg tab tablet Take 1 Tablet by mouth daily. 90 Tablet 1    hydrOXYzine HCL (ATARAX) 25 mg tablet Take 1 Tablet by mouth three (3) times daily as needed for Anxiety. 30 Tablet 1    levothyroxine (SYNTHROID) 50 mcg tablet Take 1 Tablet by mouth Daily (before breakfast) for 90 days. 90 Tablet 1    FLUoxetine (PROzac) 40 mg capsule Take 1 Capsule by mouth daily.  90 Capsule 1    cholecalciferol, vitamin D3, 2,000 unit tab Take  by mouth.  EPINEPHrine (EPIPEN 2-EDWIGE) 0.3 mg/0.3 mL (1:1,000) injection 0.3 mL by IntraMUSCular route once as needed for up to 1 dose. 2 Each 0    norethindrone (MICRONOR) 0.35 mg tab Take 1 Tablet by mouth daily. 1 Dose Pack 5    zolpidem (AMBIEN) 10 mg tablet Take 1 Tablet by mouth nightly as needed for Sleep. Max Daily Amount: 10 mg. (Patient not taking: Reported on 2/1/2022) 30 Tablet 1        Assessment/ Plan:   Diagnoses and all orders for this visit:    1. Acute cystitis with hematuria  -   UA is positive for 3+ leuc, nitrite positive, 3 + blood. Start trimethoprim-sulfamethoxazole (BACTRIM DS, SEPTRA DS) 160-800 mg per tablet; Take 1 Tablet by mouth two (2) times a day for 7 days. Increase water intake. OTC Azo.     2. Dysuria  -     CULTURE, URINE; Future  -     AMB POC URINALYSIS DIP STICK MANUAL W/O MICRO  -    Start  trimethoprim-sulfamethoxazole (BACTRIM DS, SEPTRA DS) 160-800 mg per tablet; Take 1 Tablet by mouth two (2) times a day for 7 days. 3. Missed period  -     AMB POC URINE PREGNANCY TEST, VISUAL COLOR COMPARISON- negative. 4. Urinary frequency  -     AMB POC URINALYSIS DIP STICK MANUAL W/O MICRO    5. Urinary urgency  -     AMB POC URINALYSIS DIP STICK MANUAL W/O MICRO               Medication risks/benefits/costs/interactions/alternatives discussed with patient. Advised patient to call back or return to office if symptoms worsen/change/persist. If patient cannot reach us or should anything more severe/urgent arise he/she should proceed directly to the nearest emergency department. Discussed expected course/resolution/complications of diagnosis in detail with patient. Patient given a written after visit summary which includes her diagnoses, current medications and vitals. Patient expressed understanding with the diagnosis and plan. Follow-up and Dispositions    · Return if symptoms worsen or fail to improve.

## 2022-02-03 ENCOUNTER — VIRTUAL VISIT (OUTPATIENT)
Dept: BEHAVIORAL/MENTAL HEALTH CLINIC | Age: 52
End: 2022-02-03
Payer: COMMERCIAL

## 2022-02-03 DIAGNOSIS — F41.9 ANXIETY: ICD-10-CM

## 2022-02-03 DIAGNOSIS — G47.00 INSOMNIA, UNSPECIFIED TYPE: ICD-10-CM

## 2022-02-03 DIAGNOSIS — F33.0 MILD EPISODE OF RECURRENT MAJOR DEPRESSIVE DISORDER (HCC): Primary | ICD-10-CM

## 2022-02-03 PROCEDURE — 99213 OFFICE O/P EST LOW 20 MIN: CPT | Performed by: NURSE PRACTITIONER

## 2022-02-03 RX ORDER — ESZOPICLONE 1 MG/1
1 TABLET, FILM COATED ORAL
Qty: 30 TABLET | Refills: 0 | Status: SHIPPED | OUTPATIENT
Start: 2022-02-03 | End: 2022-02-03 | Stop reason: SDUPTHER

## 2022-02-03 RX ORDER — ESZOPICLONE 1 MG/1
1 TABLET, FILM COATED ORAL
Qty: 30 TABLET | Refills: 0 | Status: SHIPPED | OUTPATIENT
Start: 2022-02-03 | End: 2022-03-14 | Stop reason: CLARIF

## 2022-02-03 NOTE — TELEPHONE ENCOUNTER
Insurance denied prior auth medication sent to [facility]. We think they will approve sent to [facility].

## 2022-02-03 NOTE — PROGRESS NOTES
CHIEF COMPLAINT:  Tanya Emery is a 46 y.o. female and was seen today for follow-up of psychiatric condition and psychotropic medication management. HPI:    Quentin reports the following psychiatric symptoms by hx:  depression and anxiety. Overall symptoms have been present for months. Currently symptoms are  of low severity most days but can be moderate severity. Pt reports medications are beneificla for depression and anxiety but she is dealing with insomnia. Met with pt via video telehealth for appt today to review current treatment plan. FAMILY/SOCIAL HX: psychosocial stressors    REVIEW OF SYSTEMS:  Psychiatric symptoms being monitored for:  depression, anxiety  Appetite:good   Sleep: poor with DIS (difficulty initiating sleep)   Neuro: denies    There were no vitals taken for this visit. virtual    Side Effects:  none    MENTAL STATUS EXAM:   Sensorium  oriented to time, place and person   Relations cooperative   Appearance:  age appropriate and casually dressed   Motor Behavior:  gait stable and within normal limits   Speech:  normal volume   Thought Process: goal directed   Thought Content free of delusions and free of hallucinations   Suicidal ideations no intention   Homicidal ideations no intention   Mood:  anxious and depressed   Affect:  anxious and depressed   Memory recent  adequate   Memory remote:  adequate   Concentration:  adequate   Abstraction:  abstract   Insight:  good   Reliability good   Judgment:  good     MEDICAL DECISION MAKING:  Problems addressed today:    ICD-10-CM ICD-9-CM    1. [facility] episode of recurrent [facility]  F33.0 296.31    2. Anxiety  F41.9 300.00        Assessment:   Quentin is responding to treatment. Symptoms are improving with addition of rexulti. Pt reports she is dealing with ongoing insomnia. Discussed current medications and dosages. Will increase rexulti to 1 mg. Reviewed options for sleep and will dc ambien and use a trial of lunesta. Discussed dosing options and pt can increase to 2 mg if needed. Pt will provide feedback on response. Reviewed treatment goals and target symptoms to monitor for. Plan:   1. Current Outpatient Medications   Medication Sig Dispense Refill    trimethoprim-sulfamethoxazole (BACTRIM DS, SEPTRA DS) 160-800 mg per tablet Take 1 Tablet by mouth two (2) times a day for 7 days. 14 Tablet 0    norethindrone (MICRONOR) 0.35 mg tab Take 1 Tablet by mouth daily. 1 Dose Pack 5    red yeast rice extract 600 mg cap Take 600 mg by mouth now.  zolpidem (AMBIEN) 10 mg tablet Take 1 Tablet by mouth nightly as needed for Sleep. Max Daily Amount: 10 mg. (Patient not taking: Reported on 2/1/2022) 30 Tablet 1    brexpiprazole (Rexulti) 0.5 mg tab tablet Take 1 Tablet by mouth daily. 90 Tablet 1    hydrOXYzine HCL (ATARAX) 25 mg tablet Take 1 Tablet by mouth three (3) times daily as needed for Anxiety. 30 Tablet 1    levothyroxine (SYNTHROID) 50 mcg tablet Take 1 Tablet by mouth Daily (before breakfast) for 90 days. 90 Tablet 1    FLUoxetine (PROzac) 40 mg capsule Take 1 Capsule by mouth daily. 90 Capsule 1    cholecalciferol, vitamin D3, 2,000 unit tab Take  by mouth.  EPINEPHrine (EPIPEN 2-EDWIGE) 0.3 mg/0.3 mL (1:1,000) injection 0.3 mL by IntraMUSCular route once as needed for up to 1 dose. 2 Each 0          medication changes made today: cont prozac, increase rexulti, dc ambien, add lunesta and titrate for prn response    2. Counseling and coordination of care including instructions for treatment, risks/benefits, risk factor reduction and patient/family education. She agrees with the plan. Patient instructed to call with any side effects, questions or issues. 3.    Follow-up and Dispositions    · Return in about 3 months (around 5/3/2022).        Анна King, who was evaluated through a synchronous (real-time) audio-video encounter, and/or her healthcare decision maker, is aware that it is a billable service, which includes applicable co-pays, with coverage as determined by her insurance carrier. She provided verbal consent to proceed and patient identification was verified. This visit was conducted pursuant to the emergency declaration under the [address] [place] [address], [address] [place] [address] authority and the [employer] and [employer] Act. A caregiver was present when appropriate. Ability to conduct physical exam was limited. The patient was located at home in a state where the provider was licensed to provide care.      2/3/2022  Jaqcues Simental NP

## 2022-02-04 LAB
BACTERIA SPEC CULT: ABNORMAL
CC UR VC: ABNORMAL
SERVICE CMNT-IMP: ABNORMAL

## 2022-02-04 NOTE — PROGRESS NOTES
Sent via my chart. Meenu Saul, hope you are doing well. According to culture report you are on the right antibiotic. Take care.     Dr. Kye Chavez

## 2022-02-04 NOTE — PROGRESS NOTES
Urine Culture results showed E. Coli in your urine. Also, it shows that the bacteria susceptible to Bactrim DS. Dr. Preet Dixon ordered the medication on 021/01. Please let us know if your urinary symptoms have resolved. If not, we can order a stronger medication called Ciprofloxacin. Feel free to call with any questions. Thank you.

## 2022-02-28 ENCOUNTER — DOCUMENTATION ONLY (OUTPATIENT)
Dept: SOCIAL WORK | Age: 52
End: 2022-02-28

## 2022-02-28 DIAGNOSIS — F33.1 MODERATE EPISODE OF RECURRENT MAJOR DEPRESSIVE DISORDER (HCC): Primary | ICD-10-CM

## 2022-03-14 DIAGNOSIS — F41.9 ANXIETY: ICD-10-CM

## 2022-03-14 RX ORDER — ZOLPIDEM TARTRATE 10 MG/1
10 TABLET ORAL
Qty: 30 TABLET | Refills: 1 | Status: SHIPPED | OUTPATIENT
Start: 2022-03-14 | End: 2022-04-27 | Stop reason: SDUPTHER

## 2022-03-15 ENCOUNTER — VIRTUAL VISIT (OUTPATIENT)
Dept: SOCIAL WORK | Age: 52
End: 2022-03-15
Payer: COMMERCIAL

## 2022-03-15 DIAGNOSIS — F33.1 MODERATE EPISODE OF RECURRENT MAJOR DEPRESSIVE DISORDER (HCC): Primary | ICD-10-CM

## 2022-03-15 PROCEDURE — 90791 PSYCH DIAGNOSTIC EVALUATION: CPT | Performed by: SOCIAL WORKER

## 2022-03-15 NOTE — PROGRESS NOTES
Virtual Visit (At Home) -Initial Evaluation    Time Start:3:05 pm  Time End:4:01 pm    DX:     ICD-10-CM ICD-9-CM   1. Moderate episode of recurrent major depressive disorder (HCC)  F33.1 296.32            Met with Ms. Edgardo Chong 3/15/2022 for our first appointment. This patient was referred by her NP, ANTONETTE Back due to increased anxiety and depression. Ms. Edgardo Chong has been in counseling about 3 years ago when she was going thru a divorce. Today she reports she is feeling burned out, unmotivated, emotional, and irritable. The patient also reports she has been having some trouble sleeping (she has experienced sleep problems in the past) that she feels may be attributed to going thru menapause-except she is waking up feeling \"panicky\" and fearful. Additionally, Ms. Edgardo Chong reports she has been [employer]. \"  The patient wants to get some help to reduce these symptoms but is unsure how. Ms. Edgardo Chong grew up in [place] [address] with her father and mother. Her father, however, passed away when she was 8years old from pancreatic and liver cancer. Her mother   3 years ago from bone cancer. Ms. Edgardo hCong has 2 sisters but states that since the death of their mother, their relationship has been distant. Ms. Edgardo Chong was  for 29 years and she and her  had 3 kids-now aged 29,23 and 15. She has been  now for 3 years and she and her ex- share custody of their 15year old son. 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 Ms. 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 She is sorry she got this position and feels stressed and miserable in her job. Recently Ms. Lopez's doctor has increased her Prozac to 40 mg. She also takes Rexalti for anxiety-5 mg. Additionally, Ms. Edgardo Chong states she is going to try out an exercise class next week as well as increase her walking in the evenings.   She wants to get fit for her daughter's wedding in Sept.    This patient will be a good candidate for short term therapy. She is engaging, insightful and verbal.  She wants to feel better and is eager to participate in counseling. Viviana Sherwood is a 46 y.o. female who is alert and oriented X3.  she  does not have any suicidal or homicidal ideations. Patient is not psychotic or delusional.   she has not been psychiatrically admitted to a hospital. Ms. Roberta Guo does have good eye contact during this interview. She is verbal and engaging. Patient does have good insight and judgement. she is a good candidate for short term therapy to address the above issues. We did set a follow-up appointment with this clinician. Follow-up appt date (if applicable) is:  March 29 @ 2 VV telemed. Wm Waite, who was evaluated through a synchronous (real-time) audio-video encounter, and/or her healthcare decision maker, is aware that it is a billable service, which includes applicable co-pays, with coverage as determined by her insurance carrier. She provided verbal consent to proceed and patient identification was verified. This visit was conducted pursuant to the emergency declaration under the [facility], [address] [place] [address] authority and the [employer] and [employer] Act. A caregiver was present when appropriate. Ability to conduct physical exam was limited. The patient was located at home in a state where the provider was licensed to provide care.      --Komal Archer LCSW on 3/15/2022 at 4:40 PM                           .

## 2022-03-19 PROBLEM — F33.1 MODERATE EPISODE OF RECURRENT MAJOR DEPRESSIVE DISORDER (HCC): Status: ACTIVE | Noted: 2018-09-28

## 2022-03-21 RX ORDER — HYDROXYZINE 25 MG/1
25 TABLET, FILM COATED ORAL
Qty: 30 TABLET | Refills: 1 | Status: SHIPPED | OUTPATIENT
Start: 2022-03-21 | End: 2022-05-10

## 2022-03-23 ENCOUNTER — TELEPHONE (OUTPATIENT)
Dept: PRIMARY CARE CLINIC | Age: 52
End: 2022-03-23

## 2022-03-23 ENCOUNTER — NURSE TRIAGE (OUTPATIENT)
Dept: OTHER | Facility: CLINIC | Age: 52
End: 2022-03-23

## 2022-03-23 NOTE — TELEPHONE ENCOUNTER
Patient is having elevated blood pressure and wants to be seen before April 1st. Patient is upset she has to wait so long for appointment. Still tell patient we was short staff with Kristi Spring being on maternity leave. Please call patient if she can be seen sooner.

## 2022-03-23 NOTE — TELEPHONE ENCOUNTER
Received call from Paulette at [facility] with Red Flag Complaint. Subjective: Caller states \"I noticed over the weekend I felt a little dizzy. I got my neighbor to take my BP. It was 140/80. When I checked it yesterday at work it was 176/92. I checked it again and it went down to 130, but it was still 92 on the bottom. \"     Current Symptoms: BP elevated, NO: dizziness, blurred vision, headache or difficulty breathing    NO hx of HTN or medication for HTN    Started taking BCP's 5 weeks ago to regulate cycle. /68 prior to this. Onset: 5 days ago; improving    Associated Symptoms: NA    Pain Severity: Denies    Temperature: Fever    What has been tried: Nothing    LMP: NA Pregnant: NA-BCP's to regulate cycle    Recommended disposition: See in Office Today. Writer did advise patient to be seen at [facility] if unable to get appointment in office today. Patient agreeable. Care advice provided, patient verbalizes understanding; denies any other questions or concerns; instructed to call back for any new or worsening symptoms. Patient/Caller agrees with recommended disposition; writer provided warm transfer to Zapcoder at [facility] for appointment scheduling. Attention Provider: Thank you for allowing me to participate in the care of your patient. The patient was connected to triage in response to information provided to the [facility]. Please do not respond through this encounter as the response is not directed to a shared pool.     Reason for Disposition   Patient wants to be seen    Protocols used: BLOOD PRESSURE - HIGH-ADULT-OH

## 2022-03-23 NOTE — TELEPHONE ENCOUNTER
Spoke to patient she stated she took her BP yesterday it was 176/92 mid morning, she waited and rechecked it was 136/92. Pt states she is sitting in chair at table when checking her BP. Pt stated when she took her BP on Saturday it was 140/82. She has not checked it today as she only checks when she is at work. Patient denies any blurry vision , SOB and denies any rapid heart rate. Patient wants to know if she can be seen sooner for this? Pt is not on any blood pressure medication currently. I did inform patient we are working through transition with less doctors, and apologized for any inconvenience.

## 2022-03-23 NOTE — TELEPHONE ENCOUNTER
Called patient- [facility], we are still keeping the appointment for 4/1/22.  She is going to write down BP and bring as well 23-Aug-2017 21:40

## 2022-04-27 ENCOUNTER — PATIENT MESSAGE (OUTPATIENT)
Dept: PRIMARY CARE CLINIC | Age: 52
End: 2022-04-27

## 2022-04-27 DIAGNOSIS — E03.9 ACQUIRED HYPOTHYROIDISM: ICD-10-CM

## 2022-04-27 DIAGNOSIS — F41.9 ANXIETY: ICD-10-CM

## 2022-04-27 RX ORDER — LEVOTHYROXINE SODIUM 50 UG/1
50 TABLET ORAL
Qty: 90 TABLET | Refills: 0 | Status: SHIPPED | OUTPATIENT
Start: 2022-04-27 | End: 2022-07-26

## 2022-04-27 RX ORDER — ZOLPIDEM TARTRATE 10 MG/1
10 TABLET ORAL
Qty: 30 TABLET | Refills: 1 | Status: SHIPPED | OUTPATIENT
Start: 2022-04-27 | End: 2022-05-10

## 2022-04-27 NOTE — TELEPHONE ENCOUNTER
Next visit: 05.10.22    :  03/14/2022 03/14/2022 2 Zolpidem Tartrate 10 Mg Tablet 30.00 30 Pa All  01/19/2022 01/17/2022 2 Zolpidem Tartrate 10 Mg Tablet 30.00 30 Pa All

## 2022-04-27 NOTE — TELEPHONE ENCOUNTER
From: Jesica Smith  To: Roxana Allen DO  Sent: 4/27/2022 10:36 AM EDT  Subject: synthroid    I need a RX refill for Synthroid sent to [facility] order.     Thank You     Bola Luciano

## 2022-05-10 ENCOUNTER — VIRTUAL VISIT (OUTPATIENT)
Dept: BEHAVIORAL/MENTAL HEALTH CLINIC | Age: 52
End: 2022-05-10
Payer: COMMERCIAL

## 2022-05-10 DIAGNOSIS — F41.9 ANXIETY: ICD-10-CM

## 2022-05-10 DIAGNOSIS — F33.1 MODERATE EPISODE OF RECURRENT MAJOR DEPRESSIVE DISORDER (HCC): ICD-10-CM

## 2022-05-10 DIAGNOSIS — F33.0 MILD EPISODE OF RECURRENT MAJOR DEPRESSIVE DISORDER (HCC): Primary | ICD-10-CM

## 2022-05-10 PROCEDURE — 99213 OFFICE O/P EST LOW 20 MIN: CPT | Performed by: NURSE PRACTITIONER

## 2022-05-10 RX ORDER — LORAZEPAM 1 MG/1
1 TABLET ORAL
Qty: 30 TABLET | Refills: 0 | Status: SHIPPED | OUTPATIENT
Start: 2022-05-10 | End: 2022-05-31 | Stop reason: SDUPTHER

## 2022-05-10 RX ORDER — FLUOXETINE HYDROCHLORIDE 40 MG/1
40 CAPSULE ORAL DAILY
Qty: 90 CAPSULE | Refills: 2 | Status: SHIPPED | OUTPATIENT
Start: 2022-05-10

## 2022-05-10 NOTE — PROGRESS NOTES
CHIEF COMPLAINT:  Mamie Rangel is a 46 y.o. female and was seen today for follow-up of psychiatric condition and psychotropic medication management. HPI:    Antonieta Vega reports the following psychiatric symptoms:  depression and anxiety. The symptoms have been present for months and are of low/moderate severity. The symptoms occur less frequently and severely overall. Met with patient for follow up care via video telehealth for appointment today. There were no vitals taken for this visit.: virtual    REVIEW OF SYSTEMS:  Psychiatric:  depression, anxiety  Appetite:good   Sleep: poor with DIMS (difficulty initiating & maintaining sleep)     Side Effects:  none    MENTAL STATUS EXAM:   Sensorium  oriented to time, place and person   Relations cooperative   Appearance:  age appropriate and casually dressed   Motor Behavior:  gait stable and within normal limits   Speech:  normal volume   Thought Process: goal directed   Thought Content free of delusions and free of hallucinations   Suicidal ideations no intention   Homicidal ideations no intention   Mood:  within normal limits   Affect:  euthymic   Memory recent  adequate   Memory remote:  adequate   Concentration:  adequate   Abstraction:  abstract   Insight:  good   Reliability good   Judgment:  good     MEDICAL DECISION MAKING:  Problems addressed today:    ICD-10-CM ICD-9-CM    1. Mild episode of recurrent major depressive disorder ([facility]  F33.0 296.31    2. Anxiety  F41.9 300.00 LORazepam (ATIVAN) 1 mg tablet   3. Moderate episode of recurrent major depressive disorder ([facility])  F33.1 296.32 FLUoxetine (PROzac) 40 mg capsule       Assessment:   Antonieta Vega is responding to treatment overall. Depression and anxiety symptoms are less severe. Pt reports some ongoing issues with sleep/insomnia. She reports a hx of sleep apnea but did not tolerate equipment. She also reports noticing some hormonal patterns.  Discussed options and cautioned pt that medications have risk vs benefit. Will use a trial of lorazepam. Discussed importance of not using nightly. She has had some occasional benefit from unisom and so will try rotating. Discussed transition of care. Plan:   1. Current Outpatient Medications   Medication Sig Dispense Refill    FLUoxetine (PROzac) 40 mg capsule Take 1 Capsule by mouth daily. 90 Capsule 2    brexpiprazole (Rexulti) 1 mg tab tablet Take 1 Tablet by mouth daily. 90 Tablet 2    LORazepam (ATIVAN) 1 mg tablet Take 1 Tablet by mouth nightly as needed for Anxiety. Max Daily Amount: 1 mg. 30 Tablet 0    levothyroxine (SYNTHROID) 50 mcg tablet Take 1 Tablet by mouth Daily (before breakfast) for 90 days. 90 Tablet 0    norethindrone (MICRONOR) 0.35 mg tab Take 1 Tablet by mouth daily. 1 Dose Pack 5    red yeast rice extract 600 mg cap Take 600 mg by mouth now.  cholecalciferol, vitamin D3, 2,000 unit tab Take  by mouth.  EPINEPHrine (EPIPEN 2-EDWIGE) 0.3 mg/0.3 mL (1:1,000) injection 0.3 mL by IntraMUSCular route once as needed for up to 1 dose. 2 Each 0          medication changes: cont prozac, rexulti and add prn lorazepam for sleep    2. Counseling and coordination of care including instructions for treatment, risks/benefits, risk factor reduction and patient/family education. She agrees with the plan. Patient instructed to call with any side effects, questions or issues. 3.    Follow-up and Dispositions    · Return transition. Beny Medina, who was evaluated through a synchronous (real-time) audio-video encounter, and/or her healthcare decision maker, is aware that it is a billable service, which includes applicable co-pays, with coverage as determined by her insurance carrier. She provided verbal consent to proceed and patient identification was verified.  This visit was conducted pursuant to the emergency declaration under the [facility], [address] [place] [address] authority and the [facility] and Response Supplemental Appropriations Act. A caregiver was present when appropriate. Ability to conduct physical exam was limited. The patient was located at home in a state where the provider was licensed to provide care.          Farhan Alanis NP  5/10/2022

## 2022-05-31 ENCOUNTER — VIRTUAL VISIT (OUTPATIENT)
Dept: PRIMARY CARE CLINIC | Age: 52
End: 2022-05-31
Payer: COMMERCIAL

## 2022-05-31 DIAGNOSIS — J18.9 PNEUMONIA OF LEFT LOWER LOBE DUE TO INFECTIOUS ORGANISM: Primary | ICD-10-CM

## 2022-05-31 DIAGNOSIS — R05.9 COUGH: ICD-10-CM

## 2022-05-31 DIAGNOSIS — R06.2 BILATERAL WHEEZING: ICD-10-CM

## 2022-05-31 DIAGNOSIS — F41.9 ANXIETY: ICD-10-CM

## 2022-05-31 PROCEDURE — 99213 OFFICE O/P EST LOW 20 MIN: CPT | Performed by: INTERNAL MEDICINE

## 2022-05-31 RX ORDER — LORAZEPAM 1 MG/1
1 TABLET ORAL
Qty: 30 TABLET | Refills: 2 | Status: SHIPPED | OUTPATIENT
Start: 2022-06-24 | End: 2022-07-28 | Stop reason: SDUPTHER

## 2022-05-31 RX ORDER — BENZONATATE 200 MG/1
200 CAPSULE ORAL
Qty: 21 CAPSULE | Refills: 0 | Status: SHIPPED | OUTPATIENT
Start: 2022-05-31 | End: 2022-06-07

## 2022-05-31 RX ORDER — FLUTICASONE PROPIONATE AND SALMETEROL 250; 50 UG/1; UG/1
1 POWDER RESPIRATORY (INHALATION) EVERY 12 HOURS
Qty: 60 EACH | Refills: 0 | Status: SHIPPED | OUTPATIENT
Start: 2022-05-31 | End: 2022-06-30

## 2022-05-31 NOTE — PROGRESS NOTES
Wilton Delgado (: 1970) is a 46 y.o. female, established patient, here for evaluation of the following chief complaint(s):   Cough     Written by Ellis Jon, as dictated by Dr. Louise Neumann MD.      ASSESSMENT/PLAN:  Below is the assessment and plan developed based on review of pertinent history, labs, studies, and medications. 1. Pneumonia of left lower lobe due to infectious organism  She will complete her current course of doxycycline. She has had a repeat chest xray at [facility] which showed pneumonia is improving. 2. Bilateral wheezing  I rx'd Advair/Wixela inhaler BID. Potential side effects were discussed. -     fluticasone propion-salmeteroL (ADVAIR/WIXELA) 250-50 mcg/dose diskus inhaler; Take 1 Puff by inhalation every twelve (12) hours for 30 days. , Normal, Disp-60 Each, R-0 sent to pharmacy. 3. Cough  I rx'd Tessalon 200 mg TID PRN for cough. Potential side effects were discussed. -     benzonatate (TESSALON) 200 mg capsule; Take 1 Capsule by mouth three (3) times daily as needed for Cough for up to 7 days. , Normal, Disp-21 Capsule, R-0 sent to pharmacy. SUBJECTIVE/OBJECTIVE:  HPI  The patient presents today due to pneumonia infection. She went to [facility] on 05/15/22 and dx'd with bronchitis and rx'd Augmentin, steroid taper, and albuterol inhaler. She went back to [facility] on 22 and had a chest xray which showed pneumonia in her left lower lung. She was then rx'd a 10 day course of doxycycline and steroids. She is on day 4 of doxycycline. She continues to have a cough, chest tightness, wheezing, and congestion. Her cough is very severe, but she was not rx'd any medication for cough at [facility]. She notes that she has had a follow-up chest xray which showed her pneumonia is improving. She denies any current fever or chills. She was tested for COVID at [facility] which was negative.      Patient Active Problem List   Diagnosis Code    Anxiety F41.9    Insomnia G47.00    Neck pain on left side M54.2    Radiculopathy of cervical spine M54.12    Stress at home F43.9    Panic attack F41.0    Moderate episode of recurrent major depressive disorder ([facility] F33.1        Current Outpatient Medications on File Prior to Visit   Medication Sig Dispense Refill    FLUoxetine (PROzac) 40 mg capsule Take 1 Capsule by mouth daily. 90 Capsule 2    brexpiprazole (Rexulti) 1 mg tab tablet Take 1 Tablet by mouth daily. 90 Tablet 2    LORazepam (ATIVAN) 1 mg tablet Take 1 Tablet by mouth nightly as needed for Anxiety. Max Daily Amount: 1 mg. 30 Tablet 0    levothyroxine (SYNTHROID) 50 mcg tablet Take 1 Tablet by mouth Daily (before breakfast) for 90 days. 90 Tablet 0    norethindrone (MICRONOR) 0.35 mg tab Take 1 Tablet by mouth daily. 1 Dose Pack 5    red yeast rice extract 600 mg cap Take 600 mg by mouth now.  cholecalciferol, vitamin D3, 2,000 unit tab Take  by mouth.  EPINEPHrine (EPIPEN 2-EDWIGE) 0.3 mg/0.3 mL (1:1,000) injection 0.3 mL by IntraMUSCular route once as needed for up to 1 dose. 2 Each 0     No current facility-administered medications on file prior to visit. Allergies   Allergen Reactions    Latex Anaphylaxis    Ginger Hives    Soy Hives    Bees [Sting, Bee] Not Reported This Time    Cephaeline Not Reported This Time     Pt. States no allergy    Keflex [Cephalexin] Rash    Shingrix (Pf) [Varicella-Zoster Ge-As01b (Pf)] Swelling    Soybean Oil Hives    Tomato Hives     Pt.  States no allergy       Past Medical History:   Diagnosis Date    Back pain, acute     Depression     Endocrine disease     hypothyroid    H/O seasonal allergies     Maxillary sinus fracture ([facility] 2016    Neck pain, musculoskeletal        Past Surgical History:   Procedure Laterality Date    COLONOSCOPY N/A 4/25/2019    COLONOSCOPY performed [facility]COPY    COLONOSCOPY,DIAGNOSTIC  4/25/2019         HX CERVICAL DISKECTOMY Left 2015    HX CERVICAL FUSION Left 2015     HX  SECTION      HX GYN  1998    laproscopsy     HX WISDOM TEETH EXTRACTION         Family History   Problem Relation Age of Onset    Cancer Father         pancreas, Liver    Cancer Maternal Grandmother     Breast Cancer Maternal Grandmother     Heart Disease Maternal Grandfather     Heart Disease Paternal Grandmother     Stroke Paternal Grandfather     Other Mother         meningioma    Hypertension Mother     Breast Cancer Mother 61   [facility] Anesth Problems Mother         SLOW TO WAKE UP    Stroke Mother     Hypertension Sister     No Known Problems Daughter     No Known Problems Daughter     No Known Problems Son     Breast Cancer Maternal Aunt     Breast Cancer Maternal Aunt     Breast Cancer Maternal Aunt        Social History     Socioeconomic History    Marital status:      Spouse name: Not on file    Number of children: Not on file    Years of education: Not on file    Highest education level: Not on file   Occupational History    Not on file   Tobacco Use    Smoking status: Never Smoker    Smokeless tobacco: Never Used   Vaping Use    Vaping Use: Never used   Substance and Sexual Activity    Alcohol use: Never     Alcohol/week: 0.0 standard drinks     Comment: occasional    Drug use: No    Sexual activity: Yes     Partners: Male     Comment:     Other Topics Concern    Not on file   Social History Narrative    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    Latest known visit with results is:   Orders Only on 2022   Component Date Value Ref Range Status    Special Requests: 2022 NO SPECIAL REQUESTS    Final    Eldridge Count 2022     Final                    Value:>100,000  COLONIES/mL      Culture result: 2022 ESCHERICHIA COLI* Final      Review of Systems   Constitutional: Negative for activity change, fatigue and unexpected weight change. HENT: Positive for congestion. Negative for hearing loss, rhinorrhea and sore throat. Eyes: Negative for discharge. Respiratory: Positive for cough, chest tightness and wheezing. Negative for shortness of breath. Cardiovascular: Negative for leg swelling. Gastrointestinal: Negative for abdominal pain, constipation and diarrhea. Genitourinary: Negative for dysuria, flank pain, frequency and urgency. Musculoskeletal: Negative for arthralgias, back pain and myalgias. Skin: Negative for color change and rash. Neurological: Negative for dizziness, light-headedness and headaches. Psychiatric/Behavioral: Negative for dysphoric mood and sleep disturbance. The patient is not nervous/anxious.            Physical Exam    [INSTRUCTIONS:  \"[x]\" Indicates a positive item  \"[]\" Indicates a negative item  -- DELETE ALL ITEMS NOT EXAMINED]    Constitutional: [x] Appears well-developed and well-nourished [x] No apparent distress      [] Abnormal -     Mental status: [x] Alert and awake  [x] Oriented to person/place/time [x] Able to follow commands    [] Abnormal -     Eyes:   EOM    [x]  Normal    [] Abnormal -   Sclera  [x]  Normal    [] Abnormal -          Discharge [x]  None visible   [] Abnormal -     HENT: [x] Normocephalic, atraumatic  [] Abnormal -   [x] Mouth/Throat: Mucous membranes are moist    External Ears [x] Normal  [] Abnormal -    Neck: [x] No visualized mass [] Abnormal -     Pulmonary/Chest: [x] Respiratory effort normal   [x] No visualized signs of difficulty breathing or respiratory distress        [] Abnormal -      Musculoskeletal:   [x] Normal gait with no signs of ataxia         [x] Normal range of motion of neck        [] Abnormal -     Neurological:        [x] No Facial Asymmetry (Cranial nerve 7 motor function) (limited exam due to video visit)          [x] No gaze palsy [] Abnormal -          Skin:        [x] No significant exanthematous lesions or discoloration noted on facial skin         [] Abnormal -            Psychiatric:       [x] Normal Affect [] Abnormal -        [x] No Hallucinations    Other pertinent observable physical exam findings:-    Monica Gunn, was evaluated through a synchronous (real-time) audio-video encounter. The patient (or guardian if applicable) is aware that this is a billable service, which includes applicable co-pays. This Virtual Visit was conducted with patient's (and/or legal guardian's) consent. The visit was conducted pursuant to the emergency declaration under the [facility], [address] [place] [address] authority and the [employer] (acquired by [employer] in 2014) and [employer] General Act. Patient identification was verified, and a caregiver was present when appropriate. The patient was located at: Home: [address] [place] [address]  The provider was located at: Facility ([facility] [place] [facility]): [address] [place] [address]       An electronic signature was used to authenticate this note.   -- Lisa Bernardo

## 2022-05-31 NOTE — LETTER
NOTIFICATION RETURN TO WORK / SCHOOL    5/31/2022 3:38 PM    Ms. William Bernal  [id number] [id number]      To Whom It May Concern:    William Bernal is currently under the care of [facility]. She was seen today for follow-up on pneumonia. She is still not feeling well and new medications were prescribed. We recommend she continue to stay out of work until 06/06/22. She will return to work/school on: 06/06/22    If there are questions or concerns please have the patient contact our office.         Sincerely,      Bennie Chamorro MD

## 2022-07-22 ENCOUNTER — HOSPITAL ENCOUNTER (OUTPATIENT)
Dept: MAMMOGRAPHY | Age: 52
Discharge: HOME | End: 2022-07-22
Attending: FAMILY MEDICINE
Payer: COMMERCIAL

## 2022-07-22 DIAGNOSIS — Z12.31 ENCOUNTER FOR SCREENING MAMMOGRAM FOR MALIGNANT NEOPLASM OF BREAST: ICD-10-CM

## 2022-07-22 PROCEDURE — 77063 BREAST TOMOSYNTHESIS BI: CPT

## 2022-07-28 DIAGNOSIS — F41.9 ANXIETY: ICD-10-CM

## 2022-07-28 RX ORDER — LORAZEPAM 1 MG/1
1 TABLET ORAL
Qty: 30 TABLET | Refills: 2 | Status: SHIPPED | OUTPATIENT
Start: 2022-07-28 | End: 2022-10-06 | Stop reason: SDUPTHER

## 2022-07-28 NOTE — TELEPHONE ENCOUNTER
Last visit Patience Alisha: 05.10.22  Next visit Bridget: 10.06.22      ;  06/07/2022 05/31/2022 1 Lorazepam 1 Mg Tablet 30.00 30 Pa All   05/10/2022 05/10/2022 1 Lorazepam 1 Mg Tablet 30.00 30 Pa All

## 2022-08-05 RX ORDER — ACETAMINOPHEN AND CODEINE PHOSPHATE 120; 12 MG/5ML; MG/5ML
1 SOLUTION ORAL DAILY
Qty: 1 DOSE PACK | Refills: 5 | Status: SHIPPED | OUTPATIENT
Start: 2022-08-05

## 2022-08-05 NOTE — TELEPHONE ENCOUNTER
Requested Prescriptions     Pending Prescriptions Disp Refills    norethindrone (MICRONOR) 0.35 mg tab 1 Dose Pack 5     Sig: Take 1 Tablet by mouth in the morning.         Last Visit 5/31/22  Last Refill   1/19/22 Clothing

## 2022-09-02 LAB
CHOLEST SERPL-MCNC: 226 MG/DL
GLUCOSE SERPL-MCNC: 87 MG/DL (ref 65–100)
HDLC SERPL-MCNC: 54 MG/DL
LDLC SERPL CALC-MCNC: 152.6 MG/DL (ref 0–100)
TRIGL SERPL-MCNC: 97 MG/DL (ref ?–150)

## 2022-09-03 ENCOUNTER — TELEPHONE (OUTPATIENT)
Dept: PRIMARY CARE CLINIC | Age: 52
End: 2022-09-03

## 2022-09-03 DIAGNOSIS — U07.1 COVID-19: Primary | ICD-10-CM

## 2022-09-03 DIAGNOSIS — R06.02 SOB (SHORTNESS OF BREATH): ICD-10-CM

## 2022-09-03 RX ORDER — METHYLPREDNISOLONE 4 MG/1
4 TABLET ORAL
Qty: 1 DOSE PACK | Refills: 0 | Status: SHIPPED | OUTPATIENT
Start: 2022-09-03 | End: 2022-10-06

## 2022-09-03 NOTE — TELEPHONE ENCOUNTER
Pt tested positive for COVD 19 today. Son tested  positive for COVID 19 on Wednesday. Pt does not have fever. Pulse -98  Pulse ox - 99%   NO history of asthma. Pt was sick in May and was given Pulmicort, albuterol and treated for bronchitis. Cough is slightly yellow. Pt feels  mild  SOB when she walks. Pt wants to try Paxlovid and steroid course. She mentioned that she will only take the methyprednisone if needed. She has albuterol inhaler. Pt will go to ER or urgent care if her symptoms get worse. Drug interaction with estrogen discussed .

## 2022-09-07 ENCOUNTER — PATIENT MESSAGE (OUTPATIENT)
Dept: BEHAVIORAL/MENTAL HEALTH CLINIC | Age: 52
End: 2022-09-07

## 2022-09-07 NOTE — TELEPHONE ENCOUNTER
Bridget. .. The medication was dc, but she takes it as needed. The same thing happened in December 2021 and March 2022. It was dc, then reordered.

## 2022-09-08 RX ORDER — HYDROXYZINE 25 MG/1
25 TABLET, FILM COATED ORAL
Qty: 30 TABLET | Refills: 0 | Status: SHIPPED | OUTPATIENT
Start: 2022-09-08 | End: 2022-09-18

## 2022-10-06 ENCOUNTER — OFFICE VISIT (OUTPATIENT)
Dept: BEHAVIORAL/MENTAL HEALTH CLINIC | Age: 52
End: 2022-10-06
Payer: COMMERCIAL

## 2022-10-06 VITALS
TEMPERATURE: 97 F | OXYGEN SATURATION: 98 % | HEART RATE: 65 BPM | WEIGHT: 190 LBS | SYSTOLIC BLOOD PRESSURE: 130 MMHG | HEIGHT: 63 IN | RESPIRATION RATE: 17 BRPM | DIASTOLIC BLOOD PRESSURE: 82 MMHG | BODY MASS INDEX: 33.66 KG/M2

## 2022-10-06 DIAGNOSIS — F33.0 MILD EPISODE OF RECURRENT MAJOR DEPRESSIVE DISORDER (HCC): ICD-10-CM

## 2022-10-06 DIAGNOSIS — F41.9 ANXIETY: Primary | ICD-10-CM

## 2022-10-06 PROCEDURE — 90792 PSYCH DIAG EVAL W/MED SRVCS: CPT | Performed by: NURSE PRACTITIONER

## 2022-10-06 RX ORDER — LORAZEPAM 1 MG/1
1 TABLET ORAL
Qty: 30 TABLET | Refills: 2 | Status: SHIPPED | OUTPATIENT
Start: 2022-10-06

## 2022-10-06 RX ORDER — PROPRANOLOL HYDROCHLORIDE 10 MG/1
10 TABLET ORAL 2 TIMES DAILY
Qty: 60 TABLET | Refills: 1 | Status: SHIPPED | OUTPATIENT
Start: 2022-10-06

## 2022-10-06 NOTE — PROGRESS NOTES
Chief Complaint   Patient presents with    New Patient     Former Debra Deluca NP patient     Visit Vitals  /82 (BP 1 Location: Left upper arm, BP Patient Position: Sitting, BP Cuff Size: Large adult)   Pulse 65   Temp 97 °F (36.1 °C) (Temporal)   Resp 17   Ht 5' 3\" (1.6 m)   Wt 86.2 kg (190 lb)   SpO2 98%   BMI 33.66 kg/m²     Prior to Admission medications    Medication Sig Start Date End Date Taking? Authorizing Provider   norethindrone (MICRONOR) 0.35 mg tab Take 1 Tablet by mouth in the morning. 8/5/22  Yes Carlos Barrett DO   LORazepam (ATIVAN) 1 mg tablet Take 1 Tablet by mouth nightly as needed for Anxiety. Max Daily Amount: 1 mg. 7/28/22  Yes Frannie Carranza NP   levothyroxine (SYNTHROID) 50 mcg tablet TAKE 1 TABLET BY MOUTH ONE TIME A DAY BEFORE BREAKFAST 7/26/22  Yes Carlos Barrett DO   FLUoxetine (PROzac) 40 mg capsule Take 1 Capsule by mouth daily. 5/10/22  Yes Deuce Back NP   brexpiprazole (Rexulti) 1 mg tab tablet Take 1 Tablet by mouth daily. 5/10/22  Yes Deuce Back NP   cholecalciferol, vitamin D3, 2,000 unit tab Take  by mouth. Yes Provider, Historical   EPINEPHrine (EPIPEN 2-EDWIGE) 0.3 mg/0.3 mL (1:1,000) injection 0.3 mL by IntraMUSCular route once as needed for up to 1 dose. 6/26/15  Yes James Balderas MD   nirmatrelvir-ritonavir (PAXLOVID) 300 mg (150 mg x 2)-100 mg Take 3 Tablets by mouth every twelve (12) hours. Patient not taking: Reported on 10/6/2022 9/3/22   Maggie Goowdin MD   methylPREDNISolone (MEDROL DOSEPACK) 4 mg tablet Take 1 Tablet by mouth Specific Days and Specific Times. Patient not taking: Reported on 10/6/2022 9/3/22   Maggie Goodwin MD   red yeast rice extract 600 mg cap Take 600 mg by mouth now.   Patient not taking: Reported on 10/6/2022    Provider, Historical     3 most recent PHQ Screens 10/6/2022   PHQ Not Done -   Little interest or pleasure in doing things Not at all   Feeling down, depressed, irritable, or hopeless Not at all   Total Score PHQ 2 0   Trouble falling or staying asleep, or sleeping too much -   Feeling tired or having little energy -   Poor appetite, weight loss, or overeating -   Feeling bad about yourself - or that you are a failure or have let yourself or your family down -   Trouble concentrating on things such as school, work, reading, or watching TV -   Moving or speaking so slowly that other people could have noticed; or the opposite being so fidgety that others notice -   Thoughts of being better off dead, or hurting yourself in some way -   PHQ 9 Score -

## 2022-10-06 NOTE — PATIENT INSTRUCTIONS
Dr. Corby Garciars My Brain     Commanding Your Morning- Dr. Lynch Loud     Words of wisdom    When something bad happens to you, you have 3 choices:  Let it DEFINE YOU,  Let it DESTROY YOU,   OR YOU CAN LET IT STRENGTHEN YOU    Life is like a camera, so:                                Just focus on the important  Capture the good times  Develop from the negatives  And if things don't turn out, take another shot     Learn from the mistakes of others, you can't live long enough to make them all yourself. Always put yourself in others' shoes, if it hurts you, then it probably hurts the other person to. The happiest of people don't necessarily have the best of everything, they just make the most of everything they have and that comes along their way. Life is 10% of what happens to you and 90 % of how you respond to it.       Pleases include the following foods in your diet for mood:  Drink GREEN TEA as often as you can but stop the caffeinated ones before 6 pm  Omega 3 Fatty Acids/Fish oil/ or flax seeds, citrus fruits, moses, turmeric, chocolates  Sunflower seeds, Pumpkin seeds, Almonds,   Oatmeal, Eggs, grapes, yogurt, Probiotics (like Activia)  Vit C, E, D every few days,  Exercise at least 20 minutes/day (walk 10,000 steps)

## 2022-10-06 NOTE — PROGRESS NOTES
INITIAL PSYCHIATRIC EVALUATION    IDENTIFICATION:      A. Name: Parvin Zamorano. Age:     46 y.o.      C.   MRN: [id number]       D.   CSN:      [id number]      E.   :     1970          CC: I've been experiencing more anxiety\". HISTORY OF PRESENT ILLNESS:    The patient Zach Ray is a 46 y.o.  female with a history of depression and anxiety. She reports the following psychiatric symptoms:  anxiety. The symptoms have been present for months and are of moderate severity. The symptoms are both acute and chronic in nature. Additional symptoms include difficulty sleeping, grief , and stressed at work. Symptoms are precipitated by external  psychosocial stressors. Symptoms made worse by work environment and grief. She is currently taking Prozac 40 mg, Rexulti 1 mg, and Ativan 1 mg. She is a transfer patient of Kulwinder Schneider and is here to establish care with this provider.      PAST HISTORY:  Psychiatric:  Past Psychiatric Hospitalization:  denies   Past Outpatient Providers:  Mel Vasquez NP   Past Psychiatric Medications: Paxil, Wellbutrin, Celexa, Lexapro, Cymbalta,     Medical:  Active Ambulatory Problems     Diagnosis Date Noted    Anxiety 2011    Insomnia 2011    Neck pain on left side 2013    Radiculopathy of cervical spine 2013    Stress at home 2016    Panic attack 2016    [facility]     Resolved Ambulatory Problems     Diagnosis Date Noted    Depression 2011    Maxillary sinus fracture, closed, initial encounter ([facility] 2018     Past Medical History:   Diagnosis Date    Back pain, acute     Endocrine disease     H/O seasonal allergies     Maxillary sinus fracture ([facility]     Neck pain, musculoskeletal      Substance Use:   Social History     Socioeconomic History    Marital status:    Tobacco Use    Smoking status: Never    Smokeless tobacco: Never   Vaping Use Vaping Use: Never used   Substance and Sexual Activity    Alcohol use: Never     Alcohol/week: 0.0 standard drinks     Comment: occasional    Drug use: No    Sexual activity: Yes     Partners: Male     Comment:     Social History Narrative    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 60 Tablet 1    LORazepam (ATIVAN) 1 mg tablet Take 1 Tablet by mouth nightly as needed for Anxiety. Max Daily Amount: 1 mg. 30 Tablet 2    norethindrone (MICRONOR) 0.35 mg tab Take 1 Tablet by mouth in the morning. 1 Dose Pack 5    levothyroxine (SYNTHROID) 50 mcg tablet TAKE 1 TABLET BY MOUTH ONE TIME A DAY BEFORE BREAKFAST 90 Tablet 1    FLUoxetine (PROzac) 40 mg capsule Take 1 Capsule by mouth daily. 90 Capsule 2    brexpiprazole (Rexulti) 1 mg tab tablet Take 1 Tablet by mouth daily. 90 Tablet 2    cholecalciferol, vitamin D3, 2,000 unit tab Take  by mouth. EPINEPHrine (EPIPEN 2-EDWIGE) 0.3 mg/0.3 mL (1:1,000) injection 0.3 mL by IntraMUSCular route once as needed for up to 1 dose. 2 Each 0       ALLERGIES:  Allergies   Allergen Reactions    Latex Anaphylaxis    Ginger Hives    Soy Hives    Bees [Sting, Bee] Not Reported This Time    Cephaeline Not Reported This Time     Pt. States no allergy    Keflex [Cephalexin] Rash    Shingrix (Pf) [Varicella-Zoster Ge-As01b (Pf)] Swelling    Soybean Oil Hives    Tomato Hives     Pt.  States no allergy       REVIEW OF SYSTEMS:  Pt reports feeling anxious and sad   Psychiatric:  depression, anxiety  Appetite:good   Sleep: poor with DIMS (difficulty initiating & maintaining sleep)   . All other Systems reviewed and are as noted above. MENTAL STATUS EXAM:   Sensorium  oriented to time, place and person   Relations cooperative   Appearance:  age appropriate and casually dressed   Motor Behavior:  gait stable and within normal limits   Speech:  normal volume   Thought Process: goal directed   Thought Content free of delusions and free of hallucinations   Suicidal ideations no intention   Homicidal ideations no intention   Mood:  Anxious    Affect:  wnl   Memory recent  adequate   Memory remote:  adequate   Concentration:  adequate   Abstraction:  abstract   Insight:  good   Reliability good   Judgment:  good     VITALS:     Visit Vitals  /82 (BP 1 Location: Left upper arm, BP Patient Position: Sitting, BP Cuff Size: Large adult)   Pulse 65   Temp 97 °F (36.1 °C) (Temporal)   Resp 17   Ht 5' 3\" (1.6 m)   Wt 86.2 kg (190 lb)   SpO2 98%   BMI 33.66 kg/m²       PERTINENT DATA:  No visits with results within 2 Day(s) from this visit. Latest known visit with results is:   Orders Only on 09/02/2022   Component Date Value Ref Range Status    Cholesterol, total 09/02/2022 226 (A)  <200 MG/DL Final    Glucose 09/02/2022 87  65 - 100 mg/dL Final    HDL Cholesterol 09/02/2022 54  MG/DL Final    LDL, calculated 09/02/2022 152.6 (A)  0 - 100 MG/DL Final    Triglyceride 09/02/2022 97  <150 MG/DL Final       XR Results (most recent):  Results from Hospital Encounter encounter on 12/30/21    XR CHEST PA LAT    Narrative  Indication: Clavicle pain. Concern for Covid19. Exam: PA and lateral views of the chest.    Direct comparison is made to prior CXR dated December 2020. Findings: Cardiomediastinal silhouette is within normal limits. Lungs are clear  bilaterally. Pleural spaces are normal. Osseous structures are intact.     Impression  No acute cardiopulmonary disease. ASSESSMENT/PLAN:  The patient Анна King is a 46 y.o.  female who presents at this time for treatment of the following diagnoses:    ICD-10-CM ICD-9-CM    1. Anxiety  F41.9 300.00 propranoloL (INDERAL) 10 mg tablet      LORazepam (ATIVAN) 1 mg tablet      2. Mild episode of recurrent major depressive disorder [facility])  F33.0 296.31             Assessment:   Анна King is a 46 y.o. female and presents to outpatient face to face appointment to establish care with this provider. She is a transfer patient of Arina Thapa last seen May 10th. Since last seen patient has been experiencing more anxiety and sleep disturbance. She believes symptoms are caused by toxic work environment. Patient states she feels better at home. Symptoms are also worsened by grief. Reports her brother in law  in July. Discussed current medications and dosages. Will start Propranolol 10 mg BID. Risks vs benefits and side effects discussed. Patient understands safety guidelines. Reinforced positive coping strategies. Will continue to monitor. Plan:  1. Medications:  Current Outpatient Medications   Medication Sig Dispense Refill    propranoloL (INDERAL) 10 mg tablet Take 1 Tablet by mouth two (2) times a day. 60 Tablet 1    LORazepam (ATIVAN) 1 mg tablet Take 1 Tablet by mouth nightly as needed for Anxiety. Max Daily Amount: 1 mg. 30 Tablet 2    norethindrone (MICRONOR) 0.35 mg tab Take 1 Tablet by mouth in the morning. 1 Dose Pack 5    levothyroxine (SYNTHROID) 50 mcg tablet TAKE 1 TABLET BY MOUTH ONE TIME A DAY BEFORE BREAKFAST 90 Tablet 1    FLUoxetine (PROzac) 40 mg capsule Take 1 Capsule by mouth daily. 90 Capsule 2    brexpiprazole (Rexulti) 1 mg tab tablet Take 1 Tablet by mouth daily. 90 Tablet 2    cholecalciferol, vitamin D3, 2,000 unit tab Take  by mouth. EPINEPHrine (EPIPEN 2-EDWIGE) 0.3 mg/0.3 mL (1:1,000) injection 0.3 mL by IntraMUSCular route once as needed for up to 1 dose.  2 Each 0      The following regarding treatment was addressed with patient:   the risks and benefits of the proposed medication, instructions for management, education and risk factor reduction. The patient was given the opportunity to ask questions. Informed consent given to the use of the above medications. Adjust psychiatric medications as needed based upon diagnoses and response to treatment. 2.  Review previous labs and medical tests in the EHR and or transferring hospital documents. I will continue to order blood tests/labs and diagnostic tests as deemed appropriate and review results as they become available (see orders for details). 3.  Review old psychiatric and medical records available in the EHR. I will order additional psychiatric records from other institutions/providers as appropriate. 4.  Gather additional collateral information as appropriate. 5.  Supportive and/or Individual Therapy      Follow-up and Dispositions    Return in about 6 weeks (around 11/17/2022) for med/management .             SIGNED:    Chip Allen NP  10/6/2022

## 2022-10-14 ENCOUNTER — TELEPHONE (OUTPATIENT)
Dept: SLEEP MEDICINE | Age: 52
End: 2022-10-14

## 2022-11-17 ENCOUNTER — VIRTUAL VISIT (OUTPATIENT)
Dept: BEHAVIORAL/MENTAL HEALTH CLINIC | Age: 52
End: 2022-11-17
Payer: COMMERCIAL

## 2022-11-17 DIAGNOSIS — G47.00 INSOMNIA, UNSPECIFIED TYPE: Primary | ICD-10-CM

## 2022-11-17 PROCEDURE — 99214 OFFICE O/P EST MOD 30 MIN: CPT | Performed by: NURSE PRACTITIONER

## 2022-11-17 RX ORDER — CLONAZEPAM 1 MG/1
1 TABLET ORAL
Qty: 10 TABLET | Refills: 0 | Status: SHIPPED | OUTPATIENT
Start: 2022-11-17 | End: 2022-11-18 | Stop reason: SDUPTHER

## 2022-11-17 NOTE — PROGRESS NOTES
CHIEF COMPLAINT:  Martin Carl is a 46 y.o. female and was seen today for follow-up of psychiatric condition and psychotropic medication management. HPI:    [facility] reports the following psychiatric symptoms:  depression and anxiety. The symptoms have been present for months and are of moderate severity. The symptoms occur more  frequently and severely. She is experiencing an exacerbation in insomnia and anxiety. Met with patient for follow up care via video telehealth for appointment today. FAMILY/SOCIAL HX:    No new stressors     REVIEW OF SYSTEMS:  Psychiatric:  depression, anxiety  Appetite:good   Sleep: poor with DIMS (difficulty initiating & maintaining sleep)     There were no vitals taken for this visit. Side Effects:  none    MENTAL STATUS EXAM:   Sensorium  oriented to time, place and person   Relations cooperative   Appearance:  age appropriate and casually dressed   Motor Behavior:  gait stable and within normal limits   Speech:  normal volume   Thought Process: goal directed   Thought Content free of delusions and free of hallucinations   Suicidal ideations no intention   Homicidal ideations no intention   Mood:  Anxious    Affect:  Anxious/depressed    Memory recent  adequate   Memory remote:  adequate   Concentration:  adequate   Abstraction:  abstract   Insight:  good   Reliability good   Judgment:  good     MEDICAL DECISION MAKING:  Problems addressed today:    ICD-10-CM ICD-9-CM    1. Insomnia, unspecified type  G47.00 780.52 clonazePAM (KlonoPIN) 1 mg tablet          Assessment:   [facility] is not responding to treatment. Symptoms are exacerbated. Discussed current medications and dosages. Will d/c Ativan and try Clonazepam. Patient has tried a number of agents for sleep without success to include Ambien and Lunesta. If Clonazepam is not effective will try Restoril. Risks vs benefits discussed. Patient is aware of benzo safety guidelines .  Reviewed treatment goals and target symptoms to monitor for. Plan:   1. Current Outpatient Medications   Medication Sig Dispense Refill    clonazePAM (KlonoPIN) 1 mg tablet Take 1 Tablet by mouth nightly. Max Daily Amount: 1 mg. 10 Tablet 0    propranoloL (INDERAL) 10 mg tablet Take 1 Tablet by mouth two (2) times a day. 60 Tablet 1    norethindrone (MICRONOR) 0.35 mg tab Take 1 Tablet by mouth in the morning. 1 Dose Pack 5    levothyroxine (SYNTHROID) 50 mcg tablet TAKE 1 TABLET BY MOUTH ONE TIME A DAY BEFORE BREAKFAST 90 Tablet 1    FLUoxetine (PROzac) 40 mg capsule Take 1 Capsule by mouth daily. 90 Capsule 2    brexpiprazole (Rexulti) 1 mg tab tablet Take 1 Tablet by mouth daily. 90 Tablet 2    cholecalciferol, vitamin D3, 2,000 unit tab Take  by mouth. EPINEPHrine (EPIPEN 2-EDWIGE) 0.3 mg/0.3 mL (1:1,000) injection 0.3 mL by IntraMUSCular route once as needed for up to 1 dose. 2 Each 0          medication changes made today: d/c Ativan, start Clonazepam    2. Counseling and coordination of care including instructions for treatment, risks/benefits, risk factor reduction and patient/family education. She agrees with the plan. Patient instructed to call with any side effects, questions or issues. Jaky Allison, was evaluated through a synchronous (real-time) audio-video encounter. The patient (or guardian if applicable) is aware that this is a billable service, which includes applicable co-pays. This Virtual Visit was conducted with patient's (and/or legal guardian's) consent. The visit was conducted pursuant to the emergency declaration under the [address], [address] [place] [address] authority and the [employer] General Act. Patient identification was verified, and a caregiver was present when appropriate. The patient was located at: Other: work, in [place]   The provider was located at:  Facility (Appt Department): [id number]  [id number] [facility] electronic signature was used to authenticate this note.   -- Silva Choi NP       11/17/2022

## 2022-11-28 ENCOUNTER — OFFICE VISIT (OUTPATIENT)
Dept: PRIMARY CARE CLINIC | Age: 52
End: 2022-11-28
Payer: COMMERCIAL

## 2022-11-28 ENCOUNTER — HOSPITAL ENCOUNTER (OUTPATIENT)
Dept: GENERAL RADIOLOGY | Age: 52
Discharge: HOME | End: 2022-11-28
Attending: FAMILY MEDICINE
Payer: COMMERCIAL

## 2022-11-28 VITALS
TEMPERATURE: 98.5 F | HEART RATE: 82 BPM | SYSTOLIC BLOOD PRESSURE: 124 MMHG | OXYGEN SATURATION: 98 % | RESPIRATION RATE: 14 BRPM | HEIGHT: 63 IN | BODY MASS INDEX: 33.66 KG/M2 | DIASTOLIC BLOOD PRESSURE: 82 MMHG

## 2022-11-28 DIAGNOSIS — Z20.828 EXPOSURE TO THE FLU: ICD-10-CM

## 2022-11-28 DIAGNOSIS — R06.2 WHEEZING: ICD-10-CM

## 2022-11-28 DIAGNOSIS — R06.2 WHEEZING: Primary | ICD-10-CM

## 2022-11-28 LAB
FLUAV+FLUBV AG NOSE QL IA.RAPID: NEGATIVE
FLUAV+FLUBV AG NOSE QL IA.RAPID: NEGATIVE
VALID INTERNAL CONTROL?: YES

## 2022-11-28 PROCEDURE — 71046 X-RAY EXAM CHEST 2 VIEWS: CPT

## 2022-11-28 PROCEDURE — 87804 INFLUENZA ASSAY W/OPTIC: CPT | Performed by: FAMILY MEDICINE

## 2022-11-28 PROCEDURE — 99213 OFFICE O/P EST LOW 20 MIN: CPT | Performed by: FAMILY MEDICINE

## 2022-11-28 RX ORDER — FLUTICASONE PROPIONATE AND SALMETEROL 100; 50 UG/1; UG/1
1 POWDER RESPIRATORY (INHALATION) EVERY 12 HOURS
COMMUNITY

## 2022-11-28 RX ORDER — ALBUTEROL SULFATE 90 UG/1
2 AEROSOL, METERED RESPIRATORY (INHALATION)
Qty: 18 G | Refills: 1 | Status: SHIPPED | OUTPATIENT
Start: 2022-11-28

## 2022-11-28 NOTE — PROGRESS NOTES
HPI     Chief Complaint   Patient presents with    Wheezing     She is a 46 y.o. female who presents for wheezing. Patient was diagnosed with PNA at [facility] in May 2022 and had wheezing at that time. Was given Albuterol and Wixela. She has not had a follow up CXR. States for the past week she has noticed when she lays down she hears a whistling in her chest. Denies any sick symptoms. Her son did have the flu 2 weeks ago. She notes occasional chest tightness with the wheezing but denies chest pain. Does not think she needs an EKG. States her inhalers have been helping with the wheezing. She is concerned because she states the wheezing feels like when she had PNA previously. She was previously diagnosed with LESTER but could not tolerate her mask. She has an appt with Sleep Medicine in Jan at [address] [place] [address]. States her  has told her that her snoring is worse. Review of Systems   Constitutional:  Negative for chills and fever. HENT:  Negative for congestion. Respiratory:  Positive for chest tightness and wheezing. Negative for cough. Cardiovascular:  Negative for chest pain. Reviewed PmHx, RxHx, FmHx, SocHx, AllgHx and updated and dated in the chart. Physical Exam:  Visit Vitals  /82 (BP 1 Location: Right arm, BP Patient Position: Sitting, BP Cuff Size: Large adult)   Pulse 82   Temp 98.5 °F (36.9 °C) (Oral)   Resp 14   Ht 5' 3\" (1.6 m)   SpO2 98%   BMI 33.66 kg/m²     Physical Exam  Vitals and nursing note reviewed. Constitutional:       General: She is not in acute distress. Appearance: Normal appearance. She is not ill-appearing. Cardiovascular:      Rate and Rhythm: Normal rate and regular rhythm. Heart sounds: No murmur heard. Pulmonary:      Effort: Pulmonary effort is normal. No respiratory distress. Breath sounds: Normal breath sounds. No wheezing, rhonchi or rales.       Comments: No wheezing appreciated when patient was sitting up or laying down.  Neurological:      General: No focal deficit present. Mental Status: She is alert. Psychiatric:         Mood and Affect: Mood normal.         Behavior: Behavior normal.     POC Flu neg  No results found for this or any previous visit (from the past 12 hour(s)). Assessment / Plan     Diagnoses and all orders for this visit:    1. Wheezing - Referred to Pulm. Likely needs formal PFTs. Will check CXR. -     XR CHEST PA LAT; Future  -     REFERRAL TO PULMONARY DISEASE  -     albuterol (PROVENTIL HFA, VENTOLIN HFA, PROAIR HFA) 90 mcg/actuation inhaler; Take 2 Puffs by inhalation every four (4) hours as needed for Wheezing. 2. Exposure to the flu  -     AMB POC KEN INFLUENZA A/B TEST     Given referral to Pulm. Will check CXR. No systemic symptoms at this time. Lungs sound okay. If symptoms worsen or she develops fever, chills, cough needs to follow up. I have discussed the diagnosis with the patient and the intended plan as seen in the above orders. The patient has received an after-visit summary and questions were answered concerning future plans. I have discussed medication side effects and warnings with the patient as well.     Diogenes Ji, DO

## 2022-11-28 NOTE — PROGRESS NOTES
Chief Complaint   Patient presents with    Wheezing      Requested Prescriptions     Signed Prescriptions Disp Refills    albuterol (PROVENTIL HFA, VENTOLIN HFA, PROAIR HFA) 90 mcg/actuation inhaler 18 g 1     Sig: Take 2 Puffs by inhalation every four (4) hours as needed for Wheezing. Identified pt with two pt identifiers(name and ). Chief Complaint   Patient presents with    Wheezing        3 most recent PHQ Screens 10/6/2022   PHQ Not Done -   Little interest or pleasure in doing things Not at all   Feeling down, depressed, irritable, or hopeless Not at all   Total Score PHQ 2 0   Trouble falling or staying asleep, or sleeping too much -   Feeling tired or having little energy -   Poor appetite, weight loss, or overeating -   Feeling bad about yourself - or that you are a failure or have let yourself or your family down -   Trouble concentrating on things such as school, work, reading, or watching TV -   Moving or speaking so slowly that other people could have noticed; or the opposite being so fidgety that others notice -   Thoughts of being better off dead, or hurting yourself in some way -   PHQ 9 Score -        Vitals:    22 1632   BP: 124/82   Pulse: 82   Resp: 14   Temp: 98.5 °F (36.9 °C)   TempSrc: Oral   SpO2: 98%   Height: 5' 3\" (1.6 m)       Health Maintenance Due   Topic Date Due    Shingrix Vaccine Age 49> (2 of 2) 2021    COVID-19 Vaccine (4 - Booster for Sanchez Peter series) 02/15/2022    Flu Vaccine (1) 2022        1. Have you been to the ER, urgent care clinic since your last visit? Hospitalized since your last visit? No    2. Have you seen or consulted any other health care providers outside of the [address] [place] [address] since your last visit? Include any pap smears or colon screening. No    3. For patients aged 39-70: Has the patient had a colonoscopy / FIT/ Cologuard? No      If the patient is female:    4.  For patients aged 41-77: Has the patient had a mammogram within the past 2 years? Yes - no Care Gap present      5. For patients aged 21-65: Has the patient had a pap smear?  Yes - no Care Gap present

## 2022-12-02 ENCOUNTER — VIRTUAL VISIT (OUTPATIENT)
Dept: BEHAVIORAL/MENTAL HEALTH CLINIC | Age: 52
End: 2022-12-02
Payer: COMMERCIAL

## 2022-12-02 DIAGNOSIS — G47.00 INSOMNIA, UNSPECIFIED TYPE: Primary | ICD-10-CM

## 2022-12-02 PROCEDURE — 99214 OFFICE O/P EST MOD 30 MIN: CPT | Performed by: NURSE PRACTITIONER

## 2022-12-02 RX ORDER — ZOLPIDEM TARTRATE 12.5 MG/1
12.5 TABLET, FILM COATED, EXTENDED RELEASE ORAL
Qty: 30 TABLET | Refills: 1 | Status: SHIPPED | OUTPATIENT
Start: 2022-12-02

## 2022-12-02 NOTE — PROGRESS NOTES
CHIEF COMPLAINT:  Jany Robbins is a 46 y.o. female and was seen today for follow-up of psychiatric condition and psychotropic medication management. HPI:    Shefali Martin reports the following psychiatric symptoms:  depression and anxiety. The symptoms have been present for months and are of moderate severity. The symptoms occur more frequently and severely. She is experiencing an exacerbation in insomnia and anxiety. Met with patient for follow up care via video telehealth for appointment today. FAMILY/SOCIAL HX:     No new stressors      REVIEW OF SYSTEMS:  Psychiatric:  depression, anxiety  Appetite:good   Sleep: poor with DIMS (difficulty initiating & maintaining sleep)     There were no vitals taken for this visit. Side Effects:  none    MENTAL STATUS EXAM:   Sensorium  oriented to time, place and person   Relations cooperative   Appearance:  age appropriate and casually dressed   Motor Behavior:   within normal limits   Speech:  normal volume   Thought Process: goal directed   Thought Content free of delusions and free of hallucinations   Suicidal ideations no intention   Homicidal ideations no intention   Mood:  Anxious    Affect:  Anxious/depressed    Memory recent  adequate   Memory remote:  adequate   Concentration:  adequate   Abstraction:  abstract   Insight:  good   Reliability good   Judgment:  good     MEDICAL DECISION MAKING:  Problems addressed today:    ICD-10-CM ICD-9-CM    1. Insomnia, unspecified type  G47.00 780.52 zolpidem CR (AMBIEN CR) 12.5 mg tablet            Assessment:   Shefali Martin is not responding to treatment. Symptoms are exacerbated. Clonazepam helped patient with anxiety and sleep for a few days, but not patient is struggling with sleep again and anxiety. She has a follow up  apt with sleep specialist in Jan. She is using sleep hygiene methods, but anxiety exacerbates insomnia. Discussed current medications and dosages. Will start Ambien CR 12.5 mg.  She may benefit from adding Rozerem later if she is able to initiate sleep. If not, will switch to Restoril. Risks vs benefits and safety guidelines made aware. Reviewed treatment goals and target symptoms to monitor for. Plan:   1. Current Outpatient Medications   Medication Sig Dispense Refill    zolpidem CR (AMBIEN CR) 12.5 mg tablet Take 1 Tablet by mouth nightly as needed for Sleep. Max Daily Amount: 12.5 mg. Indications: insomnia 30 Tablet 1    albuterol (PROVENTIL HFA, VENTOLIN HFA, PROAIR HFA) 90 mcg/actuation inhaler Take 2 Puffs by inhalation every four (4) hours as needed for Wheezing. 18 g 1    fluticasone propion-salmeteroL (Wixela Inhub) 100-50 mcg/dose diskus inhaler Take 1 Puff by inhalation every twelve (12) hours. clonazePAM (KlonoPIN) 1 mg tablet Take 1 Tablet by mouth nightly. Max Daily Amount: 1 mg. 30 Tablet 2    norethindrone (MICRONOR) 0.35 mg tab Take 1 Tablet by mouth in the morning. 1 Dose Pack 5    levothyroxine (SYNTHROID) 50 mcg tablet TAKE 1 TABLET BY MOUTH ONE TIME A DAY BEFORE BREAKFAST 90 Tablet 1    FLUoxetine (PROzac) 40 mg capsule Take 1 Capsule by mouth daily. 90 Capsule 2    brexpiprazole (Rexulti) 1 mg tab tablet Take 1 Tablet by mouth daily. 90 Tablet 2    cholecalciferol, vitamin D3, 2,000 unit tab Take  by mouth. EPINEPHrine (EPIPEN 2-EDWIGE) 0.3 mg/0.3 mL (1:1,000) injection 0.3 mL by IntraMUSCular route once as needed for up to 1 dose. 2 Each 0          medication changes made today: Ambien CR 12.5 mg     2. Counseling and coordination of care including instructions for treatment, risks/benefits, risk factor reduction and patient/family education. She agrees with the plan. Patient instructed to call with any side effects, questions or issues. Babar Reyes, was evaluated through a synchronous (real-time) audio-video encounter. The patient (or guardian if applicable) is aware that this is a billable service, which includes applicable co-pays.  This Virtual Visit was conducted with patient's (and/or legal guardian's) consent. The visit was conducted pursuant to the emergency declaration under the [facility] [address] authority and the [employer] General Act. Patient identification was verified, and a caregiver was present when appropriate. The patient was located at: Home: [address] [place] [address]  The provider was located at: [facility] [address] [place] [address]       An electronic signature was used to authenticate this note.   -- Gisselle Mendieta NP       12/2/2022  Gisselle Mendieta NP

## 2022-12-05 DIAGNOSIS — G47.00 INSOMNIA, UNSPECIFIED TYPE: Primary | ICD-10-CM

## 2022-12-05 RX ORDER — TEMAZEPAM 7.5 MG/1
7.5 CAPSULE ORAL
Qty: 30 CAPSULE | Refills: 1 | Status: SHIPPED | OUTPATIENT
Start: 2022-12-05

## 2022-12-19 RX ORDER — HYDROXYZINE 25 MG/1
TABLET, FILM COATED ORAL
Qty: 30 TABLET | Refills: 0 | Status: SHIPPED | OUTPATIENT
Start: 2022-12-19

## 2022-12-30 ENCOUNTER — PATIENT MESSAGE (OUTPATIENT)
Dept: PRIMARY CARE CLINIC | Age: 52
End: 2022-12-30

## 2023-01-17 DIAGNOSIS — G47.00 INSOMNIA, UNSPECIFIED TYPE: ICD-10-CM

## 2023-01-17 RX ORDER — ZOLPIDEM TARTRATE 12.5 MG/1
12.5 TABLET, FILM COATED, EXTENDED RELEASE ORAL
Qty: 30 TABLET | Refills: 1 | Status: SHIPPED | OUTPATIENT
Start: 2023-01-17

## 2023-01-26 ENCOUNTER — VIRTUAL VISIT (OUTPATIENT)
Dept: BEHAVIORAL/MENTAL HEALTH CLINIC | Age: 53
End: 2023-01-26
Payer: COMMERCIAL

## 2023-01-26 DIAGNOSIS — G47.00 INSOMNIA, UNSPECIFIED TYPE: ICD-10-CM

## 2023-01-26 DIAGNOSIS — F41.9 ANXIETY: ICD-10-CM

## 2023-01-26 DIAGNOSIS — F33.1 MODERATE EPISODE OF RECURRENT MAJOR DEPRESSIVE DISORDER (HCC): Primary | ICD-10-CM

## 2023-01-26 PROCEDURE — 99214 OFFICE O/P EST MOD 30 MIN: CPT | Performed by: NURSE PRACTITIONER

## 2023-01-26 RX ORDER — VILAZODONE HYDROCHLORIDE 10 MG/1
10 TABLET ORAL DAILY
Qty: 30 TABLET | Refills: 1 | Status: SHIPPED | OUTPATIENT
Start: 2023-01-26

## 2023-01-26 NOTE — PATIENT INSTRUCTIONS
Take Prozac 40 mg by mouth every other day for 7 days then stop and start Viibryd. Contact therapist below for initial apt.        [facility] Main: [phone]         [id number] Susy LACEY [place]

## 2023-01-26 NOTE — PROGRESS NOTES
CHIEF COMPLAINT:  Rinku Perez is a 46 y.o. female and was seen today for follow-up of psychiatric condition and psychotropic medication management. HPI:    Sarahi Burks reports the following psychiatric symptoms:  depression and anxiety. The symptoms have been present for months and are of moderate severity. The symptoms occur more frequently and severely. She is experiencing an exacerbation in depression and anxiety, sleep has improved. Met with patient for follow up care and med management for appointment today. PHQ-9 score is  PHQ 9 Score: 18 , indicating moderate and severe Depression . 3 most recent PHQ Screens 1/26/2023   PHQ Not Done -   Little interest or pleasure in doing things Nearly every day   Feeling down, depressed, irritable, or hopeless Nearly every day   Total Score PHQ 2 6   Trouble falling or staying asleep, or sleeping too much Several days   Feeling tired or having little energy Nearly every day   Poor appetite, weight loss, or overeating Nearly every day   Feeling bad about yourself - or that you are a failure or have let yourself or your family down Nearly every day   Trouble concentrating on things such as school, work, reading, or watching TV Several days   Moving or speaking so slowly that other people could have noticed; or the opposite being so fidgety that others notice Several days   Thoughts of being better off dead, or hurting yourself in some way Not at all   PHQ 9 Score 18   How difficult have these problems made it for you to do your work, take care of your home and get along with others Extremely difficult        FAMILY/SOCIAL HX:   No new stressors      REVIEW OF SYSTEMS:  Psychiatric:  depression, anxiety  Appetite:good   Sleep: poor with DIMS (difficulty initiating & maintaining sleep)     There were no vitals taken for this visit.     Side Effects:  none    Sensorium  oriented to time, place and person   Relations cooperative   Appearance:  age appropriate and casually dressed   Motor Behavior:   within normal limits   Speech:  normal volume   Thought Process: goal directed   Thought Content free of delusions and free of hallucinations   Suicidal ideations no intention   Homicidal ideations no intention   Mood:  Depressed    Affect:  Depressed    Memory recent  adequate   Memory remote:  adequate   Concentration:  adequate   Abstraction:  abstract   Insight:  good   Reliability good   Judgment:  good     MEDICAL DECISION MAKING:  Problems addressed today:    ICD-10-CM ICD-9-CM    1. Moderate episode of recurrent major depressive disorder (HCC)  F33.1 296.32 vilazodone (VIIBRYD) 10 mg tab tablet          Assessment:   Aubrey Santacruz is not responding to treatment. Symptoms are exacerbated. Patient reports sleep has improved, but depression is worse than it's been in a while. She denies SI/HI/AH/VH and delusions. Discussed current medications and dosages. Will d/c Prozac and start Viibryd, risks vs benefits discussed. Recommend patient start psychotherapy. Reviewed treatment goals and target symptoms to monitor for. Plan:   1. Current Outpatient Medications   Medication Sig Dispense Refill    vilazodone (VIIBRYD) 10 mg tab tablet Take 1 Tablet by mouth daily. 30 Tablet 1    zolpidem CR (AMBIEN CR) 12.5 mg tablet Take 1 Tablet by mouth nightly as needed for Sleep. Max Daily Amount: 12.5 mg. Indications: insomnia 30 Tablet 1    hydrOXYzine HCL (ATARAX) 25 mg tablet TAKE 1 TABLET BY MOUTH 3 TIMES A DAY AS NEEDED FOR SLEEP FOR UP TO 10 DAYS 30 Tablet 0    albuterol (PROVENTIL HFA, VENTOLIN HFA, PROAIR HFA) 90 mcg/actuation inhaler Take 2 Puffs by inhalation every four (4) hours as needed for Wheezing. 18 g 1    fluticasone propion-salmeteroL (Wixela Inhub) 100-50 mcg/dose diskus inhaler Take 1 Puff by inhalation every twelve (12) hours. clonazePAM (KlonoPIN) 1 mg tablet Take 1 Tablet by mouth nightly.  Max Daily Amount: 1 mg. 30 Tablet 2    norethindrone (MICRONOR) 0.35 mg tab Take 1 Tablet by mouth in the morning. 1 Dose Pack 5    levothyroxine (SYNTHROID) 50 mcg tablet TAKE 1 TABLET BY MOUTH ONE TIME A DAY BEFORE BREAKFAST 90 Tablet 1    FLUoxetine (PROzac) 40 mg capsule Take 1 Capsule by mouth daily. 90 Capsule 2    brexpiprazole (Rexulti) 1 mg tab tablet Take 1 Tablet by mouth daily. 90 Tablet 2    cholecalciferol, vitamin D3, 2,000 unit tab Take  by mouth. EPINEPHrine (EPIPEN 2-EDWIGE) 0.3 mg/0.3 mL (1:1,000) injection 0.3 mL by IntraMUSCular route once as needed for up to 1 dose. 2 Each 0          medication changes made today: D/C Prozac, start Viibryd 10 mg po daily     2. Counseling and coordination of care including instructions for treatment, risks/benefits, risk factor reduction and patient/family education. She agrees with the plan. Patient instructed to call with any side effects, questions or issues. Ian Luo, was evaluated through a synchronous (real-time) audio-video encounter. The patient (or guardian if applicable) is aware that this is a billable service, which includes applicable co-pays. This Virtual Visit was conducted with patient's (and/or legal guardian's) consent. The visit was conducted pursuant to the emergency declaration under the [facility] 2[facility], [address] [place] [address] authority and the [employer] General Act. Patient identification was verified, and a caregiver was present when appropriate. The patient was located at: Home: [address] [place] [address]  The provider was located at: [facility] [address] [place] [address]       An electronic signature was used to authenticate this note.   -- Tiffanie Rome NP         1/26/2023

## 2023-02-16 DIAGNOSIS — E03.9 ACQUIRED HYPOTHYROIDISM: ICD-10-CM

## 2023-02-16 NOTE — TELEPHONE ENCOUNTER
Requested Prescriptions     Pending Prescriptions Disp Refills    levothyroxine (SYNTHROID) 50 mcg tablet 90 Tablet 1        Last Visit 11/28/2022  Last Refill 07/26/2022

## 2023-02-20 DIAGNOSIS — E03.9 ACQUIRED HYPOTHYROIDISM: ICD-10-CM

## 2023-02-20 RX ORDER — LEVOTHYROXINE SODIUM 50 UG/1
TABLET ORAL
Qty: 90 TABLET | Refills: 1 | OUTPATIENT
Start: 2023-02-20

## 2023-02-27 DIAGNOSIS — E03.9 ACQUIRED HYPOTHYROIDISM: ICD-10-CM

## 2023-03-01 DIAGNOSIS — F33.1 MODERATE EPISODE OF RECURRENT MAJOR DEPRESSIVE DISORDER (HCC): ICD-10-CM

## 2023-03-01 RX ORDER — VILAZODONE HYDROCHLORIDE 10 MG/1
10 TABLET ORAL DAILY
Qty: 30 TABLET | Refills: 1 | Status: SHIPPED | OUTPATIENT
Start: 2023-03-01

## 2023-03-02 RX ORDER — LEVOTHYROXINE SODIUM 50 UG/1
50 TABLET ORAL
Qty: 90 TABLET | Refills: 1 | Status: SHIPPED | OUTPATIENT
Start: 2023-03-02

## 2023-03-15 ENCOUNTER — OFFICE VISIT (OUTPATIENT)
Dept: BEHAVIORAL/MENTAL HEALTH CLINIC | Age: 53
End: 2023-03-15

## 2023-03-15 VITALS
SYSTOLIC BLOOD PRESSURE: 138 MMHG | BODY MASS INDEX: 33.66 KG/M2 | DIASTOLIC BLOOD PRESSURE: 89 MMHG | HEIGHT: 63 IN | OXYGEN SATURATION: 99 % | RESPIRATION RATE: 16 BRPM | HEART RATE: 77 BPM | TEMPERATURE: 98.1 F | WEIGHT: 190 LBS

## 2023-03-15 DIAGNOSIS — G47.00 INSOMNIA, UNSPECIFIED TYPE: ICD-10-CM

## 2023-03-15 DIAGNOSIS — F33.1 MODERATE EPISODE OF RECURRENT MAJOR DEPRESSIVE DISORDER (HCC): Primary | ICD-10-CM

## 2023-03-15 RX ORDER — MONTELUKAST SODIUM 10 MG/1
10 TABLET ORAL
COMMUNITY
Start: 2023-03-08

## 2023-03-15 RX ORDER — CONJUGATED ESTROGENS AND MEDROXYPROGESTERONE ACETATE .45; 1.5 MG/1; MG/1
TABLET, SUGAR COATED ORAL
COMMUNITY
Start: 2023-02-09

## 2023-03-15 NOTE — PROGRESS NOTES
CHIEF COMPLAINT:  Darian Aguilera is a 46 y.o. female and was seen today for follow-up of psychiatric condition and psychotropic medication management. HPI:    Connie Castillo reports the following psychiatric symptoms:  depression and anxiety. The symptoms have been present for months and are of moderate severity. The symptoms occur less frequently and severely. She is experiencing an improvement in depression and anxiety. Met with patient for follow up care and med management for appointment today. FAMILY/SOCIAL HX:   No new stressors   Going to [place] in August      REVIEW OF SYSTEMS:  Psychiatric:  depression, anxiety  Appetite:good   Sleep: poor with DIMS (difficulty initiating & maintaining sleep)     Visit Vitals  /89 (BP 1 Location: Right upper arm, BP Patient Position: Sitting, BP Cuff Size: Large adult)   Pulse 77   Temp 98.1 °F (36.7 °C) (Oral)   Resp 16   Ht 5' 3\" (1.6 m)   Wt 86.2 kg (190 lb)   SpO2 99%   BMI 33.66 kg/m²       Side Effects:  GI disturbance    MENTAL STATUS EXAM:   Sensorium  oriented to time, place and person   Relations cooperative   Appearance:  age appropriate and casually dressed   Motor Behavior:   within normal limits   Speech:  normal volume   Thought Process: goal directed   Thought Content free of delusions and free of hallucinations   Suicidal ideations no intention   Homicidal ideations no intention   Mood:  Normal limits    Affect:  Wnl    Memory recent  adequate   Memory remote:  adequate   Concentration:  adequate   Abstraction:  abstract   Insight:  good   Reliability good   Judgment:  good     MEDICAL DECISION MAKING:  Problems addressed today:    ICD-10-CM ICD-9-CM    1. Moderate episode of recurrent major depressive disorder (HCC)  F33.1 296.32       2. Insomnia, unspecified type  G47.00 780.52             Assessment:   Connie Castillo is responding to treatment. Symptoms are improved. Discussed current medications and dosages. No changes made today.  She is seeing a therapist at [facility], Rocio Hogue. Overall an improvement. Will continue to monitor. Reviewed treatment goals and target symptoms to monitor for. Plan:   1. Current Outpatient Medications   Medication Sig Dispense Refill    levothyroxine (SYNTHROID) 50 mcg tablet Take 1 Tablet by mouth Daily (before breakfast). 90 Tablet 1    vilazodone (VIIBRYD) 10 mg tab tablet Take 1 Tablet by mouth daily. 30 Tablet 1    zolpidem CR (AMBIEN CR) 12.5 mg tablet Take 1 Tablet by mouth nightly as needed for Sleep. Max Daily Amount: 12.5 mg. Indications: insomnia 30 Tablet 1    albuterol (PROVENTIL HFA, VENTOLIN HFA, PROAIR HFA) 90 mcg/actuation inhaler Take 2 Puffs by inhalation every four (4) hours as needed for Wheezing. 18 g 1    brexpiprazole (Rexulti) 1 mg tab tablet Take 1 Tablet by mouth daily. 90 Tablet 2    cholecalciferol, vitamin D3, 2,000 unit tab Take  by mouth. EPINEPHrine (EPIPEN 2-EDWIGE) 0.3 mg/0.3 mL (1:1,000) injection 0.3 mL by IntraMUSCular route once as needed for up to 1 dose. 2 Each 0    Prempro 0.45-1.5 mg per tablet       montelukast (SINGULAIR) 10 mg tablet Take 10 mg by mouth nightly. medication changes made today: Continue Viibryd 10 mg po daily, Continue Ambien 12.5 mg po nightly, Continue Rexulti 1 mg po daily     2. Counseling and coordination of care including instructions for treatment, risks/benefits, risk factor reduction and patient/family education. She agrees with the plan. Patient instructed to call with any side effects, questions or issues.           3/15/2023  Song Stock NP

## 2023-03-15 NOTE — PROGRESS NOTES
Chief Complaint   Patient presents with    Medication Management     Visit Vitals  /89 (BP 1 Location: Right upper arm, BP Patient Position: Sitting, BP Cuff Size: Large adult)   Pulse 77   Temp 98.1 °F (36.7 °C) (Oral)   Resp 16   Ht 5' 3\" (1.6 m)   Wt 190 lb (86.2 kg)   SpO2 99%   BMI 33.66 kg/m²     Prior to Admission medications    Medication Sig Start Date End Date Taking? Authorizing Provider   levothyroxine (SYNTHROID) 50 mcg tablet Take 1 Tablet by mouth Daily (before breakfast). 3/2/23  Yes Moni Salmon DO   vilazodone (VIIBRYD) 10 mg tab tablet Take 1 Tablet by mouth daily. 3/1/23  Yes Frannie Carranza NP   zolpidem CR (AMBIEN CR) 12.5 mg tablet Take 1 Tablet by mouth nightly as needed for Sleep. Max Daily Amount: 12.5 mg. Indications: insomnia 1/17/23  Yes Frannie Carranza NP   albuterol (PROVENTIL HFA, VENTOLIN HFA, PROAIR HFA) 90 mcg/actuation inhaler Take 2 Puffs by inhalation every four (4) hours as needed for Wheezing. 11/28/22  Yes Moni Salmon DO   brexpiprazole (Rexulti) 1 mg tab tablet Take 1 Tablet by mouth daily. 5/10/22  Yes Danyel Back NP   cholecalciferol, vitamin D3, 2,000 unit tab Take  by mouth. Yes Provider, Historical   EPINEPHrine (EPIPEN 2-EDWIGE) 0.3 mg/0.3 mL (1:1,000) injection 0.3 mL by IntraMUSCular route once as needed for up to 1 dose. 6/26/15  Yes Baltazar San MD   Prempro 0.45-1.5 mg per tablet  2/9/23   Provider, Historical   montelukast (SINGULAIR) 10 mg tablet Take 10 mg by mouth nightly. 3/8/23   Provider, Historical   hydrOXYzine HCL (ATARAX) 25 mg tablet TAKE 1 TABLET BY MOUTH 3 TIMES A DAY AS NEEDED FOR SLEEP FOR UP TO 10 DAYS  Patient not taking: Reported on 3/15/2023 12/19/22   Rosibel Carranza NP   fluticasone propion-salmeteroL (ADVAIR/WIXELA) 100-50 mcg/dose diskus inhaler Take 1 Puff by inhalation every twelve (12) hours.   Patient not taking: Reported on 3/15/2023  3/15/23  Provider, Historical   clonazePAM (KlonoPIN) 1 mg tablet Take 1 Tablet by mouth nightly. Max Daily Amount: 1 mg. 11/21/22   Brenden Carranza NP   FLUoxetine (PROzac) 40 mg capsule Take 1 Capsule by mouth daily. Patient not taking: Reported on 3/15/2023 5/10/22 3/15/23  Hadley Howard NP     3 most recent [facility] Screens 3/15/2023   PHQ Not Done -   Little interest or pleasure in doing things Not at all   Feeling down, depressed, irritable, or hopeless Not at all   Total Score PHQ 2 0   Trouble falling or staying asleep, or sleeping too much -   Feeling tired or having little energy -   Poor appetite, weight loss, or overeating -   Feeling bad about yourself - or that you are a failure or have let yourself or your family down -   Trouble concentrating on things such as school, work, reading, or watching TV -   Moving or speaking so slowly that other people could have noticed; or the opposite being so fidgety that others notice -   Thoughts of being better off dead, or hurting yourself in some way -   PHQ 9 Score -   How difficult have these problems made it for you to do your work, take care of your home and get along with others Not difficult at all     1. Have you been to the ER, urgent care clinic since your last visit? Hospitalized since your last visit? No    2. Have you seen or consulted any other health care providers outside of the [address] [place] [address] since your last visit? Include any pap smears or colon screening.  No

## 2023-03-17 DIAGNOSIS — G47.00 INSOMNIA, UNSPECIFIED TYPE: ICD-10-CM

## 2023-03-17 RX ORDER — ZOLPIDEM TARTRATE 12.5 MG/1
TABLET, FILM COATED, EXTENDED RELEASE ORAL
Qty: 30 TABLET | Refills: 2 | Status: SHIPPED | OUTPATIENT
Start: 2023-03-17

## 2023-03-28 DIAGNOSIS — F33.1 MODERATE EPISODE OF RECURRENT MAJOR DEPRESSIVE DISORDER (HCC): ICD-10-CM

## 2023-03-28 RX ORDER — VILAZODONE HYDROCHLORIDE 10 MG/1
10 TABLET ORAL DAILY
Qty: 30 TABLET | Refills: 1 | Status: SHIPPED | OUTPATIENT
Start: 2023-03-28

## 2023-04-04 ENCOUNTER — OFFICE VISIT (OUTPATIENT)
Dept: PRIMARY CARE CLINIC | Age: 53
End: 2023-04-04
Payer: COMMERCIAL

## 2023-04-04 PROCEDURE — 99214 OFFICE O/P EST MOD 30 MIN: CPT | Performed by: FAMILY MEDICINE

## 2023-04-04 PROCEDURE — 99396 PREV VISIT EST AGE 40-64: CPT | Performed by: FAMILY MEDICINE

## 2023-04-04 RX ORDER — PNEUMOCOCCAL 20-VALENT CONJUGATE VACCINE 2.2; 2.2; 2.2; 2.2; 2.2; 2.2; 2.2; 2.2; 2.2; 2.2; 2.2; 2.2; 2.2; 2.2; 2.2; 2.2; 4.4; 2.2; 2.2; 2.2 UG/.5ML; UG/.5ML; UG/.5ML; UG/.5ML; UG/.5ML; UG/.5ML; UG/.5ML; UG/.5ML; UG/.5ML; UG/.5ML; UG/.5ML; UG/.5ML; UG/.5ML; UG/.5ML; UG/.5ML; UG/.5ML; UG/.5ML; UG/.5ML; UG/.5ML; UG/.5ML
0.5 INJECTION, SUSPENSION INTRAMUSCULAR ONCE
Qty: 0.5 ML | Refills: 0 | Status: SHIPPED
Start: 2023-04-04 | End: 2023-04-04

## 2023-04-04 RX ORDER — ONDANSETRON 4 MG/1
4 TABLET, ORALLY DISINTEGRATING ORAL
Qty: 30 TABLET | Refills: 0 | Status: SHIPPED
Start: 2023-04-04

## 2023-04-04 NOTE — PROGRESS NOTES
Chief Complaint   Patient presents with    Physical    Abdominal Pain     2 weeks        Identified pt with two pt identifiers(name and ). Chief Complaint   Patient presents with    Physical    Abdominal Pain     2 weeks        3 most recent PHQ Screens 3/15/2023   PHQ Not Done -   Little interest or pleasure in doing things Not at all   Feeling down, depressed, irritable, or hopeless Not at all   Total Score PHQ 2 0   Trouble falling or staying asleep, or sleeping too much -   Feeling tired or having little energy -   Poor appetite, weight loss, or overeating -   Feeling bad about yourself - or that you are a failure or have let yourself or your family down -   Trouble concentrating on things such as school, work, reading, or watching TV -   Moving or speaking so slowly that other people could have noticed; or the opposite being so fidgety that others notice -   Thoughts of being better off dead, or hurting yourself in some way -   PHQ 9 Score -   How difficult have these problems made it for you to do your work, take care of your home and get along with others Not difficult at all        There were no vitals filed for this visit. Health Maintenance Due   Topic Date Due    COVID-19 Vaccine (4 - Booster for Pfizer series) 02/15/2022        1. Have you been to the ER, urgent care clinic since your last visit? Hospitalized since your last visit? No    2. Have you seen or consulted any other health care providers outside of the [address] [place] [address] since your last visit? Include any pap smears or colon screening. No    3. For patients aged 39-70: Has the patient had a colonoscopy / FIT/ Cologuard? Yes - no Care Gap present      If the patient is female:    4. For patients aged 41-77: Has the patient had a mammogram within the past 2 years? Yes - no Care Gap present      5. For patients aged 21-65: Has the patient had a pap smear?  Yes - no Care Gap present

## 2023-04-04 NOTE — PROGRESS NOTES
HPI:  Paul Gramajo is a 46 y.o. female presenting for well woman exam.     States for last 2 weeks stomach has been upset. States stool was light in color and having a lot of diarrhea. 3-4 episodes a day that was oily and almost foamy. States if she went on a long walk there was blood from hemorrhoids. Only sees blood on TP not in toilet bowl and this has resolved since this. Has lost 6 lbs in 2 weeks. She has felt nauseated. No vomiting. All day. She has an appt with GI at end of May with Dr. Brooklyn Young. She did have a period with Primpro. States she feels pregnant but knows she is not and feels some of this may be hormonal. She took a test and knows she is not pregnant. No fever, chills. Nothing made it better or worse. Took Immodium. Had some Zofran but out of it now. States abdomen feels sore. No dysuria, hematuria. Last scope was in 2019 and found internal hemorrhoids but no polyps. Still has her gallbladder. Her Dad had pancreatic cancer. She denies reflux symptoms. Had thyroid labs and hormone testing done in last 6 weeks that was okay per patient. Diet: normal, no fried food, no red meat, not eating out a lot  Exercise: walking   Occupation: works for [employer]   Tobacco Use: never  Alcohol: socially  OB/ GYN HX:   -  x2, LTCS x1, no complications  Does not want STD testing    Allergies- reviewed: Allergies   Allergen Reactions    Latex Anaphylaxis    Teagan Hives    Soy Hives    Bees [Sting, Bee] Not Reported This Time    Cephaeline Not Reported This Time     Pt. States no allergy    Keflex [Cephalexin] Rash    Shingrix (Pf) [Varicella-Zoster Ge-As01b (Pf)] Swelling    Soybean Oil Hives    Tomato Hives     Pt. States no allergy       Medications- reviewed:   Current Outpatient Medications   Medication Sig    pneumococcal 20-imelda conj-dip, PF, (Prevnar 20, PF,) 0.5 mL syrg injection 0.5 mL by IntraMUSCular route once for 1 dose.     ondansetron (ZOFRAN ODT) 4 mg disintegrating tablet Take 1 Tablet by mouth every eight (8) hours as needed for Nausea or Vomiting.    vilazodone (VIIBRYD) 10 mg tab tablet TAKE 1 TABLET BY MOUTH DAILY    zolpidem CR (AMBIEN CR) 12.5 mg tablet TAKE 1 TABLET BY MOUTH EVERY NIGHT AS NEEDED FOR SLEEP. MAX DAILY AMOUNT: 12.5 MG. INDICATIONS: INSOMNIA    Prempro 0.45-1.5 mg per tablet     montelukast (SINGULAIR) 10 mg tablet Take 10 mg by mouth nightly. levothyroxine (SYNTHROID) 50 mcg tablet Take 1 Tablet by mouth Daily (before breakfast). albuterol (PROVENTIL HFA, VENTOLIN HFA, PROAIR HFA) 90 mcg/actuation inhaler Take 2 Puffs by inhalation every four (4) hours as needed for Wheezing. brexpiprazole (Rexulti) 1 mg tab tablet Take 1 Tablet by mouth daily. cholecalciferol, vitamin D3, 2,000 unit tab Take  by mouth. EPINEPHrine (EPIPEN 2-EDWIGE) 0.3 mg/0.3 mL (1:1,000) injection 0.3 mL by IntraMUSCular route once as needed for up to 1 dose. No current facility-administered medications for this visit.          Past Medical History- reviewed:  Past Medical History:   Diagnosis Date    Back pain, acute     Depression     Endocrine disease     hypothyroid    H/O seasonal allergies     Maxillary sinus fracture ([address] 2016    Neck pain, musculoskeletal          Past Surgical History- reviewed:   Past Surgical History:   Procedure Laterality Date    COLONOSCOPY N/A 2019    COLONOSCOPY performed by Reina Jeronimo MD at [facility] ENDOSCOPY    COLONOSCOPY,DIAGNOSTIC  2019         HX CERVICAL DISKECTOMY Left 2015    HX CERVICAL FUSION Left 2015     HX  SECTION      HX GYN  1998    laproscopsy     HX WISDOM TEETH EXTRACTION           Family History - reviewed:  Family History   Problem Relation Age of Onset    Cancer Father         pancreas, Liver    Cancer Maternal Grandmother     Breast Cancer Maternal Grandmother     Heart Disease Maternal Grandfather     Heart Disease Paternal Grandmother     Stroke Paternal Grandfather     Other Mother meningioma    Hypertension Mother     Breast Cancer Mother 61    Anesth Problems Mother         SLOW TO WAKE UP    Stroke Mother     Hypertension Sister     No Known Problems Daughter     No Known Problems Daughter     No Known Problems Son     Breast Cancer Maternal Aunt     Breast Cancer Maternal Aunt     Breast Cancer Maternal Aunt          Social History - reviewed:  Social History     Socioeconomic History    Marital status:      Spouse name: Not on file    Number of children: Not on file    Years of education: Not on file    Highest education level: Not on file   Occupational History    Not on file   Tobacco Use    Smoking status: Never    Smokeless tobacco: Never   Vaping Use    Vaping Use: Never used   Substance and Sexual Activity    Alcohol use: Never     Alcohol/week: 0.0 standard drinks     Comment: occasional    Drug use: No    Sexual activity: Yes     Partners: Male     Comment:     Other Topics Concern    Not on file   Social History Narrative    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    Going through divorce 2017    Member of [facility]  AND [facility], has good friends there     Social Determinants of Health     Financial Resource Strain: Not on file   Food Insecurity: Not on file   Transportation Needs: Not on file   Physical Activity: Not on file   Stress: Not on file   Social Connections: Not on file   Intimate Partner Violence: Not on file   Housing Stability: Not on file         Immunizations - reviewed:   Immunization History   Administered Date(s) Administered    COVID-19, PFIZER PURPLE top, DILUTE for use, (age 15 y+), IM, 30mcg/0.3mL 03/04/2021, 03/25/2021, 12/21/2021    Influenza Vaccine 10/10/2014, 10/08/2015, 10/30/2018, 10/05/2020, 10/21/2021    TDAP Vaccine 07/01/2006    Tdap 06/23/2014    Zoster Recombinant 09/27/2021     Flu: UTD  Tdap: UTD  Pneumovax: due has diagnosis of asthma  Zostervax: did not tolerate after 1st shot     Health Maintenance reviewed -  Pap smear - 2022  Mammogram - 2022  Colonoscopy - 2019, due 2029  DEXA scan - not indicated  Lung cancer screening - not indicated    Review of Systems   Denies fever, chills, sore throat, cough, chest pain, shortness of breath, dysuria, hematuria, breast masses, nipple discharge    Physical Exam  Visit Vitals  /79 (BP 1 Location: Left upper arm, BP Patient Position: Sitting, BP Cuff Size: Adult long)   Pulse 77   Temp 97.5 °F (36.4 °C) (Temporal)   Resp 17   Ht 5' 3\" (1.6 m)   Wt 191 lb (86.6 kg)   SpO2 97%   BMI 33.83 kg/m²       General appearance - alert, well appearing, and in no distress  Eyes - sclera anicteric, left eye normal, right eye normal  Ears - bilateral TM's and external ear canals normal  Nose - normal and patent, no erythema, discharge or polyps  Mouth - mucous membranes moist, pharynx normal without lesions  Neck - supple, no significant adenopathy  Chest - clear to auscultation, no wheezes, rales or rhonchi, symmetric air entry  Heart - normal rate, regular rhythm, normal S1, S2, no murmurs, rubs, clicks or gallops  Abdomen - soft, mild generalized tenderness in lower abdomen, not an acute abdomen, non distended, no masses or organomegaly, waist 37.5  Neurological - alert, oriented, normal speech, no focal findings or movement disorder noted  Musculoskeletal - full range of motion without pain  Extremities - peripheral pulses normal, no edema, no clubbing or cyanosis  Skin - normal coloration and turgor, no rashes, no suspicious skin lesions noted  Pelvic - Not examined    Breast - Not examined       Assessment/Plan:    ICD-10-CM ICD-9-CM    1. Alternating constipation and diarrhea  R19.8 787.99 CBC WITH AUTOMATED DIFF      METABOLIC PANEL, COMPREHENSIVE      LIPASE      AMYLASE      URINALYSIS W/ RFLX MICROSCOPIC      2. Nausea  R11.0 787.02 LIPASE      AMYLASE      HEMOGLOBIN A1C WITH EAG      3. Blood in stool  K92.1 578.1 OCCULT BLOOD IMMUNOASSAY,DIAGNOSTIC      OCCULT BLOOD IMMUNOASSAY,DIAGNOSTIC      4.  Abdominal discomfort  R10.9 789.00       5. Well woman exam (no gynecological exam)  Z00.00 V70.0         Will get labs. Discussed CT Abd Pelv W given her abd pain, alternating diarrhea/ constipation, nausea and weight loss but she would like to wait to see labs first. She declined rectal exam today. Denies any chance of pregnancy, states she took home UPT that was negative and declined repeat UPT in office. Given fecal occult card to bring back. Discussed ER precautions. Keep follow up with GI. She will wait to have us order lipid panel. Would like to do these labs first and then will message us when she wants to complete lipid panel for her Be Well exam.    I have discussed the diagnosis with the patient and the intended plan as seen in the above orders. Patient verbalized understanding of the plan and agrees with the plan. The patient has received an after-visit summary and questions were answered concerning future plans. I have discussed medication side effects and warnings with the patient as well. Informed patient to return to the office if new symptoms arise.     Mahin Hale, DO

## 2023-04-17 DIAGNOSIS — R10.84 GENERALIZED ABDOMINAL PAIN: ICD-10-CM

## 2023-04-17 DIAGNOSIS — R19.8 ALTERNATING CONSTIPATION AND DIARRHEA: ICD-10-CM

## 2023-04-17 DIAGNOSIS — R19.8 ALTERNATING CONSTIPATION AND DIARRHEA: Primary | ICD-10-CM

## 2023-04-18 ENCOUNTER — DOCUMENTATION ONLY (OUTPATIENT)
Dept: PRIMARY CARE CLINIC | Age: 53
End: 2023-04-18

## 2023-04-18 DIAGNOSIS — R19.8 ALTERNATING CONSTIPATION AND DIARRHEA: Primary | ICD-10-CM

## 2023-04-18 DIAGNOSIS — R10.84 GENERALIZED ABDOMINAL PAIN: ICD-10-CM

## 2023-04-18 NOTE — PROGRESS NOTES
Spoke with patient about persistent symptoms. Reviewed results from ER. No gross blood in stool. Put in additional labs to check. She will get these done. Will try to reach out to GI to see if we can get in sooner malou pending lab results.

## 2023-04-21 LAB
H PYLORI AG STL QL IA: NEGATIVE
SPECIMEN SOURCE: NORMAL

## 2023-04-22 LAB — HEMOCCULT STL QL IA: NEGATIVE

## 2023-04-28 DIAGNOSIS — Z13.220 ENCOUNTER FOR LIPID SCREENING FOR CARDIOVASCULAR DISEASE: ICD-10-CM

## 2023-04-28 DIAGNOSIS — Z13.6 ENCOUNTER FOR LIPID SCREENING FOR CARDIOVASCULAR DISEASE: ICD-10-CM

## 2023-04-29 LAB
CHOLEST SERPL-MCNC: 248 MG/DL
HDLC SERPL-MCNC: 41 MG/DL
HDLC SERPL: 6 (ref 0–5)
LDLC SERPL CALC-MCNC: 170.2 MG/DL (ref 0–100)
TRIGL SERPL-MCNC: 184 MG/DL (ref ?–150)
VLDLC SERPL CALC-MCNC: 36.8 MG/DL

## 2023-04-30 LAB
BACTERIA SPEC CULT: NORMAL
SPECIMEN SOURCE: NORMAL
VIBRIO STL CULT: NORMAL

## 2023-05-01 DIAGNOSIS — E78.5 HYPERLIPIDEMIA, UNSPECIFIED HYPERLIPIDEMIA TYPE: Primary | ICD-10-CM

## 2023-05-08 ENCOUNTER — HOSPITAL ENCOUNTER (OUTPATIENT)
Facility: HOSPITAL | Age: 53
Discharge: HOME | End: 2023-05-11
Payer: COMMERCIAL

## 2023-05-08 DIAGNOSIS — E78.5 HYPERLIPIDEMIA, UNSPECIFIED HYPERLIPIDEMIA TYPE: ICD-10-CM

## 2023-05-08 PROCEDURE — 75571 CT HRT W/O DYE W/CA TEST: CPT

## 2023-05-17 ENCOUNTER — TELEMEDICINE (OUTPATIENT)
Age: 53
End: 2023-05-17
Payer: COMMERCIAL

## 2023-05-17 DIAGNOSIS — F33.1 MAJOR DEPRESSIVE DISORDER, RECURRENT, MODERATE (HCC): Primary | ICD-10-CM

## 2023-05-17 DIAGNOSIS — G47.00 INSOMNIA, UNSPECIFIED TYPE: ICD-10-CM

## 2023-05-17 DIAGNOSIS — F41.9 ANXIETY DISORDER, UNSPECIFIED TYPE: ICD-10-CM

## 2023-05-17 PROCEDURE — 99214 OFFICE O/P EST MOD 30 MIN: CPT | Performed by: NURSE PRACTITIONER

## 2023-05-17 RX ORDER — DESVENLAFAXINE 25 MG/1
25 TABLET, EXTENDED RELEASE ORAL DAILY
Qty: 30 TABLET | Refills: 1 | Status: SHIPPED | OUTPATIENT
Start: 2023-05-17

## 2023-05-17 ASSESSMENT — COLUMBIA-SUICIDE SEVERITY RATING SCALE - C-SSRS
1. WITHIN THE PAST MONTH, HAVE YOU WISHED YOU WERE DEAD OR WISHED YOU COULD GO TO SLEEP AND NOT WAKE UP?: YES
6. HAVE YOU EVER DONE ANYTHING, STARTED TO DO ANYTHING, OR PREPARED TO DO ANYTHING TO END YOUR LIFE?: NO
2. HAVE YOU ACTUALLY HAD ANY THOUGHTS OF KILLING YOURSELF?: NO

## 2023-05-17 ASSESSMENT — PATIENT HEALTH QUESTIONNAIRE - PHQ9
SUM OF ALL RESPONSES TO PHQ QUESTIONS 1-9: 13
7. TROUBLE CONCENTRATING ON THINGS, SUCH AS READING THE NEWSPAPER OR WATCHING TELEVISION: 1
8. MOVING OR SPEAKING SO SLOWLY THAT OTHER PEOPLE COULD HAVE NOTICED. OR THE OPPOSITE, BEING SO FIGETY OR RESTLESS THAT YOU HAVE BEEN MOVING AROUND A LOT MORE THAN USUAL: 0
SUM OF ALL RESPONSES TO PHQ QUESTIONS 1-9: 13
SUM OF ALL RESPONSES TO PHQ QUESTIONS 1-9: 12
6. FEELING BAD ABOUT YOURSELF - OR THAT YOU ARE A FAILURE OR HAVE LET YOURSELF OR YOUR FAMILY DOWN: 1
SUM OF ALL RESPONSES TO PHQ QUESTIONS 1-9: 13
9. THOUGHTS THAT YOU WOULD BE BETTER OFF DEAD, OR OF HURTING YOURSELF: 1
3. TROUBLE FALLING OR STAYING ASLEEP: 1
SUM OF ALL RESPONSES TO PHQ9 QUESTIONS 1 & 2: 6
4. FEELING TIRED OR HAVING LITTLE ENERGY: 2
1. LITTLE INTEREST OR PLEASURE IN DOING THINGS: 3
5. POOR APPETITE OR OVEREATING: 1
2. FEELING DOWN, DEPRESSED OR HOPELESS: 3

## 2023-05-17 NOTE — PROGRESS NOTES
at Home: [address] [address]  Provider was located at [facility] (Appt Dept): [address] [facility]  [address]  [place],  [address]         --MAYELA Reyes NP              5/17/2023  MAYELA Reyes NP

## 2023-05-31 ENCOUNTER — TELEPHONE (OUTPATIENT)
Age: 53
End: 2023-05-31

## 2023-05-31 DIAGNOSIS — F33.1 MAJOR DEPRESSIVE DISORDER, RECURRENT, MODERATE (HCC): ICD-10-CM

## 2023-05-31 RX ORDER — DESVENLAFAXINE SUCCINATE 50 MG/1
50 TABLET, EXTENDED RELEASE ORAL DAILY
Qty: 30 TABLET | Refills: 1 | Status: SHIPPED | OUTPATIENT
Start: 2023-05-31

## 2023-05-31 NOTE — TELEPHONE ENCOUNTER
I followed up with the patient message that was sent via my chart. Patient stated she has been taking Pristiq 25 mg tablet once daily as directed and starting to feel  very depressed. Patient stated she has been emotionally crying and having little pleasure in interacting with others. Patient requesting to have an increase dosage of Pristiq and/or a medication change because the side effects are affecting her mood tremendously. Patient  stated she has felt more stable before taking Pristiq versus now. Please advise.

## 2023-06-20 ENCOUNTER — TELEPHONE (OUTPATIENT)
Age: 53
End: 2023-06-20

## 2023-06-20 DIAGNOSIS — F33.1 MAJOR DEPRESSIVE DISORDER, RECURRENT, MODERATE (HCC): ICD-10-CM

## 2023-06-20 RX ORDER — DESVENLAFAXINE 100 MG/1
100 TABLET, EXTENDED RELEASE ORAL DAILY
Qty: 30 TABLET | Refills: 0 | Status: SHIPPED | OUTPATIENT
Start: 2023-06-20

## 2023-06-20 NOTE — PROGRESS NOTES
Sent Pristiq 100 mg to pharmacy: [address] [phone]   [address] [place] [address] & [address] [address]   Phone:  [phone]  Fax:  [phone]

## 2023-06-20 NOTE — TELEPHONE ENCOUNTER
Called patient in regards to refill request. Informed patient that provider sent Pristiq 100mg to her pharmacy of choice. Educated patient this medication (as all in that class) takes 4-6 weeks before she will notice any therapeutic difference. Patient expressed an understanding. Patient was asked if she is suicidal, which she denied. She was asked if she started therapy. Patient states she has been seeing a therapist for three months. Patient reminded of follow up on 6/28/23. Patient had no further question. Will forward this office note to provider.

## 2023-06-28 ENCOUNTER — TELEMEDICINE (OUTPATIENT)
Age: 53
End: 2023-06-28
Payer: COMMERCIAL

## 2023-06-28 DIAGNOSIS — F33.1 MAJOR DEPRESSIVE DISORDER, RECURRENT, MODERATE (HCC): Primary | ICD-10-CM

## 2023-06-28 DIAGNOSIS — F41.9 ANXIETY DISORDER, UNSPECIFIED TYPE: ICD-10-CM

## 2023-06-28 DIAGNOSIS — G47.00 INSOMNIA, UNSPECIFIED TYPE: ICD-10-CM

## 2023-06-28 PROCEDURE — 99214 OFFICE O/P EST MOD 30 MIN: CPT | Performed by: NURSE PRACTITIONER

## 2023-06-28 RX ORDER — ZOLPIDEM TARTRATE 12.5 MG/1
12.5 TABLET, FILM COATED, EXTENDED RELEASE ORAL NIGHTLY PRN
Qty: 30 TABLET | Refills: 2 | Status: SHIPPED | OUTPATIENT
Start: 2023-06-28 | End: 2023-09-26

## 2023-06-28 RX ORDER — CLONAZEPAM 1 MG/1
1 TABLET ORAL DAILY
Qty: 30 TABLET | Refills: 2 | Status: SHIPPED | OUTPATIENT
Start: 2023-06-28 | End: 2023-09-26

## 2023-06-28 RX ORDER — DESVENLAFAXINE 100 MG/1
100 TABLET, EXTENDED RELEASE ORAL DAILY
Qty: 30 TABLET | Refills: 5 | Status: SHIPPED | OUTPATIENT
Start: 2023-06-28

## 2023-06-28 ASSESSMENT — PATIENT HEALTH QUESTIONNAIRE - PHQ9
SUM OF ALL RESPONSES TO PHQ QUESTIONS 1-9: 15
SUM OF ALL RESPONSES TO PHQ9 QUESTIONS 1 & 2: 6
SUM OF ALL RESPONSES TO PHQ QUESTIONS 1-9: 15
6. FEELING BAD ABOUT YOURSELF - OR THAT YOU ARE A FAILURE OR HAVE LET YOURSELF OR YOUR FAMILY DOWN: 1
5. POOR APPETITE OR OVEREATING: 3
9. THOUGHTS THAT YOU WOULD BE BETTER OFF DEAD, OR OF HURTING YOURSELF: 0
3. TROUBLE FALLING OR STAYING ASLEEP: 1
4. FEELING TIRED OR HAVING LITTLE ENERGY: 3
8. MOVING OR SPEAKING SO SLOWLY THAT OTHER PEOPLE COULD HAVE NOTICED. OR THE OPPOSITE, BEING SO FIGETY OR RESTLESS THAT YOU HAVE BEEN MOVING AROUND A LOT MORE THAN USUAL: 0
SUM OF ALL RESPONSES TO PHQ QUESTIONS 1-9: 15
1. LITTLE INTEREST OR PLEASURE IN DOING THINGS: 3
SUM OF ALL RESPONSES TO PHQ QUESTIONS 1-9: 15
7. TROUBLE CONCENTRATING ON THINGS, SUCH AS READING THE NEWSPAPER OR WATCHING TELEVISION: 1
2. FEELING DOWN, DEPRESSED OR HOPELESS: 3

## 2023-06-30 DIAGNOSIS — F33.1 MAJOR DEPRESSIVE DISORDER, RECURRENT, MODERATE (HCC): ICD-10-CM

## 2023-07-07 ENCOUNTER — HOSPITAL ENCOUNTER (EMERGENCY)
Facility: HOSPITAL | Age: 53
Discharge: HOME | End: 2023-07-07
Attending: STUDENT IN AN ORGANIZED HEALTH CARE EDUCATION/TRAINING PROGRAM
Payer: COMMERCIAL

## 2023-07-07 ENCOUNTER — TELEPHONE (OUTPATIENT)
Dept: PRIMARY CARE CLINIC | Facility: CLINIC | Age: 53
End: 2023-07-07

## 2023-07-07 VITALS
OXYGEN SATURATION: 92 % | DIASTOLIC BLOOD PRESSURE: 83 MMHG | HEIGHT: 63 IN | HEART RATE: 72 BPM | TEMPERATURE: 98.3 F | RESPIRATION RATE: 18 BRPM | WEIGHT: 188.49 LBS | BODY MASS INDEX: 33.4 KG/M2 | SYSTOLIC BLOOD PRESSURE: 104 MMHG

## 2023-07-07 DIAGNOSIS — F41.1 ANXIETY STATE: Primary | ICD-10-CM

## 2023-07-07 PROCEDURE — 99283 EMERGENCY DEPT VISIT LOW MDM: CPT

## 2023-07-07 PROCEDURE — 6370000000 HC RX 637 (ALT 250 FOR IP)

## 2023-07-07 RX ORDER — LORAZEPAM 1 MG/1
1 TABLET ORAL ONCE
Status: COMPLETED | OUTPATIENT
Start: 2023-07-07 | End: 2023-07-07

## 2023-07-07 RX ADMIN — LORAZEPAM 1 MG: 1 TABLET ORAL at 15:31

## 2023-07-07 NOTE — ED NOTES
I have reviewed discharge instructions with the patient. The patient verbalized understanding.       Jhonathan Gupta RN  07/07/23 [id number]

## 2023-07-07 NOTE — ED PROVIDER NOTES
Other Mother         meningioma    Stroke Paternal Grandfather     Heart Disease Paternal Grandmother     Breast Cancer Maternal Aunt     Breast Cancer Maternal Grandmother     Hypertension Mother     Breast Cancer Mother 61    No Known Problems Son     No Known Problems Daughter     No Known Problems Daughter     Hypertension Sister     Stroke Mother     Anesth Problems Mother         SLOW TO WAKE UP    Heart Disease Maternal Grandfather     Breast Cancer Maternal Aunt     Breast Cancer Maternal Aunt     Cancer Father         pancreas, Liver    Cancer Maternal Grandmother           SOCIAL HISTORY       Social History     Socioeconomic History    Marital status:    Tobacco Use    Smoking status: Never    Smokeless tobacco: Never   Substance and Sexual Activity    Alcohol use: Never     Alcohol/week: 0.0 standard drinks    Drug use: No   Social History Narrative    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 Constitutional:       General: She is not in acute distress. Appearance: Normal appearance. She is not ill-appearing or toxic-appearing. HENT:      Head: Normocephalic and atraumatic. Nose: Nose normal.      Mouth/Throat:      Mouth: Mucous membranes are moist.      Pharynx: Oropharynx is clear. Eyes:      Extraocular Movements: Extraocular movements intact. Conjunctiva/sclera: Conjunctivae normal.      Pupils: Pupils are equal, round, and reactive to light. Cardiovascular:      Rate and Rhythm: Normal rate and regular rhythm. Pulses: Normal pulses. Heart sounds: Normal heart sounds. Pulmonary:      Effort: Pulmonary effort is normal.      Breath sounds: Normal breath sounds. Abdominal:      General: Abdomen is flat. Bowel sounds are normal.      Palpations: Abdomen is soft. Tenderness: There is no abdominal tenderness.    Musculoskeletal:

## 2023-07-07 NOTE — TELEPHONE ENCOUNTER
Spoke with patient. Was seen in ER. States she is feeling better since taking the Ativan. Discussed I would send a message to the provider who fills her Pristiq to see what their recommendations are regarding the medicine. Advised I recommend she speak with supervisor if she feels job changes have been overwhelming and could write letter if needed. She voiced thanks for the call.

## 2023-07-07 NOTE — ED TRIAGE NOTES
Pt has been on mult medications for anxiety and depression Prozac and viibryd in her past and has been placed on Pristique for the last 8 weeks and states that she believes the med is making it worse. Denies SI/HI at this time, denies hallucinations.  Otherwise NAD

## 2023-07-07 NOTE — DISCHARGE INSTRUCTIONS
Discussed visit today. Please follow-up with the behavioral health clinic by calling and scheduling an appointment. Please tell them that he came to the ER today need to be seen sooner than 8 weeks out. Please return to the ER with any worsening of symptoms. Please continue to take medications at you have been prescribed. Return to the ER with any worsening of symptoms, chest pain, shortness of breath, worsening anxiety.

## 2023-07-07 NOTE — BSMART NOTE
Met with Candace Merrill via telehealth per the request of ED provider, Mally Gates. Candace Merrill presented to the ED with complaints of worsening depression and anxiety and does not feel like her current medications are working. She currently receives medication management through [facility] with NP Melanie Bowden. She reports being prescribed Pristiq and was started on this medication 8 weeks ago. Prior to this she was taking Viibryd and Prozax. Candace Merrill does not feel like her Pristiq is working and reached out to her NP today. She sent a message through the portal, however, did not hear back prior to the office closing at 4:30. She also reports her NP is leaving the practice at the end of the month and her next appointment with a new provider is not for another 8 weeks. Candace Merrill currently reports her anxiety and depressive symptoms as an 8 out of 10 and they have been getting worse recently. She denies current and historical SI/HI and denies any previous suicide attempts. She reports no changes in her sleep but reports a decreased appetite, resulting in about 13 pounds of weight loss. She denies current and historical AH/VH and did not appear to be responding to internal stimuli during the assessment. Candace Merrill does not currently meet inpatient admission criteria and was encouraged to follow up with her NP on Monday. She was provided resources for outpatient psychiatry and the [facility] crisis line. She agrees to return to the ED if her symptoms worsen or she begins experiencing SI. This clinician consulted with her ED provider who agrees with this disposition.

## 2023-07-10 ENCOUNTER — CARE COORDINATION (OUTPATIENT)
Dept: OTHER | Facility: CLINIC | Age: 53
End: 2023-07-10

## 2023-07-10 ENCOUNTER — TELEPHONE (OUTPATIENT)
Age: 53
End: 2023-07-10

## 2023-07-10 NOTE — CARE COORDINATION
Ambulatory Care Manager [facility]) contacted the patient to introduce the Associate Care Management Program related to [facility] symptoms. ACM reviewed and updated education  patient was agreeable to follow up Care Management calls. Summary Note: Patient voiced that she went to the ED due to her feelings of anxiety worsening. She was able to get an appointment with her medication management provider for 7/11. She does not feel that her current medication regime is working well. She does see a therapist as well. Provided Education:  Discussed red flags and appropriate site of care based on symptoms and resources available to patient including: Specialist.       Importance and benefits of: Follow up with PCP and specialist, medication adherence, self monitoring and reporting of symptoms. Plan:  ACM provided contact information for future needs. Plan for follow-up call in 5-7 days based on severity of symptoms and risk factors. Plan for next call: Review and update: goals    Follow up on: symptom management-symptom and medication management  Patient  verbalized understanding and is agreeable to follow up call.

## 2023-07-10 NOTE — TELEPHONE ENCOUNTER
Pt LDM 7/7/23 as she was in ED for anxiety and depression. Would like a CB to schedule a fu appt.   Last seen 6/28/2023  Next appt w/ Brie Saxena 8/21/2023

## 2023-07-11 ENCOUNTER — TELEPHONE (OUTPATIENT)
Age: 53
End: 2023-07-11

## 2023-07-11 NOTE — TELEPHONE ENCOUNTER
Spoke with patient about anxiety. She is most likely experiencing akithisia. Instructed patient to taper down to 1 mg. Will make additional changes at apt tomorrow.

## 2023-07-12 ENCOUNTER — OFFICE VISIT (OUTPATIENT)
Age: 53
End: 2023-07-12
Payer: COMMERCIAL

## 2023-07-12 VITALS
BODY MASS INDEX: 33.73 KG/M2 | HEIGHT: 63 IN | HEART RATE: 97 BPM | DIASTOLIC BLOOD PRESSURE: 92 MMHG | OXYGEN SATURATION: 96 % | RESPIRATION RATE: 17 BRPM | SYSTOLIC BLOOD PRESSURE: 140 MMHG | WEIGHT: 190.4 LBS | TEMPERATURE: 98 F

## 2023-07-12 DIAGNOSIS — F33.1 MAJOR DEPRESSIVE DISORDER, RECURRENT, MODERATE (HCC): Primary | ICD-10-CM

## 2023-07-12 PROCEDURE — 99214 OFFICE O/P EST MOD 30 MIN: CPT | Performed by: NURSE PRACTITIONER

## 2023-07-12 RX ORDER — FLUOXETINE HYDROCHLORIDE 20 MG/1
20 CAPSULE ORAL DAILY
Qty: 30 CAPSULE | Refills: 1 | Status: SHIPPED | OUTPATIENT
Start: 2023-07-12

## 2023-07-12 RX ORDER — DESVENLAFAXINE SUCCINATE 50 MG/1
TABLET, EXTENDED RELEASE ORAL
Qty: 10 TABLET | Refills: 0 | Status: SHIPPED | OUTPATIENT
Start: 2023-07-12

## 2023-07-12 ASSESSMENT — PATIENT HEALTH QUESTIONNAIRE - PHQ9
2. FEELING DOWN, DEPRESSED OR HOPELESS: 3
7. TROUBLE CONCENTRATING ON THINGS, SUCH AS READING THE NEWSPAPER OR WATCHING TELEVISION: 3
SUM OF ALL RESPONSES TO PHQ QUESTIONS 1-9: 17
1. LITTLE INTEREST OR PLEASURE IN DOING THINGS: 3
4. FEELING TIRED OR HAVING LITTLE ENERGY: 3
9. THOUGHTS THAT YOU WOULD BE BETTER OFF DEAD, OR OF HURTING YOURSELF: 1
SUM OF ALL RESPONSES TO PHQ QUESTIONS 1-9: 16
SUM OF ALL RESPONSES TO PHQ9 QUESTIONS 1 & 2: 6
6. FEELING BAD ABOUT YOURSELF - OR THAT YOU ARE A FAILURE OR HAVE LET YOURSELF OR YOUR FAMILY DOWN: 2
3. TROUBLE FALLING OR STAYING ASLEEP: 0
SUM OF ALL RESPONSES TO PHQ QUESTIONS 1-9: 17
10. IF YOU CHECKED OFF ANY PROBLEMS, HOW DIFFICULT HAVE THESE PROBLEMS MADE IT FOR YOU TO DO YOUR WORK, TAKE CARE OF THINGS AT HOME, OR GET ALONG WITH OTHER PEOPLE: 2
8. MOVING OR SPEAKING SO SLOWLY THAT OTHER PEOPLE COULD HAVE NOTICED. OR THE OPPOSITE, BEING SO FIGETY OR RESTLESS THAT YOU HAVE BEEN MOVING AROUND A LOT MORE THAN USUAL: 1
5. POOR APPETITE OR OVEREATING: 1
SUM OF ALL RESPONSES TO PHQ QUESTIONS 1-9: 17

## 2023-07-12 NOTE — PATIENT INSTRUCTIONS
[email or web address]  [email or web address]    Realign  your mind Phone: [place] book

## 2023-07-13 ENCOUNTER — TELEPHONE (OUTPATIENT)
Age: 53
End: 2023-07-13

## 2023-07-13 ENCOUNTER — HOSPITAL ENCOUNTER (INPATIENT)
Facility: HOSPITAL | Age: 53
LOS: 2 days | Discharge: HOME | End: 2023-07-15
Attending: EMERGENCY MEDICINE | Admitting: PSYCHIATRY & NEUROLOGY
Payer: COMMERCIAL

## 2023-07-13 ENCOUNTER — APPOINTMENT (OUTPATIENT)
Facility: HOSPITAL | Age: 53
End: 2023-07-13
Payer: COMMERCIAL

## 2023-07-13 DIAGNOSIS — Z00.00 GENERAL MEDICAL EXAMINATION: ICD-10-CM

## 2023-07-13 DIAGNOSIS — R45.851 SUICIDAL IDEATION: ICD-10-CM

## 2023-07-13 DIAGNOSIS — F32.2 CURRENT SEVERE EPISODE OF MAJOR DEPRESSIVE DISORDER WITHOUT PSYCHOTIC FEATURES, UNSPECIFIED WHETHER RECURRENT (HCC): Primary | ICD-10-CM

## 2023-07-13 PROBLEM — F32.A DEPRESSION: Status: ACTIVE | Noted: 2023-07-13

## 2023-07-13 LAB
ALBUMIN SERPL-MCNC: 3.6 G/DL (ref 3.5–5)
ALBUMIN/GLOB SERPL: 0.8 (ref 1.1–2.2)
ALP SERPL-CCNC: 84 U/L (ref 45–117)
ALT SERPL-CCNC: 32 U/L (ref 12–78)
AMPHET UR QL SCN: NEGATIVE
ANION GAP SERPL CALC-SCNC: 3 MMOL/L (ref 5–15)
APAP SERPL-MCNC: <2 UG/ML (ref 10–30)
APPEARANCE UR: CLEAR
AST SERPL-CCNC: 26 U/L (ref 15–37)
BACTERIA URNS QL MICRO: NEGATIVE /HPF
BARBITURATES UR QL SCN: NEGATIVE
BASOPHILS # BLD: 0.1 K/UL (ref 0–0.1)
BASOPHILS NFR BLD: 1 % (ref 0–1)
BENZODIAZ UR QL: NEGATIVE
BILIRUB SERPL-MCNC: 0.3 MG/DL (ref 0.2–1)
BILIRUB UR QL: NEGATIVE
BUN SERPL-MCNC: 12 MG/DL (ref 6–20)
BUN/CREAT SERPL: 15 (ref 12–20)
CALCIUM SERPL-MCNC: 9 MG/DL (ref 8.5–10.1)
CANNABINOIDS UR QL SCN: NEGATIVE
CHLORIDE SERPL-SCNC: 109 MMOL/L (ref 97–108)
CO2 SERPL-SCNC: 27 MMOL/L (ref 21–32)
COCAINE UR QL SCN: NEGATIVE
COLOR UR: NORMAL
CREAT SERPL-MCNC: 0.81 MG/DL (ref 0.55–1.02)
DIFFERENTIAL METHOD BLD: NORMAL
EKG ATRIAL RATE: 72 BPM
EKG DIAGNOSIS: NORMAL
EKG P AXIS: 52 DEGREES
EKG P-R INTERVAL: 136 MS
EKG Q-T INTERVAL: 404 MS
EKG QRS DURATION: 76 MS
EKG QTC CALCULATION (BAZETT): 442 MS
EKG R AXIS: 40 DEGREES
EKG T AXIS: 38 DEGREES
EKG VENTRICULAR RATE: 72 BPM
EOSINOPHIL # BLD: 0.3 K/UL (ref 0–0.4)
EOSINOPHIL NFR BLD: 4 % (ref 0–7)
EPITH CASTS URNS QL MICRO: NORMAL /LPF
ERYTHROCYTE [DISTWIDTH] IN BLOOD BY AUTOMATED COUNT: 12.4 % (ref 11.5–14.5)
ETHANOL SERPL-MCNC: <10 MG/DL (ref 0–0.08)
FLUAV RNA SPEC QL NAA+PROBE: NOT DETECTED
FLUBV RNA SPEC QL NAA+PROBE: NOT DETECTED
GLOBULIN SER CALC-MCNC: 4.4 G/DL (ref 2–4)
GLUCOSE SERPL-MCNC: 111 MG/DL (ref 65–100)
GLUCOSE UR STRIP.AUTO-MCNC: NEGATIVE MG/DL
HCT VFR BLD AUTO: 41.5 % (ref 35–47)
HGB BLD-MCNC: 13.2 G/DL (ref 11.5–16)
HGB UR QL STRIP: NEGATIVE
HYALINE CASTS URNS QL MICRO: NORMAL /LPF (ref 0–5)
IMM GRANULOCYTES # BLD AUTO: 0 K/UL (ref 0–0.04)
IMM GRANULOCYTES NFR BLD AUTO: 0 % (ref 0–0.5)
KETONES UR QL STRIP.AUTO: NEGATIVE MG/DL
LEUKOCYTE ESTERASE UR QL STRIP.AUTO: NEGATIVE
LYMPHOCYTES # BLD: 2 K/UL (ref 0.8–3.5)
LYMPHOCYTES NFR BLD: 32 % (ref 12–49)
Lab: NORMAL
MCH RBC QN AUTO: 28.6 PG (ref 26–34)
MCHC RBC AUTO-ENTMCNC: 31.8 G/DL (ref 30–36.5)
MCV RBC AUTO: 89.8 FL (ref 80–99)
METHADONE UR QL: NEGATIVE
MONOCYTES # BLD: 0.5 K/UL (ref 0–1)
MONOCYTES NFR BLD: 9 % (ref 5–13)
NEUTS SEG # BLD: 3.4 K/UL (ref 1.8–8)
NEUTS SEG NFR BLD: 54 % (ref 32–75)
NITRITE UR QL STRIP.AUTO: NEGATIVE
NRBC # BLD: 0 K/UL (ref 0–0.01)
NRBC BLD-RTO: 0 PER 100 WBC
OPIATES UR QL: NEGATIVE
PCP UR QL: NEGATIVE
PH UR STRIP: 6 (ref 5–8)
PLATELET # BLD AUTO: 317 K/UL (ref 150–400)
PMV BLD AUTO: 9.8 FL (ref 8.9–12.9)
POTASSIUM SERPL-SCNC: 4 MMOL/L (ref 3.5–5.1)
PROT SERPL-MCNC: 8 G/DL (ref 6.4–8.2)
PROT UR STRIP-MCNC: NEGATIVE MG/DL
RBC # BLD AUTO: 4.62 M/UL (ref 3.8–5.2)
RBC #/AREA URNS HPF: NORMAL /HPF (ref 0–5)
SALICYLATES SERPL-MCNC: <1.7 MG/DL (ref 2.8–20)
SARS-COV-2 RNA RESP QL NAA+PROBE: NOT DETECTED
SODIUM SERPL-SCNC: 139 MMOL/L (ref 136–145)
SP GR UR REFRACTOMETRY: 1.01 (ref 1–1.03)
UROBILINOGEN UR QL STRIP.AUTO: 0.2 EU/DL (ref 0.2–1)
WBC # BLD AUTO: 6.2 K/UL (ref 3.6–11)
WBC URNS QL MICRO: NORMAL /HPF (ref 0–4)

## 2023-07-13 PROCEDURE — 80143 DRUG ASSAY ACETAMINOPHEN: CPT

## 2023-07-13 PROCEDURE — 93005 ELECTROCARDIOGRAM TRACING: CPT | Performed by: EMERGENCY MEDICINE

## 2023-07-13 PROCEDURE — 80179 DRUG ASSAY SALICYLATE: CPT

## 2023-07-13 PROCEDURE — 80053 COMPREHEN METABOLIC PANEL: CPT

## 2023-07-13 PROCEDURE — 81001 URINALYSIS AUTO W/SCOPE: CPT

## 2023-07-13 PROCEDURE — 85025 COMPLETE CBC W/AUTO DIFF WBC: CPT

## 2023-07-13 PROCEDURE — 80307 DRUG TEST PRSMV CHEM ANLYZR: CPT

## 2023-07-13 PROCEDURE — 36415 COLL VENOUS BLD VENIPUNCTURE: CPT

## 2023-07-13 PROCEDURE — 90791 PSYCH DIAGNOSTIC EVALUATION: CPT

## 2023-07-13 PROCEDURE — 82077 ASSAY SPEC XCP UR&BREATH IA: CPT

## 2023-07-13 PROCEDURE — 71046 X-RAY EXAM CHEST 2 VIEWS: CPT

## 2023-07-13 PROCEDURE — 6370000000 HC RX 637 (ALT 250 FOR IP): Performed by: NURSE PRACTITIONER

## 2023-07-13 PROCEDURE — 99285 EMERGENCY DEPT VISIT HI MDM: CPT

## 2023-07-13 PROCEDURE — 87636 SARSCOV2 & INF A&B AMP PRB: CPT

## 2023-07-13 PROCEDURE — 1240000000 HC EMOTIONAL WELLNESS R&B

## 2023-07-13 RX ORDER — HALOPERIDOL 5 MG/1
5 TABLET ORAL EVERY 4 HOURS PRN
Status: DISCONTINUED | OUTPATIENT
Start: 2023-07-13 | End: 2023-07-15 | Stop reason: HOSPADM

## 2023-07-13 RX ORDER — ZOLPIDEM TARTRATE 5 MG/1
10 TABLET ORAL ONCE
Status: COMPLETED | OUTPATIENT
Start: 2023-07-13 | End: 2023-07-13

## 2023-07-13 RX ORDER — SENNA PLUS 8.6 MG/1
1 TABLET ORAL DAILY PRN
Status: DISCONTINUED | OUTPATIENT
Start: 2023-07-13 | End: 2023-07-15 | Stop reason: HOSPADM

## 2023-07-13 RX ORDER — DIPHENHYDRAMINE HYDROCHLORIDE 50 MG/ML
50 INJECTION INTRAMUSCULAR; INTRAVENOUS EVERY 4 HOURS PRN
Status: DISCONTINUED | OUTPATIENT
Start: 2023-07-13 | End: 2023-07-15 | Stop reason: HOSPADM

## 2023-07-13 RX ORDER — HALOPERIDOL 5 MG/ML
5 INJECTION INTRAMUSCULAR EVERY 4 HOURS PRN
Status: DISCONTINUED | OUTPATIENT
Start: 2023-07-13 | End: 2023-07-15 | Stop reason: HOSPADM

## 2023-07-13 RX ORDER — MAGNESIUM HYDROXIDE/ALUMINUM HYDROXICE/SIMETHICONE 120; 1200; 1200 MG/30ML; MG/30ML; MG/30ML
30 SUSPENSION ORAL EVERY 6 HOURS PRN
Status: DISCONTINUED | OUTPATIENT
Start: 2023-07-13 | End: 2023-07-15 | Stop reason: HOSPADM

## 2023-07-13 RX ORDER — ACETAMINOPHEN 325 MG/1
650 TABLET ORAL EVERY 4 HOURS PRN
Status: DISCONTINUED | OUTPATIENT
Start: 2023-07-13 | End: 2023-07-15 | Stop reason: HOSPADM

## 2023-07-13 RX ORDER — POLYETHYLENE GLYCOL 3350 17 G/17G
17 POWDER, FOR SOLUTION ORAL DAILY PRN
Status: DISCONTINUED | OUTPATIENT
Start: 2023-07-13 | End: 2023-07-15 | Stop reason: HOSPADM

## 2023-07-13 RX ORDER — HYDROXYZINE 50 MG/1
50 TABLET, FILM COATED ORAL 3 TIMES DAILY PRN
Status: DISCONTINUED | OUTPATIENT
Start: 2023-07-13 | End: 2023-07-14

## 2023-07-13 RX ORDER — TRAZODONE HYDROCHLORIDE 50 MG/1
50 TABLET ORAL NIGHTLY PRN
Status: DISCONTINUED | OUTPATIENT
Start: 2023-07-13 | End: 2023-07-15 | Stop reason: HOSPADM

## 2023-07-13 RX ORDER — ZOLPIDEM TARTRATE 5 MG/1
12.5 TABLET ORAL ONCE
Status: DISCONTINUED | OUTPATIENT
Start: 2023-07-13 | End: 2023-07-13

## 2023-07-13 RX ADMIN — ACETAMINOPHEN 650 MG: 325 TABLET ORAL at 18:28

## 2023-07-13 RX ADMIN — ZOLPIDEM TARTRATE 10 MG: 5 TABLET ORAL at 21:14

## 2023-07-13 ASSESSMENT — PAIN DESCRIPTION - DESCRIPTORS
DESCRIPTORS: ACHING
DESCRIPTORS: THROBBING

## 2023-07-13 ASSESSMENT — ENCOUNTER SYMPTOMS
COUGH: 0
RHINORRHEA: 0
VOMITING: 0
CHEST TIGHTNESS: 0
BLOOD IN STOOL: 0
SORE THROAT: 0
TROUBLE SWALLOWING: 0
SINUS PRESSURE: 0
SHORTNESS OF BREATH: 0
EYES NEGATIVE: 1
NAUSEA: 0
COLOR CHANGE: 0
DIARRHEA: 0
SINUS PAIN: 0
BACK PAIN: 0
WHEEZING: 0
ABDOMINAL PAIN: 0
STRIDOR: 0

## 2023-07-13 ASSESSMENT — SLEEP AND FATIGUE QUESTIONNAIRES
DO YOU USE A SLEEP AID: YES
DO YOU HAVE DIFFICULTY SLEEPING: NO
AVERAGE NUMBER OF SLEEP HOURS: 8

## 2023-07-13 ASSESSMENT — PAIN SCALES - GENERAL
PAINLEVEL_OUTOF10: 0
PAINLEVEL_OUTOF10: 4
PAINLEVEL_OUTOF10: 3

## 2023-07-13 ASSESSMENT — LIFESTYLE VARIABLES
HOW OFTEN DO YOU HAVE A DRINK CONTAINING ALCOHOL: MONTHLY OR LESS
HOW MANY STANDARD DRINKS CONTAINING ALCOHOL DO YOU HAVE ON A TYPICAL DAY: 1 OR 2

## 2023-07-13 ASSESSMENT — PAIN DESCRIPTION - LOCATION
LOCATION: HEAD
LOCATION: HEAD

## 2023-07-13 ASSESSMENT — PAIN - FUNCTIONAL ASSESSMENT: PAIN_FUNCTIONAL_ASSESSMENT: 0-10

## 2023-07-13 NOTE — BSMART NOTE
BSMART assessment completed, and suicide risk level noted to be Moderate to HIGH. Primary Nurse NA and Charge Nurse Kris Wong and Physician Dr. Jonna Crump notified. Concerns observed by still has belongings and not in green gown. Security/Off- has not been notified.

## 2023-07-13 NOTE — TELEPHONE ENCOUNTER
Patient states she is going to the hospital to seek treatment. Patient states she is feeling  really depressed. Patient states she can no longer deal with the stressors in her environment and would like to fax over [facility] paperwork. I gave the patient our fax number. Patient requesting LPN or provider for a call back. Patient can be reached at [phone]. Please advise.

## 2023-07-13 NOTE — ED NOTES
TRANSFER - OUT REPORT:    Verbal report given to Tushar Cho RN on Clent Ok  being transferred to [facility] for routine progression of patient care       Report consisted of patient's Situation, Background, Assessment and   Recommendations(SBAR). Information from the following report(s) ED SBAR, MAR, Recent Results, and Med Rec Status was reviewed with the receiving nurse. [place] Ward Fall Assessment:                           Lines:       Opportunity for questions and clarification was provided.       Patient transported with:  Registered Nurse  Melina Ansrai RN  07/13/23 1378

## 2023-07-13 NOTE — ED PROVIDER NOTES
interpretation performed. Decision-making details documented in ED Course. ECG/medicine tests: ordered and independent interpretation performed. Decision-making details documented in ED Course. Risk  Decision regarding hospitalization. REASSESSMENT     ED Course as of 07/13/23 1446   Thu Jul 13, 2023   1340 Still awaiting labs for medical clearance [RP]   1414 EKG shows a sinus rhythm with occasional PVCs rate of 72 normal axis, normal intervals. No ST or T wave abnormalities to suggest ischemia or infarct. [DA]      ED Course User Index  [DA] Deena De Anda MD  [RP] Keenan Daniels MD     Patient is medically cleared for psychiatric admission. Labs appear unremarkable. 2:47 PM  Patient has remained stable and cooperative in the ED      CONSULTS:  IP CONSULT TO BSMART    PROCEDURES:  Unless otherwise noted below, none     Procedures      FINAL IMPRESSION    No diagnosis found. DISPOSITION/PLAN   DISPOSITION        PATIENT REFERRED TO:  No follow-up provider specified.     DISCHARGE MEDICATIONS:  New Prescriptions    No medications on file         (Please note that portions of this note were completed with a voice recognition program.  Efforts were made to edit the dictations but occasionally words are mis-transcribed.)    Keenan Daniels MD (electronically signed)  Emergency Attending Physician            Keenan Daniels MD  07/13/23 7535

## 2023-07-13 NOTE — PLAN OF CARE
Pt tearful, sad and anxious upon arrival from [address] [place] [address],[address] ED. She is calm, cooperative and pleasant. Endorses S/I w/method to OD, H/I, V&A/H. Problem: Depression  Goal: Will be euthymic at discharge  Description: INTERVENTIONS:  1. Administer medication as ordered  2. Provide emotional support via 1:1 interaction with staff  3. Encourage involvement in milieu/groups/activities  4.  Monitor for social isolation  Outcome: Progressing

## 2023-07-13 NOTE — BSMART NOTE
Comprehensive Assessment Form Part 1      Section I - Disposition    Major Depressive Disorder, recurrent without psychosis  Generalized Anxiety Disorder       The Medical Doctor to Psychiatrist conference was notcompleted. The Medical Doctor is in agreement with Psychiatrist disposition because of (reason) pt meeting voluntary psychiatric admission criteria. The plan is voluntary psychiatric admission. The on-call Psychiatrist consulted was Dr. Demi Hopper. The admitting Psychiatrist will be Dr. Kimberley Boone. The admitting Diagnosis is MDD. The Payor source is [facility]. Based on the [place] Suicide Severity Risk Level  Scale there is Moderate       risk for suicide. Based on this assessment the overall risk of suicide is Moderate to HIGH. The plan will be voluntary psychiatric admission. Section II - Integrated Summary  Summary:  Per triage, \"Patient tearful in triage reports anxiety and depression since March. She was seen at [facility] recently for same and is feeling worse. She is feeling suicidal today, plan to overdose. \"    Pt seen face to face at bedside with her cousin present at her permission. Pt presented as tearful and depressed. Pt had soft tone of voice, poor eye contact and presented as if she was frightened or extremely anxious. Pt shared minimal information. Pt endorsed SI with thoughts of overdosing on her medication. However, pt elaborated by stating mostly she feels hopeless, \"I'd like to go to sleep and never wake up. Pamela Fears Pamela Fears \" Pt has no previous suicide attempts or previous psychiatric admissions. Pt shared she is feeling lethargic and finds it difficult to motivate to do much of anything but she can do own ADLs. Pt uses no assistive devices to walk, breathe or sleep. Pt has been working with NP prescribing antidepressants in which she shared she had her regular Prozac changed last this past January bc she felt it was no longer working for her.  Pt reported next medication tried on she had allergic reaction

## 2023-07-13 NOTE — BSMART NOTE
Pt admitted to [facility] by Dr. Erika Keith to room #725-b. Nursing report . Nursing/Kendal updated on admission.

## 2023-07-13 NOTE — ED TRIAGE NOTES
Patient tearful in triage reports anxiety and depression since March. She was seen at [facility] recently for same and is feeling worse. She is feeling suicidal today, plan to overdose.

## 2023-07-14 LAB
ALBUMIN SERPL-MCNC: 3.4 G/DL (ref 3.5–5)
ALBUMIN/GLOB SERPL: 0.9 (ref 1.1–2.2)
ALP SERPL-CCNC: 79 U/L (ref 45–117)
ALT SERPL-CCNC: 31 U/L (ref 12–78)
ANION GAP SERPL CALC-SCNC: 5 MMOL/L (ref 5–15)
AST SERPL-CCNC: 24 U/L (ref 15–37)
BILIRUB SERPL-MCNC: 0.3 MG/DL (ref 0.2–1)
BUN SERPL-MCNC: 12 MG/DL (ref 6–20)
BUN/CREAT SERPL: 15 (ref 12–20)
CALCIUM SERPL-MCNC: 9.3 MG/DL (ref 8.5–10.1)
CHLORIDE SERPL-SCNC: 108 MMOL/L (ref 97–108)
CO2 SERPL-SCNC: 26 MMOL/L (ref 21–32)
CREAT SERPL-MCNC: 0.78 MG/DL (ref 0.55–1.02)
EKG ATRIAL RATE: 71 BPM
EKG DIAGNOSIS: NORMAL
EKG P AXIS: 54 DEGREES
EKG P-R INTERVAL: 142 MS
EKG Q-T INTERVAL: 400 MS
EKG QRS DURATION: 76 MS
EKG QTC CALCULATION (BAZETT): 434 MS
EKG R AXIS: 0 DEGREES
EKG T AXIS: 24 DEGREES
EKG VENTRICULAR RATE: 71 BPM
EST. AVERAGE GLUCOSE BLD GHB EST-MCNC: 100 MG/DL
GLOBULIN SER CALC-MCNC: 3.9 G/DL (ref 2–4)
GLUCOSE SERPL-MCNC: 91 MG/DL (ref 65–100)
HBA1C MFR BLD: 5.1 % (ref 4–5.6)
POTASSIUM SERPL-SCNC: 4.2 MMOL/L (ref 3.5–5.1)
PROT SERPL-MCNC: 7.3 G/DL (ref 6.4–8.2)
SODIUM SERPL-SCNC: 139 MMOL/L (ref 136–145)

## 2023-07-14 PROCEDURE — 83036 HEMOGLOBIN GLYCOSYLATED A1C: CPT

## 2023-07-14 PROCEDURE — 6370000000 HC RX 637 (ALT 250 FOR IP): Performed by: NURSE PRACTITIONER

## 2023-07-14 PROCEDURE — 36415 COLL VENOUS BLD VENIPUNCTURE: CPT

## 2023-07-14 PROCEDURE — 80053 COMPREHEN METABOLIC PANEL: CPT

## 2023-07-14 PROCEDURE — 1240000000 HC EMOTIONAL WELLNESS R&B

## 2023-07-14 RX ORDER — MONTELUKAST SODIUM 10 MG/1
10 TABLET ORAL NIGHTLY
COMMUNITY

## 2023-07-14 RX ORDER — LEVOTHYROXINE SODIUM 0.05 MG/1
50 TABLET ORAL
Status: DISCONTINUED | OUTPATIENT
Start: 2023-07-14 | End: 2023-07-15 | Stop reason: HOSPADM

## 2023-07-14 RX ORDER — FLUOXETINE HYDROCHLORIDE 20 MG/1
20 CAPSULE ORAL DAILY
Status: DISCONTINUED | OUTPATIENT
Start: 2023-07-14 | End: 2023-07-15 | Stop reason: HOSPADM

## 2023-07-14 RX ORDER — ALBUTEROL SULFATE 90 UG/1
2 AEROSOL, METERED RESPIRATORY (INHALATION) EVERY 4 HOURS PRN
Status: DISCONTINUED | OUTPATIENT
Start: 2023-07-14 | End: 2023-07-15 | Stop reason: HOSPADM

## 2023-07-14 RX ORDER — HYDROXYZINE HYDROCHLORIDE 25 MG/1
25 TABLET, FILM COATED ORAL 3 TIMES DAILY PRN
Status: DISCONTINUED | OUTPATIENT
Start: 2023-07-14 | End: 2023-07-15 | Stop reason: HOSPADM

## 2023-07-14 RX ORDER — ZOLPIDEM TARTRATE 5 MG/1
10 TABLET ORAL NIGHTLY
Status: DISCONTINUED | OUTPATIENT
Start: 2023-07-14 | End: 2023-07-15 | Stop reason: HOSPADM

## 2023-07-14 RX ORDER — BUPROPION HYDROCHLORIDE 100 MG/1
100 TABLET, EXTENDED RELEASE ORAL DAILY
Status: DISCONTINUED | OUTPATIENT
Start: 2023-07-14 | End: 2023-07-15 | Stop reason: HOSPADM

## 2023-07-14 RX ORDER — BIOTIN 1 MG
1 TABLET ORAL DAILY
COMMUNITY

## 2023-07-14 RX ORDER — VITAMIN B COMPLEX
2000 TABLET ORAL DAILY
Status: DISCONTINUED | OUTPATIENT
Start: 2023-07-14 | End: 2023-07-15 | Stop reason: HOSPADM

## 2023-07-14 RX ORDER — MONTELUKAST SODIUM 10 MG/1
10 TABLET ORAL NIGHTLY
Status: DISCONTINUED | OUTPATIENT
Start: 2023-07-14 | End: 2023-07-15 | Stop reason: HOSPADM

## 2023-07-14 RX ADMIN — Medication 2000 UNITS: at 13:54

## 2023-07-14 RX ADMIN — BUPROPION HYDROCHLORIDE 100 MG: 100 TABLET, EXTENDED RELEASE ORAL at 13:54

## 2023-07-14 RX ADMIN — LEVOTHYROXINE SODIUM 50 MCG: 0.05 TABLET ORAL at 13:54

## 2023-07-14 RX ADMIN — HYDROXYZINE HYDROCHLORIDE 50 MG: 50 TABLET, FILM COATED ORAL at 10:30

## 2023-07-14 RX ADMIN — ACETAMINOPHEN 650 MG: 325 TABLET ORAL at 02:46

## 2023-07-14 RX ADMIN — FLUOXETINE 20 MG: 20 CAPSULE ORAL at 13:54

## 2023-07-14 RX ADMIN — MONTELUKAST 10 MG: 10 TABLET, FILM COATED ORAL at 20:18

## 2023-07-14 RX ADMIN — HYDROXYZINE HYDROCHLORIDE 25 MG: 25 TABLET, FILM COATED ORAL at 20:19

## 2023-07-14 RX ADMIN — ACETAMINOPHEN 650 MG: 325 TABLET ORAL at 08:55

## 2023-07-14 ASSESSMENT — PAIN - FUNCTIONAL ASSESSMENT
PAIN_FUNCTIONAL_ASSESSMENT: 0-10
PAIN_FUNCTIONAL_ASSESSMENT: ACTIVITIES ARE NOT PREVENTED
PAIN_FUNCTIONAL_ASSESSMENT: NONE - DENIES PAIN

## 2023-07-14 ASSESSMENT — LIFESTYLE VARIABLES: HOW OFTEN DO YOU HAVE A DRINK CONTAINING ALCOHOL: NEVER

## 2023-07-14 ASSESSMENT — PAIN DESCRIPTION - DESCRIPTORS
DESCRIPTORS: ACHING
DESCRIPTORS: ACHING

## 2023-07-14 ASSESSMENT — PAIN DESCRIPTION - LOCATION
LOCATION: HEAD
LOCATION: HEAD

## 2023-07-14 ASSESSMENT — PAIN SCALES - GENERAL
PAINLEVEL_OUTOF10: 4
PAINLEVEL_OUTOF10: 7

## 2023-07-14 ASSESSMENT — PAIN DESCRIPTION - ORIENTATION: ORIENTATION: OUTER

## 2023-07-14 NOTE — PROGRESS NOTES
patient. Allergies:  Latex, Bee venom, Ginger, Soy, Cephaeline, Soybean oil, and Cephalexin    Significant PMH/Disease States:   Past Medical History:   Diagnosis Date    Back pain, acute     Depression     Endocrine disease     hypothyroid    H/O seasonal allergies     Maxillary sinus fracture ([address]) 2016    Neck pain, musculoskeletal      Chief Complaint for this Admission:    Chief Complaint   Patient presents with    Suicidal     Prior to Admission Medications:   Prior to Admission Medications   Prescriptions Last Dose Informant Patient Reported? Taking? Biotin 1000 MCG TABS 7/13/2023  Yes Yes   Sig: Take 1 tablet by mouth daily   Cholecalciferol 50 MCG (2000 UT) TABS 7/13/2023  Yes Yes   Sig: Take 1 tablet by mouth daily   EPINEPHrine (EPIPEN) 0.3 MG/0.3ML SOAJ injection Unknown  Yes No   Sig: Inject into the muscle once as needed   FLUoxetine (PROZAC) 20 MG capsule 7/13/2023  No Yes   Sig: Take 1 capsule by mouth daily   albuterol sulfate HFA (PROVENTIL;VENTOLIN;PROAIR) 108 (90 Base) MCG/ACT inhaler Unknown  Yes Yes   Sig: Inhale 2 puffs into the lungs every 4 hours as needed   brexpiprazole (REXULTI) 1 MG TABS tablet 7/12/2023  Yes Yes   Sig: Take 1 tablet by mouth daily   clonazePAM (KLONOPIN) 1 MG tablet 7/9/2023  No Yes   Sig: Take 1 tablet by mouth daily for 90 days. Do not combine with Ambien or Opioids Max Daily Amount: 1 mg   desvenlafaxine succinate (PRISTIQ) 50 MG TB24 extended release tablet 7/13/2023  No Yes   Sig: Take 1 tablet by mouth daily for 7 days, then take 1 tablet by mouth every other day for 7 days then stop   levothyroxine (SYNTHROID) 50 MCG tablet 7/13/2023  Yes Yes   Sig: Take 1 tablet by mouth every morning (before breakfast)   montelukast (SINGULAIR) 10 MG tablet 7/12/2023  Yes Yes   Sig: Take 1 tablet by mouth nightly   zolpidem (AMBIEN CR) 12.5 MG extended release tablet 7/12/2023  No Yes   Sig: Take 1 tablet by mouth nightly as needed for Sleep for up to 90 days.  Max Daily

## 2023-07-14 NOTE — H&P
[address] [facility]  INITIAL PSYCHIATRIC INTERVIEW:    Name: Thi Rios  MR#: [id number]  ACCOUNT#: [de-identified]  : 1970  ADMIT DATE: 2023    CHIEF COMPLAINT: \"I was with someone, and he said he didn't want to be with me anymore. \"     HISTORY OF PRESENTING COMPLAINT:  This is a 48 y.o. female who is currently admitted to the general psychiatric floor at [facility] on a voluntary basis for worsening depression with plans to overdose. She has been experiencing worsening depression and anxiety over the past few months. She has been sleeping and eating poorly as well; she lost about 13lbs over the past few months. She had a medication change recently, from Prozac to [address] and [address], and the pt believes this medication adjustment may have been making her depression worse. The pt reports that the medication changes started in January. Prior to the medication changes, the pt was generally stable for the most part; depression was in remission for years prior to these changes. She had been on Prozac, then changed to [address] as she felt the Prozac was ineffective. The Viibryd caused stomach upset, so she stopped taking that, and then she was changed to Pristiq, which was increased up to 100mg, and she did not have a response to that. She started Rexulti as well, which was increased to 1.5mg, but the pt felt that was too activating, so it was decreased back down to 1mg. The pt is medication compliant and is taking her medications as ordered. The pt also reports a recent breakup has contributed to her worsening mental health - she had gotten back together with her ex- for about a year and a half, and then he broke up with her, causing the pt immense distress and upset. The pt endorses symptoms of anhedonia, anergia, avolition, apathy. No current SI/plan/intent, no HI/plan/intent, no AVH.      PAST PSYCHIATRIC HISTORY: Hx of depression and anxiety for 30 years; first depressive

## 2023-07-14 NOTE — DISCHARGE INSTRUCTIONS
DISCHARGE SUMMARY    NAME:Cathryn Dias  : 1970  MRN: [id number]    The patient Dahlia Mujica exhibits the ability to control behavior in a less restrictive environment. Patient's level of functioning is improving. No assaultive/destructive behavior has been observed for the past 24 hours. No suicidal/homicidal threat or behavior has been observed for the past 24 hours. There is no evidence of serious medication side effects. Patient has not been in physical or protective restraints for at least the past 24 hours. If weapons involved, how are they secured? None    Is patient aware of and in agreement with discharge plan? Yes, AMA discharge     Arrangements for medication:  No prescriptions provided     Copy of discharge instructions to provider?:  Yes    Arrangements for transportation home: Family    Keep all follow up appointments as scheduled, continue to take prescribed medications per physician instructions. Mental health crisis number:  990 or your local mental health crisis line number at [address] [facility] at [phone] [address] [place] [address] Emergency WARM LINE      [phone]-MHAV ([phone])      M-F: 9am to 9pm      Sat & Sun: [address] [place] [address] suicide prevention lines:                             3-636-XQKQFHI ([phone])       [phone] ([phone])    Crisis Text Line:  Text HOME to [facility] NOTE      Personal items collected during your admission have been returned to you. PATIENT INSTRUCTIONS:    What to do at Home    Avoid making any critical decisions for at least 24-hours. Follow-up with your PCP in 2 days. If you have problems relating to your recovery, call your physician. The discharge information has been reviewed with the patient. The patient verbalized understanding.

## 2023-07-15 VITALS
RESPIRATION RATE: 16 BRPM | HEIGHT: 63 IN | OXYGEN SATURATION: 97 % | DIASTOLIC BLOOD PRESSURE: 92 MMHG | WEIGHT: 186.4 LBS | HEART RATE: 91 BPM | SYSTOLIC BLOOD PRESSURE: 128 MMHG | BODY MASS INDEX: 33.03 KG/M2 | TEMPERATURE: 98 F

## 2023-07-15 PROCEDURE — 6370000000 HC RX 637 (ALT 250 FOR IP): Performed by: NURSE PRACTITIONER

## 2023-07-15 RX ORDER — LOPERAMIDE HYDROCHLORIDE 2 MG/1
2 CAPSULE ORAL 4 TIMES DAILY PRN
Status: DISCONTINUED | OUTPATIENT
Start: 2023-07-15 | End: 2023-07-15 | Stop reason: HOSPADM

## 2023-07-15 RX ADMIN — BUPROPION HYDROCHLORIDE 100 MG: 100 TABLET, EXTENDED RELEASE ORAL at 08:44

## 2023-07-15 RX ADMIN — HYDROXYZINE HYDROCHLORIDE 25 MG: 25 TABLET, FILM COATED ORAL at 08:44

## 2023-07-15 RX ADMIN — Medication 2000 UNITS: at 08:44

## 2023-07-15 RX ADMIN — FLUOXETINE 20 MG: 20 CAPSULE ORAL at 08:44

## 2023-07-15 RX ADMIN — LEVOTHYROXINE SODIUM 50 MCG: 0.05 TABLET ORAL at 06:06

## 2023-07-15 ASSESSMENT — PAIN SCALES - GENERAL: PAINLEVEL_OUTOF10: 0

## 2023-07-15 ASSESSMENT — PAIN - FUNCTIONAL ASSESSMENT: PAIN_FUNCTIONAL_ASSESSMENT: NONE - DENIES PAIN

## 2023-07-15 NOTE — PLAN OF CARE
Problem: Depression  Goal: Will be euthymic at discharge  Description: INTERVENTIONS:  1. Administer medication as ordered  2. Provide emotional support via 1:1 interaction with staff  3. Encourage involvement in milieu/groups/activities  4. Monitor for social isolation  Outcome: Progressing   Problem: Anxiety  Goal: Will report anxiety at manageable levels  Description: INTERVENTIONS:  1. Administer medication as ordered  2. Teach and rehearse alternative coping skills  3. Provide emotional support with 1:1 interaction with staff  Outcome: German Frostill (Taken 7/14/2023 1524 by Jean Carlos Byrd RN)  Will report anxiety at manageable levels: Provide emotional support with 1:1 interaction with staff     Pt has been calm, pleasant and cooperative, slept through the night with no behavioral issues and safety concerns. Monitored and safe on Q 15 minutes check.

## 2023-07-15 NOTE — PLAN OF CARE
12:44 pm-Dr. Lupis Newsome notified of diarrhea 1x. Problem: Depression  Goal: Will be euthymic at discharge  Description: INTERVENTIONS:  1. Administer medication as ordered  2. Provide emotional support via 1:1 interaction with staff  3. Encourage involvement in milieu/groups/activities  4. Monitor for social isolation  7/15/2023 1048 by Preston Soriano RN  Outcome: Progressing  Note: Pt out on the unit for meals and medication. Tearful during the shift. Continues to feel depressed due to loss of long term relationship. Encouraged pt to express feelings and concerns. Complaining of 1x diarrhea.      [facility]  Master Treatment Plan for Cleta [place]    Date Treatment Plan Initiated: 7/15/23    Treatment Plan Modalities:  Type of Modality Amount  (x minutes) Frequency (x/week) Duration (x days) Name of Responsible Staff   1215 [facility],8 meetings to encourage peer interactions 15 7 1   Yumiko, 6101 [facility] psychotherapy to assist in building coping skills and internal controls 60 7 1 Maritza Simpson MSW   Therapeutic activity groups to build coping skills 60 7 1 Maritza Simpson MS   Psychoeducation in group setting to address:   Medication education   15 7 200 [facility] skills   [address], Aiden Gaffney   Relaxation techniques         Symptom management         Discharge planning   60 2 1 Goran Calvert   Spirituality    60 2 55 [facility]   60 1 1 [facility]/AA/NA         Physician medication management   13 7 1 Dr. Sherren Sharps   15 2 1 Raul Murphy

## 2023-07-15 NOTE — PLAN OF CARE
Problem: Anxiety  Goal: Will report anxiety at manageable levels  Description: INTERVENTIONS:  1. Administer medication as ordered  2. Teach and rehearse alternative coping skills  3.  Provide emotional support with 1:1 interaction with staff  7/15/2023 1438 by Tommy Duque RN  Outcome: Progressing  Flowsheets  Taken 7/15/2023 1432 by Tommy Duque RN  Will report anxiety at manageable levels:   Administer medication as ordered   Teach and rehearse alternative coping skills

## 2023-07-17 ENCOUNTER — CARE COORDINATION (OUTPATIENT)
Dept: OTHER | Facility: CLINIC | Age: 53
End: 2023-07-17

## 2023-07-17 ENCOUNTER — OFFICE VISIT (OUTPATIENT)
Dept: PRIMARY CARE CLINIC | Facility: CLINIC | Age: 53
End: 2023-07-17
Payer: COMMERCIAL

## 2023-07-17 VITALS
OXYGEN SATURATION: 94 % | TEMPERATURE: 98.5 F | SYSTOLIC BLOOD PRESSURE: 116 MMHG | HEART RATE: 103 BPM | DIASTOLIC BLOOD PRESSURE: 77 MMHG

## 2023-07-17 DIAGNOSIS — F32.2 SEVERE DEPRESSION (HCC): Primary | ICD-10-CM

## 2023-07-17 DIAGNOSIS — F41.9 ANXIETY: ICD-10-CM

## 2023-07-17 PROCEDURE — 99214 OFFICE O/P EST MOD 30 MIN: CPT | Performed by: FAMILY MEDICINE

## 2023-07-17 RX ORDER — BUPROPION HYDROCHLORIDE 100 MG/1
100 TABLET ORAL DAILY
COMMUNITY
End: 2023-07-17 | Stop reason: SDUPTHER

## 2023-07-17 RX ORDER — HYDROXYZINE PAMOATE 25 MG/1
25 CAPSULE ORAL 3 TIMES DAILY PRN
COMMUNITY
End: 2023-07-17 | Stop reason: SDUPTHER

## 2023-07-17 RX ORDER — BUPROPION HYDROCHLORIDE 100 MG/1
100 TABLET, EXTENDED RELEASE ORAL DAILY
Qty: 30 TABLET | Refills: 0 | Status: SHIPPED | OUTPATIENT
Start: 2023-07-17 | End: 2023-07-20

## 2023-07-17 RX ORDER — HYDROXYZINE PAMOATE 25 MG/1
25 CAPSULE ORAL 3 TIMES DAILY PRN
Qty: 60 CAPSULE | Refills: 0 | Status: SHIPPED | OUTPATIENT
Start: 2023-07-17

## 2023-07-17 SDOH — ECONOMIC STABILITY: FOOD INSECURITY: WITHIN THE PAST 12 MONTHS, YOU WORRIED THAT YOUR FOOD WOULD RUN OUT BEFORE YOU GOT MONEY TO BUY MORE.: NEVER TRUE

## 2023-07-17 SDOH — ECONOMIC STABILITY: FOOD INSECURITY: WITHIN THE PAST 12 MONTHS, THE FOOD YOU BOUGHT JUST DIDN'T LAST AND YOU DIDN'T HAVE MONEY TO GET MORE.: NEVER TRUE

## 2023-07-17 SDOH — ECONOMIC STABILITY: HOUSING INSECURITY
IN THE LAST 12 MONTHS, WAS THERE A TIME WHEN YOU DID NOT HAVE A STEADY PLACE TO SLEEP OR SLEPT IN A SHELTER (INCLUDING NOW)?: NO

## 2023-07-17 SDOH — ECONOMIC STABILITY: INCOME INSECURITY: HOW HARD IS IT FOR YOU TO PAY FOR THE VERY BASICS LIKE FOOD, HOUSING, MEDICAL CARE, AND HEATING?: NOT HARD AT ALL

## 2023-07-17 NOTE — DISCHARGE SUMMARY
individual, group, occupational and milieu therapy. Patient requested to be discharged AMA and the online psychiatric provider was called. They felt that the patient did not meet criteria for a TDO assessment and [facility] was discharged AMA to their own recognizance. There was no behavior indicating instability of their psychiatric symptoms and the patient appears to be able to make a reasonable judgment regarding their own care, manipulate this information, and indicate an appropriate choice. [facility] was discharged at their request with follow up to be arranged. The pt has an outpatient team already and expressed motivation to continue mental health treatment at the outpatient level. Patient is fully aware of the risks and benefits of staying in the hosptal for completion of treatment vs. leaving the hospital AMA. Patient had expressed a desire to follow up with appointments and remains motivated to be in treatment after discharge. The patient verbalized understanding of discharge instructions. DISCHARGE DIAGNOSIS: MDD, recurrent, severe, without psychosis       Medication List        STOP taking these medications      fluticasone-salmeterol 100-50 MCG/ACT Aepb diskus inhaler  Commonly known as: ADVAIR            ASK your doctor about these medications      albuterol sulfate  (90 Base) MCG/ACT inhaler  Commonly known as: PROVENTIL;VENTOLIN;PROAIR     Biotin 1000 MCG Tabs     brexpiprazole 1 MG Tabs tablet  Commonly known as: REXULTI     Cholecalciferol 50 MCG (2000 UT) Tabs     clonazePAM 1 MG tablet  Commonly known as: KLONOPIN  Take 1 tablet by mouth daily for 90 days.  Do not combine with Ambien or Opioids Max Daily Amount: 1 mg     desvenlafaxine succinate 50 MG Tb24 extended release tablet  Commonly known as: Pristiq  Take 1 tablet by mouth daily for 7 days, then take 1 tablet by mouth every other day for 7 days then stop     EPINEPHrine 0.3 MG/0.3ML Soaj injection  Commonly known as:

## 2023-07-17 NOTE — CARE COORDINATION
[address] [address] [facility] 250 Care Transitions Initial Follow Up Call    Call within 2 business days of discharge: Yes    Patient Current Location:  Home: [address]    Care Transition Nurse contacted the patient by telephone to perform post hospital discharge assessment. Verified name and  with patient as identifiers. Provided introduction to self, and explanation of the Care Transition Nurse role. Patient: Madelyn Baldwin Patient : 1970   MRN: [id number]  Reason for Admission: Behavioral health  Discharge Date: 7/15/23 RARS: Readmission Risk Score: 5.2      Last Discharge [address] [facility] [facility]       Date Complaint Diagnosis Description Type Department Provider    23 Suicidal Current severe episode of major depressive disorder without psychotic features, unspecified whether recurrent ([address] [facility]) . .. ED to Hosp-Admission (Discharged) (ADMITTED) [address] Odalys Bradley MD; Álvaro Must. .. Was this an external facility discharge? No Discharge Facility: [address] [place] [address] to be reviewed by the provider   Additional needs identified to be addressed with provider: No  none               Method of communication with provider: none. ACM contacted the patient to introduce the Associate Care Management program and follow up after discharge on 23. ACM reviewed and updated medication  and goals patient was agreeable to follow up Care Management calls. Assessment and Education:   Patient  reports the following symptoms of anxiety: difficulty concentrating feelings of losing control . Symptoms are reported as has worsened slightly since previous ACM contact. Is patient currently undergoing treatment? Yes seeing a therapist and Psychiatric NP .  and Patient  reports the following depression symptoms: depressed mood, fatigue, feelings of worthlessness/guilt, and hopelessness. Symptoms are reported as has slightly improved since previous ACM contact.  Is patient currently undergoing

## 2023-07-20 ENCOUNTER — TELEMEDICINE (OUTPATIENT)
Age: 53
End: 2023-07-20
Payer: COMMERCIAL

## 2023-07-20 DIAGNOSIS — F32.2 SEVERE DEPRESSION (HCC): ICD-10-CM

## 2023-07-20 DIAGNOSIS — F33.1 MAJOR DEPRESSIVE DISORDER, RECURRENT, MODERATE (HCC): Primary | ICD-10-CM

## 2023-07-20 DIAGNOSIS — G47.00 INSOMNIA, UNSPECIFIED TYPE: ICD-10-CM

## 2023-07-20 PROCEDURE — 99214 OFFICE O/P EST MOD 30 MIN: CPT | Performed by: NURSE PRACTITIONER

## 2023-07-20 RX ORDER — BUPROPION HYDROCHLORIDE 100 MG/1
100 TABLET, EXTENDED RELEASE ORAL 2 TIMES DAILY
Qty: 60 TABLET | Refills: 2 | Status: SHIPPED | OUTPATIENT
Start: 2023-07-20

## 2023-07-20 RX ORDER — FLUOXETINE HYDROCHLORIDE 40 MG/1
40 CAPSULE ORAL DAILY
Qty: 30 CAPSULE | Refills: 2 | Status: SHIPPED | OUTPATIENT
Start: 2023-07-20

## 2023-07-20 NOTE — PROGRESS NOTES
treatment. Symptoms are exacerbated. Patient continues to struggle with depression. She states it is hard to determine the efficacy of Prozac and Wellbutrin because of her emotional state. Patient experienced a recent breakup. Educated and recommended PHP. Discussed FLMA. Discussed current medications and dosages. Prozac 40 mg and Wellbutrin 100 mg SR BID. Risk vs benefits discussed. Patient has been instructed to call the office Monday to report her status. If she tolerates the increase of Prozac she can start Wellbutrin then. Reviewed treatment goals and target symptoms to monitor for. Plan:   1. Current Outpatient Medications   Medication Sig Dispense Refill    FLUoxetine (PROZAC) 40 MG capsule Take 1 capsule by mouth daily 30 capsule 2    buPROPion (WELLBUTRIN SR) 100 MG extended release tablet Take 1 tablet by mouth 2 times daily 60 tablet 2    hydrOXYzine pamoate (VISTARIL) 25 MG capsule Take 1 capsule by mouth 3 times daily as needed for Itching 60 capsule 0    montelukast (SINGULAIR) 10 MG tablet Take 1 tablet by mouth nightly      Biotin 1000 MCG TABS Take 1 tablet by mouth daily      zolpidem (AMBIEN CR) 12.5 MG extended release tablet Take 1 tablet by mouth nightly as needed for Sleep for up to 90 days. Max Daily Amount: 12.5 mg 30 tablet 2    clonazePAM (KLONOPIN) 1 MG tablet Take 1 tablet by mouth daily for 90 days. Do not combine with Ambien or Opioids Max Daily Amount: 1 mg (Patient not taking: Reported on 7/17/2023) 30 tablet 2    albuterol sulfate HFA (PROVENTIL;VENTOLIN;PROAIR) 108 (90 Base) MCG/ACT inhaler Inhale 2 puffs into the lungs every 4 hours as needed      Cholecalciferol 50 MCG (2000 UT) TABS Take 1 tablet by mouth daily      EPINEPHrine (EPIPEN) 0.3 MG/0.3ML SOAJ injection Inject into the muscle once as needed      levothyroxine (SYNTHROID) 50 MCG tablet Take 1 tablet by mouth every morning (before breakfast)       No current facility-administered medications for this visit.

## 2023-07-22 DIAGNOSIS — F33.1 MAJOR DEPRESSIVE DISORDER, RECURRENT, MODERATE (HCC): ICD-10-CM

## 2023-07-24 ENCOUNTER — TELEPHONE (OUTPATIENT)
Age: 53
End: 2023-07-24

## 2023-07-24 DIAGNOSIS — F32.2 SEVERE DEPRESSION (HCC): ICD-10-CM

## 2023-07-24 DIAGNOSIS — F33.1 MAJOR DEPRESSIVE DISORDER, RECURRENT, MODERATE (HCC): ICD-10-CM

## 2023-07-24 RX ORDER — FLUOXETINE HYDROCHLORIDE 40 MG/1
40 CAPSULE ORAL DAILY
Qty: 60 CAPSULE | Refills: 2 | Status: SHIPPED | OUTPATIENT
Start: 2023-07-24

## 2023-07-24 RX ORDER — DESVENLAFAXINE 100 MG/1
100 TABLET, EXTENDED RELEASE ORAL DAILY
Qty: 30 TABLET | Refills: 0 | OUTPATIENT
Start: 2023-07-24

## 2023-07-24 RX ORDER — BUPROPION HYDROCHLORIDE 100 MG/1
100 TABLET, EXTENDED RELEASE ORAL 2 TIMES DAILY
Qty: 60 TABLET | Refills: 2 | Status: SHIPPED | OUTPATIENT
Start: 2023-07-24

## 2023-07-24 NOTE — TELEPHONE ENCOUNTER
Patient wanted to inform the provider that she is doing well on Prozac 40mg capsules. Patient states she would like to transfer the prescription for Prozac to [address] [place] [address] at [facility] on [address].     Preferred pharmacy:  [facility] at [address] [facility], [facility], [facility] Clover Sweet [phone] Susana Joiner [phone]     Last visit:07/20/23  Next visit:08/23/23 NP Brie Toscano Orn

## 2023-07-26 ENCOUNTER — CARE COORDINATION (OUTPATIENT)
Dept: OTHER | Facility: CLINIC | Age: 53
End: 2023-07-26

## 2023-07-26 ENCOUNTER — TELEPHONE (OUTPATIENT)
Age: 53
End: 2023-07-26

## 2023-07-26 ENCOUNTER — HOSPITAL ENCOUNTER (OUTPATIENT)
Facility: HOSPITAL | Age: 53
Setting detail: RECURRING SERIES
Discharge: HOME | End: 2023-07-29
Payer: COMMERCIAL

## 2023-07-26 VITALS
OXYGEN SATURATION: 96 % | SYSTOLIC BLOOD PRESSURE: 118 MMHG | TEMPERATURE: 98.3 F | DIASTOLIC BLOOD PRESSURE: 81 MMHG | HEART RATE: 78 BPM

## 2023-07-26 DIAGNOSIS — F41.9 ANXIETY: ICD-10-CM

## 2023-07-26 PROCEDURE — 90853 GROUP PSYCHOTHERAPY: CPT

## 2023-07-26 ASSESSMENT — ANXIETY QUESTIONNAIRES
1. FEELING NERVOUS, ANXIOUS, OR ON EDGE: 3
7. FEELING AFRAID AS IF SOMETHING AWFUL MIGHT HAPPEN: 2
2. NOT BEING ABLE TO STOP OR CONTROL WORRYING: 3
3. WORRYING TOO MUCH ABOUT DIFFERENT THINGS: 3
6. BECOMING EASILY ANNOYED OR IRRITABLE: 1
4. TROUBLE RELAXING: 3
5. BEING SO RESTLESS THAT IT IS HARD TO SIT STILL: 0
GAD7 TOTAL SCORE: 15
IF YOU CHECKED OFF ANY PROBLEMS ON THIS QUESTIONNAIRE, HOW DIFFICULT HAVE THESE PROBLEMS MADE IT FOR YOU TO DO YOUR WORK, TAKE CARE OF THINGS AT HOME, OR GET ALONG WITH OTHER PEOPLE: VERY DIFFICULT

## 2023-07-26 ASSESSMENT — PATIENT HEALTH QUESTIONNAIRE - PHQ9: SUM OF ALL RESPONSES TO PHQ QUESTIONS 1-9: 18

## 2023-07-26 NOTE — GROUP NOTE
Group Therapy Note    Date: 7/26/2023    Group Start Time:  1:10 PM  Group End Time:  2:00 PM  Group Topic: Activity    [address]        Group Therapy Note    This writer facilitated the group therapy session. The patients completing a activity using a therapeutic questions ball to examine various emotions, triggers and coping mechanisms. The patients answered questions based upon where they caught the ball. The patients shared and received feedback from peers. Attendees: 7       Patient's Goal:  To attend group and participate in activity     Notes: The patient attended the group therapist session. The patient actively listened and provided feedback to peers. The patient participated in the group activity. The patient identified unhealthy ways to express emotions and then identified healthy way to do so instead. The patient continues to work towards identified goals. Status After Intervention:  Unchanged    Participation Level:  Active Listener    Participation Quality: Appropriate and Attentive      Speech:  normal      Thought Process/Content: Logical  Linear      Affective Functioning: Congruent      Mood: euthymic      Level of consciousness:  Alert, Oriented x4, and Attentive      Response to Learning: Progressing to goal      Endings: None Reported    Modes of Intervention: Exploration and Activity      Discipline Responsible: /Counselor      Signature:  ANITRA Sanchez Intern

## 2023-07-26 NOTE — GROUP NOTE
Group Therapy Note    Date: 7/26/2023    Group Start Time: 12:10 PM  Group End Time:  1:10 PM  Group Topic: Psychotherapy    [address]        Group Therapy Note    This writer facilitated the group therapy session. The patients completed a visualization exercise. The patient reflected on what their best possible self would look like on a professional, personal, and social domain. The patient shared their responses with the group and received feedback. Attendees: 7       Patient's Goal:  To attend group and self-visualize    Notes: The patient attended the group therapist session. The patient actively listened and provided feedback to peers. The patient completed the activity however did not contribute to the discussion. The patient continues to work towards identified goals. Status After Intervention:  Unchanged    Participation Level:  Active Listener    Participation Quality: Appropriate and Attentive      Speech:  normal      Thought Process/Content: Logical  Linear      Affective Functioning: Congruent      Mood: euthymic      Level of consciousness:  Alert, Oriented x4, and Attentive      Response to Learning: Progressing to goal      Endings: None Reported    Modes of Intervention: Support, Socialization, Exploration, and Activity      Discipline Responsible: /Counselor      Signature:  ANITRA Smith Intern

## 2023-07-26 NOTE — TELEPHONE ENCOUNTER
Pt. Called to inquire about her [facility] paperwork. Called pt back and left message that paperwork was with provider and being worked on, and that the nurse had also left her a message in regards to this concern this morning, and if she had any other questions to call back to office.

## 2023-07-26 NOTE — GROUP NOTE
Group Therapy Note    Date: 7/26/2023    Group Start Time:  2:05 PM  Group End Time:  2:45 PM  Group Topic: Community Meeting    [address] PHP    ANITRA Mckinney        Group Therapy Note    Patients were asked to reflect on learning from [facility] and to reflect on a peer graduationg from [facility]. Patients were encouraged to provide feedback and support to each other. Patients were asked to identify a need and to discuss plans for self-care to address needs. Attendees: 11       Patient's Goal:  Reflect on learning from the day, offer feedback to peers, identify self-care     Notes: The patient engaged minimally in wrap-up group. The patient sat quietly for most of the group discussion. When prompted, the patient stated that she felt overwhelmed. The patient was receptive to feedback from peers. The patient will continue to practice reflecting and engaging in groups. Status After Intervention:  Unchanged    Participation Level:  Active Listener and Interactive    Participation Quality: Appropriate and Attentive      Speech:  normal      Thought Process/Content: Logical  Linear      Affective Functioning: Congruent      Mood: dysphoric      Level of consciousness:  Alert, Oriented x4, and Attentive      Response to Learning: Able to verbalize current knowledge/experience and Progressing to goal      Endings: None Reported    Modes of Intervention: Support and Exploration      Discipline Responsible: /Counselor      Signature:  ANITRA Mckinney

## 2023-07-26 NOTE — CARE COORDINATION
Care Transitions Follow Up Call    M attempted to reach patient for Care Transitions follow up call. HIPAA compliant message left requesting a return phone call at patient convenience.      Plan for follow-up call in 7-10 days    Future Appointments   Date Time Provider [address]   7/27/2023  8:45 AM [facility]   7/28/2023  8:45 AM [facility] ([facility]) [facility]   7/31/2023  8:45 AM [facility]   8/23/2023 11:00 AM MAYELA Swain - SILVERIO ALVARADOR BS AMB
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
## 2023-07-26 NOTE — GROUP NOTE
Group Therapy Note    Date: 7/26/2023    Group Start Time:  9:00 AM  Group End Time:  9:45 AM  Group Topic: [employer]    [facility]        Group Therapy Note    This writer facilitated the community group with the patients. The patients were encouraged to share anything they would like to from the previous evening. The patients were encouraged to participate in a discussion with a discharging peer and to participate in a ice breaker activity with a new patient. Attendees: 13       Patient's Goal: identify mood     Notes: The patient completed the morning assessment and identified mood and denied SI/HI. The patient participated in discussion and listened to the discharging peer. The patient participated in ice breaker activity with new peer. The patient will continue to identify mood in the community group. Status After Intervention:  Unchanged    Participation Level:  Active Listener and Interactive    Participation Quality: Appropriate, Attentive, Sharing, and Supportive      Speech:  normal      Thought Process/Content: Logical  Linear      Affective Functioning: Congruent      Mood: euthymic      Level of consciousness:  Alert, Oriented x4, and Attentive      Response to Learning: Capable of insight and Progressing to goal      Endings: None Reported    Modes of Intervention: Support, Socialization, and Exploration      Discipline Responsible: /Counselor       Signature:  [facility] Therapist

## 2023-07-26 NOTE — SUICIDE SAFETY PLAN
SAFETY PLAN    A suicide Safety Plan is a document that supports someone when they are having thoughts of suicide. Warning Signs that indicate a suicidal crisis may be developing: What (situations, thoughts, feelings, body sensations, behaviors, etc.) do you experience that lets you know you are beginning to think about suicide? 1. Feeling depressed   2. Crying   3. Isolate self     Internal Coping Strategies:  What things can I do (relaxation techniques, hobbies, physical activities, etc.) to take my mind off my problems without contacting another person? 1. Reach out to friends   2. cooking  3. Go for walk    People and social settings that provide distraction: Who can I call or where can I go to distract me? 1. Name: Beatriz Huang  Phone: per pt, number in phone   2. Name: Liz Barrientos  Phone: per pt, number in phone    3. Place: [address]: [address] whom I can ask for help: Who can I call when I need help - for example, friends, family, clergy, someone else? 1. Name: Support group at [facility]                Phone: per pt, number in phone   2. Name: Beatriz Huang  Phone: per pt, number in phone   3. Name: Liz Barrientos  Phone: per pt, number in phone     Professionals or [address] [facility] I can contact during a crisis: Who can I call for help - for example, my doctor, my psychiatrist, my psychologist, a mental health provider, a suicide hotline? 1. Clinician Name: Joana Payne   Phone: [phone]      Clinician Pager or Emergency Contact #: [phone]    0. Clinician Name: Access   Phone: [phone]      Clinician Pager or Emergency Contact #: [phone]    9. Suicide Prevention Lifeline: 7-455-608-TALK (0164) 7. [address] [facility] -  for example, [address], [facility] suicide hotline, [place] ([facility] Way Hotline: [address] [facility]      Emergency Services Address: [address]      Emergency Services Phone: [phone]    Making the

## 2023-07-26 NOTE — BH NOTE
Goal: Intake Assessment for PHP          met with patient for intake assessment. Pt denied SI/HI upon assessment. Pt signed PETEY for daughter, [address]. SW did not complete COWS, Audit-C or CIWA due to pt denying alcohol and opiate use.     Pt completed all screenings and is appropriate for treatment at [facility]

## 2023-07-26 NOTE — GROUP NOTE
Group Therapy Note    Date: 7/26/2023    Group Start Time: 10:50 AM  Group End Time: 11:40 AM  Group Topic: CBT    [id number] [address]        Group Therapy Note    Attendees: 7    CBT: Pts participated in a 15 minute progressive muscle relaxation meditation to begin group. Pts reflected on how they felt during the meditation. Pts then participated in a thought record where they were able to challenge their emotions to have a more realistic expectation. Pts encouraged to provide feedback to one another and reflect on group       Patient's Goal:  attend groups and process emotions     Notes:  Pt was quiet in group but listening to peers. Pt is making some progress towards goals by attending and participating in group    Status After Intervention:  Unchanged    Participation Level:  Active Listener and Interactive    Participation Quality: Appropriate and Attentive      Speech:  normal      Thought Process/Content: Logical  Linear      Affective Functioning: Congruent      Mood: euthymic      Level of consciousness:  Alert and Oriented x4      Response to Learning: Able to retain information, Capable of insight, and Progressing to goal      Endings: None Reported    Modes of Intervention: Support, Socialization, Exploration, and Clarifying      Discipline Responsible: /Counselor      Signature:  ANITRA Taylor

## 2023-07-26 NOTE — GROUP NOTE
Group Therapy Note    Date: 7/26/2023    Group Start Time: 10:50 AM  Group End Time: 11:40 AM  Group Topic: Process Group - Inpatient    [facility]        Group Therapy Note    This writer facilitated a process group on replacing negative self-talk with positive self-talk. Attendees: 9       Patient's Goal: replace negative self-talk     The patient participated in discussion on self-talk by sharing what self-talk sounds like for them and how to replace it with positive self-talk. The patient participated in a discussion on when their self-talk degrades others. The patient will continue to self-disclose and accept feedback in the process group. Status After Intervention:  Unchanged    Participation Level:  Active Listener and Interactive    Participation Quality: Appropriate and Attentive      Speech:  normal      Thought Process/Content: Logical  Linear      Affective Functioning: Congruent      Mood: euthymic      Level of consciousness:  Alert, Oriented x4, and Attentive      Response to Learning: Capable of insight      Endings: None Reported    Modes of Intervention: Education and Activity      Discipline Responsible: /Counselor      Signature:  Bhargavi Bose [facility] Therapist

## 2023-07-27 ENCOUNTER — HOSPITAL ENCOUNTER (OUTPATIENT)
Facility: HOSPITAL | Age: 53
Discharge: HOME | End: 2023-07-30
Payer: COMMERCIAL

## 2023-07-27 ENCOUNTER — CARE COORDINATION (OUTPATIENT)
Dept: OTHER | Facility: CLINIC | Age: 53
End: 2023-07-27

## 2023-07-27 VITALS
OXYGEN SATURATION: 95 % | HEART RATE: 79 BPM | SYSTOLIC BLOOD PRESSURE: 127 MMHG | DIASTOLIC BLOOD PRESSURE: 90 MMHG | TEMPERATURE: 98.3 F

## 2023-07-27 PROCEDURE — 90853 GROUP PSYCHOTHERAPY: CPT

## 2023-07-27 RX ORDER — HYDROXYZINE PAMOATE 25 MG/1
25 CAPSULE ORAL 3 TIMES DAILY PRN
Qty: 90 CAPSULE | Refills: 0 | Status: SHIPPED | OUTPATIENT
Start: 2023-07-27 | End: 2023-07-28 | Stop reason: SDUPTHER

## 2023-07-27 RX ORDER — HYDROXYZINE PAMOATE 25 MG/1
25 CAPSULE ORAL 3 TIMES DAILY PRN
Qty: 90 CAPSULE | Refills: 0 | Status: SHIPPED | OUTPATIENT
Start: 2023-07-27 | End: 2023-07-27 | Stop reason: SDUPTHER

## 2023-07-27 NOTE — GROUP NOTE
Group Therapy Note    Date: 7/27/2023    Group Start Time:  1:15 PM  Group End Time:  2:00 PM  Group Topic: Art Therapy     [facility]    ANITRA Deng        Group Therapy Note    Patients were given masks to paint and modeling domonique to sculpt with. Patients were prompted by peer to create something that makes them happy. Patients were encouraged to engage actively with peers. Patients were asked to engage in introductions with new group members. Attendees: 9       Patient's Goal:  Engage in art activity, connect with peers     Notes: The patient did not attend group and left [facility] early.     Status After Intervention:  N/A: Did not attend    Participation Level: N/A: Did not attend    Participation Quality: N/A: Did not attend      Speech:  N/A: Did not attend      Thought Process/Content: N/A: Did not attend      Affective Functioning: N/A: Did not attend      Mood: N/A: Did not attend      Level of consciousness:  N/A: Did not attend      Response to Learning: N/A: Did not attend      Endings: N/A: Did not attend    Modes of Intervention: Socialization and Activity      Discipline Responsible: /Counselor      Signature:  ANITRA Deng

## 2023-07-27 NOTE — CARE COORDINATION
[address] Holly Kramer [facility],Steve 250 Care Transitions Follow Up Call    Patient Current Location:  Home: Ken Ryan  [address] [place] [address] Drive [address]    Care Transition Nurse contacted the patient by telephone to follow up after admission on 2023. Verified name and  with patient as identifiers. Patient: Aristeo Elise  Patient : 1970   MRN: [id number]  Reason for Admission: Behavioral health  Discharge Date: 7/15/23 RARS: Readmission Risk Score: 5.2      Needs to be reviewed by the provider   Additional needs identified to be addressed with provider: No  none             Method of communication with provider: none. Patient returned this [facility] call. She reports that her medication is making her sleepy and nauseated. She is not sleeping well at night. Her psychiatric NP is leaving the practice, so her next appointment for medication management is . She has reached out to her PCP who is addressing the medication as well as her extended FMLA. She has scheduled an appointment with her counselor for tomorrow. Patient states that she went to the [facility] program for two days, but was too tired to continue and did not feel that she was getting anything out of it. She states that she is safe and with her son. Addressed changes since last contact:  medications-changes in Wellbutrin  Discussed follow-up appointments. If no appointment was previously scheduled, appointment scheduling offered: Yes. Is follow up appointment scheduled within 7 days of discharge? Yes. Follow Up  Future Appointments   Date Time Provider [address]   2023  3:15 PM Grady Dupont DO [facility] BS AMB   2023 11:00 AM [facility]     Non-[facility] follow up appointment(s): None    Care Transition Nurse reviewed discharge instructions and red flags with patient and discussed any barriers to care and/or understanding of plan of care after discharge.  Discussed appropriate site of care based on symptoms and resources available to patient

## 2023-07-27 NOTE — GROUP NOTE
Group Therapy Note    Date: 7/27/2023    Group Start Time: 10:50 AM  Group End Time: 11:40 AM  Group Topic: Cognitive Skills    [id number] [address]        Group Therapy Note    Attendees: 11    Cognitive Skills: Pts were asked how they are doing as a check in question. Writer than opened up the group for them to discuss grief and suicidal thoughts. Pts disclosed personal experiences and also spoke about struggling with family. Pts provided feedback to one another and reflected on group. Patient's Goal:  attend groups and process emotions     Notes:  pt was quiet in group but listening to peers. Pt is making progress towards goals by attending and participating in group    Status After Intervention:  Improved    Participation Level:  Active Listener and Interactive    Participation Quality: Appropriate and Attentive      Speech:  normal      Thought Process/Content: Logical  Linear      Affective Functioning: Congruent      Mood: euthymic      Level of consciousness:  Alert and Oriented x4      Response to Learning: Capable of insight and Progressing to goal      Endings: None Reported    Modes of Intervention: Support, Socialization, Exploration, and Clarifying      Discipline Responsible: /Counselor      Signature:  Luciano Carrel, MSW

## 2023-07-27 NOTE — H&P
History and Physical    Chief Complaint of Present Illness: 48 y.o. female who was admitted to the general psychiatric floor at [facility] on a voluntary basis for worsening depression with plans to overdose. Per the discharge summary, she had been experiencing worsening depression and anxiety over the past few months. She has been sleeping and eating poorly as well; she lost about 13lbs over the past few months. She had a medication change recently, from Prozac to [facility] and [address], and the pt believes this medication adjustment may have been making her depression worse. The pt reports that the medication changes started in January. Prior to the medication changes, the pt was generally stable for the most part; depression was in remission for years prior to these changes. She had been on Prozac, then changed to [address] as she felt the Prozac was ineffective. The Viibryd caused stomach upset, so she stopped taking that, and then she was changed to Pristiq, which was increased up to 100mg, and she did not have a response to that. She started Rexulti as well, which was increased to 1.5mg, but the pt felt that was too activating, so it was decreased back down to 1mg. The pt is medication compliant and is taking her medications as ordered. The pt also reports a recent breakup has contributed to her worsening mental health - she had gotten back together with her ex- for about a year and a half, and then he broke up with her, causing the pt immense distress and upset. The pt endorses symptoms of anhedonia, anergia, avolition, apathy. No current SI/plan/intent, no HI/plan/intent, no AVH. She came here for management of her condition.     Past Medical History:   Diagnosis Date    Back pain, acute     Depression     Endocrine disease     hypothyroid    H/O seasonal allergies     Maxillary sinus fracture ([address]) 2016    Neck pain, musculoskeletal       Past Surgical History:   Procedure Laterality Date

## 2023-07-27 NOTE — BH NOTE
Outpatient Clinical Discharge Summary      Dahlia Mujica     [id number]     07/27/23 1970    Physician: Dr. Hallie Rodriguez    Admission Date: 7.26.23  Admission Reason: Increase in depression and anxiety  Discharge Date: 7.27.23    Admission Diagnosis (DSM 5): F33.1 MDD    Discharge Diagnosis (DSM 5): F33.1 MDD    Date and Type of Last Contact: 7/27/23 in person at [facility]    Status at Last Contact: Pt presents with flat affect and congruent mood. Pt expressed not feeling comfortable in PHP setting and feeling that IOP is a more appropriate fit for her. Pt reports understanding of it being an unplanned discharge     Reason for Admission (Summary): PT was admitted due to increase in depression and anxiety after break up with ex . Pt reported having a lack of motivation. Reason for Discharge (check all that apply):  [] Transition from PHP to IOP [] Transition from IOP to OP  [] Transition to Inpatient Unit  [] Treatment Complete  [] Transfer within Facility  [] Transfer to another Facility  [] Administrative Discharge  [] Financial Discharge  [] Medical Discharge   [x] AMA/unplanned discharge     Summary of Progress and Course of Treatment (including treatment plan goals and objective achieved/not achieved during treatment/level of functioning): Pt only participated in [facility] for one day and discharged prior to treatment plan creation.     Discharge Medication List:   Vistaril 25mg PRN  Wellbutrin 200mg BID  Prozac 40mg daily   Ambien cr 12.5 mg daily     Discharge/Aftercare Plan (including resources available to patient, scheduled follow-up appointments, and ongoing treatment recommendations): N/A    Psychiatric Aftercare Appointments:  Appointment #1     N/A   Appointment #2            Medical Aftercare Appointments:  Appointment #1       N/A   Appointment #2              ANITRA Millan  7/27/2023  1:56 PM

## 2023-07-27 NOTE — GROUP NOTE
Group Therapy Note    Date: 7/27/2023    Group Start Time:  9:00 AM  Group End Time:  9:40 AM  Group Topic: Community Meeting    [id number] [address]        Group Therapy Note    Attendees: 8    Check in: Pts were asked how they are doing as a check in question. Pts filled in check in sheets and reflected on how their evenings were. Pts then shared what topics they would like to address or goals that they would like work towards. Pts provided feedback to one another before reflecting on group       Patient's Goal:  attend groups and process emotions     Notes:  Pt was quiet in group but listening to peers. Pt is making some progress towards goals by attending and participating in group    Status After Intervention:  Unchanged    Participation Level:  Active Listener    Participation Quality: Appropriate and Attentive      Speech:  normal      Thought Process/Content: Logical  Linear      Affective Functioning: Congruent      Mood: euthymic      Level of consciousness:  Alert and Oriented x4      Response to Learning: Able to retain information, Capable of insight, and Progressing to goal      Endings: None Reported    Modes of Intervention: Support, Socialization, Exploration, and Clarifying      Discipline Responsible: /Counselor      Signature:  ANITRA Lopez

## 2023-07-27 NOTE — BH NOTE
Goal: Unplanned Discharge      Pt asked to speak with writer in office. Pt reported that she does not feel that the program is a right fit for her due to feeling like peers are more acute than she is. Pt said \"I'm not suicidal, I just struggle with depression and anxiety. \" Writer validated pts emotions and asked how it was when she opened up to peers. Pt reported it was \"fine\" but she still reported feeling uncomfortable and wanting to discharge. Writer explained that this would be an unplanned discharge and that no appts would be made for pt. Pt verbalized understanding and reported that she has an upcoming psychiatrist appt on Aug 23rd. Pt expressed interest in IOP and writer provided resources for [email or web address] and [facility]. Writer printed safety plan and gave copy to pt    Pt discharged from program prior to treatment plan being created.

## 2023-07-27 NOTE — GROUP NOTE
Group Therapy Note    Date: 7/27/2023    Group Start Time: 12:16 PM  Group End Time:  1:11 PM  Group Topic: Psychoeducation    [address] [facility]    ANITRA Martinez    Group Therapy Note    Writer facilitated a psychoeducation group focused on boundaries. Writer opened with peer introductions due to multiple new groups members and the writer not having lead groups since many pts joined the program. Group focused on differentiating rigid versus porous versus healthy boundaries. Writer provided education on how to navigate competing boundaries, how to break patterns of not holding healthy boundaries, and holding healthy boundaries with self. Pts were encouraged to process own boundary styles, provide feedback, and ask questions. Attendees: 12       Patient's Goal:  learning healthy boundaries and applying healthy boundary setting skills. Notes:   Pt presented as alert and attentive. Pt listened respectfully to peers and staff. Pt participated in peer introductions. Pt will continue to work on learning healthy boundaries and applying healthy boundary setting skills.      Status After Intervention:  Improved    Participation Level: Minimal    Participation Quality: Appropriate and Attentive      Speech:  normal      Thought Process/Content: Logical      Affective Functioning: Congruent      Mood: euthymic      Level of consciousness:  Alert, Oriented x4, and Attentive      Response to Learning: Able to verbalize current knowledge/experience and Progressing to goal      Endings: None Reported    Modes of Intervention: Education and Support      Discipline Responsible: /Counselor      Signature:  ANITRA Martinez

## 2023-07-28 ENCOUNTER — APPOINTMENT (OUTPATIENT)
Facility: HOSPITAL | Age: 53
End: 2023-07-28
Payer: COMMERCIAL

## 2023-07-28 RX ORDER — HYDROXYZINE PAMOATE 25 MG/1
25 CAPSULE ORAL 3 TIMES DAILY PRN
Qty: 90 CAPSULE | Refills: 0 | Status: SHIPPED | OUTPATIENT
Start: 2023-07-28

## 2023-07-28 NOTE — BH NOTE
Goal: follow up call    Writer spoke with pt on 7/28 at 12:50p who reported that she is doing fine and that she saw her regular therapist today and was able to make a new appt with a psychiatrist that she will see next week. Writer encouraged her to call if she needs anything.          ANITRA Rosario

## 2023-07-31 ENCOUNTER — APPOINTMENT (OUTPATIENT)
Facility: HOSPITAL | Age: 53
End: 2023-07-31
Payer: COMMERCIAL

## 2023-08-02 ENCOUNTER — TELEMEDICINE (OUTPATIENT)
Dept: PRIMARY CARE CLINIC | Facility: CLINIC | Age: 53
End: 2023-08-02
Payer: COMMERCIAL

## 2023-08-02 ENCOUNTER — CARE COORDINATION (OUTPATIENT)
Dept: OTHER | Facility: CLINIC | Age: 53
End: 2023-08-02

## 2023-08-02 DIAGNOSIS — R11.0 NAUSEA: Primary | ICD-10-CM

## 2023-08-02 DIAGNOSIS — K21.9 GASTROESOPHAGEAL REFLUX DISEASE, UNSPECIFIED WHETHER ESOPHAGITIS PRESENT: ICD-10-CM

## 2023-08-02 PROCEDURE — 99213 OFFICE O/P EST LOW 20 MIN: CPT | Performed by: FAMILY MEDICINE

## 2023-08-02 RX ORDER — FAMOTIDINE 20 MG/1
20 TABLET, FILM COATED ORAL 2 TIMES DAILY
Qty: 60 TABLET | Refills: 3 | Status: SHIPPED | OUTPATIENT
Start: 2023-08-02

## 2023-08-02 RX ORDER — PAROXETINE 10 MG/1
10 TABLET, FILM COATED ORAL EVERY MORNING
COMMUNITY

## 2023-08-02 RX ORDER — ONDANSETRON 8 MG/1
8 TABLET, ORALLY DISINTEGRATING ORAL EVERY 8 HOURS PRN
Qty: 30 TABLET | Refills: 1 | Status: SHIPPED | OUTPATIENT
Start: 2023-08-02

## 2023-08-02 ASSESSMENT — ENCOUNTER SYMPTOMS
VOMITING: 0
ABDOMINAL PAIN: 0
BLOOD IN STOOL: 0
NAUSEA: 1

## 2023-08-02 NOTE — CARE COORDINATION
Care Transitions Follow Up Call    ACM attempted to reach patient for Care Transitions follow up call. HIPAA compliant message left requesting a return phone call at patient convenience.      Plan for follow-up call in 7-10 days    Future Appointments   Date Time Provider [address]   8/2/2023  2:45 PM DO NINA Alfred BS AMB   8/23/2023 11:00 AM MAYELA Swain - SILVERIO MARCELLR BS AMB

## 2023-08-02 NOTE — PROGRESS NOTES
the Last Year: Not on file    Number of Places Lived in the Last Year: Not on file    Unstable Housing in the Last Year: No       No visits with results within 3 Month(s) from this visit. Latest known visit with results is:   Orders Only on 04/28/2023   Component Date Value Ref Range Status    Cholesterol, Total 04/28/2023 248 (H)  <200 MG/DL Final    Triglycerides 04/28/2023 184 (H)  <150 MG/DL Final    Based on NCEP-ATP III:  Triglycerides <150 mg/dL  is considered normal, 150-199 mg/dL  borderline high,  200-499 mg/dL high and  greater than or equal to 500 mg/dL very high. HDL 04/28/2023 41  MG/DL Final    Based on NCEP ATP III, HDL Cholesterol <40 mg/dL is considered low and >60 mg/dL is elevated. LDL Calculated 04/28/2023 170.2 (H)  0 - 100 MG/DL Final    Comment: Based on the NCEP-ATP: LDL-C concentrations are considered  optimal <100 mg/dL,  near optimal/above Normal 100-129 mg/dL  Borderline High: 130-159, High: 160-189 mg/dL  Very High: Greater than or equal to 190 mg/dL      VLDL Cholesterol Calculated 04/28/2023 36.8  MG/DL Final    Chol/HDL Ratio 04/28/2023 6.0 (H)  0.0 - 5.0   Final         Review of Systems   Constitutional:  Negative for chills and fever. Gastrointestinal:  Positive for nausea. Negative for abdominal pain, blood in stool and vomiting.         Objective   Patient-Reported Vitals  No data recorded     Physical Exam  [INSTRUCTIONS:  \"[x]\" Indicates a positive item  \"[]\" Indicates a negative item  -- DELETE ALL ITEMS NOT EXAMINED]    Constitutional: [x] Appears well-developed and well-nourished [x] No apparent distress      [] Abnormal -     Mental status: [x] Alert and awake  [] Oriented to person/place/time [x] Able to follow commands    [] Abnormal -     Eyes:   EOM    [x]  Normal    [] Abnormal -   Sclera  [x]  Normal    [] Abnormal -          Discharge [x]  None visible   [] Abnormal -     HENT: [x] Normocephalic, atraumatic  [] Abnormal -   [x] Mouth/Throat: Mucous

## 2023-08-03 ENCOUNTER — TELEPHONE (OUTPATIENT)
Age: 53
End: 2023-08-03

## 2023-08-03 NOTE — TELEPHONE ENCOUNTER
Left second voice mail for nurse at [facility] to return call regarding records release. Asked nurse to call or fax form again as the one we received is not legible.

## 2023-08-04 ENCOUNTER — CARE COORDINATION (OUTPATIENT)
Dept: OTHER | Facility: CLINIC | Age: 53
End: 2023-08-04

## 2023-08-04 ENCOUNTER — TELEPHONE (OUTPATIENT)
Dept: PRIMARY CARE CLINIC | Facility: CLINIC | Age: 53
End: 2023-08-04

## 2023-08-04 NOTE — TELEPHONE ENCOUNTER
Patient called in, stated she is not doing well with the new medication she is taking.   Would like to speak with provider about what she possibly do

## 2023-08-04 NOTE — CARE COORDINATION
Specialist.         ACM provided contact information for future needs. Plan for follow-up call in 7-10 days based on severity of symptoms and risk factors. Plan for next call: symptom management-depression/anxiety      Addressed changes since last contact:  medications-DC Wellbutrin, started Paxil  Discussed follow-up appointments. If no appointment was previously scheduled, appointment scheduling offered: Yes. Is follow up appointment scheduled within 7 days of discharge? Yes. Follow Up  Future Appointments   Date Time Provider [address] [place] [address]   8/23/2023 11:00 AM [facility]     Non-BS follow up appointment(s): None    Care Transition Nurse reviewed discharge instructions with patient and discussed any barriers to care and/or understanding of plan of care after discharge. Discussed appropriate site of care based on symptoms and resources available to patient including: Specialist. The patient agrees to contact the PCP office for questions related to their healthcare. Advance Care Planning:   reviewed and current.      Patients top risk factors for readmission: depression, ineffective coping, and support system           Care Transitions Subsequent and Final Call    Subsequent and Final Calls  Care Transitions Interventions  Other Interventions:                 Shantel Flannery RN

## 2023-08-04 NOTE — TELEPHONE ENCOUNTER
Spoke with patient. She is weaning off Wellbutrin and starting paxil and having more panic attacks. States she has been taking her atarax but it is not helping much. Called her mental health provider and nurse said they were out of the office until Tuesday. She called her counselor and they told her she would need to manage symptoms until then. Discussed mental health provider should have someone covering for them while off for urgent issues and to call office back to see if someone is covering them. If not and her symptoms are severe she should go to ER. Discussed I cannot prescribe a controlled substance for anxiety and this would need to come from mental health provider if she needed something stronger then Atarax. She voiced understanding and thanks for call.

## 2023-08-05 ENCOUNTER — HOSPITAL ENCOUNTER (EMERGENCY)
Facility: HOSPITAL | Age: 53
Discharge: HOME | End: 2023-08-05
Attending: EMERGENCY MEDICINE
Payer: COMMERCIAL

## 2023-08-05 VITALS
BODY MASS INDEX: 32.3 KG/M2 | WEIGHT: 182.32 LBS | DIASTOLIC BLOOD PRESSURE: 88 MMHG | HEART RATE: 76 BPM | OXYGEN SATURATION: 93 % | TEMPERATURE: 97.8 F | SYSTOLIC BLOOD PRESSURE: 121 MMHG | RESPIRATION RATE: 15 BRPM

## 2023-08-05 DIAGNOSIS — R11.0 NAUSEA: Primary | ICD-10-CM

## 2023-08-05 PROCEDURE — 99285 EMERGENCY DEPT VISIT HI MDM: CPT

## 2023-08-05 PROCEDURE — 6370000000 HC RX 637 (ALT 250 FOR IP): Performed by: EMERGENCY MEDICINE

## 2023-08-05 RX ORDER — METOCLOPRAMIDE 10 MG/1
10 TABLET ORAL
Status: COMPLETED | OUTPATIENT
Start: 2023-08-05 | End: 2023-08-05

## 2023-08-05 RX ORDER — PROCHLORPERAZINE MALEATE 10 MG
10 TABLET ORAL EVERY 6 HOURS PRN
Qty: 30 TABLET | Refills: 0 | Status: SHIPPED | OUTPATIENT
Start: 2023-08-05

## 2023-08-05 RX ORDER — PROCHLORPERAZINE MALEATE 5 MG/1
10 TABLET ORAL ONCE
Status: COMPLETED | OUTPATIENT
Start: 2023-08-05 | End: 2023-08-05

## 2023-08-05 RX ADMIN — METOCLOPRAMIDE 10 MG: 10 TABLET ORAL at 10:12

## 2023-08-05 RX ADMIN — PROCHLORPERAZINE MALEATE 10 MG: 5 TABLET ORAL at 11:15

## 2023-08-05 NOTE — ED NOTES
Pt discharged in stable condition at this time. MD and this RN reviewed discharge instructions, prescriptions, and follow up with patient at bedside. Pt verbalized understanding and denies any needs or questions at this time.         Yoana Goode RN  08/05/23 6645

## 2023-08-05 NOTE — ED TRIAGE NOTES
Patient arrives ambulatory to the ED with complaints of nausea and anxiety. She states she was started on Wellbutrin in July and it has made her nauseous. Her PCP is tapering her of of the Wellbutrin and gave her zofran for nausea but it has not helped. She states the anxiety is worse in the morning but improves as the day goes on. Patient denies SI and HI.

## 2023-08-09 ENCOUNTER — CARE COORDINATION (OUTPATIENT)
Dept: OTHER | Facility: CLINIC | Age: 53
End: 2023-08-09

## 2023-08-15 ENCOUNTER — CARE COORDINATION (OUTPATIENT)
Dept: OTHER | Facility: CLINIC | Age: 53
End: 2023-08-15

## 2023-08-15 NOTE — CARE COORDINATION
[address] [facility] 250 Care Transitions Follow Up Call    Patient Current Location:  Home: Ken Ryan  [address] [place] [address] Drive [id number]    Care Transition Nurse contacted the patient by telephone to follow up after admission on . Verified name and  with patient as identifiers. Patient: Juan Antonio Hairston  Patient : 1970   MRN: [id number]  Reason for Admission: Behavioral Health  Discharge Date: 23 RARS: Readmission Risk Score: 5.2      Needs to be reviewed by the provider   Additional needs identified to be addressed with provider: No  none             Method of communication with provider: none. Patient reports that she is feeling a little better. She is no longer experiencing nausea. She has been taken off of the Wellbutrin and has started on Paxil. She voices that she is still depressed through out the day, with an increase in anxiety in the morning. Encouraged the patient to take her Hydroxyzine in the morning to alleviate some of the anxiety. She sees her counselor on  and her medication management provider next week. Addressed changes since last contact:  none  Discussed follow-up appointments. If no appointment was previously scheduled, appointment scheduling offered: No.   Is follow up appointment scheduled within 7 days of discharge? Yes. Follow Up  Future Appointments   Date Time Provider [address] [place] [address]   2023 11:00 AM [facility]     Non-[facility] follow up appointment(s): None    Care Transition Nurse reviewed discharge instructions with patient and discussed any barriers to care and/or understanding of plan of care after discharge. Discussed appropriate site of care based on symptoms and resources available to patient including: Specialist. The patient agrees to contact the PCP office for questions related to their healthcare. Advance Care Planning:   reviewed and current.      Patients top risk factors for readmission: depression and ineffective coping  Interventions

## 2023-08-23 ENCOUNTER — OFFICE VISIT (OUTPATIENT)
Age: 53
End: 2023-08-23
Payer: COMMERCIAL

## 2023-08-23 VITALS
WEIGHT: 175 LBS | TEMPERATURE: 98.1 F | OXYGEN SATURATION: 98 % | HEIGHT: 63 IN | RESPIRATION RATE: 16 BRPM | BODY MASS INDEX: 31.01 KG/M2 | DIASTOLIC BLOOD PRESSURE: 108 MMHG | SYSTOLIC BLOOD PRESSURE: 141 MMHG | HEART RATE: 74 BPM

## 2023-08-23 DIAGNOSIS — F41.9 ANXIETY: ICD-10-CM

## 2023-08-23 DIAGNOSIS — F41.9 ANXIETY DISORDER, UNSPECIFIED TYPE: ICD-10-CM

## 2023-08-23 DIAGNOSIS — F33.1 MAJOR DEPRESSIVE DISORDER, RECURRENT, MODERATE (HCC): Primary | ICD-10-CM

## 2023-08-23 PROCEDURE — 99214 OFFICE O/P EST MOD 30 MIN: CPT | Performed by: NURSE PRACTITIONER

## 2023-08-23 ASSESSMENT — ANXIETY QUESTIONNAIRES
1. FEELING NERVOUS, ANXIOUS, OR ON EDGE: 3
7. FEELING AFRAID AS IF SOMETHING AWFUL MIGHT HAPPEN: 3
6. BECOMING EASILY ANNOYED OR IRRITABLE: 1
4. TROUBLE RELAXING: 3
IF YOU CHECKED OFF ANY PROBLEMS ON THIS QUESTIONNAIRE, HOW DIFFICULT HAVE THESE PROBLEMS MADE IT FOR YOU TO DO YOUR WORK, TAKE CARE OF THINGS AT HOME, OR GET ALONG WITH OTHER PEOPLE: EXTREMELY DIFFICULT
3. WORRYING TOO MUCH ABOUT DIFFERENT THINGS: 3
GAD7 TOTAL SCORE: 17
2. NOT BEING ABLE TO STOP OR CONTROL WORRYING: 3
5. BEING SO RESTLESS THAT IT IS HARD TO SIT STILL: 1

## 2023-08-23 ASSESSMENT — PATIENT HEALTH QUESTIONNAIRE - PHQ9
SUM OF ALL RESPONSES TO PHQ QUESTIONS 1-9: 22
SUM OF ALL RESPONSES TO PHQ QUESTIONS 1-9: 22
SUM OF ALL RESPONSES TO PHQ9 QUESTIONS 1 & 2: 6
2. FEELING DOWN, DEPRESSED OR HOPELESS: 3
SUM OF ALL RESPONSES TO PHQ QUESTIONS 1-9: 22
5. POOR APPETITE OR OVEREATING: 3
6. FEELING BAD ABOUT YOURSELF - OR THAT YOU ARE A FAILURE OR HAVE LET YOURSELF OR YOUR FAMILY DOWN: 3
3. TROUBLE FALLING OR STAYING ASLEEP: 1
SUM OF ALL RESPONSES TO PHQ QUESTIONS 1-9: 22
10. IF YOU CHECKED OFF ANY PROBLEMS, HOW DIFFICULT HAVE THESE PROBLEMS MADE IT FOR YOU TO DO YOUR WORK, TAKE CARE OF THINGS AT HOME, OR GET ALONG WITH OTHER PEOPLE: 3
9. THOUGHTS THAT YOU WOULD BE BETTER OFF DEAD, OR OF HURTING YOURSELF: 0
4. FEELING TIRED OR HAVING LITTLE ENERGY: 3
1. LITTLE INTEREST OR PLEASURE IN DOING THINGS: 3
8. MOVING OR SPEAKING SO SLOWLY THAT OTHER PEOPLE COULD HAVE NOTICED. OR THE OPPOSITE, BEING SO FIGETY OR RESTLESS THAT YOU HAVE BEEN MOVING AROUND A LOT MORE THAN USUAL: 3
7. TROUBLE CONCENTRATING ON THINGS, SUCH AS READING THE NEWSPAPER OR WATCHING TELEVISION: 3

## 2023-08-24 ENCOUNTER — HOSPITAL ENCOUNTER (EMERGENCY)
Facility: HOSPITAL | Age: 53
Discharge: HOME | End: 2023-08-24
Attending: STUDENT IN AN ORGANIZED HEALTH CARE EDUCATION/TRAINING PROGRAM
Payer: COMMERCIAL

## 2023-08-24 VITALS
OXYGEN SATURATION: 96 % | DIASTOLIC BLOOD PRESSURE: 98 MMHG | BODY MASS INDEX: 31.29 KG/M2 | WEIGHT: 176.59 LBS | RESPIRATION RATE: 12 BRPM | HEART RATE: 92 BPM | HEIGHT: 63 IN | SYSTOLIC BLOOD PRESSURE: 159 MMHG | TEMPERATURE: 98.4 F

## 2023-08-24 DIAGNOSIS — R11.0 NAUSEA: ICD-10-CM

## 2023-08-24 DIAGNOSIS — T88.7XXA MEDICATION SIDE EFFECT: Primary | ICD-10-CM

## 2023-08-24 LAB
AMORPH CRY URNS QL MICRO: ABNORMAL
APPEARANCE UR: ABNORMAL
BACTERIA URNS QL MICRO: ABNORMAL /HPF
BILIRUB UR QL: NEGATIVE
COLOR UR: ABNORMAL
EKG ATRIAL RATE: 64 BPM
EKG DIAGNOSIS: NORMAL
EKG P AXIS: 50 DEGREES
EKG P-R INTERVAL: 122 MS
EKG Q-T INTERVAL: 412 MS
EKG QRS DURATION: 74 MS
EKG QTC CALCULATION (BAZETT): 425 MS
EKG R AXIS: 16 DEGREES
EKG T AXIS: 34 DEGREES
EKG VENTRICULAR RATE: 64 BPM
EPITH CASTS URNS QL MICRO: ABNORMAL /LPF
GLUCOSE UR STRIP.AUTO-MCNC: NEGATIVE MG/DL
HGB UR QL STRIP: NEGATIVE
KETONES UR QL STRIP.AUTO: NEGATIVE MG/DL
LEUKOCYTE ESTERASE UR QL STRIP.AUTO: ABNORMAL
MUCOUS THREADS URNS QL MICRO: ABNORMAL /LPF
NITRITE UR QL STRIP.AUTO: NEGATIVE
PH UR STRIP: 7 (ref 5–8)
PROT UR STRIP-MCNC: NEGATIVE MG/DL
RBC #/AREA URNS HPF: ABNORMAL /HPF (ref 0–5)
SP GR UR REFRACTOMETRY: 1.01 (ref 1–1.03)
URINE CULTURE IF INDICATED: ABNORMAL
UROBILINOGEN UR QL STRIP.AUTO: 0.2 EU/DL (ref 0.2–1)
WBC URNS QL MICRO: ABNORMAL /HPF (ref 0–4)

## 2023-08-24 PROCEDURE — 2580000003 HC RX 258: Performed by: STUDENT IN AN ORGANIZED HEALTH CARE EDUCATION/TRAINING PROGRAM

## 2023-08-24 PROCEDURE — 96375 TX/PRO/DX INJ NEW DRUG ADDON: CPT

## 2023-08-24 PROCEDURE — 81001 URINALYSIS AUTO W/SCOPE: CPT

## 2023-08-24 PROCEDURE — 93005 ELECTROCARDIOGRAM TRACING: CPT | Performed by: STUDENT IN AN ORGANIZED HEALTH CARE EDUCATION/TRAINING PROGRAM

## 2023-08-24 PROCEDURE — 99284 EMERGENCY DEPT VISIT MOD MDM: CPT

## 2023-08-24 PROCEDURE — 96374 THER/PROPH/DIAG INJ IV PUSH: CPT

## 2023-08-24 PROCEDURE — 96361 HYDRATE IV INFUSION ADD-ON: CPT

## 2023-08-24 PROCEDURE — 6360000002 HC RX W HCPCS: Performed by: STUDENT IN AN ORGANIZED HEALTH CARE EDUCATION/TRAINING PROGRAM

## 2023-08-24 RX ORDER — SODIUM CHLORIDE, SODIUM LACTATE, POTASSIUM CHLORIDE, AND CALCIUM CHLORIDE .6; .31; .03; .02 G/100ML; G/100ML; G/100ML; G/100ML
1000 INJECTION, SOLUTION INTRAVENOUS ONCE
Status: COMPLETED | OUTPATIENT
Start: 2023-08-24 | End: 2023-08-24

## 2023-08-24 RX ORDER — DROPERIDOL 2.5 MG/ML
0.62 INJECTION, SOLUTION INTRAMUSCULAR; INTRAVENOUS EVERY 6 HOURS PRN
Status: DISCONTINUED | OUTPATIENT
Start: 2023-08-24 | End: 2023-08-24 | Stop reason: HOSPADM

## 2023-08-24 RX ORDER — ONDANSETRON 2 MG/ML
4 INJECTION INTRAMUSCULAR; INTRAVENOUS
Status: COMPLETED | OUTPATIENT
Start: 2023-08-24 | End: 2023-08-24

## 2023-08-24 RX ADMIN — DROPERIDOL 0.62 MG: 2.5 INJECTION, SOLUTION INTRAMUSCULAR; INTRAVENOUS at 13:12

## 2023-08-24 RX ADMIN — ONDANSETRON HYDROCHLORIDE 4 MG: 2 INJECTION, SOLUTION INTRAMUSCULAR; INTRAVENOUS at 11:48

## 2023-08-24 RX ADMIN — SODIUM CHLORIDE, POTASSIUM CHLORIDE, SODIUM LACTATE AND CALCIUM CHLORIDE 1000 ML: 600; 310; 30; 20 INJECTION, SOLUTION INTRAVENOUS at 11:24

## 2023-08-24 ASSESSMENT — PAIN SCALES - GENERAL: PAINLEVEL_OUTOF10: 3

## 2023-08-24 ASSESSMENT — PAIN DESCRIPTION - PAIN TYPE: TYPE: ACUTE PAIN

## 2023-08-24 ASSESSMENT — PAIN DESCRIPTION - LOCATION: LOCATION: ABDOMEN

## 2023-08-24 ASSESSMENT — PAIN DESCRIPTION - DESCRIPTORS: DESCRIPTORS: OTHER (COMMENT)

## 2023-08-24 ASSESSMENT — PAIN - FUNCTIONAL ASSESSMENT: PAIN_FUNCTIONAL_ASSESSMENT: 0-10

## 2023-08-24 ASSESSMENT — PAIN DESCRIPTION - ORIENTATION: ORIENTATION: LOWER

## 2023-08-24 ASSESSMENT — PAIN DESCRIPTION - FREQUENCY: FREQUENCY: INTERMITTENT

## 2023-08-24 ASSESSMENT — PAIN DESCRIPTION - ONSET: ONSET: ON-GOING

## 2023-08-24 ASSESSMENT — LIFESTYLE VARIABLES
HOW MANY STANDARD DRINKS CONTAINING ALCOHOL DO YOU HAVE ON A TYPICAL DAY: 1 OR 2
HOW OFTEN DO YOU HAVE A DRINK CONTAINING ALCOHOL: MONTHLY OR LESS

## 2023-08-24 NOTE — ED NOTES
SBAR report to Kim Giron RN. Pt given update on plan of care. VSS. Call bell at bedside.      Les Richardson RN  08/24/23 2154

## 2023-08-24 NOTE — ED PROVIDER NOTES
21     Call in 1 day  Regarding your ER visit    Emergency Department    Go to   If symptoms worsen      DISCHARGE MEDICATIONS:  Discharge Medication List as of 8/24/2023  3:00 PM            (Please note that portions of this note were completed with a voice recognition program.  Efforts were made to edit the dictations but occasionally words are mis-transcribed.)    Anna Rodriguez DO (electronically signed)  Emergency Attending Physician / Physician Assistant / Nurse Practitioner            Anna Rodriguez DO  08/24/23 1951

## 2023-08-24 NOTE — ED TRIAGE NOTES
Patient reports intermittent lower abdominal pain for six weeks with nausea and decreased appetite. Pt adds that she has had a sore throat for a few days and had one episode of diarrhea this morning. Pt denies vaginal discharge or dysuria. Pt states that she is undergoing treatment for anxiety and is unsure of her medications may be attributing to her symptoms.

## 2023-08-25 ENCOUNTER — CARE COORDINATION (OUTPATIENT)
Dept: OTHER | Facility: CLINIC | Age: 53
End: 2023-08-25

## 2023-08-25 NOTE — CARE COORDINATION
Ambulatory Care Coordination Note  2023    Patient Current Location:  Home: Ken Ryan  [address] [place] [address] Drive [id number]     ACM contacted the patient by telephone. Verified name and  with patient as identifiers. Provided introduction to self, and explanation of the ACM role. Challenges to be reviewed by the provider   Additional needs identified to be addressed with provider: No  none               Method of communication with provider: none. ACM: Esequiel Phalen, RN    Ambulatory Care Manager [facility]) contacted the patient follow up on progress, discuss new issues or concerns, and reinforce/provide patient education. Assessment and Education:   Patient  reports the following depression symptoms: depressed mood, feelings of worthlessness/guilt, and hopelessness. Symptoms are reported as has worsened slightly since previous ACM contact. Is patient currently undergoing treatment? Yes. All medications are filled Yes    Psychiatry (med management) seen by Debra Murcia NP on     Today's ACM interventions Education of patient/family/caregiver/guardian to support self-management-Regarding symptoms and medication . Discussed red flags and appropriate site of care based on symptoms and resources available to patient including: Specialist.     Summary Note: Spoke with patient who visited the ED yesterday related to abdominal pain and nausea. She reports that she remains nauseated, which is causing her not to want to eat or drink as she should. She received fluids in the ED, related to dehydration. She saw her medication management provider, MAYELA Rodney on . She decreased her Wellbutrin and plans to wean her off and start her on a different medication to stop the nausea. Patient voices that she still struggles with depression, but is able to use her coping skills to get through the day. ACM provided contact information for future needs.  Plan for follow-up call in 10-14 days based on severity of

## 2023-08-26 ENCOUNTER — ENROLLMENT (OUTPATIENT)
Dept: OTHER | Facility: CLINIC | Age: 53
End: 2023-08-26

## 2023-08-28 ENCOUNTER — CARE COORDINATION (OUTPATIENT)
Dept: OTHER | Facility: CLINIC | Age: 53
End: 2023-08-28

## 2023-08-28 ENCOUNTER — HOSPITAL ENCOUNTER (EMERGENCY)
Facility: HOSPITAL | Age: 53
Discharge: HOME | End: 2023-08-28
Attending: STUDENT IN AN ORGANIZED HEALTH CARE EDUCATION/TRAINING PROGRAM
Payer: COMMERCIAL

## 2023-08-28 VITALS
DIASTOLIC BLOOD PRESSURE: 96 MMHG | HEART RATE: 91 BPM | HEIGHT: 63 IN | WEIGHT: 176.59 LBS | SYSTOLIC BLOOD PRESSURE: 144 MMHG | BODY MASS INDEX: 31.29 KG/M2 | RESPIRATION RATE: 16 BRPM | OXYGEN SATURATION: 95 % | TEMPERATURE: 98.7 F

## 2023-08-28 DIAGNOSIS — F41.1 ANXIETY STATE: Primary | ICD-10-CM

## 2023-08-28 LAB
EKG ATRIAL RATE: 64 BPM
EKG DIAGNOSIS: NORMAL
EKG P AXIS: 50 DEGREES
EKG P-R INTERVAL: 122 MS
EKG Q-T INTERVAL: 412 MS
EKG QRS DURATION: 74 MS
EKG QTC CALCULATION (BAZETT): 425 MS
EKG R AXIS: 16 DEGREES
EKG T AXIS: 34 DEGREES
EKG VENTRICULAR RATE: 64 BPM

## 2023-08-28 PROCEDURE — 99283 EMERGENCY DEPT VISIT LOW MDM: CPT

## 2023-08-28 PROCEDURE — 90791 PSYCH DIAGNOSTIC EVALUATION: CPT

## 2023-08-28 PROCEDURE — 6370000000 HC RX 637 (ALT 250 FOR IP): Performed by: STUDENT IN AN ORGANIZED HEALTH CARE EDUCATION/TRAINING PROGRAM

## 2023-08-28 RX ORDER — LORAZEPAM 0.5 MG/1
0.5 TABLET ORAL 3 TIMES DAILY PRN
Qty: 12 TABLET | Refills: 0 | Status: SHIPPED | OUTPATIENT
Start: 2023-08-28 | End: 2023-09-02

## 2023-08-28 RX ORDER — LORAZEPAM 0.5 MG/1
1 TABLET ORAL ONCE
Status: COMPLETED | OUTPATIENT
Start: 2023-08-28 | End: 2023-08-28

## 2023-08-28 RX ADMIN — LORAZEPAM 1 MG: 0.5 TABLET ORAL at 11:07

## 2023-08-28 ASSESSMENT — PAIN SCALES - GENERAL
PAINLEVEL_OUTOF10: 0
PAINLEVEL_OUTOF10: 0

## 2023-08-28 ASSESSMENT — PAIN - FUNCTIONAL ASSESSMENT: PAIN_FUNCTIONAL_ASSESSMENT: 0-10

## 2023-08-28 ASSESSMENT — ENCOUNTER SYMPTOMS: SHORTNESS OF BREATH: 0

## 2023-08-28 NOTE — BSMART NOTE
BSMART assessment completed, and suicide risk level noted to be no risk. Charge Nurse, Kris Wong and Physician, Dr. Kacy Collins notified. Concerns not observed. Security/Off- has not been notified.
BSMART was made aware of consult
homicide is not noted. The patient has not been a perpetrator of sexual or physical abuse. There are not pending charges. The patient is not felt to be at risk for self harm or harm to others. Section III - Psychosocial  The patient's overall mood and attitude is mildly anxious. Feelings of helplessness and hopelessness are not observed. Generalized anxiety is observed by self-report and tense body language. Panic is not observed. Phobias are not observed. Obsessive compulsive tendencies are not observed. Section IV - Mental Status Exam  The patient's appearance is tense. The patient's behavior shows no evidence of impairment. The patient is oriented to time, place, person and situation. The patient's speech shows no evidence of impairment. The patient's mood is mildly anxious. The range of affect is flat. The patient's thought content demonstrates no evidence of impairment . The thought process shows no evidence of impairment. The patient's perception shows no evidence of impairment. The patient's memory shows no evidence of impairment. The patient's appetite shows no evidence of impairment . The patient's sleep shows evidence of insomnia. The patient's insight shows no evidence of impairment. The patient's judgement shows no evidence of impairment. Section V - Substance Abuse  The patient is not using substances. Section VI - Living Arrangements  The patient . The patient lives part-time with her 15year old son. The patient has a 15year old son and 2 adult daughters. The patient does plan to return home upon discharge. The patient does not have legal issues pending. The patient's source of income comes from employment. Evangelical and cultural practices have not been voiced at this time. The patient's greatest support comes from her friend, Sera Gabino and this person will not be involved with the treatment.     The patient has not been in an event described as horrible

## 2023-08-28 NOTE — ED PROVIDER NOTES
[facility] EMERGENCY DEP  EMERGENCY DEPARTMENT ENCOUNTER      Pt Name: Jhonathan Reeves  MRN: [id number]  [address] [facility] [place] 1970  Date of evaluation: 2023  Provider: Jami Albrecht MD    CHIEF COMPLAINT       Chief Complaint   Patient presents with    Anxiety         HISTORY OF PRESENT ILLNESS   51-year-old female with history of anxiety and depression presents to the ED with chief complaint of severe anxiety worsening over the past several days. Patient was admitted to the hospital for anxiety on  through 7/15. She has been started on Paxil, initially at 20 mg but this was too high of a dose and has been tapered back to 10 mg prompting her anxiety symptoms to worsen. She takes hydroxyzine at home as needed for anxiety, most recent dose several hours ago, but says this has not been helping. She attributes her anxiety to recently having a relationship and. She denies any suicidal or homicidal ideations, denies any auditory or visual hallucinations. She says she is in the ER currently because she feels she may need to be admitted to the hospital again. No chest pain, difficulty breathing, or any other symptoms. The history is provided by the patient. Review of External Medical Records:     Nursing Notes were reviewed. REVIEW OF SYSTEMS       Review of Systems   Respiratory:  Negative for shortness of breath. Cardiovascular:  Negative for chest pain. Except as noted above the remainder of the review of systems was reviewed and negative.        PAST MEDICAL HISTORY     Past Medical History:   Diagnosis Date    Anxiety     Back pain, acute     Depression     Endocrine disease     hypothyroid    H/O seasonal allergies     Maxillary sinus fracture ([address]) 2016    Neck pain, musculoskeletal          SURGICAL HISTORY       Past Surgical History:   Procedure Laterality Date    CERVICAL DISCECTOMY Left 2015    CERVICAL FUSION Left 2015      SECTION      COLONOSCOPY N/A

## 2023-08-28 NOTE — ED TRIAGE NOTES
Pt reports increased anxiety and depression x 6 weeks. States her therapist, Avery Paulson, is weaning her off Paxil currently on 10 mg. Pt denies SI/ HI, AV/VH. Pt states she and her ex  have been trying to work things out and most recently he has decided he doesn't want to.

## 2023-08-28 NOTE — ED NOTES

## 2023-08-28 NOTE — ED NOTES
Pt sitting in bed and eating a snack. No distress noted at this time. Will continue to monitor.      ANDRES Stroud  08/28/23 0411

## 2023-08-28 NOTE — ED NOTES
Patient given warm blanket and is resting quietly on stretcher. RN will continue to monitor.      Shyla Wong, ANDRES  08/28/23 2673

## 2023-08-28 NOTE — ED NOTES
Patient signed out to me by overnight team at 7 AM.  Patient presenting with anxiety in the setting of recent medication changes. Recent admission for the same. Not SI or HI. Plan for behavioral health evaluation and likely discharge. While awaiting behavioral health evaluation patient starting to feel anxious. 1 dose of oral Ativan given. Seen by behavioral health counselor. No indication for admission at this time. Okay for discharge home. Plan outlined between behavioral health and patient. Pt requesting something for anxiety. Atarax not working. Will give short course of ativan 0.5mg q8 prn.  Discussed safety instructions regarding this medication    Jacky Willis, DO       [facility], DO  08/28/23 1430 [facility], DO  08/28/23 1226

## 2023-08-30 ENCOUNTER — CLINICAL DOCUMENTATION (OUTPATIENT)
Age: 53
End: 2023-08-30

## 2023-08-30 ENCOUNTER — OFFICE VISIT (OUTPATIENT)
Age: 53
End: 2023-08-30
Payer: COMMERCIAL

## 2023-08-30 VITALS
TEMPERATURE: 98 F | SYSTOLIC BLOOD PRESSURE: 130 MMHG | HEIGHT: 63 IN | HEART RATE: 103 BPM | RESPIRATION RATE: 16 BRPM | BODY MASS INDEX: 30.69 KG/M2 | OXYGEN SATURATION: 98 % | WEIGHT: 173.2 LBS | DIASTOLIC BLOOD PRESSURE: 94 MMHG

## 2023-08-30 DIAGNOSIS — F33.1 MODERATE EPISODE OF RECURRENT MAJOR DEPRESSIVE DISORDER (HCC): Primary | ICD-10-CM

## 2023-08-30 DIAGNOSIS — F41.9 ANXIETY DISORDER, UNSPECIFIED TYPE: ICD-10-CM

## 2023-08-30 DIAGNOSIS — G47.00 INSOMNIA, UNSPECIFIED TYPE: ICD-10-CM

## 2023-08-30 PROCEDURE — 99214 OFFICE O/P EST MOD 30 MIN: CPT | Performed by: NURSE PRACTITIONER

## 2023-08-30 ASSESSMENT — PATIENT HEALTH QUESTIONNAIRE - PHQ9
SUM OF ALL RESPONSES TO PHQ QUESTIONS 1-9: 22
2. FEELING DOWN, DEPRESSED OR HOPELESS: 3
1. LITTLE INTEREST OR PLEASURE IN DOING THINGS: 1
6. FEELING BAD ABOUT YOURSELF - OR THAT YOU ARE A FAILURE OR HAVE LET YOURSELF OR YOUR FAMILY DOWN: 3
4. FEELING TIRED OR HAVING LITTLE ENERGY: 3
SUM OF ALL RESPONSES TO PHQ QUESTIONS 1-9: 22
SUM OF ALL RESPONSES TO PHQ9 QUESTIONS 1 & 2: 4
8. MOVING OR SPEAKING SO SLOWLY THAT OTHER PEOPLE COULD HAVE NOTICED. OR THE OPPOSITE, BEING SO FIGETY OR RESTLESS THAT YOU HAVE BEEN MOVING AROUND A LOT MORE THAN USUAL: 3
7. TROUBLE CONCENTRATING ON THINGS, SUCH AS READING THE NEWSPAPER OR WATCHING TELEVISION: 3
SUM OF ALL RESPONSES TO PHQ QUESTIONS 1-9: 22
3. TROUBLE FALLING OR STAYING ASLEEP: 3
5. POOR APPETITE OR OVEREATING: 3
9. THOUGHTS THAT YOU WOULD BE BETTER OFF DEAD, OR OF HURTING YOURSELF: 0
SUM OF ALL RESPONSES TO PHQ QUESTIONS 1-9: 22
10. IF YOU CHECKED OFF ANY PROBLEMS, HOW DIFFICULT HAVE THESE PROBLEMS MADE IT FOR YOU TO DO YOUR WORK, TAKE CARE OF THINGS AT HOME, OR GET ALONG WITH OTHER PEOPLE: 2

## 2023-08-30 NOTE — PROGRESS NOTES
CHIEF COMPLAINT:  Madelyn Baldwin is a 48 y.o.  and newly  from ex- (July, 2023) female, mother of 3 children (Daughters 27 & 25, son, 15 y/o) and is domiciled independently with half time spent with son each week. Today she is scheduled for a follow-up appointment to discuss mood symptom management associated with historical diagnoses of anxiety, MDD, recurrent, moderate, and insomnia with psychotropic medication management. *Last in [facility] office- 7/20/23; Prozac 40 mg and Wellbutrin 100 mg SR BID; Psych Hosp- 7/13 to 7/15 (AMA) for depression with SI; Wellbutrin discontinued on 8/1; Paxil 10 mg started on 8/2 by [facility]; 8/1: ED visit for increased anxiety with nausea. Of note, history of suicide in family- maternal uncle. ED visit: 8/24- Nausea, anxiety; 8/28- Nausea, anxiety    HPI:    Today, 8/30/23, Carlos Hicks states, \"I'm doing a little better. \" She attends on time and is dressed casually and appropriately for the weather. MOOD is described as \"less depression, not so flat or like a zombie. \" She denies SI plan or intent. Denies self harm behaviours. Identifies family and Sabianism as protective factors. She continues with [employer] and [employer] on Sunday albeit only able to stay half the service d/t anxiety. ANXIETY continues but has lessened in severity and frequency. She has returned to work on 8/29 and reports benefit from being around co-workers and re-engaging in work routine. She was prescribed #12 tablets Ativan 0.5 mg tablets prn on 8/28 at ED visit. To date, she reports taking 0.25mg on morning of 8/28 prior to work with no additional doses. Carlos Hicks brought with her a paper detailing anxiety, depression, activities, sleep, and appetite tracked since last appt on 8/24/23. Pt reports return of anxiety at end of work day on 8/29 due to thoughts of returning home as she \"doesn't like the house being quiet or being alone. \" Anxiety sxs worsened by worry related to finances, not being able to

## 2023-08-31 ENCOUNTER — TELEPHONE (OUTPATIENT)
Age: 53
End: 2023-08-31

## 2023-09-01 ENCOUNTER — TELEPHONE (OUTPATIENT)
Age: 53
End: 2023-09-01

## 2023-09-01 ENCOUNTER — OFFICE VISIT (OUTPATIENT)
Dept: PRIMARY CARE CLINIC | Facility: CLINIC | Age: 53
End: 2023-09-01
Payer: COMMERCIAL

## 2023-09-01 ENCOUNTER — HOSPITAL ENCOUNTER (EMERGENCY)
Facility: HOSPITAL | Age: 53
Discharge: PSYCHIATRIC OR SUBSTANCE USE FACILITY | End: 2023-09-01
Attending: EMERGENCY MEDICINE
Payer: COMMERCIAL

## 2023-09-01 ENCOUNTER — HOSPITAL ENCOUNTER (INPATIENT)
Facility: HOSPITAL | Age: 53
LOS: 6 days | Discharge: HOME | DRG: 885 | End: 2023-09-07
Attending: PSYCHIATRY & NEUROLOGY | Admitting: PSYCHIATRY & NEUROLOGY
Payer: COMMERCIAL

## 2023-09-01 VITALS
HEIGHT: 63 IN | BODY MASS INDEX: 30.65 KG/M2 | OXYGEN SATURATION: 96 % | WEIGHT: 173 LBS | SYSTOLIC BLOOD PRESSURE: 144 MMHG | HEART RATE: 95 BPM | DIASTOLIC BLOOD PRESSURE: 107 MMHG | RESPIRATION RATE: 18 BRPM | TEMPERATURE: 98.1 F

## 2023-09-01 VITALS
BODY MASS INDEX: 30.65 KG/M2 | HEIGHT: 63 IN | HEART RATE: 104 BPM | TEMPERATURE: 97.1 F | RESPIRATION RATE: 18 BRPM | OXYGEN SATURATION: 98 % | WEIGHT: 173 LBS | SYSTOLIC BLOOD PRESSURE: 133 MMHG | DIASTOLIC BLOOD PRESSURE: 89 MMHG

## 2023-09-01 DIAGNOSIS — R45.89 DEPRESSED MOOD: ICD-10-CM

## 2023-09-01 DIAGNOSIS — G47.00 INSOMNIA, UNSPECIFIED TYPE: ICD-10-CM

## 2023-09-01 DIAGNOSIS — F32.A DEPRESSION, UNSPECIFIED DEPRESSION TYPE: Primary | ICD-10-CM

## 2023-09-01 DIAGNOSIS — F41.9 SEVERE ANXIETY: Primary | ICD-10-CM

## 2023-09-01 DIAGNOSIS — F41.9 ANXIETY DISORDER, UNSPECIFIED TYPE: Primary | ICD-10-CM

## 2023-09-01 LAB
ALBUMIN SERPL-MCNC: 4.2 G/DL (ref 3.5–5)
ALBUMIN/GLOB SERPL: 1.1 (ref 1.1–2.2)
ALP SERPL-CCNC: 82 U/L (ref 45–117)
ALT SERPL-CCNC: 31 U/L (ref 12–78)
AMPHET UR QL SCN: NEGATIVE
ANION GAP SERPL CALC-SCNC: 3 MMOL/L (ref 5–15)
APPEARANCE UR: ABNORMAL
AST SERPL-CCNC: 17 U/L (ref 15–37)
BACTERIA URNS QL MICRO: ABNORMAL /HPF
BARBITURATES UR QL SCN: NEGATIVE
BASOPHILS # BLD: 0.1 K/UL (ref 0–0.1)
BASOPHILS NFR BLD: 1 % (ref 0–1)
BENZODIAZ UR QL: NEGATIVE
BILIRUB SERPL-MCNC: 0.3 MG/DL (ref 0.2–1)
BILIRUB UR QL: NEGATIVE
BUN SERPL-MCNC: 16 MG/DL (ref 6–20)
BUN/CREAT SERPL: 19 (ref 12–20)
CALCIUM SERPL-MCNC: 9.6 MG/DL (ref 8.5–10.1)
CANNABINOIDS UR QL SCN: NEGATIVE
CHLORIDE SERPL-SCNC: 106 MMOL/L (ref 97–108)
CO2 SERPL-SCNC: 27 MMOL/L (ref 21–32)
COCAINE UR QL SCN: NEGATIVE
COLOR UR: ABNORMAL
CREAT SERPL-MCNC: 0.86 MG/DL (ref 0.55–1.02)
DIFFERENTIAL METHOD BLD: ABNORMAL
EOSINOPHIL # BLD: 0.1 K/UL (ref 0–0.4)
EOSINOPHIL NFR BLD: 1 % (ref 0–7)
EPITH CASTS URNS QL MICRO: ABNORMAL /LPF
ERYTHROCYTE [DISTWIDTH] IN BLOOD BY AUTOMATED COUNT: 12.8 % (ref 11.5–14.5)
ETHANOL SERPL-MCNC: <10 MG/DL (ref 0–0.08)
GLOBULIN SER CALC-MCNC: 4 G/DL (ref 2–4)
GLUCOSE SERPL-MCNC: 96 MG/DL (ref 65–100)
GLUCOSE UR STRIP.AUTO-MCNC: NEGATIVE MG/DL
HCG UR QL: NEGATIVE
HCT VFR BLD AUTO: 40.9 % (ref 35–47)
HGB BLD-MCNC: 13.3 G/DL (ref 11.5–16)
HGB UR QL STRIP: NEGATIVE
IMM GRANULOCYTES # BLD AUTO: 0 K/UL (ref 0–0.04)
IMM GRANULOCYTES NFR BLD AUTO: 0 % (ref 0–0.5)
KETONES UR QL STRIP.AUTO: NEGATIVE MG/DL
LEUKOCYTE ESTERASE UR QL STRIP.AUTO: ABNORMAL
LYMPHOCYTES # BLD: 2.3 K/UL (ref 0.8–3.5)
LYMPHOCYTES NFR BLD: 32 % (ref 12–49)
Lab: NORMAL
MCH RBC QN AUTO: 28.5 PG (ref 26–34)
MCHC RBC AUTO-ENTMCNC: 32.5 G/DL (ref 30–36.5)
MCV RBC AUTO: 87.8 FL (ref 80–99)
METHADONE UR QL: NEGATIVE
MONOCYTES # BLD: 0.6 K/UL (ref 0–1)
MONOCYTES NFR BLD: 9 % (ref 5–13)
NEUTS SEG # BLD: 4 K/UL (ref 1.8–8)
NEUTS SEG NFR BLD: 57 % (ref 32–75)
NITRITE UR QL STRIP.AUTO: NEGATIVE
NRBC # BLD: 0 K/UL (ref 0–0.01)
NRBC BLD-RTO: 0 PER 100 WBC
OPIATES UR QL: NEGATIVE
PCP UR QL: NEGATIVE
PH UR STRIP: 6.5 (ref 5–8)
PLATELET # BLD AUTO: 435 K/UL (ref 150–400)
PMV BLD AUTO: 9.3 FL (ref 8.9–12.9)
POTASSIUM SERPL-SCNC: 4 MMOL/L (ref 3.5–5.1)
PROT SERPL-MCNC: 8.2 G/DL (ref 6.4–8.2)
PROT UR STRIP-MCNC: NEGATIVE MG/DL
RBC # BLD AUTO: 4.66 M/UL (ref 3.8–5.2)
RBC #/AREA URNS HPF: ABNORMAL /HPF (ref 0–5)
SARS-COV-2 RDRP RESP QL NAA+PROBE: NOT DETECTED
SODIUM SERPL-SCNC: 136 MMOL/L (ref 136–145)
SOURCE: NORMAL
SP GR UR REFRACTOMETRY: 1.01 (ref 1–1.03)
UROBILINOGEN UR QL STRIP.AUTO: 0.2 EU/DL (ref 0.2–1)
WBC # BLD AUTO: 7.2 K/UL (ref 3.6–11)
WBC URNS QL MICRO: ABNORMAL /HPF (ref 0–4)

## 2023-09-01 PROCEDURE — 87077 CULTURE AEROBIC IDENTIFY: CPT

## 2023-09-01 PROCEDURE — 82077 ASSAY SPEC XCP UR&BREATH IA: CPT

## 2023-09-01 PROCEDURE — 99285 EMERGENCY DEPT VISIT HI MDM: CPT

## 2023-09-01 PROCEDURE — 1240000000 HC EMOTIONAL WELLNESS R&B

## 2023-09-01 PROCEDURE — 81025 URINE PREGNANCY TEST: CPT

## 2023-09-01 PROCEDURE — 87086 URINE CULTURE/COLONY COUNT: CPT

## 2023-09-01 PROCEDURE — 99214 OFFICE O/P EST MOD 30 MIN: CPT | Performed by: FAMILY MEDICINE

## 2023-09-01 PROCEDURE — 87635 SARS-COV-2 COVID-19 AMP PRB: CPT

## 2023-09-01 PROCEDURE — 80307 DRUG TEST PRSMV CHEM ANLYZR: CPT

## 2023-09-01 PROCEDURE — 90791 PSYCH DIAGNOSTIC EVALUATION: CPT

## 2023-09-01 PROCEDURE — 36415 COLL VENOUS BLD VENIPUNCTURE: CPT

## 2023-09-01 PROCEDURE — 81001 URINALYSIS AUTO W/SCOPE: CPT

## 2023-09-01 PROCEDURE — 85025 COMPLETE CBC W/AUTO DIFF WBC: CPT

## 2023-09-01 PROCEDURE — 87186 SC STD MICRODIL/AGAR DIL: CPT

## 2023-09-01 PROCEDURE — 80053 COMPREHEN METABOLIC PANEL: CPT

## 2023-09-01 RX ORDER — BUSPIRONE HYDROCHLORIDE 5 MG/1
5 TABLET ORAL 2 TIMES DAILY
Qty: 60 TABLET | Refills: 0 | Status: SHIPPED | OUTPATIENT
Start: 2023-09-01 | End: 2023-10-01

## 2023-09-01 RX ORDER — MULTIVIT-MIN/IRON/FOLIC ACID/K 18-600-40
CAPSULE ORAL
COMMUNITY

## 2023-09-01 ASSESSMENT — COLUMBIA-SUICIDE SEVERITY RATING SCALE - C-SSRS
2. HAVE YOU ACTUALLY HAD ANY THOUGHTS OF KILLING YOURSELF?: NO
6. HAVE YOU EVER DONE ANYTHING, STARTED TO DO ANYTHING, OR PREPARED TO DO ANYTHING TO END YOUR LIFE?: NO
1. WITHIN THE PAST MONTH, HAVE YOU WISHED YOU WERE DEAD OR WISHED YOU COULD GO TO SLEEP AND NOT WAKE UP?: NO

## 2023-09-01 ASSESSMENT — PATIENT HEALTH QUESTIONNAIRE - PHQ9
3. TROUBLE FALLING OR STAYING ASLEEP: 1
SUM OF ALL RESPONSES TO PHQ QUESTIONS 1-9: 21
6. FEELING BAD ABOUT YOURSELF - OR THAT YOU ARE A FAILURE OR HAVE LET YOURSELF OR YOUR FAMILY DOWN: 1
3. TROUBLE FALLING OR STAYING ASLEEP: 3
SUM OF ALL RESPONSES TO PHQ QUESTIONS 1-9: 21
SUM OF ALL RESPONSES TO PHQ QUESTIONS 1-9: 7
10. IF YOU CHECKED OFF ANY PROBLEMS, HOW DIFFICULT HAVE THESE PROBLEMS MADE IT FOR YOU TO DO YOUR WORK, TAKE CARE OF THINGS AT HOME, OR GET ALONG WITH OTHER PEOPLE: 1
5. POOR APPETITE OR OVEREATING: 1
4. FEELING TIRED OR HAVING LITTLE ENERGY: 3
2. FEELING DOWN, DEPRESSED OR HOPELESS: 2
2. FEELING DOWN, DEPRESSED OR HOPELESS: 3
8. MOVING OR SPEAKING SO SLOWLY THAT OTHER PEOPLE COULD HAVE NOTICED. OR THE OPPOSITE, BEING SO FIGETY OR RESTLESS THAT YOU HAVE BEEN MOVING AROUND A LOT MORE THAN USUAL: 0
6. FEELING BAD ABOUT YOURSELF - OR THAT YOU ARE A FAILURE OR HAVE LET YOURSELF OR YOUR FAMILY DOWN: 3
SUM OF ALL RESPONSES TO PHQ9 QUESTIONS 1 & 2: 6
4. FEELING TIRED OR HAVING LITTLE ENERGY: 1
9. THOUGHTS THAT YOU WOULD BE BETTER OFF DEAD, OR OF HURTING YOURSELF: 0
SUM OF ALL RESPONSES TO PHQ QUESTIONS 1-9: 7
1. LITTLE INTEREST OR PLEASURE IN DOING THINGS: 1
7. TROUBLE CONCENTRATING ON THINGS, SUCH AS READING THE NEWSPAPER OR WATCHING TELEVISION: 3
1. LITTLE INTEREST OR PLEASURE IN DOING THINGS: 3
5. POOR APPETITE OR OVEREATING: 3
SUM OF ALL RESPONSES TO PHQ9 QUESTIONS 1 & 2: 3
SUM OF ALL RESPONSES TO PHQ QUESTIONS 1-9: 21
8. MOVING OR SPEAKING SO SLOWLY THAT OTHER PEOPLE COULD HAVE NOTICED. OR THE OPPOSITE, BEING SO FIGETY OR RESTLESS THAT YOU HAVE BEEN MOVING AROUND A LOT MORE THAN USUAL: 0
10. IF YOU CHECKED OFF ANY PROBLEMS, HOW DIFFICULT HAVE THESE PROBLEMS MADE IT FOR YOU TO DO YOUR WORK, TAKE CARE OF THINGS AT HOME, OR GET ALONG WITH OTHER PEOPLE: 2
7. TROUBLE CONCENTRATING ON THINGS, SUCH AS READING THE NEWSPAPER OR WATCHING TELEVISION: 0
SUM OF ALL RESPONSES TO PHQ QUESTIONS 1-9: 21
9. THOUGHTS THAT YOU WOULD BE BETTER OFF DEAD, OR OF HURTING YOURSELF: 0

## 2023-09-01 ASSESSMENT — ENCOUNTER SYMPTOMS
VOMITING: 0
ABDOMINAL PAIN: 0
NAUSEA: 0
BACK PAIN: 0

## 2023-09-01 ASSESSMENT — PAIN SCALES - GENERAL: PAINLEVEL_OUTOF10: 0

## 2023-09-01 ASSESSMENT — PAIN - FUNCTIONAL ASSESSMENT: PAIN_FUNCTIONAL_ASSESSMENT: 0-10

## 2023-09-01 NOTE — ED TRIAGE NOTES
Pt arrives ambulatory to ER as a referral from her psychologist for feelings of depression. Denies SI or HI.

## 2023-09-01 NOTE — BSMART NOTE
Comprehensive Assessment Form Part 1        Section I - Disposition     Major Depressive Disorder, recurrent without psychosis  Generalized Anxiety Disorder     No past medical history on file. The Medical Doctor to Psychiatrist conference was notcompleted. The Medical Doctor is in agreement with Psychiatrist disposition because of (reason) pt meeting voluntary psychiatric admission criteria. The plan is voluntary psychiatric admission. The on-call Psychiatrist consulted was Dr. Nellie Downing  The admitting Psychiatrist will be Dr. Marbella Mohamud. The admitting Diagnosis is MDD. The Payor source is [facility]. Based on the [place] Suicide Severity Risk Level  Scale there is LOW risk for suicide. Based on this assessment the overall risk of suicide is LOW. The plan will be voluntary psychiatric admission. Section II - Integrated Summary  Summary:  Per triage, \"Pt arrives ambulatory to ER as a referral from her psychologist for feelings of depression. Denies SI or HI. \"    Pt arrived to ER at advisement of Mary Phoenix, DO after she saw her this morning reporting after returning to work this week from [facility] that she has increased anxiety and panic attacks that have led to x4 days of insomnia with no sleep. Pt seen face to face at bedside. Pt is A&Ox4. Pt is ambulatory and utilizes no assistive devices to breathe, sleep or walk and can do own ADLs. Pt denied HI and AVH. Pt shared decreased appetite with weight loss last few months. Pt reported not sleeping the last x4 nights not even one minute even after taking the Ativan prescribed in ER 8/28/2023 when she was evaluated for increased anxiety. Pt denied regular etoh use and shared no illicit drug or tobacco use. Pt shared her PMHNP has been weaning her off Paxil and it has been a difficult journey. Buspar called into pharmacy today. Pt shared she returned to work Monday and the stress is causing her to have panic attacks.  Pt shared she has learned to breathe, impairment. The patient's behavior shows good eye contact. The patient is oriented to time, place, person and situation. The patient's speech is soft. The patient's mood is withdrawn. The range of affect is flat. The patient's thought content demonstrates no evidence of impairment . The thought process shows no evidence of impairment. The patient's perception shows no evidence of impairment. The patient's memory shows no evidence of impairment. The patient's appetite is decreased and shows signs of weight loss . The patient's sleep has evidence of insomnia. The patient shows little insight. The patient's judgement is psychologically impaired. Section V - Substance Abuse  The patient is  using substances. The patient is using alcohol for greater than 10 years with last use on 2 weeks ago. The patient has experienced the following withdrawal symptoms: N/A. Section VI - Living Arrangements  The patient is seprarted. The patient lives with a child/children. The patient has x3 kids ages 27, 25 and 15 (will stay with family while mother inpatient). The patient does plan to return home upon discharge. The patient does not have legal issues pending. The patient's source of income comes from employment/[employer]. Advent and cultural practices have not been voiced at this time. The patient's greatest support comes from family/friend and this person will be involved with the treatment. The patient has not been in an event described as horrible or outside the realm of ordinary life experience either currently or in the past.  The patient has not been a victim of sexual/physical abuse. 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 The patient is currently employed and speaks [place] as a primary language.   The patient has no communication impairments affecting

## 2023-09-01 NOTE — BSMART NOTE
BSMART assessment completed, and suicide risk level noted to be LOW. Primary Nurse NA and Charge Nurse Worthy Nicely and Physician NA notified. Concerns not observed. Security/Off- has not been notified.

## 2023-09-01 NOTE — TELEPHONE ENCOUNTER
Left patient a voicemail message in regards to RX script Buspar. Per provider, Buspar 5mg tablets has been sent to preferred pharmacy.      Preferred pharmacy:  [address] [phone] - f [phone]     Last visit:08/30/23  Next visit:09/27/23

## 2023-09-01 NOTE — ED PROVIDER NOTES
[address] EMERGENCY DEP  EMERGENCY DEPARTMENT ENCOUNTER      Pt Name: Aliyah Mao  MRN: [id number]  [address] 1970  Date of evaluation: 9/1/2023  Provider: MAYELA Syed - NP    [address]       Chief Complaint   Patient presents with    Mental Health Problem         HISTORY OF PRESENT ILLNESS   (Location/Symptom, Timing/Onset, Context/Setting, Quality, Duration, Modifying Factors, Severity)  Note limiting factors. HPI  Patient is a 78-year-old female with past medical history significant for depression and anxiety who presents to the ED reporting that her doctor sent her to be evaluated for admission for worsening depression, insomnia and intermittent suicidal thoughts. Denies fever, cold symptoms, headache, neck pain, visual changes, focal weakness or rash. Denies any difficulty breathing, difficulty swallowing, SOB or chest pain. Denies any nausea, vomiting or diarrhea. Pt. Reports having breakfast prior to arrival.  Dr. Mulu Crystal (PCP)   Sharon Garner NP (psychiatry)       Review of External Medical Records:     Nursing Notes were reviewed. REVIEW OF SYSTEMS    (2-9 systems for level 4, 10 or more for level 5)     Review of Systems   Constitutional:  Negative for activity change and appetite change. Cardiovascular:  Negative for chest pain, palpitations and leg swelling. Gastrointestinal:  Negative for abdominal pain, nausea and vomiting. Musculoskeletal:  Negative for back pain and neck pain. Psychiatric/Behavioral:  Positive for dysphoric mood, sleep disturbance and suicidal ideas. The patient is nervous/anxious. All other systems reviewed and are negative. Except as noted above the remainder of the review of systems was reviewed and negative. PAST MEDICAL HISTORY   No past medical history on file. SURGICAL HISTORY     No past surgical history on file.       CURRENT MEDICATIONS       Previous Medications    No medications on file       ALLERGIES     Latex and that portions of this note were completed with a voice recognition program.  Efforts were made to edit the dictations but occasionally words are mis-transcribed.)    MAYELA Roland NP (electronically signed)  Emergency Attending Physician / Physician Assistant / Nurse Practitioner             MAYELA Roland NP  09/01/23 8353

## 2023-09-01 NOTE — TELEPHONE ENCOUNTER
9/1/23, 5417  Underseugenio received verbal message from MIKE Jonas MA that pt attended PCP appt on 9/1/23 for cc of Anxiety. Undersigned called pt on mobile number and pt stated she was driving to [facility] ED for possible inpatient psych admission. Pt stated she has not been sleeping well since 8/28/23. She reported back to work on 8/29 and found it helpful for anxiety. She attended follow-up appt with undersigned on 8/30 and she reported improvement with anxiety but with continued sleep issues. At that time, pt was instructed to resume full dose Ambien 12.5 mg to optimize sleep. She worked on 8/31 but found it to be too difficult to be there. She states she has taken a couple of tablets of Ativan since Monday 8/28 ED visit; it was helpful at first but now minimally effective. Pt stated she sent additional FMLA paperwork to this office to be completed to support leave from work for hospitalization. Will continue to monitor pt over the next couple of days/weeks via Epic encounters, notes, and lab results. Pt is scheduled for follow-up with this provider on 9/27/23.

## 2023-09-01 NOTE — BSMART NOTE
Pt accepted by Dr. Arsalan Langston to THE [facility] to room 102-1. Nursing report [phone]. Nursing charge/Reta updated with admission.

## 2023-09-01 NOTE — ED NOTES
TRANSFER - OUT REPORT:    Verbal report given to Poonam Cali on Rohith Johnson  being transferred to [facility] for routine progression of patient care       Report consisted of patient's Situation, Background, Assessment and   Recommendations(SBAR). Information from the following report(s) ED Encounter Summary and ED SBAR was reviewed with the receiving nurse. Kinder Fall Assessment:                           Lines:       Opportunity for questions and clarification was provided.       Patient transported with:  ANDRES Encinas  09/01/23 2147

## 2023-09-02 PROBLEM — I10 HTN (HYPERTENSION): Status: ACTIVE | Noted: 2023-09-02

## 2023-09-02 PROBLEM — E03.9 HYPOTHYROIDISM: Status: ACTIVE | Noted: 2023-09-02

## 2023-09-02 PROBLEM — F32.A DEPRESSION: Status: ACTIVE | Noted: 2023-09-02

## 2023-09-02 LAB — TSH SERPL DL<=0.05 MIU/L-ACNC: 1.46 UIU/ML (ref 0.36–3.74)

## 2023-09-02 PROCEDURE — 1240000000 HC EMOTIONAL WELLNESS R&B

## 2023-09-02 PROCEDURE — 6370000000 HC RX 637 (ALT 250 FOR IP): Performed by: PSYCHIATRY & NEUROLOGY

## 2023-09-02 PROCEDURE — 6370000000 HC RX 637 (ALT 250 FOR IP): Performed by: HOSPITALIST

## 2023-09-02 PROCEDURE — 36415 COLL VENOUS BLD VENIPUNCTURE: CPT

## 2023-09-02 PROCEDURE — 84443 ASSAY THYROID STIM HORMONE: CPT

## 2023-09-02 RX ORDER — TRAZODONE HYDROCHLORIDE 50 MG/1
50 TABLET ORAL NIGHTLY PRN
Status: DISCONTINUED | OUTPATIENT
Start: 2023-09-02 | End: 2023-09-07 | Stop reason: HOSPADM

## 2023-09-02 RX ORDER — PAROXETINE HYDROCHLORIDE 20 MG/1
10 TABLET, FILM COATED ORAL DAILY
Status: DISCONTINUED | OUTPATIENT
Start: 2023-09-02 | End: 2023-09-05

## 2023-09-02 RX ORDER — POLYETHYLENE GLYCOL 3350 17 G/17G
17 POWDER, FOR SOLUTION ORAL DAILY PRN
Status: DISCONTINUED | OUTPATIENT
Start: 2023-09-02 | End: 2023-09-07 | Stop reason: HOSPADM

## 2023-09-02 RX ORDER — ACETAMINOPHEN 160 MG
TABLET,DISINTEGRATING ORAL
COMMUNITY

## 2023-09-02 RX ORDER — QUETIAPINE FUMARATE 50 MG/1
50 TABLET, FILM COATED ORAL NIGHTLY
Status: DISCONTINUED | OUTPATIENT
Start: 2023-09-02 | End: 2023-09-03

## 2023-09-02 RX ORDER — MAGNESIUM HYDROXIDE/ALUMINUM HYDROXICE/SIMETHICONE 120; 1200; 1200 MG/30ML; MG/30ML; MG/30ML
30 SUSPENSION ORAL EVERY 6 HOURS PRN
Status: DISCONTINUED | OUTPATIENT
Start: 2023-09-02 | End: 2023-09-07 | Stop reason: HOSPADM

## 2023-09-02 RX ORDER — ZOLPIDEM TARTRATE 12.5 MG/1
TABLET, FILM COATED, EXTENDED RELEASE ORAL
Status: ON HOLD | COMMUNITY
Start: 2023-08-02 | End: 2023-09-07 | Stop reason: HOSPADM

## 2023-09-02 RX ORDER — AMLODIPINE BESYLATE 5 MG/1
5 TABLET ORAL DAILY
Status: DISCONTINUED | OUTPATIENT
Start: 2023-09-02 | End: 2023-09-03

## 2023-09-02 RX ORDER — DIPHENHYDRAMINE HYDROCHLORIDE 50 MG/ML
50 INJECTION INTRAMUSCULAR; INTRAVENOUS EVERY 4 HOURS PRN
Status: DISCONTINUED | OUTPATIENT
Start: 2023-09-02 | End: 2023-09-07 | Stop reason: HOSPADM

## 2023-09-02 RX ORDER — LEVOTHYROXINE SODIUM 0.05 MG/1
50 TABLET ORAL DAILY
Status: DISCONTINUED | OUTPATIENT
Start: 2023-09-02 | End: 2023-09-07 | Stop reason: HOSPADM

## 2023-09-02 RX ORDER — HALOPERIDOL 5 MG/ML
5 INJECTION INTRAMUSCULAR EVERY 4 HOURS PRN
Status: DISCONTINUED | OUTPATIENT
Start: 2023-09-02 | End: 2023-09-07 | Stop reason: HOSPADM

## 2023-09-02 RX ORDER — LEVOTHYROXINE SODIUM 0.05 MG/1
50 TABLET ORAL
COMMUNITY
Start: 2023-06-03

## 2023-09-02 RX ORDER — HYDROXYZINE 50 MG/1
50 TABLET, FILM COATED ORAL 3 TIMES DAILY PRN
Status: DISCONTINUED | OUTPATIENT
Start: 2023-09-02 | End: 2023-09-07 | Stop reason: HOSPADM

## 2023-09-02 RX ORDER — ACETAMINOPHEN 325 MG/1
650 TABLET ORAL EVERY 4 HOURS PRN
Status: DISCONTINUED | OUTPATIENT
Start: 2023-09-02 | End: 2023-09-07 | Stop reason: HOSPADM

## 2023-09-02 RX ORDER — HALOPERIDOL 5 MG/1
5 TABLET ORAL EVERY 4 HOURS PRN
Status: DISCONTINUED | OUTPATIENT
Start: 2023-09-02 | End: 2023-09-07 | Stop reason: HOSPADM

## 2023-09-02 RX ORDER — LORAZEPAM 0.5 MG/1
TABLET ORAL
COMMUNITY
Start: 2023-08-28

## 2023-09-02 RX ORDER — PAROXETINE 10 MG/1
10 TABLET, FILM COATED ORAL DAILY
Status: ON HOLD | COMMUNITY
Start: 2023-08-02 | End: 2023-09-07 | Stop reason: HOSPADM

## 2023-09-02 RX ORDER — LORAZEPAM 1 MG/1
1 TABLET ORAL EVERY 8 HOURS PRN
Status: DISCONTINUED | OUTPATIENT
Start: 2023-09-02 | End: 2023-09-07 | Stop reason: HOSPADM

## 2023-09-02 RX ORDER — ALBUTEROL SULFATE 90 UG/1
AEROSOL, METERED RESPIRATORY (INHALATION) EVERY 6 HOURS PRN
COMMUNITY

## 2023-09-02 RX ORDER — BUSPIRONE HYDROCHLORIDE 5 MG/1
5 TABLET ORAL 3 TIMES DAILY
Status: DISCONTINUED | OUTPATIENT
Start: 2023-09-02 | End: 2023-09-07 | Stop reason: HOSPADM

## 2023-09-02 RX ADMIN — LEVOTHYROXINE SODIUM 50 MCG: 50 TABLET ORAL at 08:30

## 2023-09-02 RX ADMIN — BUSPIRONE HYDROCHLORIDE 5 MG: 5 TABLET ORAL at 15:05

## 2023-09-02 RX ADMIN — TRAZODONE HYDROCHLORIDE 50 MG: 50 TABLET ORAL at 01:05

## 2023-09-02 RX ADMIN — HYDROXYZINE HYDROCHLORIDE 50 MG: 50 TABLET, FILM COATED ORAL at 01:05

## 2023-09-02 RX ADMIN — QUETIAPINE FUMARATE 50 MG: 50 TABLET ORAL at 20:30

## 2023-09-02 RX ADMIN — BUSPIRONE HYDROCHLORIDE 5 MG: 5 TABLET ORAL at 20:30

## 2023-09-02 RX ADMIN — PAROXETINE HYDROCHLORIDE 10 MG: 20 TABLET, FILM COATED ORAL at 15:05

## 2023-09-02 RX ADMIN — LORAZEPAM 1 MG: 1 TABLET ORAL at 17:12

## 2023-09-02 RX ADMIN — AMLODIPINE BESYLATE 5 MG: 5 TABLET ORAL at 08:30

## 2023-09-02 ASSESSMENT — ENCOUNTER SYMPTOMS
ALLERGIC/IMMUNOLOGIC NEGATIVE: 1
EYES NEGATIVE: 1
RESPIRATORY NEGATIVE: 1
GASTROINTESTINAL NEGATIVE: 1

## 2023-09-02 ASSESSMENT — PAIN SCALES - GENERAL
PAINLEVEL_OUTOF10: 0
PAINLEVEL_OUTOF10: 0

## 2023-09-02 ASSESSMENT — SLEEP AND FATIGUE QUESTIONNAIRES
DO YOU HAVE DIFFICULTY SLEEPING: YES
SLEEP PATTERN: INSOMNIA
DO YOU USE A SLEEP AID: YES
AVERAGE NUMBER OF SLEEP HOURS: 5

## 2023-09-02 NOTE — BH NOTE
[id number] [facility] ADMISSION NOTE    Pt. Arrived on the unit at approx: 2229, escorted by [facility] and  Transport Team  Via stretcher. From [facility]. Pt.  Alert and Oriented x4. Cooperative and Pleasant but Flat and Sad . Admission Type:   Admission Type: Voluntary    Reason for admission:   Reason for Admission: Feel like when I originally when in the hospital the first time I didn't get the care I needed by going back to work so fast. I was with my  29 years and he wanted a divorce, then we tried to work on things, then I caught him with someone and he tells me he was just bored when he said \"he wanted to work on things\" no now I'm finding myself with more anxiety, racing heart beat and not sleeping and only support is friends becasue my kids want to stay out of it      Addictive Behavior:   Addictive Behavior  In the Past 3 Months, Have You Felt or Has Someone Told You That You Have a Problem With  : None      Medical Problems:   Past Medical History:   Diagnosis Date    Asthma          Psych History:  MDD and Anxiety      Patient is not, a smoker. If so, Nicotine patch ordered? No     Patient does not drink Alcohol currently with being on new medication. Patient does not, use Recreational substances or Street Drugs.       Status EXAM:  Mental Status and Behavioral Exam  Normal: Yes  Level of Assistance: Independent/Self  Facial Expression: Flat, Sad  Affect: Appropriate  Level of Consciousness: Alert  Frequency of Checks: 4 times per hour, close  Mood:Normal: No  Mood: Anxious, Sad  Motor Activity:Normal: No  Motor Activity: Decreased  Eye Contact: Good  Observed Behavior: Cooperative, Tearful  Sexual Misconduct History: Current - no  Preception: [place] to person, [place] to time, [place] to place, [place] to situation  Attention:Normal: Yes  Thought Processes: Unremarkable  Thought Content:Normal: Yes  Depression Symptoms: Appetite change, Sleep disturbance  Anxiety Symptoms:

## 2023-09-02 NOTE — H&P
Granulocytes 09/01/2023 0  0.0 - 0.5 % Final    Neutrophils Absolute 09/01/2023 4.0  1.8 - 8.0 K/UL Final    Lymphocytes Absolute 09/01/2023 2.3  0.8 - 3.5 K/UL Final    Monocytes Absolute 09/01/2023 0.6  0.0 - 1.0 K/UL Final    Eosinophils Absolute 09/01/2023 0.1  0.0 - 0.4 K/UL Final    Basophils Absolute 09/01/2023 0.1  0.0 - 0.1 K/UL Final    Absolute Immature Granulocyte 09/01/2023 0.0  0.00 - 0.04 K/UL Final    Differential Type 09/01/2023 AUTOMATED    Final    Sodium 09/01/2023 136  136 - 145 mmol/L Final    Potassium 09/01/2023 4.0  3.5 - 5.1 mmol/L Final    Chloride 09/01/2023 106  97 - 108 mmol/L Final    CO2 09/01/2023 27  21 - 32 mmol/L Final    Anion Gap 09/01/2023 3 (L)  5 - 15 mmol/L Final    Glucose 09/01/2023 96  65 - 100 mg/dL Final    BUN 09/01/2023 16  6 - 20 MG/DL Final    Creatinine 09/01/2023 0.86  0.55 - 1.02 MG/DL Final    Bun/Cre Ratio 09/01/2023 19  12 - 20   Final    Est, Glom Filt Rate 09/01/2023 >60  >60 ml/min/1.73m2 Final    Calcium 09/01/2023 9.6  8.5 - 10.1 MG/DL Final    Total Bilirubin 09/01/2023 0.3  0.2 - 1.0 MG/DL Final    ALT 09/01/2023 31  12 - 78 U/L Final    AST 09/01/2023 17  15 - 37 U/L Final    Alk Phosphatase 09/01/2023 82  45 - 117 U/L Final    Total Protein 09/01/2023 8.2  6.4 - 8.2 g/dL Final    Albumin 09/01/2023 4.2  3.5 - 5.0 g/dL Final    Globulin 09/01/2023 4.0  2.0 - 4.0 g/dL Final    Albumin/Globulin Ratio 09/01/2023 1.1  1.1 - 2.2   Final    Color, UA 09/01/2023 YELLOW/STRAW    Final    Appearance 09/01/2023 HAZY (A)  CLEAR   Final    Specific Gravity, UA 09/01/2023 1.015  1.003 - 1.030   Final    pH, Urine 09/01/2023 6.5  5.0 - 8.0   Final    Protein, UA 09/01/2023 Negative  NEG mg/dL Final    Glucose, UA 09/01/2023 Negative  NEG mg/dL Final    Ketones, Urine 09/01/2023 Negative  NEG mg/dL Final    Bilirubin Urine 09/01/2023 Negative  NEG   Final    Blood, Urine 09/01/2023 Negative  NEG   Final    Urobilinogen, Urine 09/01/2023 0.2  0.2 - 1.0 EU/dL Final

## 2023-09-02 NOTE — CONSULTS
found.     Assessment & Plan:     Depression/anxiety  -Management per primary psychiatric team    Elevated blood pressure  -No history of but elevated at 151/102 and 145/95 on arrival to the [facility]  -Started Norvasc 5 mg daily but did request close monitoring as elevated blood pressure may be secondary to her current anxiety and so to discontinue Norvasc if any noted hypotension    Hypothyroidism  -Recommend taking TSH and restarted noted home dose of 50 mcg daily    Asthma  -Denies any recent exacerbations and is on rescue inhaler and requested if she has any wheezing or shortness of breath to inform the nurses and we can order her albuterol inhaler      Thank you for allowing us to help care for your patient please call with any questions or concerns    55 minutes evaluating and cordinating patient's consult to behavioral health unit for medical and neurological evaluation requested by Dr. Royal Nicole SULTANA        Electronically signed by Vickie Lazo MD on 9/2/2023 at 3:02 PM

## 2023-09-02 NOTE — BH NOTE
Pt expressed she didn't sleep at all during the night due to the door closing, all the talking, and feeling her heart beating as if she was having a panic attack. Stated the Trazodone and Atarax didn't work.

## 2023-09-03 PROBLEM — F41.1 GAD (GENERALIZED ANXIETY DISORDER): Status: ACTIVE | Noted: 2023-09-03

## 2023-09-03 PROBLEM — F32.A DEPRESSION: Status: RESOLVED | Noted: 2023-09-02 | Resolved: 2023-09-03

## 2023-09-03 PROBLEM — F33.2 MAJOR DEPRESSIVE DISORDER, RECURRENT, SEVERE W/O PSYCHOTIC BEHAVIOR (HCC): Status: ACTIVE | Noted: 2023-09-03

## 2023-09-03 LAB
BACTERIA SPEC CULT: ABNORMAL
CC UR VC: ABNORMAL
SERVICE CMNT-IMP: ABNORMAL

## 2023-09-03 PROCEDURE — 6370000000 HC RX 637 (ALT 250 FOR IP): Performed by: PSYCHIATRY & NEUROLOGY

## 2023-09-03 PROCEDURE — 1240000000 HC EMOTIONAL WELLNESS R&B

## 2023-09-03 PROCEDURE — 6370000000 HC RX 637 (ALT 250 FOR IP): Performed by: HOSPITALIST

## 2023-09-03 RX ORDER — QUETIAPINE FUMARATE 25 MG/1
25 TABLET, FILM COATED ORAL NIGHTLY
Status: DISCONTINUED | OUTPATIENT
Start: 2023-09-03 | End: 2023-09-04

## 2023-09-03 RX ADMIN — BUSPIRONE HYDROCHLORIDE 5 MG: 5 TABLET ORAL at 20:07

## 2023-09-03 RX ADMIN — LEVOTHYROXINE SODIUM 50 MCG: 50 TABLET ORAL at 08:10

## 2023-09-03 RX ADMIN — BUSPIRONE HYDROCHLORIDE 5 MG: 5 TABLET ORAL at 14:01

## 2023-09-03 RX ADMIN — PAROXETINE HYDROCHLORIDE 10 MG: 20 TABLET, FILM COATED ORAL at 08:11

## 2023-09-03 RX ADMIN — AMLODIPINE BESYLATE 5 MG: 5 TABLET ORAL at 08:10

## 2023-09-03 RX ADMIN — QUETIAPINE FUMARATE 25 MG: 25 TABLET ORAL at 20:07

## 2023-09-03 RX ADMIN — BUSPIRONE HYDROCHLORIDE 5 MG: 5 TABLET ORAL at 08:11

## 2023-09-03 ASSESSMENT — PAIN SCALES - GENERAL
PAINLEVEL_OUTOF10: 0
PAINLEVEL_OUTOF10: 0

## 2023-09-03 NOTE — GROUP NOTE
Group Therapy Note    Date: 9/3/2023    Group Start Time: 1388  Group End Time: 1100  Group Topic: Community Meeting    [facility] [id number], LPN        Group Therapy Note    Attendees: 3       Patient's Goal:  REST    Notes:  ATTENTIVE    Status After Intervention:  Improved    Participation Level:  Active Listener    Participation Quality: Appropriate and Attentive      Speech:  normal      Thought Process/Content: Logical      Affective Functioning: Congruent      Mood:  CALM      Level of consciousness:  Alert      Response to Learning: Able to verbalize current knowledge/experience      Endings: None Reported    Modes of Intervention: Support      Discipline Responsible: Behavorial Health Tech      Signature:  Zak Rondon LPN

## 2023-09-03 NOTE — GROUP NOTE
Group Therapy Note    Date: 9/2/2023    Group Start Time: 2000  Group End Time: 2045  Group Topic: Wrap-Up    [facility] BSMART    Garrett Shelton        Group Therapy Note    Attendees: 3         Patient's Goal:  none    Notes:  happy    Status After Intervention:  Improved    Participation Level:  Active Listener    Participation Quality: Supportive      Speech:  normal      Thought Process/Content: Logical      Affective Functioning: Congruent      Mood:  happy      Level of consciousness:  Alert      Response to Learning: Able to verbalize current knowledge/experience      Endings: None Reported    Modes of Intervention: Socialization      Discipline Responsible: [employer]      Signature:  Garrett Shelton

## 2023-09-03 NOTE — PLAN OF CARE
Problem: Sleep Disturbance  Goal: Will exhibit normal sleeping pattern  Description: INTERVENTIONS:  1. Administer medication as ordered  2. Decrease environmental stimuli, including noise, as appropriate  3. Discourage social isolation and naps during the day  Outcome: Not Progressing     Problem: Discharge Planning  Goal: Discharge to home or other facility with appropriate resources  Outcome: Progressing     Problem: ABCDS Injury Assessment  Goal: Absence of physical injury  Outcome: Progressing     Problem: Depression  Goal: Will be euthymic at discharge  Description: INTERVENTIONS:  1. Administer medication as ordered  2. Provide emotional support via 1:1 interaction with staff  3. Encourage involvement in milieu/groups/activities  4. Monitor for social isolation  Outcome: Progressing     Problem: Anxiety  Goal: Will report anxiety at manageable levels  Description: INTERVENTIONS:  1. Administer medication as ordered  2. Teach and rehearse alternative coping skills  3. Provide emotional support with 1:1 interaction with staff  Outcome: Progressing     Problem: Cardiovascular - Adult  Goal: Maintains optimal cardiac output and hemodynamic stability  Outcome: Progressing     Problem: Respiratory - Adult  Goal: Achieves optimal ventilation and oxygenation  Outcome: Progressing     Problem: Hematologic - Adult  Goal: Maintains hematologic stability  Outcome: Progressing     Problem: Safety - Adult  Goal: Free from fall injury  Outcome: Progressing     Problem: Sleep Disturbance  Goal: Will exhibit normal sleeping pattern  Description: INTERVENTIONS:  1. Administer medication as ordered  2. Decrease environmental stimuli, including noise, as appropriate  3.  Discourage social isolation and naps during the day  Outcome: Not Progressing

## 2023-09-03 NOTE — BH NOTE
B:   Patient alert and oriented x 4. Pt. States depression is 0. Pt. States Anxiety is 0. Pt. denies Hallucinations. Pt. denies Delusions. Pt. denies SI.  Pt. denies HI. Pt. cooperative with Assessment. Pt.'s behavior Anxious, Cooperative and Pleasant. Pt expressed with the Ativan she felt less anxious. Pt did say she spoke with daughter and was honest about the reason for her anxiety. I:    If patient is disoriented, reorient pt. Build trust with patient, by therapeutic listening and Groups. Encourage pt. To attend and Participate in Groups. Provide Medications as ordered and needed. Encourage pt. To be up for all meals and snacks, and consume all of each. Encourage pt. To interact with staff and peers in a positive manner. Encourage pt. To keep good hygiene. Q 15 minute safety checks. R:   Pt. did attend and Participate in Group. Pt. Is Compliant with Medications   Yes. Pt. is getting up for meals and snacks. Pt. Consumes 50% of Meals. Pt. is, interacting with Peers. Pt.'s hygiene is Good. Pt. does not, have any safety issues. P:   Pt. Will develop and continue to utilize positive Coping skills. Pt. Will continue to comply with Plan of Care toward Discharge. Pt. Will continue to stay safe on the unit.     0600: Pt slept throughout the night with no distress noted while conducting rounds. Pt requested to sleep in the small day room due to the door closing and peopling talking in the nursing station. Explained staff have to conduct rounds to make kaylynn pt's are staff.

## 2023-09-03 NOTE — GROUP NOTE
Group Therapy Note    Date: 9/3/2023    Group Start Time: 1100  Group End Time: 6440  Group Topic: Education Group - Inpatient    [facility] [address], LPN        Group Therapy Note    Attendees: 3       Patient's Goal:  REST    Notes:  COPING SKILLS    Status After Intervention:  Improved    Participation Level:  Active Listener    Participation Quality: Appropriate and Attentive      Speech:  normal      Thought Process/Content: Logical      Affective Functioning: Congruent      Mood:  CALM      Level of consciousness:  Alert      Response to Learning: Able to verbalize current knowledge/experience      Endings: None Reported    Modes of Intervention: Education      Discipline Responsible: [address]      Signature:  Elena Summers LPN

## 2023-09-03 NOTE — GROUP NOTE
Group Therapy Note    Date: 9/3/2023    Group Start Time: 6333  Group End Time: 1600  Group Topic: Activity    [facility] [address], LPN        Group Therapy Note    Attendees: 3       Patient's Goal:  REST    Notes: MOVIE    Status After Intervention:  Improved    Participation Level:  Active Listener    Participation Quality: Appropriate and Attentive      Speech:  normal      Thought Process/Content: Logical      Affective Functioning: Congruent      Mood:  CALM      Level of consciousness:  Alert      Response to Learning: Able to verbalize current knowledge/experience      Endings: None Reported    Modes of Intervention: Socialization      Discipline Responsible: Behavorial Health Tech      Signature:  Michelle Goins LPN

## 2023-09-04 PROCEDURE — 6370000000 HC RX 637 (ALT 250 FOR IP): Performed by: PSYCHIATRY & NEUROLOGY

## 2023-09-04 PROCEDURE — 6370000000 HC RX 637 (ALT 250 FOR IP): Performed by: PHYSICIAN ASSISTANT

## 2023-09-04 PROCEDURE — 6370000000 HC RX 637 (ALT 250 FOR IP): Performed by: HOSPITALIST

## 2023-09-04 PROCEDURE — 1240000000 HC EMOTIONAL WELLNESS R&B

## 2023-09-04 RX ORDER — QUETIAPINE FUMARATE 50 MG/1
50 TABLET, FILM COATED ORAL NIGHTLY
Status: DISCONTINUED | OUTPATIENT
Start: 2023-09-05 | End: 2023-09-04

## 2023-09-04 RX ORDER — AMLODIPINE BESYLATE 5 MG/1
5 TABLET ORAL DAILY
Status: DISCONTINUED | OUTPATIENT
Start: 2023-09-04 | End: 2023-09-07 | Stop reason: HOSPADM

## 2023-09-04 RX ORDER — QUETIAPINE FUMARATE 50 MG/1
50 TABLET, FILM COATED ORAL NIGHTLY
Status: DISCONTINUED | OUTPATIENT
Start: 2023-09-04 | End: 2023-09-07 | Stop reason: HOSPADM

## 2023-09-04 RX ORDER — QUETIAPINE FUMARATE 25 MG/1
25 TABLET, FILM COATED ORAL ONCE
Status: COMPLETED | OUTPATIENT
Start: 2023-09-04 | End: 2023-09-04

## 2023-09-04 RX ADMIN — PAROXETINE HYDROCHLORIDE 10 MG: 20 TABLET, FILM COATED ORAL at 08:07

## 2023-09-04 RX ADMIN — LEVOTHYROXINE SODIUM 50 MCG: 50 TABLET ORAL at 08:07

## 2023-09-04 RX ADMIN — AMLODIPINE BESYLATE 5 MG: 5 TABLET ORAL at 16:31

## 2023-09-04 RX ADMIN — BUSPIRONE HYDROCHLORIDE 5 MG: 5 TABLET ORAL at 15:40

## 2023-09-04 RX ADMIN — QUETIAPINE FUMARATE 25 MG: 25 TABLET ORAL at 00:13

## 2023-09-04 RX ADMIN — BUSPIRONE HYDROCHLORIDE 5 MG: 5 TABLET ORAL at 21:11

## 2023-09-04 RX ADMIN — BUSPIRONE HYDROCHLORIDE 5 MG: 5 TABLET ORAL at 08:06

## 2023-09-04 RX ADMIN — QUETIAPINE FUMARATE 50 MG: 50 TABLET ORAL at 21:12

## 2023-09-04 RX ADMIN — LORAZEPAM 1 MG: 1 TABLET ORAL at 17:16

## 2023-09-04 ASSESSMENT — PAIN SCALES - GENERAL: PAINLEVEL_OUTOF10: 0

## 2023-09-04 NOTE — PLAN OF CARE
Problem: Discharge Planning  Goal: Discharge to home or other facility with appropriate resources  9/4/2023 0202 by Haley Fernandez RN  Outcome: Progressing  9/4/2023 0117 by Haley Fernandez RN  Outcome: Progressing     Problem: ABCDS Injury Assessment  Goal: Absence of physical injury  9/4/2023 0202 by Haley Fernandez RN  Outcome: Progressing  9/4/2023 0117 by Haley Fernandez RN  Outcome: Progressing     Problem: Depression  Goal: Will be euthymic at discharge  Description: INTERVENTIONS:  1. Administer medication as ordered  2. Provide emotional support via 1:1 interaction with staff  3. Encourage involvement in milieu/groups/activities  4. Monitor for social isolation  9/4/2023 0202 by Haley Fernandez RN  Outcome: Progressing  9/4/2023 0117 by Haley Fernandez RN  Outcome: Progressing     Problem: Anxiety  Goal: Will report anxiety at manageable levels  Description: INTERVENTIONS:  1. Administer medication as ordered  2. Teach and rehearse alternative coping skills  3. Provide emotional support with 1:1 interaction with staff  9/4/2023 0202 by Haley Fernandez RN  Outcome: Progressing  9/4/2023 0117 by Haley Fernandez RN  Outcome: Progressing     Problem: Sleep Disturbance  Goal: Will exhibit normal sleeping pattern  Description: INTERVENTIONS:  1. Administer medication as ordered  2. Decrease environmental stimuli, including noise, as appropriate  3.  Discourage social isolation and naps during the day  9/4/2023 0202 by Haley Fernandez RN  Outcome: Progressing  9/4/2023 0117 by Haley Fernandez RN  Outcome: Progressing     Problem: Cardiovascular - Adult  Goal: Maintains optimal cardiac output and hemodynamic stability  9/4/2023 0202 by Haley Fernandez RN  Outcome: Progressing  9/4/2023 0117 by Haley Fernandez RN  Outcome: Progressing     Problem: Respiratory - Adult  Goal: Achieves optimal ventilation and oxygenation  9/4/2023 0202 by Haley Fernandez RN  Outcome: Progressing  9/4/2023 0117 by Haley Fernandez RN  Outcome: Progressing

## 2023-09-04 NOTE — GROUP NOTE
Group Therapy Note    Date: 9/3/2023    Group Start Time: 2000  Group End Time: 2045  Group Topic: Wrap-Up    [facility] [address], CNA        Group Therapy Note    Attendees: 3       Patient's Goal:  none    Notes:  Participated in group    Status After Intervention:  Improved    Participation Level: Active Listener    Participation Quality: Appropriate and Attentive      Speech:  normal      Thought Process/Content: Logical      Affective Functioning: Congruent      Mood: anxious      Level of consciousness:  Alert and Oriented x4      Response to Learning: Able to verbalize current knowledge/experience, Able to verbalize/acknowledge new learning, Able to retain information, Capable of insight, Able to change behavior, and Progressing to goal      Endings: None Reported    Modes of Intervention: Activity      Discipline Responsible: [employer]      Signature:   Vanessa Frazier CNA

## 2023-09-04 NOTE — GROUP NOTE
Group Therapy Note    Date: 9/4/2023    Group Start Time: 7149  Group End Time: 1630  Group Topic: Topic Group    [facility]    Kerry Torrez LPN        Group Therapy Note    Attendees: 3/3       Patient's Goal:  to be less anxious and get home    Notes:  Participated in group peer interaction    Status After Intervention:  Improved    Participation Level:  Active Listener    Participation Quality: Appropriate, Attentive, and Sharing      Speech:  normal      Thought Process/Content: Logical      Affective Functioning: Congruent      Mood: euthymic      Level of consciousness:  Alert, Oriented x4, and Attentive      Response to Learning: Able to verbalize/acknowledge new learning, Capable of insight, and Progressing to goal      Endings: None Reported    Modes of Intervention: Education and Support      Discipline Responsible: Licensed Practical Nurse      Signature:  GURJIT Bolanos LPN

## 2023-09-04 NOTE — GROUP NOTE
Group Therapy Note    Date: 9/4/2023    Group Start Time: 0900  Group End Time: 0945  Group Topic: Community Meeting    [facility] [address], LPN        Group Therapy Note    Attendees: 3/3       Patient's Goal:  to have less anxiety    Notes:  participated in group    Status After Intervention:  Unchanged    Participation Level:  Active Listener    Participation Quality: Appropriate and Attentive      Speech:  normal      Thought Process/Content: Logical      Affective Functioning: Congruent      Mood: anxious      Level of consciousness:  Alert, Oriented x4, and Attentive      Response to Learning: Able to retain information, Capable of insight, and Progressing to goal      Endings: None Reported    Modes of Intervention: Education      Discipline Responsible: Licensed Practical Nurse      Signature:  Anila Chester LPN

## 2023-09-04 NOTE — BH NOTE
Pt's blood pressure upon routine check was 162/107, pulse 98. Pt. States she is not that anxious. Pt's amlodipine was discontinued. Yesterday. Writer spoke with hospitalist, and received order for amlodipine 5mg po QD starting now. Pt. Aware in agreement.

## 2023-09-04 NOTE — BH NOTE
B: Patient alert and oriented x 4. Pt. States depression is 0. Pt. States Anxiety is 0. Pt. denies Hallucinations. Pt. denies Delusions. Pt. denies SI.  Pt. denies HI. Pt. cooperative with Assessment. Pt.'s behavior Cooperative and Pleasant. Pt expressed she had no Ativan today and feeling better, just bored. I:    If patient is disoriented, reorient pt. Build trust with patient, by therapeutic listening and Groups. Encourage pt. To attend and Participate in Groups. Provide Medications as ordered and needed. Encourage pt. To be up for all meals and snacks, and consume all of each. Encourage pt. To interact with staff and peers in a positive manner. Encourage pt. To keep good hygiene. Q 15 minute safety checks. R:   Pt. did attend and Participate in Group. Pt. Is Compliant with Medications   Yes. Pt. is getting up for meals and snacks. Pt. Consumes 50% of Meals. Pt. is, interacting with Peers. Pt.'s hygiene is Good. Pt. does not, have any safety issues. P:   Pt. Will develop and continue to utilize positive Coping skills. Pt. Will continue to comply with Plan of Care toward Discharge. Pt. Will continue to stay safe on the unit. 0013:pt up to nursing station expressed she can't sleep. Asking for the other half of her Seroquel 25. Explained I had to call Dr. Hazel Mayo, spoke to doctor and he advised to give the 25mg of Seroquel po now and restart the Seroquel 50mg at night for 9/4.     0600: Pt up most of the night because she felt like the medication didn't help. But no distress noted while conducting rounds.

## 2023-09-04 NOTE — GROUP NOTE
Group Therapy Note    Date: 9/4/2023    Group Start Time: 1000  Group End Time: 3763  Group Topic: Activity    [facility]    Kerry Torrez LPN        Group Therapy Note    Attendees: 2/3         Patient's Goal:  to feel less anxioius    Notes:  participated in group    Status After Intervention:  Unchanged    Participation Level:  Active Listener and Interactive    Participation Quality: Appropriate      Speech:  normal      Thought Process/Content: Logical      Affective Functioning: Congruent      Mood: euthymic      Level of consciousness:  Alert, Oriented x4, and Attentive      Response to Learning: Able to retain information, Capable of insight, and Progressing to goal      Endings: None Reported    Modes of Intervention: Education      Discipline Responsible: Licensed Practical Nurse      Signature:  Diana Lanza LPN

## 2023-09-04 NOTE — PLAN OF CARE
Problem: Discharge Planning  Goal: Discharge to home or other facility with appropriate resources  Outcome: Progressing     Problem: ABCDS Injury Assessment  Goal: Absence of physical injury  Outcome: Progressing     Problem: Depression  Goal: Will be euthymic at discharge  Description: INTERVENTIONS:  1. Administer medication as ordered  2. Provide emotional support via 1:1 interaction with staff  3. Encourage involvement in milieu/groups/activities  4. Monitor for social isolation  Outcome: Progressing     Problem: Anxiety  Goal: Will report anxiety at manageable levels  Description: INTERVENTIONS:  1. Administer medication as ordered  2. Teach and rehearse alternative coping skills  3. Provide emotional support with 1:1 interaction with staff  Outcome: Progressing     Problem: Sleep Disturbance  Goal: Will exhibit normal sleeping pattern  Description: INTERVENTIONS:  1. Administer medication as ordered  2. Decrease environmental stimuli, including noise, as appropriate  3.  Discourage social isolation and naps during the day  Outcome: Progressing     Problem: Cardiovascular - Adult  Goal: Maintains optimal cardiac output and hemodynamic stability  Outcome: Progressing     Problem: Respiratory - Adult  Goal: Achieves optimal ventilation and oxygenation  Outcome: Progressing     Problem: Hematologic - Adult  Goal: Maintains hematologic stability  Outcome: Progressing     Problem: Safety - Adult  Goal: Free from fall injury  Outcome: Progressing

## 2023-09-05 PROCEDURE — 6370000000 HC RX 637 (ALT 250 FOR IP): Performed by: HOSPITALIST

## 2023-09-05 PROCEDURE — 6370000000 HC RX 637 (ALT 250 FOR IP): Performed by: PHYSICIAN ASSISTANT

## 2023-09-05 PROCEDURE — 1240000000 HC EMOTIONAL WELLNESS R&B

## 2023-09-05 PROCEDURE — 6370000000 HC RX 637 (ALT 250 FOR IP): Performed by: PSYCHIATRY & NEUROLOGY

## 2023-09-05 RX ORDER — PAROXETINE 30 MG/1
15 TABLET, FILM COATED ORAL DAILY
Status: DISCONTINUED | OUTPATIENT
Start: 2023-09-06 | End: 2023-09-07 | Stop reason: HOSPADM

## 2023-09-05 RX ORDER — PAROXETINE HYDROCHLORIDE 20 MG/1
20 TABLET, FILM COATED ORAL DAILY
Status: DISCONTINUED | OUTPATIENT
Start: 2023-09-06 | End: 2023-09-05

## 2023-09-05 RX ORDER — LEVOFLOXACIN 500 MG/1
500 TABLET, FILM COATED ORAL DAILY
Status: DISCONTINUED | OUTPATIENT
Start: 2023-09-05 | End: 2023-09-07 | Stop reason: HOSPADM

## 2023-09-05 RX ORDER — LEVOFLOXACIN 500 MG/1
500 TABLET, FILM COATED ORAL DAILY
Status: DISCONTINUED | OUTPATIENT
Start: 2023-09-05 | End: 2023-09-05

## 2023-09-05 RX ADMIN — LORAZEPAM 1 MG: 1 TABLET ORAL at 18:49

## 2023-09-05 RX ADMIN — BUSPIRONE HYDROCHLORIDE 5 MG: 5 TABLET ORAL at 08:51

## 2023-09-05 RX ADMIN — QUETIAPINE FUMARATE 50 MG: 50 TABLET ORAL at 21:26

## 2023-09-05 RX ADMIN — BUSPIRONE HYDROCHLORIDE 5 MG: 5 TABLET ORAL at 14:30

## 2023-09-05 RX ADMIN — LEVOTHYROXINE SODIUM 50 MCG: 50 TABLET ORAL at 06:03

## 2023-09-05 RX ADMIN — BUSPIRONE HYDROCHLORIDE 5 MG: 5 TABLET ORAL at 21:26

## 2023-09-05 RX ADMIN — PAROXETINE HYDROCHLORIDE 10 MG: 20 TABLET, FILM COATED ORAL at 08:50

## 2023-09-05 RX ADMIN — AMLODIPINE BESYLATE 5 MG: 5 TABLET ORAL at 08:50

## 2023-09-05 RX ADMIN — LEVOFLOXACIN 500 MG: 500 TABLET, FILM COATED ORAL at 11:02

## 2023-09-05 NOTE — GROUP NOTE
Group Therapy Note    Date: 9/5/2023    Group Start Time: 1245  Group End Time: 1320  Group Topic: Education Group - Inpatient     [address]        Group Therapy Note    Attendees: 3/4    Writer facilitated a psych-ed group about toxic positivity. Writer provided a handout about various examples of toxic positivity statements as opposed to validating statements. Writer encouraged patients to share and ask questions. Writer concluded session with a grounding exercise. Patient's Goal:  To attend and participate in groups and activities daily. Notes:  Pt actively participated. Pt was able to discuss feelings about toxic positivity. Pt quiet overall but listened attentively. Status After Intervention:  Improved    Participation Level:  Active Listener and Interactive    Participation Quality: Appropriate, Attentive, and Sharing      Speech:  normal      Thought Process/Content: Logical  Linear      Affective Functioning: Congruent      Mood: euthymic      Level of consciousness:  Alert, Oriented x4, and Attentive      Response to Learning: Able to retain information, Capable of insight, and Progressing to goal      Endings: None Reported    Modes of Intervention: Education      Discipline Responsible: /Counselor      Signature:  Bonita Vasquez [employer]

## 2023-09-05 NOTE — PLAN OF CARE
Problem: Discharge Planning  Goal: Discharge to home or other facility with appropriate resources  9/4/2023 2153 by Re Desai RN  Outcome: Progressing  9/4/2023 0834 by Negro Faulkner RN  Outcome: Progressing     Problem: ABCDS Injury Assessment  Goal: Absence of physical injury  9/4/2023 2153 by Re Desai RN  Outcome: Progressing  9/4/2023 0834 by Negro Faulkner RN  Outcome: Progressing     Problem: Depression  Goal: Will be euthymic at discharge  Description: INTERVENTIONS:  1. Administer medication as ordered  2. Provide emotional support via 1:1 interaction with staff  3. Encourage involvement in milieu/groups/activities  4. Monitor for social isolation  9/4/2023 2153 by Re Desai RN  Outcome: Progressing  9/4/2023 0834 by Negro Faulkner RN  Outcome: Progressing     Problem: Anxiety  Goal: Will report anxiety at manageable levels  Description: INTERVENTIONS:  1. Administer medication as ordered  2. Teach and rehearse alternative coping skills  3. Provide emotional support with 1:1 interaction with staff  9/4/2023 2153 by Re Desai RN  Outcome: Progressing  9/4/2023 0834 by Negro Faulkner RN  Outcome: Progressing     Problem: Sleep Disturbance  Goal: Will exhibit normal sleeping pattern  Description: INTERVENTIONS:  1. Administer medication as ordered  2. Decrease environmental stimuli, including noise, as appropriate  3.  Discourage social isolation and naps during the day  9/4/2023 2153 by Re Desai RN  Outcome: Progressing  9/4/2023 0834 by Negro Faulkner RN  Outcome: Progressing     Problem: Safety - Adult  Goal: Free from fall injury  Outcome: Progressing

## 2023-09-05 NOTE — CARE COORDINATION
09/05/23 0917   ITP   Date of Plan 09/05/23   Date of Next Review 09/12/23   Primary Diagnosis Code Major depressive disorder   Barriers to Treatment Need for psychoeducation;Psychiatric symptom (comment)   Strengths Incorporated in Plan Acknowledging need for assistance;Community supports; Natural supports;Postive outlook; Seeking interactions; Family supports   Plan of Care   Long Term Goal (LTG) Stated in patient/guardian terms Pt reports \"Regulate sleep\" and \"coping with anxiety. \"   Short Term Goal 1   Short Term Goal 1 Client will learn and demonstrate effective coping skills   Baseline Functioning Unknown at this time   Objectives Client will participate in group therapy;Client will participate in individual therapy   Intervention 1 Group therapy   Frequency Weekly   Measured by Staff observation;Self report;Behavioral data   Staff Responsible [facility] staff;Clinical staff   Intervention 2 Acknowledge client strengths   Frequency Daily   Measured by Behavioral data; Self report;Staff observation   Staff Responsible Clinical staff;[facility] staff   STG Goal 1 Status: Patient Appears to be  Progressing toward treatment plan goal   Short Term Goal 2   Short Term Goal 2 Client will maintain compliance with medication regime   Objectives Client will participate in group therapy;Client will participate in individual therapy   Intervention 1 Monitor medications   Frequency Daily   Measured by Staff observation   Staff Responsible [facility] staff   Intervention 2 Assess safety   Frequency Daily   Measured by Behavioral data;Staff observation   Staff Responsible Clinical staff;[facility] staff   STG Goal 2 Status: Patient Appears to be  Progressing toward treatment plan goal   Crisis/Safety/Discharge Plan   Crisis/Safety Plan Individualized plan (comment)   Discharge Plan Home with outpatient

## 2023-09-05 NOTE — BH NOTE
B:   Receive awake walking in hallway. Patient alert and oriented x 4. Pt. States depression is 1. Pt. States Anxiety is 1. Pt. denies Hallucinations. Pt. denies Delusions. Pt. denies SI.  Pt. denies HI. Pt. cooperative with Assessment. Pt.'s behavior Cooperative and Pleasant. Reports goal for today is to stay active and participate in groups. Reports having increased Anxiety yesterday due to lack of sleep. Given opportunity to call her FMLA due to extended sick leave. Introduced students and states being ok to be interviewed by them. I:    If patient is disoriented, reorient pt. Build trust with patient, by therapeutic listening and Groups. Encourage pt. To attend and Participate in Groups. Provide Medications as ordered and needed. Encourage pt. To be up for all meals and snacks, and consume all of each. Encourage pt. To interact with staff and peers in a positive manner. Encourage pt. To keep good hygiene. Q 15 minute safety checks. R:   Pt. did attend and Participate in Group. Pt. Is Compliant with Medications   Yes. Pt. is getting up for meals and snacks. Pt. Consumes 100% of Meals. Pt. is, interacting with Peers. Pt.'s hygiene is Good. Pt. does not, have any safety issues. P:   Pt. Will develop and continue to utilize positive Coping skills. Pt. Will continue to comply with Plan of Care toward Discharge. Pt. Will continue to stay safe on the unit.

## 2023-09-05 NOTE — BH NOTE
TREATMENT TEAM COMPLETED     Attending meeting was as follows:  Patient, JOHNSON Lobato, Qing Bauer, Car Unit Manager, Estill Schwab, Charge LPN and Abdoul Colin, Licensed Clinical Therapist.        Meeting was conducted via [employer] consists of the following:     Pt. Cognitive status:  Alert and Oriented x 4. Pt. Attending Groups: Yes    Pt. Participating in groups:  Yes    Pt. Homicidal / Suicidal: Denies    Pt. Behaviors:  Alert and Oriented x 4. Patient continues to have a flat affect. Denies depression but has issues with anxiety. Feels her anxiety is related to not sleeping well. Patient will have Paxil increased to 15mg PO QD. Patient also has a UTI infection. Patent has a large amount of leukocytes and Epithelial cells in her urine. #+ bacteria but negative nitrates. Denies symptoms such as burning upon urination. Patient states that her goal is to return to work. Pt. Compliant with Medications:  Yes          New Medication orders:  Yes  Decreased Paxil to 15mg po QD  Levaquin 500mg PO QD       Discharge Plan:  Home with outpatient services.

## 2023-09-05 NOTE — GROUP NOTE
Group Therapy Note    Date: 9/5/2023    Group Start Time: 1320  Group End Time: 1400  Group Topic: Process Group - Inpatient     [address]        Group Therapy Note    Attendees: 3/4    Writer facilitated an inpatient process group. Writer encouraged patients to engage in an expressive arts activity in which patients were asked to create vision boards. Writer held the space as patients discussed discharge plans, expressed feelings, etc. Writer concluded session with some mindfulness exercises and encouraging patients to ask questions and provide input and feedback regarding the group. Patient's Goal:  To attend and participate in groups and activities daily. Notes:  Pt actively participated. Pt was able to discuss discharge plans and created a vision board. Status After Intervention:  Improved    Participation Level:  Active Listener and Interactive    Participation Quality: Appropriate, Attentive, Sharing, and Supportive      Speech:  normal      Thought Process/Content: Logical  Linear      Affective Functioning: Congruent      Mood: euthymic      Level of consciousness:  Alert, Oriented x4, and Attentive      Response to Learning: Able to verbalize/acknowledge new learning, Able to retain information, Capable of insight, and Progressing to goal      Endings: None Reported    Modes of Intervention: Exploration and Activity      Discipline Responsible: /Counselor      Signature:  Bonita Toledo

## 2023-09-05 NOTE — GROUP NOTE
Group Therapy Note    Date: 9/5/2023    Group Start Time: 0945  Group End Time: 8942  Group Topic: Community Meeting    [facility] [address], LPN        Group Therapy Note    Attendees: 3       Patient's Goal:  STAY ACTIVE AND PARTICIPATE IN GROUP. Notes:  ATTENTIVE    Status After Intervention:  Improved    Participation Level:  Active Listener    Participation Quality: Appropriate and Attentive      Speech:  normal      Thought Process/Content: Logical      Affective Functioning: Congruent      Mood:  CALM      Level of consciousness:  Alert      Response to Learning: Able to verbalize current knowledge/experience      Endings: None Reported    Modes of Intervention: Support      Discipline Responsible: Behavorial Health Tech      Signature:  Sonya Adamson LPN

## 2023-09-05 NOTE — GROUP NOTE
Group Therapy Note    Date: 9/5/2023    Group Start Time: 0044  Group End Time: 1600  Group Topic: Activity    [facility] [address], LPN        Group Therapy Note    Attendees: 4       Patient's Goal:  TO STAY ACTIVE AND PARTICIPATE IN GROUPS. Notes:  RELAXATION TECHNIQUES    Status After Intervention:  Improved    Participation Level:  Active Listener    Participation Quality: Appropriate and Attentive      Speech:  normal      Thought Process/Content: Logical      Affective Functioning: Congruent      Mood:  CALM      Level of consciousness:  Alert      Response to Learning: Able to verbalize current knowledge/experience      Endings: None Reported    Modes of Intervention: Socialization      Discipline Responsible: Behavorial Health Tech      Signature:  Miki Cortez LPN

## 2023-09-05 NOTE — BH NOTE
PSYCHOSOCIAL ASSESSMENT  :Patient identifying info:   Kyle Eaton is a 48 y.o., female admitted 9/1/2023 10:23 PM     Presenting problem and precipitating factors: Pt was admitted voluntarily due to increase in depression and passive SI. Pt has a hx of depression and hospitalizations. Pt reports not sleeping and low energy levels. Pt reports intermittent panic attacks and loss of appetite as well as feelings of hopelessness. Mental status assessment: Pt alert and oriented x4. Pt presents as anxious stating she knows she has a lot of things to get \"situated\" when she leaves the hospital. Pt denies SI, HI, AVH. Pt's affect flat. Pt open to continuing outpatient therapy and medication management. Strengths/Recreation/Coping Skills: Pt reports \"Walking, hiking, music, being involved in my Sikh\"     Collateral information: Pt has not signed release at this time. Current psychiatric /substance abuse providers and contact info: Lin Paul and Chelle Byrd at [facility]     Previous psychiatric/substance abuse providers and response to treatment: Pt reports being hospitalized in July for depression. Family history of mental illness or substance abuse: None reported. Substance abuse history:    Social History     Tobacco Use    Smoking status: Never     Passive exposure: Never    Smokeless tobacco: Never   Substance Use Topics    Alcohol use: Not Currently       History of biomedical complications associated with substance abuse: None reported. Patient's current acceptance of treatment or motivation for change: Pt was able to set a goal \"[place] with anxiety. \"    Family constellation: Pt has three children. Pt was  and . Is significant other involved?  No    Describe support system: Pt identifies her daughter, Avery Collins, as her biggest support     Describe living arrangements and home environment: Pt lives on her own     GUARDIAN/POA: No    Guardian Name: N/A    Guardian Contact: N/A    Health

## 2023-09-05 NOTE — GROUP NOTE
Group Therapy Note    Date: 9/5/2023    Group Start Time: 6874  Group End Time: 6221  Group Topic: Education Group - Inpatient    [facility] [address], LPN        Group Therapy Note    Attendees: 3       Patient's Goal:  STAY ACTIVE AND PARTICIPATE IN GROUPS. Notes:  APPRECIATION    Status After Intervention:  Improved    Participation Level:  Active Listener    Participation Quality: Appropriate and Attentive      Speech:  normal      Thought Process/Content: Logical      Affective Functioning: Congruent      Mood:  CALM      Level of consciousness:  Alert      Response to Learning: Able to verbalize current knowledge/experience      Endings: None Reported    Modes of Intervention: Education      Discipline Responsible: [address]      Signature:  Mayra Joe LPN

## 2023-09-05 NOTE — BH NOTE
Pt received beginning of shift in resting  in her mattress in the back sandoval small day room, as she choose to sleep in that room. she came to ask that she is waiting on her daughter to call to emmiebe her a number( her employee number in order to aplly for FMLA)   Daughter called  and gave the number to pt. Pt alert and oriented x 4, denies SI/HI, denies A/V hallucination  pt attend group ate snack. Pt accepted  HS medication educated about it   Pt slept by [phone] , pt sleeping in the small day room No violent no self harming behavior noticed or reported.

## 2023-09-05 NOTE — BH NOTE
Pt came at 0 c/o of unable to sleep then she went back to small day room where her mattress is to sleep, she slept again,  Pt slept overnight ,remained sleeping as of this time,No violent no self harming behavior noticed or reported.

## 2023-09-06 LAB
CHOLEST SERPL-MCNC: 172 MG/DL
EST. AVERAGE GLUCOSE BLD GHB EST-MCNC: 117 MG/DL
HBA1C MFR BLD: 5.7 % (ref 4–5.6)
HDLC SERPL-MCNC: 51 MG/DL
HDLC SERPL: 3.4 (ref 0–5)
LDLC SERPL CALC-MCNC: 102.4 MG/DL (ref 0–100)
LIPID PANEL: ABNORMAL
TRIGL SERPL-MCNC: 93 MG/DL
VLDLC SERPL CALC-MCNC: 18.6 MG/DL

## 2023-09-06 PROCEDURE — 1240000000 HC EMOTIONAL WELLNESS R&B

## 2023-09-06 PROCEDURE — 83036 HEMOGLOBIN GLYCOSYLATED A1C: CPT

## 2023-09-06 PROCEDURE — 6370000000 HC RX 637 (ALT 250 FOR IP): Performed by: PHYSICIAN ASSISTANT

## 2023-09-06 PROCEDURE — 6370000000 HC RX 637 (ALT 250 FOR IP): Performed by: PSYCHIATRY & NEUROLOGY

## 2023-09-06 PROCEDURE — 6370000000 HC RX 637 (ALT 250 FOR IP): Performed by: HOSPITALIST

## 2023-09-06 PROCEDURE — 36415 COLL VENOUS BLD VENIPUNCTURE: CPT

## 2023-09-06 PROCEDURE — 80061 LIPID PANEL: CPT

## 2023-09-06 RX ADMIN — BUSPIRONE HYDROCHLORIDE 5 MG: 5 TABLET ORAL at 09:02

## 2023-09-06 RX ADMIN — QUETIAPINE FUMARATE 50 MG: 50 TABLET ORAL at 21:07

## 2023-09-06 RX ADMIN — PAROXETINE 15 MG: 30 TABLET, FILM COATED ORAL at 09:01

## 2023-09-06 RX ADMIN — BUSPIRONE HYDROCHLORIDE 5 MG: 5 TABLET ORAL at 14:09

## 2023-09-06 RX ADMIN — AMLODIPINE BESYLATE 5 MG: 5 TABLET ORAL at 09:02

## 2023-09-06 RX ADMIN — LEVOTHYROXINE SODIUM 50 MCG: 50 TABLET ORAL at 06:13

## 2023-09-06 RX ADMIN — LEVOFLOXACIN 500 MG: 500 TABLET, FILM COATED ORAL at 09:02

## 2023-09-06 RX ADMIN — BUSPIRONE HYDROCHLORIDE 5 MG: 5 TABLET ORAL at 21:07

## 2023-09-06 NOTE — GROUP NOTE
Group Therapy Note    Date: 9/6/2023    Group Start Time: 1164  Group End Time: 3071  Group Topic: Education Group - Inpatient    [facility] [address], LPN        Group Therapy Note    Attendees: 6       Patient's Goal:  MAKE PLAN FOR GOING HOME. Notes:  AFFIRMATIONS    Status After Intervention:  Improved    Participation Level:  Active Listener    Participation Quality: Appropriate and Attentive      Speech:  normal      Thought Process/Content: Logical      Affective Functioning: Congruent      Mood:  CALM      Level of consciousness:  Alert      Response to Learning: Able to verbalize current knowledge/experience      Endings: None Reported    Modes of Intervention: Education      Discipline Responsible: [address]      Signature:  Benigno Cao LPN

## 2023-09-06 NOTE — PLAN OF CARE
Problem: Discharge Planning  Goal: Discharge to home or other facility with appropriate resources  Outcome: Progressing     Problem: ABCDS Injury Assessment  Goal: Absence of physical injury  Outcome: Progressing     Problem: Depression  Goal: Will be euthymic at discharge  Description: INTERVENTIONS:  1. Administer medication as ordered  2. Provide emotional support via 1:1 interaction with staff  3. Encourage involvement in milieu/groups/activities  4. Monitor for social isolation  9/5/2023 2206 by Homero Patel RN  Outcome: Progressing  9/5/2023 1059 by Rodo Collins LPN  Outcome: Progressing     Problem: Anxiety  Goal: Will report anxiety at manageable levels  Description: INTERVENTIONS:  1. Administer medication as ordered  2. Teach and rehearse alternative coping skills  3. Provide emotional support with 1:1 interaction with staff  9/5/2023 2206 by Homero Patel RN  Outcome: Progressing  9/5/2023 1059 by Rodo Collins LPN  Outcome: Progressing     Problem: Sleep Disturbance  Goal: Will exhibit normal sleeping pattern  Description: INTERVENTIONS:  1. Administer medication as ordered  2. Decrease environmental stimuli, including noise, as appropriate  3.  Discourage social isolation and naps during the day  Outcome: Progressing     Problem: Safety - Adult  Goal: Free from fall injury  Outcome: Progressing

## 2023-09-06 NOTE — BH NOTE
B: Pt is alert and oriented x4. Pt is cooperative with assessments. Pt reports feeling \"good today\". Pt states they are here because \"not sleeping for four days\". Pt identifies they're doing better since their admission because she's been sleeping. Pt is able to identify personal coping alternatives. No s/s of distress noted. No complaints of pain. I: Assess Pt mood. Document presence of depressive/anxiety symptoms. Assess for Audio/visual hallucinations. Establish trust.  Educate Pt on medications and disease processes. Monitor ADLs, food/fluid consumption and sleep. Encouarge group participation and socialization. Educate Pt on medications, coping alternatives, disease processes, and community resources. Safety checks. R: Pt mood is stable. Pt acknowledges depression but did not rate. Pt rates anxiety at 2/10, identifies triggers as finances and break up with her ex-. Pt denies SI. Pt denies HI. Pt denies audio/visual hallucinations, s/s are not observed by staff. Pt is attending groups and is interacting appropriately with staff/peers. Pt is eating all meals, and is maintaining their personal hygiene. Pt reports sleeping well with the Seroquel. Pt is compliant wiith medications. P: Encourage group participation and socialization.

## 2023-09-06 NOTE — GROUP NOTE
Group Therapy Note    Date: 9/6/2023    Group Start Time: 1000  Group End Time: 8107  Group Topic: Community Meeting    [facility] [address], LPN        Group Therapy Note    Attendees: 6       Patient's Goal:  MAKE PLANS FOR GOING HOME    Notes:  COMMUNITY    Status After Intervention:  Improved    Participation Level:  Active Listener    Participation Quality: Appropriate and Attentive      Speech:  normal      Thought Process/Content: Logical      Affective Functioning: Congruent      Mood:  CALM      Level of consciousness:  Alert      Response to Learning: Able to verbalize current knowledge/experience      Endings: None Reported    Modes of Intervention: Support      Discipline Responsible: Behavorial Health Tech      Signature:  Eleni Vasques LPN

## 2023-09-06 NOTE — BH NOTE
TREATMENT TEAM COMPLETED     Attending meeting was as follows:  Patient, ELIGIO Farley, Writer, and Mally Greco. Meeting was conducted via real-time video conference. Daily Treatment Team consists of the following:     Pt. Cognitive status:  Awake    Pt. Attending Groups: Yes    Pt. Participating in groups:  Yes    Pt. Homicidal / Suicidal: normal    Pt. Behaviors:  Anxious, Cooperative, and Pleasant    Pt. Compliant with Medications:  Yes          New Medication orders:  No      Discharge Plan:  Home, Pt to reach out to family/friends to identify a support system upon discharge.

## 2023-09-06 NOTE — GROUP NOTE
Group Therapy Note    Date: 2023    Group Start Time: 1600  Group End Time: 9854  Group Topic: Nursing    [facility] [address]ett RN      Discharge Planning and Resources  Group Therapy Note    Attendees:          Patient's Goal:  ***    Notes:  ***    Status After Intervention:  {Status After Intervention:262134195}    Participation Level: {Participation Level:905571935}    Participation Quality: {[facility] PARTICIPATION QUALITY:303190694}      Speech:  {[facility] CD_SPEECH:76205}      Thought Process/Content: {Thought Process/Content:824115939}      Affective Functioning: {Affective Functionin}      Mood: {Mood:771709799}      Level of consciousness:  {Level of consciousness:574512120}      Response to Learnin [facility] Responses to Learnin}      Endings: {[facility] Endings:89901}    Modes of Intervention: {[facility] Modes of Intervention:350211844}      Discipline Responsible: [address] [facility] Multidisciplinary:221980296}      Signature:  Amanda Robbins, RN

## 2023-09-06 NOTE — GROUP NOTE
Group Therapy Note    Date: 9/6/2023    Group Start Time: 1330  Group End Time: 9988  Group Topic: Education Group - Inpatient     [address]        Group Therapy Note    Attendees: 5/7    Writer facilitated an inpatient psych-ed group. Writer provided a CBT handout regarding core beliefs and encouraged patients to fill out the information regarding negative core beliefs they feel they need to work on. Writer provided education regarding how core beliefs can impact our way of thinking and mental health. Writer concluded session with a grounding exercise and encouraging patients to share and ask questions. Patient's Goal:  To attend and participate in groups and activities daily. Notes:  Pt actively participated. Pt was able to discuss negative core beliefs she needs to work on. Status After Intervention:  Improved    Participation Level:  Active Listener and Interactive    Participation Quality: Appropriate, Attentive, Sharing, and Supportive      Speech:  normal      Thought Process/Content: Logical  Linear      Affective Functioning: Congruent      Mood: euthymic      Level of consciousness:  Alert, Oriented x4, and Attentive      Response to Learning: Able to verbalize current knowledge/experience, Able to verbalize/acknowledge new learning, Able to retain information, Capable of insight, and Progressing to goal      Endings: None Reported    Modes of Intervention: Education      Discipline Responsible: /Counselor      Signature:  Therese [facility]

## 2023-09-06 NOTE — GROUP NOTE
Group Therapy Note    Date: 9/6/2023    Group Start Time: 1410  Group End Time: 0523  Group Topic: Process Group - Inpatient     [address]        Group Therapy Note    Attendees: 5/7    Writer facilitated an inpatient process group. Writer implemented various mindfulness and expressive arts exercises. Writer held the space as patients processed. Writer encouraged patients to process any feelings they may be having, discuss discharge plans, etc. Writer reviewed coping skills and topics learned earlier in the day. Writer concluded session with a grounding exercise and encouraging patients to provide input and feedback regarding the group. Patient's Goal:  To attend and participate in groups and activities daily. Notes:  Pt actively participated. Pt was able to discuss discharge plans and engaged in mindfulness activities. Status After Intervention:  Improved    Participation Level:  Active Listener and Interactive    Participation Quality: Appropriate, Attentive, and Supportive      Speech:  normal      Thought Process/Content: Logical      Affective Functioning: Congruent      Mood: euthymic      Level of consciousness:  Alert, Oriented x4, and Attentive      Response to Learning: Able to verbalize current knowledge/experience, Able to verbalize/acknowledge new learning, Able to retain information, and Progressing to goal      Endings: None Reported    Modes of Intervention: Exploration and Activity      Discipline Responsible: /Counselor      Signature:  Diogo [employer]

## 2023-09-06 NOTE — BH NOTE
Pt received beginning of shift  on the phone talking. Pt ate snack, noticed socializing with others and watching Tv    Pt alert and oriented x 4, denies SI/HI, denies A/V hallucination  , no pain complaint  reported feeling better rated depression as 0 and anxiety as 1. Pt accepted  HS medication , pt tried to sleep in her room 102-1 by 2210 then came at 2255 complaining being unable to sleep due to her roommate snoring,  pt choose to sleep in the small day room back hallway, she took her mattress and went their by 2300    Pt slept by 2330    , pt sleeping in the small day room No violent no self harming behavior noticed or reported.

## 2023-09-06 NOTE — BH NOTE
Behavioral Health Treatment Team Note     Patient goal(s) for today: Pt reports \"Work on discharge stuff. \"  Treatment team focus/goals: medication management, safe discharge planning, attend groups and activities daily    Progress note: Pt observed walking the unit. Pt pleasant and more alert and oriented x4 today. Pt states she is feeling better and is hoping to discharge this week so she can go back to work soon. Pt denies SI, HI, AVH. Inpatient level of care is needed in order to stabilize pt further on medications.     LOS:  5  Expected LOS: 6-7 days    Insurance info/prescription coverage:  [employer]  Date of last family contact:  Pt will sign a release for support session prior to discharge  Family requesting physician contact today:  No  Discharge plan:  Home with outpatient   Guns in the home:  No  Outpatient provider(s):  [address] [place] [address] outpatient     Participating treatment team members: Mary Kay Tomlinson, [facility]

## 2023-09-07 ENCOUNTER — TELEPHONE (OUTPATIENT)
Age: 53
End: 2023-09-07

## 2023-09-07 VITALS
OXYGEN SATURATION: 99 % | SYSTOLIC BLOOD PRESSURE: 141 MMHG | TEMPERATURE: 97.5 F | DIASTOLIC BLOOD PRESSURE: 90 MMHG | HEIGHT: 63 IN | HEART RATE: 124 BPM | WEIGHT: 174.36 LBS | BODY MASS INDEX: 30.89 KG/M2 | RESPIRATION RATE: 16 BRPM

## 2023-09-07 PROCEDURE — 6370000000 HC RX 637 (ALT 250 FOR IP): Performed by: PSYCHIATRY & NEUROLOGY

## 2023-09-07 PROCEDURE — 6370000000 HC RX 637 (ALT 250 FOR IP): Performed by: HOSPITALIST

## 2023-09-07 PROCEDURE — 6370000000 HC RX 637 (ALT 250 FOR IP): Performed by: PHYSICIAN ASSISTANT

## 2023-09-07 RX ORDER — AMLODIPINE BESYLATE 5 MG/1
5 TABLET ORAL DAILY
Qty: 30 TABLET | Refills: 3 | Status: SHIPPED | OUTPATIENT
Start: 2023-09-08

## 2023-09-07 RX ORDER — BUSPIRONE HYDROCHLORIDE 5 MG/1
5 TABLET ORAL 3 TIMES DAILY
Qty: 90 TABLET | Refills: 0 | Status: SHIPPED | OUTPATIENT
Start: 2023-09-07

## 2023-09-07 RX ORDER — QUETIAPINE FUMARATE 50 MG/1
50 TABLET, FILM COATED ORAL NIGHTLY
Qty: 60 TABLET | Refills: 3 | Status: SHIPPED | OUTPATIENT
Start: 2023-09-07

## 2023-09-07 RX ORDER — PAROXETINE 30 MG/1
15 TABLET, FILM COATED ORAL DAILY
Qty: 30 TABLET | Refills: 3 | Status: SHIPPED | OUTPATIENT
Start: 2023-09-08

## 2023-09-07 RX ADMIN — AMLODIPINE BESYLATE 5 MG: 5 TABLET ORAL at 07:56

## 2023-09-07 RX ADMIN — LEVOFLOXACIN 500 MG: 500 TABLET, FILM COATED ORAL at 08:31

## 2023-09-07 RX ADMIN — BUSPIRONE HYDROCHLORIDE 5 MG: 5 TABLET ORAL at 07:56

## 2023-09-07 RX ADMIN — PAROXETINE 15 MG: 30 TABLET, FILM COATED ORAL at 07:56

## 2023-09-07 RX ADMIN — LEVOTHYROXINE SODIUM 50 MCG: 50 TABLET ORAL at 06:17

## 2023-09-07 RX ADMIN — BUSPIRONE HYDROCHLORIDE 5 MG: 5 TABLET ORAL at 13:26

## 2023-09-07 ASSESSMENT — PAIN SCALES - GENERAL: PAINLEVEL_OUTOF10: 0

## 2023-09-07 NOTE — BH NOTE
DISCHARGE SUMMARY    Ellis Grace  : 1970  MRN: [id number]    The patient Claudia Hui exhibits the ability to control behavior in a less restrictive environment. Patient's level of functioning is improving. No assaultive/destructive behavior has been observed for the past 24 hours. No suicidal/homicidal threat or behavior has been observed for the past 24 hours. There is no evidence of serious medication side effects. Patient has not been in physical or protective restraints for at least the past 24 hours. If weapons involved, how are they secured? N/A    Is patient aware of and in agreement with discharge plan? Yes    Arrangements for medication:  Prescriptions On file    Copy of discharge instructions to provider?:  Yes    Arrangements for transportation home:  Pt will be picked up and taken home by friend or family member. Keep all follow up appointments as scheduled, continue to take prescribed medications per physician instructions.   Mental health crisis number:  044 or your local mental health crisis line number at [facility] [facility] Emergency WARM LINE      8-079-815-MHAV ([phone])      M-F: 9am to 9pm      Sat & Sun: 5pm - 9pm  National suicide prevention lines:                             7-321-HQIBVZN (2-692-634-479-427-1770)       0-398-357-TALK ([phone])    Crisis Text Line:  Text HOME to 964040

## 2023-09-07 NOTE — TELEPHONE ENCOUNTER
Received call from Nury Hahn,  at [facility] in [place]. Patient will be discharged on 9/8/23. Nury Hahn called to schedule follow up (9/12/23 @ 10am). Informed Paula to let patient know that provider did not have a Wednesday availability, and 9/12 appt will be an hour long. Will inform provider that notes from hospitalization is under [facility] for Merge\" charge.

## 2023-09-07 NOTE — BH NOTE
Behavioral Health Transition Record to Provider    Patient Name: Eve Estrada  YOB: 1970  Medical Record Number: [id number]  Date of Admission: 9/1/2023  Date of Discharge: 9/7/2023    Attending Provider: Joli Gilford, MD  Discharging Provider: Joli Gilford, MD  To contact this individual call [phone] and ask the  to page. If unavailable, ask to be transferred to [facility] Provider on call. [facility] Provider will be available on call 24/7 and during holidays. Primary Care Provider: Nick Stanford DO    Allergies   Allergen Reactions    Latex Anaphylaxis    Ginger     Cephalexin Rash       Reason for Admissio  REASON FOR HOSPITALIZATION:  CC: \" Depression\". HISTORY OF PRESENT ILLNESS:    The patient, Eve Estrada, is a 48 y.o. White (non-) female with a past psychiatric history significant for  major depressive disorder and generalized anxiety disorder admitted to [facility] for worsening of depression and anxiety. Patient is that she is going a lot of stress related to finances and relationship. She reported feeling depressed and anxious for the last few days. She reported poor sleep for the last 3 to 5 days and was sleeping for about 1 to 2 hours. Stated she was on Ambien which helped with her sleep in the past but not helping for the last 1 week. She reported low energy levels and appetite. Feeling hopeless. Endorsed anhedonia. She also reported having intermittent passive suicidal ideations. Denied any homicidal ideation. She also reported intermittent panic attacks. Denied any paranoid thoughts, audiovisual hallucinations. Denied any symptoms related to shruti. She reported taking her medications as recommended. Stated that she was tried on Ativan 0.5 mg while she was in the ER and did not help much with her anxiety or sleep. Admission Diagnosis: Depression [F32. A]    * No surgery found

## 2023-09-07 NOTE — BH NOTE
Discharge instructions given and explained. Instructions signed for. Belongings returned. Denies SI/HI. Denies pain. Has safety plan in place. Discharged via vehicle accompanied by family member.

## 2023-09-07 NOTE — BH NOTE
B:   Patient alert and oriented x 4. Pt. States depression is 0. Pt. States Anxiety is 3. Pt. denies Hallucinations. Pt. denies Delusions. Pt. denies SI.  Pt. denies HI. Pt. cooperative with Assessment. Pt.'s behavior Attention Seeking. I:    If patient is disoriented, reorient pt. Build trust with patient, by therapeutic listening and Groups. Encourage pt. To attend and Participate in Groups. Provide Medications as ordered and needed. Encourage pt. To be up for all meals and snacks, and consume all of each. Encourage pt. To interact with staff and peers in a positive manner. Encourage pt. To keep good hygiene. Q 15 minute safety checks. R:   Pt. did attend and Participate in Group. Pt. Is Compliant with Medications   Yes. Pt. is getting up for meals and snacks. Pt. Consumes 75% of Meals. Pt. is, interacting with Peers. Pt.'s hygiene is Good. Pt. does not, have any safety issues. P:   Pt. Will develop and continue to utilize positive Coping skills. Pt. Will continue to comply with Plan of Care toward Discharge. Pt. Will continue to stay safe on the unit.

## 2023-09-07 NOTE — BH NOTE
TREATMENT TEAM COMPLETED     Attending meeting was as follows:  Patient, JOHNSON Jay, Joyce Ngo, RN Unit Manager, Kyle Patricio RN and Therese Maloney, Licensed Clinical Therapist.        Meeting was conducted via [employer] consists of the following:     Pt. Cognitive status:  Alert and Oriented x 4. Pt. Attending Groups: Yes    Pt. Participating in groups:  Yes    Pt. Homicidal / Suicidal: denies    Pt. Behaviors:  Alert and Cooperative. Patient feels her depression is much better. Feels she is doing well and is ready to be discharged. Patient continues to work on coping skills to decrease anxiety and depression. Patient is tolerating her medications well. Continues to sleep in small dayroom. States she can not sleep int he room with her roommate due to roommate snoring. Pt. Compliant with Medications:  Yes          New Medication orders:  No      Discharge Plan:  Home with outpatient services.

## 2023-09-07 NOTE — BH NOTE
TREATMENT TEAM COMPLETED     Attending meeting was as follows:  Patient, JOHNSON Nettles, Nyasia Reina, RN Unit Manager, Ankur Hernandez, Charge RN and Jessica Hooper, Licensed Clinical Therapist       Meeting was conducted via [employer] consists of the following:     Pt. Cognitive status:  Alert and Oriented x 4    Pt. Attending Groups: Yes    Pt. Participating in groups:  Yes    Pt. Homicidal / Suicidal: Denies    Pt. Behaviors:  Alert and Oriented x 4. Continues to sleep in small day room. States she cannot sleep in the room with her roommate snoring. Affect remains flat but patient states her depression is much better. Patient does appear to be brighter today. HAS been taking medications as ordered. Plan is for patient to be discharged home. Patient has a friend from her [employer] who will be helping her by being a support since patient lives alone. Continues with Levaquin 500 mg PO QD for UTI. Instructed patient to force fluids. Tolerating medications well. Pt. Compliant with Medications:  Yes          New Medication orders:  No      Discharge Plan:  Home with outpatient services.

## 2023-09-07 NOTE — BH NOTE
Collateral: Writer facilitated a support session with hospitals and Marialuisa De La Garza, pt's daughters. No safety concerns with pt returning home. Daughters state that they hope pt is able to return to work due to being out six weeks and being stressed about finances.

## 2023-09-07 NOTE — DISCHARGE SUMMARY
11.5 - 14.5 % Final    Platelets 72/22/0542 435 (H)  150 - 400 K/uL Final    MPV 09/01/2023 9.3  8.9 - 12.9 FL Final    Nucleated RBCs 09/01/2023 0.0  0  WBC Final    nRBC 09/01/2023 0.00  0.00 - 0.01 K/uL Final    Neutrophils % 09/01/2023 57  32 - 75 % Final    Lymphocytes % 09/01/2023 32  12 - 49 % Final    Monocytes % 09/01/2023 9  5 - 13 % Final    Eosinophils % 09/01/2023 1  0 - 7 % Final    Basophils % 09/01/2023 1  0 - 1 % Final    Immature Granulocytes 09/01/2023 0  0.0 - 0.5 % Final    Neutrophils Absolute 09/01/2023 4.0  1.8 - 8.0 K/UL Final    Lymphocytes Absolute 09/01/2023 2.3  0.8 - 3.5 K/UL Final    Monocytes Absolute 09/01/2023 0.6  0.0 - 1.0 K/UL Final    Eosinophils Absolute 09/01/2023 0.1  0.0 - 0.4 K/UL Final    Basophils Absolute 09/01/2023 0.1  0.0 - 0.1 K/UL Final    Absolute Immature Granulocyte 09/01/2023 0.0  0.00 - 0.04 K/UL Final    Differential Type 09/01/2023 AUTOMATED    Final    Sodium 09/01/2023 136  136 - 145 mmol/L Final    Potassium 09/01/2023 4.0  3.5 - 5.1 mmol/L Final    Chloride 09/01/2023 106  97 - 108 mmol/L Final    CO2 09/01/2023 27  21 - 32 mmol/L Final    Anion Gap 09/01/2023 3 (L)  5 - 15 mmol/L Final    Glucose 09/01/2023 96  65 - 100 mg/dL Final    BUN 09/01/2023 16  6 - 20 MG/DL Final    Creatinine 09/01/2023 0.86  0.55 - 1.02 MG/DL Final    Bun/Cre Ratio 09/01/2023 19  12 - 20   Final    Est, Glom Filt Rate 09/01/2023 >60  >60 ml/min/1.73m2 Final    Calcium 09/01/2023 9.6  8.5 - 10.1 MG/DL Final    Total Bilirubin 09/01/2023 0.3  0.2 - 1.0 MG/DL Final    ALT 09/01/2023 31  12 - 78 U/L Final    AST 09/01/2023 17  15 - 37 U/L Final    Alk Phosphatase 09/01/2023 82  45 - 117 U/L Final    Total Protein 09/01/2023 8.2  6.4 - 8.2 g/dL Final    Albumin 09/01/2023 4.2  3.5 - 5.0 g/dL Final    Globulin 09/01/2023 4.0  2.0 - 4.0 g/dL Final    Albumin/Globulin Ratio 09/01/2023 1.1  1.1 - 2.2   Final    Color, UA 09/01/2023 YELLOW/STRAW    Final    Appearance 09/01/2023

## 2023-09-07 NOTE — GROUP NOTE
Group Therapy Note    Date: 9/6/2023    Group Start Time: 2000  Group End Time: 2045  Group Topic: Wrap-Up    SVR [address], CNA        Group Therapy Note    Attendees: 7       Patient's Goal:  work on ways to occupy time once home    Notes:  Participated in group    Status After Intervention:  Improved    Participation Level: Active Listener and Interactive    Participation Quality: Appropriate and Attentive      Speech:  normal      Thought Process/Content: Logical      Affective Functioning: Congruent      Mood: anxious      Level of consciousness:  Alert and Oriented x4      Response to Learning: Able to verbalize current knowledge/experience, Able to verbalize/acknowledge new learning, Able to retain information, Capable of insight, Able to change behavior, and Progressing to goal      Endings: None Reported    Modes of Intervention: Activity      Discipline Responsible: Behavorial Health Tech      Signature:   Jocelyn Matute CNA

## 2023-09-07 NOTE — BH NOTE
Pt. Belongings given back to pt., verified all there, signed for. Discharge instructions explained to pt. Including, Follow up appt. With Victor M Adjutant MAYELA 9/12,2023 @ 10:00 AM .  Medications called [address] Pt. Denies SI/HI at time of discharge. Pt. is not a Smoker. Smoking cessation education was notprovided. Pt. is not a drinker. Alcohol Education was not Provided. Discharge Paperwork and H & P, faxed to [facility], With success sheet. Pt. was not discharged on 2 or more Antipsychotics. Pt. Displayed no safety concerns at discharge. Pt. Escorted to front by staff for transportation by family via car, to home.

## 2023-09-08 ENCOUNTER — PATIENT MESSAGE (OUTPATIENT)
Dept: PRIMARY CARE CLINIC | Facility: CLINIC | Age: 53
End: 2023-09-08

## 2023-09-08 ENCOUNTER — CARE COORDINATION (OUTPATIENT)
Dept: OTHER | Facility: CLINIC | Age: 53
End: 2023-09-08

## 2023-09-08 NOTE — CARE COORDINATION
[facility] Care Transitions Initial Follow Up Call    Call within 2 business days of discharge: Yes    Patient Current Location:  Home: [address]    Care Transition Nurse contacted the patient by telephone to perform post hospital discharge assessment. Verified name and  with patient as identifiers. Provided introduction to self, and explanation of the Care Transition Nurse role. Patient: Pablo Hazel Patient : 1970   MRN: [id number]  Reason for Admission: Behavioral Health  Discharge Date: 23 RARS: Readmission Risk Score: 5.2      Last Discharge [address] [facility]       Date Complaint Diagnosis Description Type Department Provider    23 Anxiety Anxiety state ED (DISCHARGE)  Víctor Fowler, ; Asia Brown . .. Was this an external facility discharge? Yes, 2023  Discharge Facility: [facility] in [address] [place] [address] to be reviewed by the provider   Additional needs identified to be addressed with provider: No  none               Method of communication with provider: none. Spoke with patient, outreaching to follow up with CCM. Patient reported that she had been hospitalized since  and discharged yesterday, . The patient reports that she was admitted due to increased anxiety and decreased sleep. Patient reports that she did not sleep for 4 days prior to her admission. She reports feeling much better, but is fearful that she will again not be able to sleep. She states that she had a hard time falling asleep last night. She is taking Seroquel in the evening, but it was not particularly helpful. She has a follow up with her psychiatrist on  and with her counselor on . Care Transition Nurse reviewed discharge instructions wpatient who verbalized understanding. The patient was given an opportunity to ask questions and does not have any further questions or concerns at this time. Were discharge instructions available to patient?  Yes.

## 2023-09-10 RX ORDER — LEVOTHYROXINE SODIUM 0.05 MG/1
50 TABLET ORAL
Qty: 30 TABLET | Refills: 0 | Status: SHIPPED | OUTPATIENT
Start: 2023-09-10

## 2023-09-11 DIAGNOSIS — T50.905S ADVERSE EFFECT OF DRUG, SEQUELA: Primary | ICD-10-CM

## 2023-09-12 ENCOUNTER — CARE COORDINATION (OUTPATIENT)
Dept: OTHER | Facility: CLINIC | Age: 53
End: 2023-09-12

## 2023-09-12 ENCOUNTER — OFFICE VISIT (OUTPATIENT)
Age: 53
End: 2023-09-12
Payer: COMMERCIAL

## 2023-09-12 VITALS
OXYGEN SATURATION: 97 % | BODY MASS INDEX: 30.44 KG/M2 | HEIGHT: 63 IN | HEART RATE: 110 BPM | TEMPERATURE: 98.1 F | DIASTOLIC BLOOD PRESSURE: 86 MMHG | RESPIRATION RATE: 17 BRPM | SYSTOLIC BLOOD PRESSURE: 119 MMHG | WEIGHT: 171.8 LBS

## 2023-09-12 DIAGNOSIS — F41.9 ANXIETY DISORDER, UNSPECIFIED TYPE: ICD-10-CM

## 2023-09-12 DIAGNOSIS — F33.1 MODERATE EPISODE OF RECURRENT MAJOR DEPRESSIVE DISORDER (HCC): Primary | ICD-10-CM

## 2023-09-12 DIAGNOSIS — G47.00 INSOMNIA, UNSPECIFIED TYPE: ICD-10-CM

## 2023-09-12 PROCEDURE — 99214 OFFICE O/P EST MOD 30 MIN: CPT | Performed by: NURSE PRACTITIONER

## 2023-09-12 RX ORDER — BUSPIRONE HYDROCHLORIDE 5 MG/1
5 TABLET ORAL 3 TIMES DAILY
Qty: 90 TABLET | Refills: 0 | Status: SHIPPED | OUTPATIENT
Start: 2023-09-12 | End: 2023-10-12

## 2023-09-12 RX ORDER — AMLODIPINE BESYLATE 5 MG/1
5 TABLET ORAL DAILY
COMMUNITY

## 2023-09-12 RX ORDER — QUETIAPINE FUMARATE 50 MG/1
50 TABLET, EXTENDED RELEASE ORAL NIGHTLY
COMMUNITY
End: 2023-09-12

## 2023-09-12 RX ORDER — RISPERIDONE 0.5 MG/1
TABLET ORAL
Qty: 30 TABLET | Refills: 0 | Status: SHIPPED | OUTPATIENT
Start: 2023-09-12 | End: 2023-10-11

## 2023-09-12 ASSESSMENT — PATIENT HEALTH QUESTIONNAIRE - PHQ9
8. MOVING OR SPEAKING SO SLOWLY THAT OTHER PEOPLE COULD HAVE NOTICED. OR THE OPPOSITE, BEING SO FIGETY OR RESTLESS THAT YOU HAVE BEEN MOVING AROUND A LOT MORE THAN USUAL: 0
SUM OF ALL RESPONSES TO PHQ QUESTIONS 1-9: 7
4. FEELING TIRED OR HAVING LITTLE ENERGY: 0
9. THOUGHTS THAT YOU WOULD BE BETTER OFF DEAD, OR OF HURTING YOURSELF: 0
SUM OF ALL RESPONSES TO PHQ9 QUESTIONS 1 & 2: 2
5. POOR APPETITE OR OVEREATING: 0
2. FEELING DOWN, DEPRESSED OR HOPELESS: 1
3. TROUBLE FALLING OR STAYING ASLEEP: 2
SUM OF ALL RESPONSES TO PHQ QUESTIONS 1-9: 7
1. LITTLE INTEREST OR PLEASURE IN DOING THINGS: 1
SUM OF ALL RESPONSES TO PHQ QUESTIONS 1-9: 7
10. IF YOU CHECKED OFF ANY PROBLEMS, HOW DIFFICULT HAVE THESE PROBLEMS MADE IT FOR YOU TO DO YOUR WORK, TAKE CARE OF THINGS AT HOME, OR GET ALONG WITH OTHER PEOPLE: 1
SUM OF ALL RESPONSES TO PHQ QUESTIONS 1-9: 7
7. TROUBLE CONCENTRATING ON THINGS, SUCH AS READING THE NEWSPAPER OR WATCHING TELEVISION: 2
6. FEELING BAD ABOUT YOURSELF - OR THAT YOU ARE A FAILURE OR HAVE LET YOURSELF OR YOUR FAMILY DOWN: 1

## 2023-09-12 NOTE — PROGRESS NOTES
CHIEF COMPLAINT:  [place] is a 48 y.o.  and newly  from ex- (July, 2023) female, mother of 3 children (Daughters 27 & 25, son, 14[address] [facility] y/o) and is domiciled independently with half time spent with son each week. Today she is scheduled for a follow-up appointment to discuss mood symptom management associated with historical diagnoses of anxiety, MDD, recurrent, moderate, and insomnia with psychotropic medication management. *Last in [facility] office- 7/20/23; Prozac 40 mg and Wellbutrin 100 mg SR BID; Psych Hosp- 7/13 to 7/15 ([facility]) for depression with SI; Wellbutrin discontinued on 8/1; Paxil 10 mg started on 8/2 by [facility]; 8/1: ED visit for increased anxiety with nausea. Of note, history of suicide in family- maternal uncle. ED visit: 8/24- Nausea, anxiety; 8/28- Nausea, anxiety; 8/30- Paxil 10 mg, Ambien 12.5 mg cont.; 9/1- Inpatient Psych Admit 9/1 to 9/7- Buspar increased to 5 mg TID, increased Paxil to 30 mg; Ambien 12.5 mg discontinued. HPI:    (9/12/23) Today, Teresita Cervantes states \"I'm doing better. \" Teresita Cervantes was recently discharged from inpatient Psych at [facility] from 9/1 to 9/7/23 due to worsening anxiety and depression, with poor sleep x 1 week despite the use of Ambien with intermittent suicidal ideations and intermittent panic attacks. Currently, she reports decrease in depression. Anxiety is controlled with Buspar 5 mg TID and Paxil 30 mg daily. Sleep remains problematic despite hospital discharging her with Seroquel 50 mg nightly. Pt reports rapid heart rate 2 hrs after taking Seroquel. Pt advised to stop medication. She also reports combining Ativan 0.5 mg with Seroquel with minimal effectiveness. She endorses a sleep routine by taking meds at 9 pm, and avoiding all electronics and making sure room is cool etc. She denies the use of Ambien 12.5 mg despite her friend picking-up med from pharmacy on 8/31. She reports a Sleep Medicine appt for Nov 2023.  She denies suicidal
8/30/2023 8/2/23   Giulia Salcido DO   famotidine (PEPCID) 20 MG tablet Take 1 tablet by mouth 2 times daily  Patient not taking: Reported on 8/30/2023 8/2/23   Giulia Salcido DO   hydrOXYzine pamoate (VISTARIL) 25 MG capsule Take 1 capsule by mouth 3 times daily as needed for Itching  Patient not taking: Reported on 8/30/2023 7/28/23   Valery Layton MD   montelukast (SINGULAIR) 10 MG tablet Take 1 tablet by mouth nightly  Patient not taking: Reported on 8/2/2023    Historical Provider, MD   Biotin 1000 MCG TABS Take 1 tablet by mouth daily  Patient not taking: Reported on 8/2/2023    Historical Provider, MD   zolpidem (AMBIEN CR) 12.5 MG extended release tablet Take 1 tablet by mouth nightly as needed for Sleep for up to 90 days. Max Daily Amount: 12.5 mg  Patient not taking: Reported on 9/12/2023 6/28/23 9/26/23  Willard Gutierrez, APRN - NP   Cholecalciferol 50 MCG (2000 UT) TABS Take 1 tablet by mouth daily  Patient not taking: Reported on 8/2/2023    Ar Automatic Reconciliation         Patient states with Buspar she was given 5mg TID in the hospital. [facility] would not fill for TID due to  upcoming appt with [facility] provider. [facility] prescribed 30mg 1/2 tab daily    Added Amlodipine 5mg and Quetiapine 50mg    PHQ-9 Total Score: 7 (9/12/2023 10:01 AM)  Thoughts that you would be better off dead, or of hurting yourself in some way: 0 (9/12/2023 10:01 AM)    1. Have you been to the ER, urgent care clinic since your last visit? Hospitalized since your last visit? Yes [facility]. [address] [facility]    2. Have you seen or consulted any other health care providers outside of the [address] since your last visit? Include any pap smears or colon screening. Records received.

## 2023-09-12 NOTE — CARE COORDINATION
Care Transitions Follow Up Call    ACM attempted to reach patient for Care Transitions follow up call. HIPAA compliant message left requesting a return phone call at patient convenience.      Plan for follow-up call in 10-14 days    Future Appointments   Date Time Provider [address]   9/27/2023  2:00 PM Brie Cisneros APRN - CNP [facility] LD ARA[place]   11/1/2023  3:00 PM MAYELA Perez - NP [facility] [place] LD AMB

## 2023-09-13 ENCOUNTER — TELEPHONE (OUTPATIENT)
Age: 53
End: 2023-09-13

## 2023-09-13 ENCOUNTER — CARE COORDINATION (OUTPATIENT)
Dept: OTHER | Facility: CLINIC | Age: 53
End: 2023-09-13

## 2023-09-13 DIAGNOSIS — E03.9 HYPOTHYROIDISM, UNSPECIFIED TYPE: Primary | ICD-10-CM

## 2023-09-13 RX ORDER — LEVOTHYROXINE SODIUM 0.05 MG/1
50 TABLET ORAL
Qty: 30 TABLET | Refills: 0 | OUTPATIENT
Start: 2023-09-13

## 2023-09-13 RX ORDER — LEVOTHYROXINE SODIUM 0.05 MG/1
50 TABLET ORAL DAILY
Qty: 90 TABLET | Refills: 3 | Status: SHIPPED | OUTPATIENT
Start: 2023-09-13

## 2023-09-13 ASSESSMENT — SOCIAL DETERMINANTS OF HEALTH (SDOH)
HOW OFTEN DO YOU GET TOGETHER WITH FRIENDS OR RELATIVES?: THREE TIMES A WEEK
HOW OFTEN DO YOU ATTEND CHURCH OR RELIGIOUS SERVICES?: MORE THAN 4 TIMES PER YEAR
HOW HARD IS IT FOR YOU TO PAY FOR THE VERY BASICS LIKE FOOD, HOUSING, MEDICAL CARE, AND HEATING?: VERY HARD
DO YOU BELONG TO ANY CLUBS OR ORGANIZATIONS SUCH AS CHURCH GROUPS UNIONS, FRATERNAL OR ATHLETIC GROUPS, OR SCHOOL GROUPS?: YES
HOW OFTEN DO YOU ATTENT MEETINGS OF THE CLUB OR ORGANIZATION YOU BELONG TO?: MORE THAN 4 TIMES PER YEAR
IN A TYPICAL WEEK, HOW MANY TIMES DO YOU TALK ON THE PHONE WITH FAMILY, FRIENDS, OR NEIGHBORS?: MORE THAN THREE TIMES A WEEK

## 2023-09-13 NOTE — CARE COORDINATION
Ambulatory Care Coordination Note  2023    Patient Current Location:  Home: Ken [place]  [address] [place] [address] Drive [id number]     ACM contacted the patient by telephone. Verified name and  with patient as identifiers. Provided introduction to self, and explanation of the ACM role. Challenges to be reviewed by the provider   Additional needs identified to be addressed with provider: No  none               Method of communication with provider: none. ACM: Rupali Anderson RN    Ambulatory Care Manager [facility]) contacted the patient follow up on progress, discuss new issues or concerns, and reinforce/provide patient education. Assessment and Education:   Patient  reports the following symptoms of anxiety: racing thoughts feelings of apprehension/worry panic attacks. Symptoms are reported as has slightly improved since previous ACM contact. Is patient currently undergoing treatment? Yes. Patient  reports the following depression symptoms: depressed mood, insomnia, difficulty concentrating, and hopelessness. Symptoms are reported as has slightly improved since previous ACM contact. Is patient currently undergoing treatment? Yes. All medications are filled Yes    Patient saw her General acute hospital provider on . The patient was started on Risperidone 0.5mg at . Today's ACM interventions Education of patient/family/caregiver/guardian to support self-management-regarding medication and sleeping habits . Discussed red flags and appropriate site of care based on symptoms and resources available to patient including: PCP  Specialist.     Summary Note: Patient states that she saw her General acute hospital provider for medication management yesterday. She reports that she was started on Risperdal 0.5mg and took her first dose before going to bed last night. Patient reports that she did not sleep at all. She states that the Risperdal made her feel more relaxed, but she is still unable to sleep.  She reports that she slept the night before, but only

## 2023-09-14 ENCOUNTER — CARE COORDINATION (OUTPATIENT)
Dept: OTHER | Facility: CLINIC | Age: 53
End: 2023-09-14

## 2023-09-14 DIAGNOSIS — F41.9 ANXIETY DISORDER, UNSPECIFIED TYPE: ICD-10-CM

## 2023-09-14 NOTE — CARE COORDINATION
MSW contacted patient for evaluation . Patient identifiers confirmed, zip code and . Referral sent to  by Nurse Guerita Carlson. Purpose of TC  Assess for financial needs    Patient concerns  Ms. Jimena Hauser is concerned about [employer] in particular. Transportation  Transportation needs are met. Patient is able to drive. Household  Patient stated that her minor son resides in the household with her. Employment  Ms. Jimena Hauser is currently on STD. She returns to work half time on 23. 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 Finances  Ms. Jimena Hauser stated that she has been out of work for 8 weeks. During the time frame, she has only received 2 STD paychecks. Patient stated that her current bills are present but she is concerned about next month's bills. Ms. Jimena Hauser stated that she spoke to her  this morning  who suggested that she transfer funds from her savings account to her checking. The funds available should be adequate to pay her expenses next month until her checks resume. Ms. Jmiena Hauser stated that the funds in her savings are not adequate to cover her upcoming medical bills. According to patient income and family size, it appears that patient will qualify for [address] [place] [address] financial assistance program.    Plan of action  MSW M emailed patient an application for [employer]. MSW ACM emailed patient information for the [address] [place] [address] Ave. ACM provided contact information for future needs. Plan for follow-up call in 5-7 days        Goals         Behavioral Health       I will work towards the following [address] [place] [address] goals: I will continue to follow up with my psychologist /counselor and/or psychiatrist., I will take my medications daily as prescribed. , I will work on improving sleep habits to have consistent sleep/awake time. , and develop effective coping

## 2023-09-14 NOTE — CARE COORDINATION
Ambulatory Care Coordination Note  2023    Patient Current Location:  Home: Ken Ryan  [address] [place] [address] Drive [id number]     ACM contacted the patient by telephone. Verified name and  with patient as identifiers. Provided introduction to self, and explanation of the ACM role. Challenges to be reviewed by the provider   Additional needs identified to be addressed with provider: No  none               Method of communication with provider: none. ACM: Nishi Massey RN    Ambulatory Care Manager [facility]) contacted the patient follow up on progress, discuss new issues or concerns, and reinforce/provide patient education. Assessment and Education:   Patient  reports the following depression symptoms: insomnia, depresses mood, hoplessness. Symptoms are reported as has worsened since previous ACM contact. Is patient currently undergoing treatment? Yes. All medications are filled Yes Patient is requesting assistance with her sleep. Psychiatry (med management) seen by Patience Zafar on     Today's ACM interventions Education of patient/family/caregiver/guardian to support self-management-regarding sleep management. . Discussed red flags and appropriate site of care based on symptoms and resources available to patient including: Specialist.     Summary Note: Follow up with the patient from previous conversation. She reports that she has not slept in 48 hours. Patient reached out to her [facility] provider earlier in the day to request assistance with sleep. She states that the Risperdal is not helping her sleep. Patient's [facility] provider called her back to inform her that she will need to complete a urine drug screen and drug compliance lab prior to making medication changes. Patient plans to go to lab in the AM.   Will follow up with the patient after labs are completed. ACM provided contact information for future needs. Plan for follow-up call in 3-5 days based on severity of symptoms and risk factors.   Plan for next

## 2023-09-15 NOTE — TELEPHONE ENCOUNTER
Hema Tinoco from [facility] calling and requesting refill on medication said they did not think there request was going through. Advised would send the message to Jensen.      Lov 9/1/23

## 2023-09-18 ENCOUNTER — CARE COORDINATION (OUTPATIENT)
Dept: OTHER | Facility: CLINIC | Age: 53
End: 2023-09-18

## 2023-09-18 ENCOUNTER — TELEPHONE (OUTPATIENT)
Age: 53
End: 2023-09-18

## 2023-09-18 NOTE — TELEPHONE ENCOUNTER
Spoke with Venus Lerma, representative with [employer] regarding request for treatment plan. Informed Nae that this office will need a release of information form signed by patient before sending office notes. Venus Lerma was given this office fax number. Venus Lerma had no further questions.

## 2023-09-18 NOTE — CARE COORDINATION
MSW Documentation    MSW contacted patient for follow up. Patient identifiers confirmed, zip code and . Patient referred by Nurse Odessa Bell. Purpose of TC  Patient emailed requesting MSW ACM contact her. TC Details  Ms. Anny Moyer had questions about the [employer] form. MSW ACM answered patient questions. Plan of action  Ms. Anny Moyer will call MSW ACM if she has additional questions. MSW provided contact information for future needs. Plan for follow-up call in 7-10 days      Goals         Behavioral Health       I will work towards the following [address] [place] [address] goals: I will continue to follow up with my psychologist /counselor and/or psychiatrist., I will take my medications daily as prescribed. , I will work on improving sleep habits to have consistent sleep/awake time. , and develop effective coping skills    Barriers: fear of failure and lack of support  Plan for overcoming my barriers: medication, therapy, and coping skill development  Confidence: 6/10  Anticipated Goal Completion Date: 10/1/2023        Patient will be aware of financial assistance programs (pt-stated)

## 2023-09-18 NOTE — CARE COORDINATION
Care Transitions Follow Up Call    ACM attempted to reach patient for Care Transitions follow up call. HIPAA compliant message left requesting a return phone call at patient convenience.      Plan for follow-up call in 3-5 days    Future Appointments   Date Time Provider [address]   9/27/2023  2:00 PM Brie Moses APRN - CNP [facility] LD ARANA   11/1/2023  3:00 PM MAYELA Jacob - NP [facility] LD AMB

## 2023-09-18 NOTE — TELEPHONE ENCOUNTER
Nadir Howard from [address] [place] [address] called waiting information on the physician treatment plan for patient from Sept 1, 2023 thru the patient  return day back to work there call back number is [phone] opt #1 claim # [id number].

## 2023-09-19 ENCOUNTER — CLINICAL DOCUMENTATION (OUTPATIENT)
Dept: PRIMARY CARE CLINIC | Facility: CLINIC | Age: 53
End: 2023-09-19

## 2023-09-20 ENCOUNTER — CARE COORDINATION (OUTPATIENT)
Dept: OTHER | Facility: CLINIC | Age: 53
End: 2023-09-20

## 2023-09-20 NOTE — CARE COORDINATION
Ambulatory Care Coordination Note    ACM attempted to reach patient for care management follow up call regarding recent hospitalization. HIPAA compliant message left requesting a return phone call at patient convenience.      Plan for follow-up call in 5-7 days    Future Appointments   Date Time Provider [address]   9/27/2023  2:00 PM Brie Barrios APRN - CNP [facility] LD ARANA   11/1/2023  3:00 PM Pastor Ganser, APRN - NP [facility] [place] BS AMB

## 2023-09-21 LAB — DRUGS UR: NORMAL

## 2023-09-21 RX ORDER — LEVOTHYROXINE SODIUM 0.05 MG/1
50 TABLET ORAL
Qty: 90 TABLET | Refills: 0 | Status: SHIPPED | OUTPATIENT
Start: 2023-09-21

## 2023-09-21 RX ORDER — LEVOTHYROXINE SODIUM 0.05 MG/1
50 TABLET ORAL
Qty: 90 TABLET | Refills: 0 | OUTPATIENT
Start: 2023-09-21

## 2023-09-22 ENCOUNTER — TELEPHONE (OUTPATIENT)
Age: 53
End: 2023-09-22

## 2023-09-22 NOTE — TELEPHONE ENCOUNTER
Spoke with Cris Parmar on the unit of [address] [place] [address]. She will fax last two notes from provider and group therapy sessions while patient was at their facility. [address] office and fax number given.

## 2023-09-26 ENCOUNTER — TELEPHONE (OUTPATIENT)
Age: 53
End: 2023-09-26

## 2023-09-26 LAB
ALPRAZ UR QL: NEGATIVE
AMPHETAMINES UR QL SCN: NEGATIVE NG/ML
BARBITURATES UR QL SCN: NEGATIVE NG/ML
BENZODIAZ UR QL: POSITIVE NG/ML
BZE UR QL SCN: NEGATIVE NG/ML
CANNABINOIDS UR QL SCN: NEGATIVE NG/ML
CLONAZEPAM UR QL: NEGATIVE
CREAT UR-MCNC: 165.4 MG/DL (ref 20–300)
FLURAZEPAM UR QL: NEGATIVE
LABORATORY COMMENT REPORT: ABNORMAL
LORAZEPAM UR CFM-MCNC: 298 NG/ML
LORAZEPAM UR QL: POSITIVE
METHADONE UR QL SCN: NEGATIVE NG/ML
MIDAZOLAM UR QL CFM: NEGATIVE
NORDIAZEPAM UR QL: NEGATIVE
OPIATES UR QL SCN: NEGATIVE NG/ML
OXAZEPAM UR QL: NEGATIVE
OXYCODONE+OXYMORPHONE UR QL SCN: NEGATIVE NG/ML
PCP UR QL: NEGATIVE NG/ML
PH UR: 6.1 (ref 4.5–8.9)
PROPOXYPH UR QL SCN: NEGATIVE NG/ML
SP GR UR: 1.01
TEMAZEPAM UR QL CFM: NEGATIVE
TRIAZOLAM UR QL: NEGATIVE

## 2023-09-26 NOTE — TELEPHONE ENCOUNTER
Spoke with Zeus Vallecillo from [facility] inpatient. [phone]. Informed Zeus Vallecillo that last note and group therapy session was not faxed to office and patient is scheduled for follow up on 09.27.23. Zeus Vallecillo stated they typically only fax the discharge summary to offices. Explained that provider would like a note from the inpatient provider regarding mood, sleep pattern, etc.     Zeus Vallecillo stated that during one evening the patient slid her mattress to the group therapy room due to noise. She complained that staff was too loud. She was checked on frequently and noted as sleeping through the night. Informed Zeus Vallecillo will give feedback to provider.

## 2023-09-27 ENCOUNTER — CARE COORDINATION (OUTPATIENT)
Dept: OTHER | Facility: CLINIC | Age: 53
End: 2023-09-27

## 2023-09-27 ENCOUNTER — OFFICE VISIT (OUTPATIENT)
Age: 53
End: 2023-09-27
Payer: COMMERCIAL

## 2023-09-27 VITALS
SYSTOLIC BLOOD PRESSURE: 114 MMHG | OXYGEN SATURATION: 100 % | DIASTOLIC BLOOD PRESSURE: 78 MMHG | HEART RATE: 89 BPM | BODY MASS INDEX: 30.3 KG/M2 | HEIGHT: 63 IN | RESPIRATION RATE: 16 BRPM | TEMPERATURE: 98.4 F | WEIGHT: 171 LBS

## 2023-09-27 DIAGNOSIS — F41.9 ANXIETY DISORDER, UNSPECIFIED TYPE: ICD-10-CM

## 2023-09-27 DIAGNOSIS — G47.00 INSOMNIA, UNSPECIFIED TYPE: ICD-10-CM

## 2023-09-27 DIAGNOSIS — F33.1 MODERATE EPISODE OF RECURRENT MAJOR DEPRESSIVE DISORDER (HCC): Primary | ICD-10-CM

## 2023-09-27 DIAGNOSIS — R45.851 SUICIDAL IDEATION: ICD-10-CM

## 2023-09-27 PROCEDURE — 99214 OFFICE O/P EST MOD 30 MIN: CPT | Performed by: NURSE PRACTITIONER

## 2023-09-27 RX ORDER — TRAZODONE HYDROCHLORIDE 50 MG/1
50 TABLET ORAL NIGHTLY
Qty: 30 TABLET | Refills: 0 | Status: SHIPPED | OUTPATIENT
Start: 2023-10-06 | End: 2023-11-05

## 2023-09-27 RX ORDER — SERTRALINE HYDROCHLORIDE 25 MG/1
25 TABLET, FILM COATED ORAL DAILY
Qty: 30 TABLET | Refills: 0 | Status: SHIPPED | OUTPATIENT
Start: 2023-10-06 | End: 2023-11-05

## 2023-09-27 ASSESSMENT — PATIENT HEALTH QUESTIONNAIRE - PHQ9
3. TROUBLE FALLING OR STAYING ASLEEP: 3
5. POOR APPETITE OR OVEREATING: 1
8. MOVING OR SPEAKING SO SLOWLY THAT OTHER PEOPLE COULD HAVE NOTICED. OR THE OPPOSITE, BEING SO FIGETY OR RESTLESS THAT YOU HAVE BEEN MOVING AROUND A LOT MORE THAN USUAL: 0
SUM OF ALL RESPONSES TO PHQ QUESTIONS 1-9: 14
2. FEELING DOWN, DEPRESSED OR HOPELESS: 3
SUM OF ALL RESPONSES TO PHQ QUESTIONS 1-9: 15
SUM OF ALL RESPONSES TO PHQ QUESTIONS 1-9: 15
9. THOUGHTS THAT YOU WOULD BE BETTER OFF DEAD, OR OF HURTING YOURSELF: 1
7. TROUBLE CONCENTRATING ON THINGS, SUCH AS READING THE NEWSPAPER OR WATCHING TELEVISION: 3
4. FEELING TIRED OR HAVING LITTLE ENERGY: 3
10. IF YOU CHECKED OFF ANY PROBLEMS, HOW DIFFICULT HAVE THESE PROBLEMS MADE IT FOR YOU TO DO YOUR WORK, TAKE CARE OF THINGS AT HOME, OR GET ALONG WITH OTHER PEOPLE: 1
SUM OF ALL RESPONSES TO PHQ QUESTIONS 1-9: 15
6. FEELING BAD ABOUT YOURSELF - OR THAT YOU ARE A FAILURE OR HAVE LET YOURSELF OR YOUR FAMILY DOWN: 1

## 2023-09-27 ASSESSMENT — ANXIETY QUESTIONNAIRES
7. FEELING AFRAID AS IF SOMETHING AWFUL MIGHT HAPPEN: 3
3. WORRYING TOO MUCH ABOUT DIFFERENT THINGS: 3
6. BECOMING EASILY ANNOYED OR IRRITABLE: 0
5. BEING SO RESTLESS THAT IT IS HARD TO SIT STILL: 1
GAD7 TOTAL SCORE: 14
IF YOU CHECKED OFF ANY PROBLEMS ON THIS QUESTIONNAIRE, HOW DIFFICULT HAVE THESE PROBLEMS MADE IT FOR YOU TO DO YOUR WORK, TAKE CARE OF THINGS AT HOME, OR GET ALONG WITH OTHER PEOPLE: SOMEWHAT DIFFICULT
1. FEELING NERVOUS, ANXIOUS, OR ON EDGE: 1
4. TROUBLE RELAXING: 3
2. NOT BEING ABLE TO STOP OR CONTROL WORRYING: 3

## 2023-09-27 NOTE — CARE COORDINATION
MSW Documentation    MSW contacted patient for follow up. Patient identifiers confirmed, zip code and . Patient referred by Nurse Herlinda You. Purpose of TC  Follow-up on [employer] financial assistance application     TC Details  MSW TRACYM inquired about patient completing the [employer] financial assistance application that may assist with outstanding medical bills. Patient stated that the application was causing her stress. Patient was asked if she wanted MSW TRACYM to walk her through the application or call her at a later time to discuss. Ms. Emilio Sanchez asked for MSW TRACYM to walk her through the application (application was previously emailed per patient request). After MSURIEL MOLINAM explained particulars, patient requested MSW ACM resend application via email. MSW TRACYM confirmed email address and resent. Ms. Emilio Sanchez stated that her household expenses continue to be current. Plan of action  MSW TRACYM will follow up with patient in a few weeks. MSW provided contact information for future needs. Plan for follow-up call in 10-14 days      Goals         Behavioral Health       I will work towards the following [address] [place] [address] goals: I will continue to follow up with my psychologist /counselor and/or psychiatrist., I will take my medications daily as prescribed. , I will work on improving sleep habits to have consistent sleep/awake time. , and develop effective coping skills    Barriers: fear of failure and lack of support  Plan for overcoming my barriers: medication, therapy, and coping skill development  Confidence: 6/10  Anticipated Goal Completion Date: 10/1/2023        Patient will be aware of financial assistance programs (pt-stated)

## 2023-09-27 NOTE — CARE COORDINATION
Care Transitions Follow Up Call    ACM attempted to reach patient for Care Transitions follow up call. HIPAA compliant message left requesting a return phone call at patient convenience.      Plan for follow-up call in 3-5 days    Future Appointments   Date Time Provider [address]   9/27/2023  2:00 PM Brie Villa APRN - CNP [facility] LD ARANA   11/1/2023  3:00 PM MAYELA Hatfield - NP MAR[facility] LD AMB

## 2023-10-03 ENCOUNTER — TRANSCRIBE ORDERS (OUTPATIENT)
Facility: HOSPITAL | Age: 53
End: 2023-10-03

## 2023-10-03 DIAGNOSIS — Z12.31 VISIT FOR SCREENING MAMMOGRAM: Primary | ICD-10-CM

## 2023-10-04 ENCOUNTER — CARE COORDINATION (OUTPATIENT)
Dept: OTHER | Facility: CLINIC | Age: 53
End: 2023-10-04

## 2023-10-04 NOTE — CARE COORDINATION
Care Transitions Follow Up Call    ACM attempted to reach patient for Care Transitions follow up call. HIPAA compliant message left requesting a return phone call at patient convenience.      Plan for follow-up call in 7-10 days    Future Appointments   Date Time Provider [address]   10/6/2023 12:30 PM SPT MAMMO 1 SPTMAMMO [place] [facility]   10/11/2023  3:00 PM Brie Saxena, APRN - CNP [facility] BS AMB   11/1/2023  3:00 PM MAYELA Raygoza - NP [facility] BS AMB   1/3/2024  1:00 PM Ming Liu DO [facility] BS AMB

## 2023-10-06 ENCOUNTER — HOSPITAL ENCOUNTER (OUTPATIENT)
Facility: HOSPITAL | Age: 53
Discharge: HOME | End: 2023-10-06
Payer: COMMERCIAL

## 2023-10-06 DIAGNOSIS — Z12.31 VISIT FOR SCREENING MAMMOGRAM: ICD-10-CM

## 2023-10-06 PROCEDURE — 77063 BREAST TOMOSYNTHESIS BI: CPT

## 2023-10-11 ENCOUNTER — OFFICE VISIT (OUTPATIENT)
Age: 53
End: 2023-10-11
Payer: COMMERCIAL

## 2023-10-11 VITALS
WEIGHT: 171 LBS | TEMPERATURE: 98.1 F | BODY MASS INDEX: 30.3 KG/M2 | HEIGHT: 63 IN | OXYGEN SATURATION: 100 % | SYSTOLIC BLOOD PRESSURE: 119 MMHG | DIASTOLIC BLOOD PRESSURE: 79 MMHG | HEART RATE: 98 BPM | RESPIRATION RATE: 16 BRPM

## 2023-10-11 DIAGNOSIS — F33.1 MODERATE EPISODE OF RECURRENT MAJOR DEPRESSIVE DISORDER (HCC): Primary | ICD-10-CM

## 2023-10-11 DIAGNOSIS — F41.9 ANXIETY DISORDER, UNSPECIFIED TYPE: ICD-10-CM

## 2023-10-11 DIAGNOSIS — G47.00 INSOMNIA, UNSPECIFIED TYPE: ICD-10-CM

## 2023-10-11 PROCEDURE — 99214 OFFICE O/P EST MOD 30 MIN: CPT | Performed by: NURSE PRACTITIONER

## 2023-10-11 RX ORDER — SERTRALINE HYDROCHLORIDE 25 MG/1
25 TABLET, FILM COATED ORAL DAILY
Qty: 30 TABLET | Refills: 0 | Status: SHIPPED | OUTPATIENT
Start: 2023-10-11 | End: 2023-11-10

## 2023-10-11 RX ORDER — TRAZODONE HYDROCHLORIDE 50 MG/1
50 TABLET ORAL NIGHTLY
Qty: 30 TABLET | Refills: 0 | Status: SHIPPED | OUTPATIENT
Start: 2023-10-11 | End: 2023-11-10

## 2023-10-11 RX ORDER — LORAZEPAM 1 MG/1
1 TABLET ORAL EVERY 6 HOURS PRN
COMMUNITY

## 2023-10-11 ASSESSMENT — PATIENT HEALTH QUESTIONNAIRE - PHQ9
10. IF YOU CHECKED OFF ANY PROBLEMS, HOW DIFFICULT HAVE THESE PROBLEMS MADE IT FOR YOU TO DO YOUR WORK, TAKE CARE OF THINGS AT HOME, OR GET ALONG WITH OTHER PEOPLE: 1
SUM OF ALL RESPONSES TO PHQ9 QUESTIONS 1 & 2: 2
4. FEELING TIRED OR HAVING LITTLE ENERGY: 3
3. TROUBLE FALLING OR STAYING ASLEEP: 1
6. FEELING BAD ABOUT YOURSELF - OR THAT YOU ARE A FAILURE OR HAVE LET YOURSELF OR YOUR FAMILY DOWN: 1
8. MOVING OR SPEAKING SO SLOWLY THAT OTHER PEOPLE COULD HAVE NOTICED. OR THE OPPOSITE, BEING SO FIGETY OR RESTLESS THAT YOU HAVE BEEN MOVING AROUND A LOT MORE THAN USUAL: 0
9. THOUGHTS THAT YOU WOULD BE BETTER OFF DEAD, OR OF HURTING YOURSELF: 0
7. TROUBLE CONCENTRATING ON THINGS, SUCH AS READING THE NEWSPAPER OR WATCHING TELEVISION: 1
SUM OF ALL RESPONSES TO PHQ QUESTIONS 1-9: 8
SUM OF ALL RESPONSES TO PHQ QUESTIONS 1-9: 8
1. LITTLE INTEREST OR PLEASURE IN DOING THINGS: 1
2. FEELING DOWN, DEPRESSED OR HOPELESS: 1
SUM OF ALL RESPONSES TO PHQ QUESTIONS 1-9: 8
5. POOR APPETITE OR OVEREATING: 0
SUM OF ALL RESPONSES TO PHQ QUESTIONS 1-9: 8

## 2023-10-11 ASSESSMENT — ANXIETY QUESTIONNAIRES
1. FEELING NERVOUS, ANXIOUS, OR ON EDGE: 1
IF YOU CHECKED OFF ANY PROBLEMS ON THIS QUESTIONNAIRE, HOW DIFFICULT HAVE THESE PROBLEMS MADE IT FOR YOU TO DO YOUR WORK, TAKE CARE OF THINGS AT HOME, OR GET ALONG WITH OTHER PEOPLE: SOMEWHAT DIFFICULT
7. FEELING AFRAID AS IF SOMETHING AWFUL MIGHT HAPPEN: 1
5. BEING SO RESTLESS THAT IT IS HARD TO SIT STILL: 0
3. WORRYING TOO MUCH ABOUT DIFFERENT THINGS: 1
2. NOT BEING ABLE TO STOP OR CONTROL WORRYING: 1
4. TROUBLE RELAXING: 1
6. BECOMING EASILY ANNOYED OR IRRITABLE: 0
GAD7 TOTAL SCORE: 5

## 2023-10-11 NOTE — PROGRESS NOTES
Vomiting  Patient not taking: Reported on 8/30/2023 8/2/23   Cherelle Pisano DO   famotidine (PEPCID) 20 MG tablet Take 1 tablet by mouth 2 times daily  Patient not taking: Reported on 8/30/2023 8/2/23   Cherelle Pisano DO   PARoxetine (PAXIL) 30 MG tablet Take 1 tablet by mouth daily 1/2 tab daily reported by patient  Patient not taking: Reported on 10/11/2023    Bonilla Ulloa MD   hydrOXYzine pamoate (VISTARIL) 25 MG capsule Take 1 capsule by mouth 3 times daily as needed for Itching  Patient not taking: Reported on 8/30/2023 7/28/23   Shelley Hall MD   montelukast (SINGULAIR) 10 MG tablet Take 1 tablet by mouth nightly  Patient not taking: Reported on 8/2/2023    Bonilla Ulloa MD   Biotin 1000 MCG TABS Take 1 tablet by mouth daily  Patient not taking: Reported on 8/2/2023    Bonilla Ulloa MD     PHQ-9 Total Score: 8 (10/11/2023  2:49 PM)  Thoughts that you would be better off dead, or of hurting yourself in some way: 0 (10/11/2023  2:49 PM)        10/11/2023     2:49 PM 9/27/2023     1:50 PM 8/23/2023    10:58 AM   RD-7 SCREENING   Feeling nervous, anxious, or on edge Several days Several days Nearly every day   Not being able to stop or control worrying Several days Nearly every day Nearly every day   Worrying too much about different things Several days Nearly every day Nearly every day   Trouble relaxing Several days Nearly every day Nearly every day   Being so restless that it is hard to sit still Not at all Several days Several days   Becoming easily annoyed or irritable Not at all Not at all Several days   Feeling afraid as if something awful might happen Several days Nearly every day Nearly every day   RD-7 Total Score 5 14 17   How difficult have these problems made it for you to do your work, take care of things at home, or get along with other people? Somewhat difficult Somewhat difficult Extremely difficult     1.  Have you been to the ER, urgent care clinic
for control of both anxiety and depression to her self-reports of ineffectiveness and/or side effects. Although pt has a hx of using Zoloft in the past, she was willing to try it again as she could not definitively recall specifics about effectiveness or SE for stopping it.      Norberto Denton, APRN - CNP  10/10/2023

## 2023-10-16 ENCOUNTER — CARE COORDINATION (OUTPATIENT)
Dept: OTHER | Facility: CLINIC | Age: 53
End: 2023-10-16

## 2023-10-16 NOTE — CARE COORDINATION
Care Transitions Follow Up Call    AC attempted to reach patient for Care Transitions follow up call. HIPAA compliant message left requesting a return phone call at patient convenience.      Plan for follow-up call in 7-10 days    Future Appointments   Date Time Provider [address]   11/1/2023  3:00 PM MAYELA Chau - NP MARIB[facility] [address] BS AMB   11/29/2023  1:30 PM MAYELA Lin - CNP [facility] BS AMB   1/3/2024  1:00 PM Ashley Daniels DO [facility] AMB

## 2023-10-19 ENCOUNTER — TELEPHONE (OUTPATIENT)
Age: 53
End: 2023-10-19

## 2023-10-19 NOTE — TELEPHONE ENCOUNTER
Patient requesting increase in sertraline (ZOLOFT) 25 MG tablet. Patient states during her last visit with the provider,she discuss an increase if there were not reactions to the medication. Patient requesting medication to be sent to [facility] pharmacy [address] [phone] . Patient is scheduled for a follow visit 11/29/23.

## 2023-10-20 ENCOUNTER — OFFICE VISIT (OUTPATIENT)
Dept: PRIMARY CARE CLINIC | Facility: CLINIC | Age: 53
End: 2023-10-20
Payer: COMMERCIAL

## 2023-10-20 ENCOUNTER — TELEPHONE (OUTPATIENT)
Age: 53
End: 2023-10-20

## 2023-10-20 VITALS
RESPIRATION RATE: 16 BRPM | BODY MASS INDEX: 29.77 KG/M2 | DIASTOLIC BLOOD PRESSURE: 82 MMHG | OXYGEN SATURATION: 96 % | WEIGHT: 168 LBS | HEIGHT: 63 IN | SYSTOLIC BLOOD PRESSURE: 123 MMHG | HEART RATE: 91 BPM

## 2023-10-20 DIAGNOSIS — G47.00 INSOMNIA, UNSPECIFIED TYPE: Primary | ICD-10-CM

## 2023-10-20 PROCEDURE — 99213 OFFICE O/P EST LOW 20 MIN: CPT | Performed by: FAMILY MEDICINE

## 2023-10-20 RX ORDER — LEVOTHYROXINE SODIUM 0.05 MG/1
50 TABLET ORAL
Qty: 30 TABLET | OUTPATIENT
Start: 2023-10-20

## 2023-10-20 RX ORDER — ZOLPIDEM TARTRATE 6.25 MG/1
6.25 TABLET, FILM COATED, EXTENDED RELEASE ORAL NIGHTLY PRN
Qty: 30 TABLET | Refills: 2 | Status: SHIPPED | OUTPATIENT
Start: 2023-10-20 | End: 2024-01-18

## 2023-10-20 RX ORDER — BUSPIRONE HYDROCHLORIDE 5 MG/1
5 TABLET ORAL 3 TIMES DAILY
COMMUNITY

## 2023-10-20 ASSESSMENT — ENCOUNTER SYMPTOMS: SHORTNESS OF BREATH: 0

## 2023-10-20 NOTE — TELEPHONE ENCOUNTER
Left patient a voicemail message in regards to sertraline (ZOLOFT) 25 MG tablet. Per provider there will be no changes at this time. Patient will need to continue taking the medication as prescribed.

## 2023-10-23 ENCOUNTER — CARE COORDINATION (OUTPATIENT)
Dept: OTHER | Facility: CLINIC | Age: 53
End: 2023-10-23

## 2023-10-23 DIAGNOSIS — F41.9 ANXIETY DISORDER, UNSPECIFIED TYPE: ICD-10-CM

## 2023-10-23 DIAGNOSIS — F33.1 MODERATE EPISODE OF RECURRENT MAJOR DEPRESSIVE DISORDER (HCC): ICD-10-CM

## 2023-10-23 RX ORDER — SERTRALINE HYDROCHLORIDE 25 MG/1
25 TABLET, FILM COATED ORAL DAILY
Qty: 30 TABLET | Refills: 0 | Status: SHIPPED | OUTPATIENT
Start: 2023-10-23 | End: 2023-10-25 | Stop reason: SDUPTHER

## 2023-10-23 NOTE — CARE COORDINATION
Ambulatory Care Coordination Note    ACM attempted to reach patient for Associate Care Management related to [facility] CM. No voicemail available, , will continue to outreach.      Plan for follow-up call in 10-14 days    Future Appointments   Date Time Provider [address]   11/1/2023  3:00 PM MAYELA Lewis - NP [facility] BS AMB   11/29/2023  1:30 PM MAYELA Sanford - CNP [facility] BS AMB   1/3/2024  1:00 PM Judie Love DO [facility] BS AMB

## 2023-10-25 DIAGNOSIS — F33.1 MODERATE EPISODE OF RECURRENT MAJOR DEPRESSIVE DISORDER (HCC): ICD-10-CM

## 2023-10-25 DIAGNOSIS — F41.9 ANXIETY DISORDER, UNSPECIFIED TYPE: ICD-10-CM

## 2023-10-25 RX ORDER — SERTRALINE HYDROCHLORIDE 25 MG/1
25 TABLET, FILM COATED ORAL DAILY
Qty: 30 TABLET | Refills: 0 | Status: SHIPPED | OUTPATIENT
Start: 2023-10-25 | End: 2023-11-24

## 2023-10-31 ENCOUNTER — OFFICE VISIT (OUTPATIENT)
Age: 53
End: 2023-10-31

## 2023-10-31 VITALS
WEIGHT: 168.2 LBS | OXYGEN SATURATION: 97 % | RESPIRATION RATE: 17 BRPM | HEIGHT: 63 IN | SYSTOLIC BLOOD PRESSURE: 125 MMHG | TEMPERATURE: 98 F | DIASTOLIC BLOOD PRESSURE: 77 MMHG | BODY MASS INDEX: 29.8 KG/M2 | HEART RATE: 110 BPM

## 2023-10-31 DIAGNOSIS — G47.00 INSOMNIA, UNSPECIFIED TYPE: ICD-10-CM

## 2023-10-31 DIAGNOSIS — F41.9 ANXIETY DISORDER, UNSPECIFIED TYPE: ICD-10-CM

## 2023-10-31 DIAGNOSIS — F33.2 SEVERE EPISODE OF RECURRENT MAJOR DEPRESSIVE DISORDER, WITHOUT PSYCHOTIC FEATURES (HCC): Primary | ICD-10-CM

## 2023-10-31 RX ORDER — BUSPIRONE HYDROCHLORIDE 5 MG/1
5 TABLET ORAL 3 TIMES DAILY
Qty: 90 TABLET | Refills: 0 | Status: SHIPPED | OUTPATIENT
Start: 2023-10-31 | End: 2023-11-30

## 2023-10-31 RX ORDER — SERTRALINE HYDROCHLORIDE 100 MG/1
100 TABLET, FILM COATED ORAL DAILY
Qty: 30 TABLET | Refills: 0 | Status: SHIPPED | OUTPATIENT
Start: 2023-10-31

## 2023-10-31 ASSESSMENT — ANXIETY QUESTIONNAIRES
6. BECOMING EASILY ANNOYED OR IRRITABLE: 3
4. TROUBLE RELAXING: 3
5. BEING SO RESTLESS THAT IT IS HARD TO SIT STILL: 0
IF YOU CHECKED OFF ANY PROBLEMS ON THIS QUESTIONNAIRE, HOW DIFFICULT HAVE THESE PROBLEMS MADE IT FOR YOU TO DO YOUR WORK, TAKE CARE OF THINGS AT HOME, OR GET ALONG WITH OTHER PEOPLE: VERY DIFFICULT
2. NOT BEING ABLE TO STOP OR CONTROL WORRYING: 3
7. FEELING AFRAID AS IF SOMETHING AWFUL MIGHT HAPPEN: 3
3. WORRYING TOO MUCH ABOUT DIFFERENT THINGS: 3
GAD7 TOTAL SCORE: 18
1. FEELING NERVOUS, ANXIOUS, OR ON EDGE: 3

## 2023-10-31 ASSESSMENT — PATIENT HEALTH QUESTIONNAIRE - PHQ9
SUM OF ALL RESPONSES TO PHQ QUESTIONS 1-9: 22
10. IF YOU CHECKED OFF ANY PROBLEMS, HOW DIFFICULT HAVE THESE PROBLEMS MADE IT FOR YOU TO DO YOUR WORK, TAKE CARE OF THINGS AT HOME, OR GET ALONG WITH OTHER PEOPLE: 2
SUM OF ALL RESPONSES TO PHQ QUESTIONS 1-9: 22
5. POOR APPETITE OR OVEREATING: 3
1. LITTLE INTEREST OR PLEASURE IN DOING THINGS: 3
SUM OF ALL RESPONSES TO PHQ QUESTIONS 1-9: 22
9. THOUGHTS THAT YOU WOULD BE BETTER OFF DEAD, OR OF HURTING YOURSELF: 0
SUM OF ALL RESPONSES TO PHQ9 QUESTIONS 1 & 2: 6
6. FEELING BAD ABOUT YOURSELF - OR THAT YOU ARE A FAILURE OR HAVE LET YOURSELF OR YOUR FAMILY DOWN: 3
SUM OF ALL RESPONSES TO PHQ QUESTIONS 1-9: 22
3. TROUBLE FALLING OR STAYING ASLEEP: 3
2. FEELING DOWN, DEPRESSED OR HOPELESS: 3
7. TROUBLE CONCENTRATING ON THINGS, SUCH AS READING THE NEWSPAPER OR WATCHING TELEVISION: 3
8. MOVING OR SPEAKING SO SLOWLY THAT OTHER PEOPLE COULD HAVE NOTICED. OR THE OPPOSITE, BEING SO FIGETY OR RESTLESS THAT YOU HAVE BEEN MOVING AROUND A LOT MORE THAN USUAL: 1
4. FEELING TIRED OR HAVING LITTLE ENERGY: 3

## 2023-10-31 NOTE — PSYCHOTHERAPY
CHIEF COMPLAINT:  Sole Schmidt is a 48 y.o.  and newly  from ex- () female, mother of 3 children (Daughters 27 & 25, son,14 y/o) and is domiciled independently with half time spent with son each week. Today she is scheduled for a follow-up appointment to discuss mood symptom management associated with historical diagnoses of anxiety, MDD, recurrent, moderate, and insomnia with psychotropic medication management. *Last in [facility] office- 23; Prozac 40 mg and Wellbutrin 100 mg SR BID; Psych Hosp-  to 7/15 (AMA) for depression with SI; Wellbutrin discontinued on ; Paxil 10 mg started on  by [facility]; : ED visit for increased anxiety with nausea. Of note, history of suicide in family- maternal uncle. ED visit: ()- Nausea, anxiety; ()- Nausea, anxiety; ()- Paxil 10 mg, Ambien 12.5 mg cont.; ()- Inpatient Psych Admit  to - Buspar increased to 5 mg TID, increased Paxil to 30 mg; Ambien 12.5 mg discontinued; 23: discontinue Seroquel 50 mg due to ineffectiveness/heart racing; Start Risperdal 0.5 mg nightly and Discontinued Ambien 12.5 m23- Medication changes: Discontinue Risperdal 0.5 mg; Taper Paxil as follows: Paxil current dose 30 mg; take 20 mg x 4 days; then take 10 mg x 3 days; then take 5 mg x 2 days and stop medication. Pt given written instructions (23) at time of visit; Continue Buspar 5 mg TID; Start Zoloft 25 mg and Trazodone on 10/6/23. Pt okay to take Ativan 1 mg prescribed by provider at Urgent Care on , #30.    *May consider Effexor, mood stabilizer (Lamictal), or antipsychotic (Latuda) at follow-up. HPI:    (10/31/23) Today, Krystal Martines states, \"I'm . \" She attends on time and is neatly dressed wearing her scrubs from work. Speech is soft and without latency. Historically, Krystal Bobbi reported the following symptoms- depression, anxiety, and insomnia. Associated symptoms include -difficulty sleeping, grief , and stress at work.

## 2023-10-31 NOTE — PROGRESS NOTES
CHIEF COMPLAINT:  Samira Coleman is a 48 y.o.  and newly  from ex- () female, mother of 3 children (Daughters 27 & 25, son,12 y/o) and is domiciled independently with half time spent with son each week. Today she is scheduled for a follow-up appointment to discuss mood symptom management associated with historical diagnoses of anxiety, MDD, recurrent, moderate, and insomnia with psychotropic medication management. *Last in [facility] office- 23; Prozac 40 mg and Wellbutrin 100 mg SR BID; Psych Hosp-  to 7/15 (AMA) for depression with SI; Wellbutrin discontinued on ; Paxil 10 mg started on  by [facility]; : ED visit for increased anxiety with nausea. Of note, history of suicide in family- maternal uncle. ED visit: ()- Nausea, anxiety; ()- Nausea, anxiety; ()- Paxil 10 mg, Ambien 12.5 mg cont.; ()- Inpatient Psych Admit  to - Buspar increased to 5 mg TID, increased Paxil to 30 mg; Ambien 12.5 mg discontinued; 23: discontinue Seroquel 50 mg due to ineffectiveness/heart racing; Start Risperdal 0.5 mg nightly and Discontinued Ambien 12.5 m23- Medication changes: Discontinue Risperdal 0.5 mg; Taper Paxil as follows: Paxil current dose 30 mg; take 20 mg x 4 days; then take 10 mg x 3 days; then take 5 mg x 2 days and stop medication. Pt given written instructions (23) at time of visit; Continue Buspar 5 mg TID; Start Zoloft 25 mg and Trazodone on 10/6/23. Pt okay to take Ativan 1 mg prescribed by provider at Urgent Care on , #30; (10/11/23) No med changes. *May consider Effexor, mood stabilizer (Lamictal), or antipsychotic (Latuda) at follow-up. HPI:    (10/31/23) Today, Jin Hernández states, \"I'm not too good. \" She attends on time and is neatly dressed wearing her scrubs from work. Speech is soft and with increased latency. Historically, Jin Hernández reported the following symptoms- depression, anxiety, and insomnia.  Associated symptoms include

## 2023-11-01 ENCOUNTER — TELEPHONE (OUTPATIENT)
Age: 53
End: 2023-11-01

## 2023-11-01 NOTE — TELEPHONE ENCOUNTER
Received FMLA paperwork for patient. Called (two identifiers confirmed) to confirm FMLA begins once patient starts the PHP program [facility] [place]). Patient confirmed and will call once established with the program.     Will remind patient that FMLA will probably be completed by the program versus G.

## 2023-11-02 ENCOUNTER — TELEPHONE (OUTPATIENT)
Age: 53
End: 2023-11-02

## 2023-11-02 NOTE — TELEPHONE ENCOUNTER
Spoke with patient late evening on 11.01.2023. Patient will be attending the [facility] [place] program through [facility] beginning Monday 11.06.2023.  4 week program with PHP and then another 4 week program with their IOP. Patient stated today (11.2.2023) that Rox Wilkinson will complete the [place] paperwork. Informed provider.

## 2023-11-03 ENCOUNTER — TELEPHONE (OUTPATIENT)
Dept: PRIMARY CARE CLINIC | Facility: CLINIC | Age: 53
End: 2023-11-03

## 2023-11-03 ENCOUNTER — FOLLOWUP TELEPHONE ENCOUNTER (OUTPATIENT)
Age: 53
End: 2023-11-03

## 2023-11-03 PROBLEM — F33.2 SEVERE EPISODE OF RECURRENT MAJOR DEPRESSIVE DISORDER, WITHOUT PSYCHOTIC FEATURES (HCC): Status: ACTIVE | Noted: 2023-11-03

## 2023-11-03 NOTE — TELEPHONE ENCOUNTER
Undersigned returned call to Saran Nicholson, investigator with the [facility] on 11/3/23 at 21 [phone]  ([phone]) to answer the following questions regarding pt:   Treatment Plan  Diagnoses  Dates of service with undersigned  Next scheduled appointment with undersigned  My concerns I. e. Pt's willingness/engagement in treatment  Pt's level of acceptance of diagnoses  Undersigned's opinion re pt's \"safety to practice nursing\"

## 2023-11-06 ENCOUNTER — CARE COORDINATION (OUTPATIENT)
Dept: OTHER | Facility: CLINIC | Age: 53
End: 2023-11-06

## 2023-11-06 NOTE — CARE COORDINATION
Ambulatory Care Coordination Note    ACM attempted to reach patient for care management follow up call regarding behavioral health. HIPAA compliant message left requesting a return phone call at patient convenience.      Plan for follow-up call in 10-14 days, if no return call, will close the program     Future Appointments   Date Time Provider [address] [place] [address]   11/15/2023 11:00 AM [facility]ks[facility]   1/3/2024  1:00 PM Amarilis Barrett DO [facility] BS AMB

## 2023-11-07 ENCOUNTER — TELEPHONE (OUTPATIENT)
Dept: PRIMARY CARE CLINIC | Facility: CLINIC | Age: 53
End: 2023-11-07

## 2023-11-07 NOTE — TELEPHONE ENCOUNTER
Patient would like Dr Pincus Hammans to know Extension Debbi Allen did not receive the prescription for Ambien. Can she please send it again?

## 2023-11-10 ENCOUNTER — HOSPITAL ENCOUNTER (OUTPATIENT)
Facility: HOSPITAL | Age: 53
Setting detail: RECURRING SERIES
Discharge: HOME | End: 2023-11-13
Payer: COMMERCIAL

## 2023-11-10 ENCOUNTER — TELEPHONE (OUTPATIENT)
Dept: PRIMARY CARE CLINIC | Facility: CLINIC | Age: 53
End: 2023-11-10

## 2023-11-10 VITALS
HEART RATE: 86 BPM | OXYGEN SATURATION: 96 % | SYSTOLIC BLOOD PRESSURE: 130 MMHG | DIASTOLIC BLOOD PRESSURE: 91 MMHG | TEMPERATURE: 98.3 F

## 2023-11-10 PROCEDURE — 90853 GROUP PSYCHOTHERAPY: CPT

## 2023-11-10 ASSESSMENT — ANXIETY QUESTIONNAIRES
5. BEING SO RESTLESS THAT IT IS HARD TO SIT STILL: 0
2. NOT BEING ABLE TO STOP OR CONTROL WORRYING: 3
GAD7 TOTAL SCORE: 16
3. WORRYING TOO MUCH ABOUT DIFFERENT THINGS: 3
IF YOU CHECKED OFF ANY PROBLEMS ON THIS QUESTIONNAIRE, HOW DIFFICULT HAVE THESE PROBLEMS MADE IT FOR YOU TO DO YOUR WORK, TAKE CARE OF THINGS AT HOME, OR GET ALONG WITH OTHER PEOPLE: EXTREMELY DIFFICULT
1. FEELING NERVOUS, ANXIOUS, OR ON EDGE: 3
4. TROUBLE RELAXING: 3
7. FEELING AFRAID AS IF SOMETHING AWFUL MIGHT HAPPEN: 3
6. BECOMING EASILY ANNOYED OR IRRITABLE: 1

## 2023-11-10 ASSESSMENT — PATIENT HEALTH QUESTIONNAIRE - PHQ9
3. TROUBLE FALLING OR STAYING ASLEEP: 0
SUM OF ALL RESPONSES TO PHQ QUESTIONS 1-9: 24
10. IF YOU CHECKED OFF ANY PROBLEMS, HOW DIFFICULT HAVE THESE PROBLEMS MADE IT FOR YOU TO DO YOUR WORK, TAKE CARE OF THINGS AT HOME, OR GET ALONG WITH OTHER PEOPLE: 3
SUM OF ALL RESPONSES TO PHQ QUESTIONS 1-9: 21
6. FEELING BAD ABOUT YOURSELF - OR THAT YOU ARE A FAILURE OR HAVE LET YOURSELF OR YOUR FAMILY DOWN: 3
SUM OF ALL RESPONSES TO PHQ QUESTIONS 1-9: 24
4. FEELING TIRED OR HAVING LITTLE ENERGY: 3
SUM OF ALL RESPONSES TO PHQ9 QUESTIONS 1 & 2: 6
9. THOUGHTS THAT YOU WOULD BE BETTER OFF DEAD, OR OF HURTING YOURSELF: 3
2. FEELING DOWN, DEPRESSED OR HOPELESS: 3
7. TROUBLE CONCENTRATING ON THINGS, SUCH AS READING THE NEWSPAPER OR WATCHING TELEVISION: 3
5. POOR APPETITE OR OVEREATING: 3
1. LITTLE INTEREST OR PLEASURE IN DOING THINGS: 3
SUM OF ALL RESPONSES TO PHQ QUESTIONS 1-9: 24
8. MOVING OR SPEAKING SO SLOWLY THAT OTHER PEOPLE COULD HAVE NOTICED. OR THE OPPOSITE, BEING SO FIGETY OR RESTLESS THAT YOU HAVE BEEN MOVING AROUND A LOT MORE THAN USUAL: 3

## 2023-11-10 NOTE — BH NOTE
OP Behavioral Health Intake Packet    OUTPATIENT INTAKE PACKET    DEMOGRAPHICS  Ryan Eastman   1970  [facility] [address]  [phone]   [id number]    11/10/2023  8:31 AM  Information Source: Patient  48 y.o. Accompanied by/Method of Arrival: Patient brought by family member, patient will typically drive self    Marital Status: Single    Emergency Contact: Brenda Booth (daughter) Phone: [phone]    Allergies   Allergen Reactions    Latex Anaphylaxis    Bee Venom Anaphylaxis     Other reaction(s): Not Reported This Time    Amparo Hives    Soy Hives    Cephaeline      Other reaction(s): Not Reported This Time  Pt. States no allergy    Soybean Oil Hives    Cephalexin Rash        [] Guardian [] POA Name:   Language if not English:      [] Reads [] Writes      REPRODUCTION/SEXUALITY  Sexual Preference/Orientation: Heterosexual   Patient's Gender: female   Patient's Gender Identity: Female  Pronoun Preference: she/her    PSYCHIATRIC CARE HISTORY    Last Inpatient Treatment: July 13-15 of this year at [facility], Sept 1-7 in [address] [place] [address] Dr: [x] Psychiatrist Name: Savana Leone  Date:      Intent to Follow [] Yes [x] No patient has appointment at [facility] December 14      [x] Therapist Name: Mynor Miranda   Date:      Intent to Follow [x] Yes [] No    CURRENT/PREVIOUS TREATMENT HISTORY     Diagnosis: MDD, RD    Daughter feels pt is bipolar     REASON FOR REFERRAL    Chief Complaint (patient's own words): What brings you here? \"Anxiety and depression\"     Rekindled relationship with ex , in July this ended and that is when she was hospitalized the first time. [facility] but had to discharge due to physical symptoms. She recently tried to start at [facility] but after 2 days was told they don't take her insurance.      \"I feel like whatever they're giving me for depression isn't working\"     3 children, only contacts ex  about children  27, 22, 15  Strained

## 2023-11-10 NOTE — BH NOTE
Goal: patient safety     Patient approached writer and asked to talk. Writer invited patient into office and patient shared that her daughter had asked her to be more honest here in the program. Patient shared that she was suicidal last night, including plan and intent, but did not go through with it. Writer asked patient what had caused her not to go through with it. Patient stated it was her belief that suicide is morally wrong. Writer asked patient if she is having those thoughts now, and she denied. Writer asked patient if she felt she would be able to keep herself between today and coming to treatment on Monday. Patient stated she was worried about how she will feel with her anxiety, but felt she could keep herself safe over the weekend. Patient stated her daughter wanted her to be admitted. Writer asked patient if she felt that was necessary, and patient presented as unsure. Writer advised patient take some time in the fifth group to think about this, and patient was agreeable. Patient approached writer for same reason later--her daughter was concerned. Writer reflected that patient had earlier stated that she could keep herself safe through the weekend and asked if she was as worried as her daughter was. Patient stated it was her daughter that was primarily worried. Writer emphasized to patient that she could go to the ED if in crisis, but reflected that she was not currently in crisis. Writer suggested calling the on call physician for PHP together instead--patient in agreement. Patient and writer called physician together, and he was in agreement for her not going to the ED. Writer updated patient's [place] Suicide Risk scale and printed her a copy of the safety plan, which she took home with her. Patient agreed to sign an PETEY for her daughter, so that writer can call her this afternoon and provide reassurance on safety.      ANITRA Rene

## 2023-11-10 NOTE — GROUP NOTE
Group Therapy Note    Date: 11/10/2023    Group Start Time:  2:00 PM  Group End Time:  2:45 PM  Group Topic: Wrap-Up    [facility]        Group Therapy Note    Attendees: 6    Writer encouraged patients to fill out their wrap up sheets during group. Writer also passed out sticky notes and asked patients to identify one thing that has stuck with them this week. Writer also gave patients weekend agenda activity, where they identified things they would be doing this weekend--writer then facilitated discussion reflecting on the day and looking forward to the weekend. Patient's Goal:  Fill out wrap up sheet, reflect on the treatment day and week, identify activities for the weekend. Notes:  Patient filled out her wrap up sheet indicating no SI. She was quiet but attentive during group, offering the name of a soap brand to supplement the conversation between other peers. She identified goal setting as something that stuck with her from the treatment day and will continue with identified treatment goals in further groups. Status After Intervention:  Unchanged    Participation Level:  Active Listener    Participation Quality: Appropriate and Attentive      Speech:  normal      Thought Process/Content: Logical      Affective Functioning: Congruent      Mood: depressed      Level of consciousness:  Alert and Oriented x4      Response to Learning: Able to verbalize current knowledge/experience, Capable of insight, and Progressing to goal      Endings: None Reported    Modes of Intervention: Support, Socialization, and Exploration      Discipline Responsible: /Counselor      Signature:  ANITRA Silver

## 2023-11-10 NOTE — SUICIDE SAFETY PLAN
SAFETY PLAN    A suicide Safety Plan is a document that supports someone when they are having thoughts of suicide. Warning Signs that indicate a suicidal crisis may be developing: What (situations, thoughts, feelings, body sensations, behaviors, etc.) do you experience that lets you know you are beginning to think about suicide? 1. Negative thoughts--rumination  2. High anxiety/chest tightness  3. Self isolation    Internal Coping Strategies:  What things can I do (relaxation techniques, hobbies, physical activities, etc.) to take my mind off my problems without contacting another person? 1. Watch TV   2. Spend time with pets  3. People and social settings that provide distraction: Who can I call or where can I go to distract me? 1. Name: Serge Hatfield (daughter)  Phone: in pt's phone  2. Name: Constancia Babinski (daughter)  Phone: in pt's phone   3. Place:             4. Place:     People whom I can ask for help: Who can I call when I need help - for example, friends, family, clergy, someone else? 1. Name: Serge Hatfield (daughter)                Phone: in pt's phone  2. Name: Constancia Babinski (daughter)  Phone: in pt's phone  3. Name:   Phone:     Professionals or [facility] I can contact during a crisis: Who can I call for help - for example, my doctor, my psychiatrist, my psychologist, a mental health provider, a suicide hotline? 1. Clinician Name: Nadine Acosta   Phone: [phone]      Clinician Pager or Emergency Contact #: [phone]    6. Clinician Name: Mariusz Marquez (therapist w/ [facility])   Phone: [phone]      Clinician Pager or Emergency Contact #: [phone] or [phone]    3. Suicide Prevention Lifeline: 988 call or text    4.  [address] [facility] Emergency Services -  for example, [address] [facility] suicide hot[facility], [place] [address] [address] Hotline: [address]      Emergency Services Address:       Emergency Services Phone: [phone] or

## 2023-11-10 NOTE — GROUP NOTE
Group Therapy Note    Date: 11/10/2023    Group Start Time:  9:40 AM  Group End Time: 10:30 AM  Group Topic: Process Group - Outpatient    [facility] - Savana Tabares        Group Therapy Note    In the process group, the facilitator asked patients to introduce themselves and their preferred pronouns to welcome a new member. After introductions the writer provided patients with an info sheet that describes how to control our inner critic and ways to stop critical self talk. The writer then encouraged patients to engage in a group discussion on their inner critic, asking patients open-ended questions to help guide discussion. The writer supported patient processing through the use of CBT. Attendees: 10     Patient's Goal:  Engage in group introductions and discussion on inner critic. Notes: The patient introduced themselves to a group members sharing their name and preferred pronouns. The patient supported peers through active listening during group processing and will continue to review ways to refrain from negative thinking and self-talk in the process group. Status After Intervention:  Unchanged    Participation Level:  Active Listener    Participation Quality: Appropriate and Attentive      Speech:  normal      Thought Process/Content: Logical  Linear      Affective Functioning: Incongruent      Mood: anxious and dysphoric      Level of consciousness:  Attentive      Response to Learning: Able to verbalize current knowledge/experience and Progressing to goal      Endings: None Reported    Modes of Intervention: Education, Support, and Exploration      Discipline Responsible: /Counselor      Signature:  Deborah Ho

## 2023-11-10 NOTE — GROUP NOTE
Group Therapy Note    Date: 11/10/2023    Group Start Time:  1:10 PM  Group End Time:  2:00 PM  Group Topic: Psychoeducation    [facility]ana [facility]ares        Group Therapy Note    In the psychoeducation group, the writer discussed the importance of understanding others and ones own body language. The writer presented each patient with two pieces of paper that contained a different emotion. The writer asked patients to act out the emotions using certain body language for other group members to guess the emotion. The writer encouraged the patients to participate in a group discussion, sharing their experiences with the activity and asked open-ended questions for reflection. The writer supported patient processing based on the presenting needs of the patients using education derived from CBT. Attendees: 5     Patient's Goal:  Engage in body language activity    Notes: The patient engaged in the body language activity by acting out the emotion \"frustrated\" to peers. The patient stated that it is important to learn different body languages in order to relate to others. The patient will continue to practice the importance of body language in the psycoeducation group. Status After Intervention:  Unchanged    Participation Level:  Active Listener and Interactive    Participation Quality: Appropriate, Attentive, and Sharing      Speech:  normal      Thought Process/Content: Logical  Linear      Affective Functioning: Congruent      Mood: euthymic      Level of consciousness:  Attentive      Response to Learning: Able to verbalize current knowledge/experience, Able to verbalize/acknowledge new learning, Able to retain information, Capable of insight, and Progressing to goal      Endings: None Reported    Modes of Intervention: Education, Exploration, Clarifying, and Activity      Discipline Responsible: /Counselor      Signature:

## 2023-11-10 NOTE — BH NOTE
Goal: family update    Writer spoke with patients daughter and provided some reassurance. Patient's daughter shared that she had been quite anxious  yesterday as evidenced by pacing, restlessness, preoccupation with anxiety and that daughter had stayed with her overnight last night, noting lack of sleep. Writer emphasized interventions that had already been taken for patient, including safety plan and speaking to physician for his opinion, as well as interventions for the future as needed (wellness checks, patient calls). Patient's daughter presented as grateful for the call. Writer let her know to call back if she has concerns.      ANITRA Tapia

## 2023-11-10 NOTE — GROUP NOTE
Group Therapy Note    Date: 11/10/2023    Group Start Time: 10:44 AM  Group End Time: 11:32 AM  Group Topic: Cognitive Skills    [address] PHP    ANITRA Esteves    Group Therapy Note    Writer facilitated cognitive skills group with a focus on maintaining a healthy emotional baseline through increasing positive experiences and the STRONG skill acronym from DBT. Writer provided a handout covering each. Writer provided education on mindfulness and intentionality as skills for increasing positive experiences. Writer provided education on each letter of STRONG. Writer led an activity in practicing different movement skills for releasing energy or increasing alertness in response to multiple pts presenting as fatigued. Writer encouraged pts to identify areas they struggle with and problem solve for ways to improve in these areas. Attendees: 8       Patient's Goal:   applying skills for maintaining a healthy emotional baseline. Notes: Pt presented as attentive as evidenced by appropriate eye contact throughout. Pt did not share. Pt will continue to work on applying skills for maintaining a healthy emotional baseline. Status After Intervention:  Unchanged    Participation Level:  Active Listener and Minimal    Participation Quality: Appropriate and Attentive      Speech:  normal      Thought Process/Content: Logical      Affective Functioning: Congruent      Mood: depressed      Level of consciousness:  Alert, Oriented x4, and Attentive      Response to Learning: Progressing to goal      Endings: None Reported    Modes of Intervention: Education and Activity      Discipline Responsible: /Counselor      Signature:  ANITRA Esteves

## 2023-11-10 NOTE — BH NOTE
Goal: Intake Assessment for PHP     met with patient for intake assessment. Patient endorsed some passive SI with no plan or intent--based primarily on hopelessness. Patient denied HI, AH/VH. Patient declined to sign PETEY at this time. SW did not complete COWS, Audit-C or CIWA due to pt denying alcohol and opiate use.     ANITRA Abarca

## 2023-11-11 ENCOUNTER — TELEPHONE (OUTPATIENT)
Dept: PRIMARY CARE CLINIC | Facility: CLINIC | Age: 53
End: 2023-11-11

## 2023-11-11 NOTE — TELEPHONE ENCOUNTER
Received page on call. Confirmed . She states she does not feel her current outpatient mental health program is a good fit. She has reached out to her mental health provider but not heard back. It is a M-F program. Otherwise she is doing well with no acute complaints/ concerns. Discussed that I am not sure of other outpatient programs and this would have to come from her mental health team. Discussed I recommend she reach out to her insurance to see if they know of other outpatient programs covered in the area by her insurance and she may be able to discuss these with her mental health provider. She voiced understanding.

## 2023-11-13 ENCOUNTER — TELEPHONE (OUTPATIENT)
Age: 53
End: 2023-11-13

## 2023-11-13 NOTE — TELEPHONE ENCOUNTER
Patient left  requesting provider complete FMLA paperwork. Patient [facility]. [facility] was suppose to start [facility] with [facility]. Patient was informed PHP program would complete the FMLA. Patient was in agreement. This nurse shredded FMLA paperwork received.

## 2023-11-14 ENCOUNTER — HOSPITAL ENCOUNTER (OUTPATIENT)
Facility: HOSPITAL | Age: 53
Setting detail: RECURRING SERIES
End: 2023-11-14
Payer: COMMERCIAL

## 2023-11-14 NOTE — BH NOTE
Outpatient Clinical Discharge Summary      Hermann Holm     [id number]     11/14/23 1970    Physician: Dr. Nicholas Westfall    Admission Date: 11/10/2023  Admission Reason: Depressive episode   Discharge Date: 11/13/2023    Admission Diagnosis (DSM 5): Major depressive disorder, generalized anxiety    Discharge Diagnosis (DSM 5): Major depressive disorder, generalized anxiety    Date and Type of Last Contact: Friday 11/10/2023 for intake     Status at Last Contact: Alert and oriented x4, cooperative, understood need for treatment. Reason for Admission (Summary):     Patient presented for treatment of anxiety and depression that she reported had gotten worse over the last 5-6 months following a second separation between her and her ex . Patient reported thinking her medication was not working and reported increase in anxiety as evidenced by restlessness, irritability, and panic attacks as well as ongoing depressive symptoms including hopelessness, helplessness, and lack of motivation. Reason for Discharge (check all that apply):  [] Transition from PHP to IOP [] Transition from IOP to OP  [] Transition to Inpatient Unit  [] Treatment Complete  [] Transfer within Facility  [] Transfer to another Facility  [] Administrative Discharge  [] Financial Discharge  [] Medical Discharge   [x] AMA     Summary of Progress and Course of Treatment (including treatment plan goals and objective achieved/not achieved during treatment/level of functioning):     Patient appeared for her intake appointment on Friday the 10th of November, however did not return to [facility] on Monday the 13th for initial individual counseling session, treatment planning, or group therapy. Discharge Medication List: Effexor, Buspar, and Ambien. Physician d/c'ed Zoloft on 11/10.      Discharge/Aftercare Plan (including resources available to patient, scheduled follow-up appointments, and ongoing treatment recommendations): No aftercare plan

## 2023-11-14 NOTE — BH NOTE
Goal: patient contact     Writer attempted to call patient to follow up regarding participation in the program. There was no answer--writer left a voicemail to let patient know that as it has been 48 hours we would have to discharge, but to please call back if she is interested in trying again. Of note, Caitlyn Walker still has patient's FMLA paperwork, however due to patient being discharged from treatment AMA will not be able to complete these forms.      ANITRA Spence

## 2023-11-14 NOTE — BH NOTE
Goal: discharge progress note    Patient appeared for her intake appointment on Friday the 10th of November, however did not return to [facility] on Monday the 13th for initial individual counseling session, treatment planning, or group therapy.      ANITRA eLon

## 2023-11-16 ENCOUNTER — APPOINTMENT (OUTPATIENT)
Facility: HOSPITAL | Age: 53
End: 2023-11-16
Payer: COMMERCIAL

## 2023-11-16 ENCOUNTER — CARE COORDINATION (OUTPATIENT)
Dept: OTHER | Facility: CLINIC | Age: 53
End: 2023-11-16

## 2023-11-16 NOTE — CARE COORDINATION
MSW ACM attempted to contact patient for follow-up. No answer. VM full. MSW TRACYM will call again next week.

## 2023-11-17 ENCOUNTER — APPOINTMENT (OUTPATIENT)
Facility: HOSPITAL | Age: 53
End: 2023-11-17
Payer: COMMERCIAL

## 2023-11-20 ENCOUNTER — APPOINTMENT (OUTPATIENT)
Facility: HOSPITAL | Age: 53
End: 2023-11-20
Payer: COMMERCIAL

## 2023-11-21 ENCOUNTER — TELEPHONE (OUTPATIENT)
Age: 53
End: 2023-11-21

## 2023-11-21 ENCOUNTER — APPOINTMENT (OUTPATIENT)
Facility: HOSPITAL | Age: 53
End: 2023-11-21
Payer: COMMERCIAL

## 2023-11-22 ENCOUNTER — APPOINTMENT (OUTPATIENT)
Facility: HOSPITAL | Age: 53
End: 2023-11-22
Payer: COMMERCIAL

## 2023-11-22 ENCOUNTER — CARE COORDINATION (OUTPATIENT)
Dept: OTHER | Facility: CLINIC | Age: 53
End: 2023-11-22

## 2023-11-22 NOTE — CARE COORDINATION
Care Transitions Outreach Attempt    Call within 2 business days of discharge: Yes   Attempted to reach patient for transitions of care follow up. Unable to reach patient. Patient: Spike Feliciano Patient : 1970 MRN: [id number]    Last Discharge Facility       Date Complaint Diagnosis Description Type Department Provider    23 Anxiety Anxiety state ED (DISCHARGE) [address] [address], Jacky ANAND DO; Katherin. .. Was this an external facility discharge?  Yes, 2023  Discharge Facility Name: [facility]    Noted following upcoming appointments from discharge chart review:   [facility] follow up appointment(s):   Future Appointments   Date Time Provider [address]   2023 11:00 AM Marina Erickson APRN - CNP [facility] BS AMB   1/3/2024  1:00 PM Cherelle Pisano DO [facility] BS AMB

## 2023-11-24 ENCOUNTER — APPOINTMENT (OUTPATIENT)
Facility: HOSPITAL | Age: 53
End: 2023-11-24
Payer: COMMERCIAL

## 2023-11-24 ENCOUNTER — CARE COORDINATION (OUTPATIENT)
Dept: OTHER | Facility: CLINIC | Age: 53
End: 2023-11-24

## 2023-11-24 NOTE — CARE COORDINATION
Care Transitions Outreach Attempt    Call within 2 business days of discharge: Yes   Attempted to reach patient for transitions of care follow up. Unable to reach patient. Patient: Belle Galicia Patient : 1970 MRN: [id number]    Last Discharge Facility       Date Complaint Diagnosis Description Type Department Provider    23 Anxiety Anxiety state ED (DISCHARGE) [address], Jacky ANAND DO; Katherin. .. Ambulatory Care Coordination Note    ACM attempted to reach patient for follow up call regarding [address] [facility]. HIPAA compliant message left requesting a return phone call at patient convenience. Was this an external facility discharge? No Discharge Facility Name: [facility]    Noted following upcoming appointments from discharge chart review:   [facility] follow up appointment(s):   Future Appointments   Date Time Provider [address]   2023 11:00 AM Dre [address], APRN - CNP [facility] BS AMB   1/3/2024  1:00 PM Rosas Crane DO [facility]       Miguel Vinson. [facility], [address] [place] [address] Coordinator  Associate Care Management  [address] [place] [address] Street  Phone: [phone]  [email or web address]

## 2023-11-27 ENCOUNTER — TELEPHONE (OUTPATIENT)
Dept: PRIMARY CARE CLINIC | Facility: CLINIC | Age: 53
End: 2023-11-27

## 2023-11-27 ENCOUNTER — APPOINTMENT (OUTPATIENT)
Facility: HOSPITAL | Age: 53
End: 2023-11-27
Payer: COMMERCIAL

## 2023-11-28 ENCOUNTER — APPOINTMENT (OUTPATIENT)
Facility: HOSPITAL | Age: 53
End: 2023-11-28
Payer: COMMERCIAL

## 2023-11-29 ENCOUNTER — APPOINTMENT (OUTPATIENT)
Facility: HOSPITAL | Age: 53
End: 2023-11-29
Payer: COMMERCIAL

## 2023-11-29 ENCOUNTER — CARE COORDINATION (OUTPATIENT)
Dept: OTHER | Facility: CLINIC | Age: 53
End: 2023-11-29

## 2023-11-29 NOTE — CARE COORDINATION
Ambulatory Care Coordination Note  2023    Patient Current Location:  Home: Ken Ryan  [address] [place] [address] Drive [address]     ACM contacted the patient by telephone. Verified name and  with patient as identifiers. Provided introduction to self, and explanation of the ACM role. Challenges to be reviewed by the provider   Additional needs identified to be addressed with provider: No  none               Method of communication with provider: none. ACM: Mega Duong RN    Telephonic outreach to the patient, who was hospitalized at [facility] and discharged on . The patient states that she is trying to adjust, but gets very easily bored and distracted at home. She is scheduled to return to work on . The patient states her medications are what she believes led her to the most recent hospitalization. She was taken off Effexor and started on Lithium. She believes the Effexor was causing increased depression and feeling fearful. She is taking Lithium 300 mg BID, Buspar 10mg TID, and Seroquel. Discussed lab draws that are necessary with the use of Lithium. Patient understood. She has an appointment with her psychiatrist tomorrow on  and had an appointment with her counselor the day after discharge, which was rescheduled to today. Patient is concerned that the symptoms will reoccur and admits that it is difficult staying busy while she is out of work. She is hopeful, but worried. Lab Results       None                 Goals Addressed                   This Visit's Progress     Behavioral Health   Improving     I will work towards the following [address] [place] [address] goals: I will continue to follow up with my psychologist /counselor and/or psychiatrist., I will take my medications daily as prescribed. , I will work on improving sleep habits to have consistent sleep/awake time. , and develop effective coping skills    Barriers: fear of failure and lack of support  Plan for overcoming my barriers:

## 2023-11-30 ENCOUNTER — PATIENT MESSAGE (OUTPATIENT)
Dept: PRIMARY CARE CLINIC | Facility: CLINIC | Age: 53
End: 2023-11-30

## 2023-11-30 ENCOUNTER — APPOINTMENT (OUTPATIENT)
Facility: HOSPITAL | Age: 53
End: 2023-11-30
Payer: COMMERCIAL

## 2023-11-30 RX ORDER — AMLODIPINE BESYLATE 5 MG/1
5 TABLET ORAL DAILY
Qty: 90 TABLET | Refills: 0 | Status: SHIPPED | OUTPATIENT
Start: 2023-11-30 | End: 2024-02-28

## 2023-11-30 NOTE — TELEPHONE ENCOUNTER
From: Spike Feliciano  To: Dr. Samir Weinstein: 11/30/2023 2:55 PM EST  Subject: Refill    Hello,  I need a refill of my Amlodipine 5 mg sent to [facility].   Thank you

## 2023-12-01 ENCOUNTER — OFFICE VISIT (OUTPATIENT)
Age: 53
End: 2023-12-01

## 2023-12-01 VITALS
HEIGHT: 63 IN | HEART RATE: 91 BPM | BODY MASS INDEX: 29.06 KG/M2 | DIASTOLIC BLOOD PRESSURE: 80 MMHG | TEMPERATURE: 98 F | RESPIRATION RATE: 17 BRPM | SYSTOLIC BLOOD PRESSURE: 124 MMHG | WEIGHT: 164 LBS | OXYGEN SATURATION: 97 %

## 2023-12-01 DIAGNOSIS — F33.2 SEVERE EPISODE OF RECURRENT MAJOR DEPRESSIVE DISORDER, WITHOUT PSYCHOTIC FEATURES (HCC): Primary | ICD-10-CM

## 2023-12-01 DIAGNOSIS — R45.851 SUICIDAL IDEATION: ICD-10-CM

## 2023-12-01 DIAGNOSIS — F41.1 GENERALIZED ANXIETY DISORDER: ICD-10-CM

## 2023-12-01 RX ORDER — LITHIUM CARBONATE 300 MG/1
300 CAPSULE ORAL 2 TIMES DAILY WITH MEALS
Qty: 60 CAPSULE | Refills: 1 | Status: SHIPPED | OUTPATIENT
Start: 2023-12-01 | End: 2024-01-30

## 2023-12-01 RX ORDER — LITHIUM CARBONATE 300 MG/1
300 CAPSULE ORAL 2 TIMES DAILY WITH MEALS
COMMUNITY
End: 2023-12-01 | Stop reason: SDUPTHER

## 2023-12-01 RX ORDER — BUSPIRONE HYDROCHLORIDE 10 MG/1
10 TABLET ORAL 3 TIMES DAILY
COMMUNITY
End: 2023-12-01 | Stop reason: SDUPTHER

## 2023-12-01 RX ORDER — BUSPIRONE HYDROCHLORIDE 10 MG/1
10 TABLET ORAL 3 TIMES DAILY
Qty: 90 TABLET | Refills: 0 | Status: SHIPPED | OUTPATIENT
Start: 2023-12-01 | End: 2023-12-31

## 2023-12-01 RX ORDER — QUETIAPINE FUMARATE 50 MG/1
50 TABLET, EXTENDED RELEASE ORAL NIGHTLY
COMMUNITY
End: 2023-12-01 | Stop reason: SDUPTHER

## 2023-12-01 RX ORDER — QUETIAPINE FUMARATE 50 MG/1
50 TABLET, EXTENDED RELEASE ORAL NIGHTLY
Qty: 30 TABLET | Refills: 1 | Status: SHIPPED | OUTPATIENT
Start: 2023-12-01 | End: 2024-01-30

## 2023-12-01 SDOH — ECONOMIC STABILITY: FOOD INSECURITY: WITHIN THE PAST 12 MONTHS, YOU WORRIED THAT YOUR FOOD WOULD RUN OUT BEFORE YOU GOT MONEY TO BUY MORE.: NEVER TRUE

## 2023-12-01 SDOH — ECONOMIC STABILITY: TRANSPORTATION INSECURITY
IN THE PAST 12 MONTHS, HAS LACK OF TRANSPORTATION KEPT YOU FROM MEETINGS, WORK, OR FROM GETTING THINGS NEEDED FOR DAILY LIVING?: NO

## 2023-12-01 SDOH — ECONOMIC STABILITY: TRANSPORTATION INSECURITY
IN THE PAST 12 MONTHS, HAS THE LACK OF TRANSPORTATION KEPT YOU FROM MEDICAL APPOINTMENTS OR FROM GETTING MEDICATIONS?: NO

## 2023-12-01 SDOH — ECONOMIC STABILITY: HOUSING INSECURITY: IN THE LAST 12 MONTHS, HOW MANY PLACES HAVE YOU LIVED?: 1

## 2023-12-01 SDOH — ECONOMIC STABILITY: INCOME INSECURITY: IN THE LAST 12 MONTHS, WAS THERE A TIME WHEN YOU WERE NOT ABLE TO PAY THE MORTGAGE OR RENT ON TIME?: NO

## 2023-12-01 SDOH — ECONOMIC STABILITY: FOOD INSECURITY: WITHIN THE PAST 12 MONTHS, THE FOOD YOU BOUGHT JUST DIDN'T LAST AND YOU DIDN'T HAVE MONEY TO GET MORE.: NEVER TRUE

## 2023-12-01 ASSESSMENT — PATIENT HEALTH QUESTIONNAIRE - PHQ9
1. LITTLE INTEREST OR PLEASURE IN DOING THINGS: 1
SUM OF ALL RESPONSES TO PHQ QUESTIONS 1-9: 10
6. FEELING BAD ABOUT YOURSELF - OR THAT YOU ARE A FAILURE OR HAVE LET YOURSELF OR YOUR FAMILY DOWN: 1
3. TROUBLE FALLING OR STAYING ASLEEP: 0
7. TROUBLE CONCENTRATING ON THINGS, SUCH AS READING THE NEWSPAPER OR WATCHING TELEVISION: 3
4. FEELING TIRED OR HAVING LITTLE ENERGY: 1
2. FEELING DOWN, DEPRESSED OR HOPELESS: 1
SUM OF ALL RESPONSES TO PHQ9 QUESTIONS 1 & 2: 2
5. POOR APPETITE OR OVEREATING: 3
8. MOVING OR SPEAKING SO SLOWLY THAT OTHER PEOPLE COULD HAVE NOTICED. OR THE OPPOSITE, BEING SO FIGETY OR RESTLESS THAT YOU HAVE BEEN MOVING AROUND A LOT MORE THAN USUAL: 0
10. IF YOU CHECKED OFF ANY PROBLEMS, HOW DIFFICULT HAVE THESE PROBLEMS MADE IT FOR YOU TO DO YOUR WORK, TAKE CARE OF THINGS AT HOME, OR GET ALONG WITH OTHER PEOPLE: 1
9. THOUGHTS THAT YOU WOULD BE BETTER OFF DEAD, OR OF HURTING YOURSELF: 0
SUM OF ALL RESPONSES TO PHQ QUESTIONS 1-9: 10

## 2023-12-01 ASSESSMENT — ANXIETY QUESTIONNAIRES
IF YOU CHECKED OFF ANY PROBLEMS ON THIS QUESTIONNAIRE, HOW DIFFICULT HAVE THESE PROBLEMS MADE IT FOR YOU TO DO YOUR WORK, TAKE CARE OF THINGS AT HOME, OR GET ALONG WITH OTHER PEOPLE: SOMEWHAT DIFFICULT
4. TROUBLE RELAXING: 2
1. FEELING NERVOUS, ANXIOUS, OR ON EDGE: 1
2. NOT BEING ABLE TO STOP OR CONTROL WORRYING: 2
GAD7 TOTAL SCORE: 8
7. FEELING AFRAID AS IF SOMETHING AWFUL MIGHT HAPPEN: 1
5. BEING SO RESTLESS THAT IT IS HARD TO SIT STILL: 0
3. WORRYING TOO MUCH ABOUT DIFFERENT THINGS: 2
6. BECOMING EASILY ANNOYED OR IRRITABLE: 0

## 2023-12-01 ASSESSMENT — LIFESTYLE VARIABLES
HOW OFTEN DO YOU HAVE A DRINK CONTAINING ALCOHOL: NEVER
HOW MANY STANDARD DRINKS CONTAINING ALCOHOL DO YOU HAVE ON A TYPICAL DAY: PATIENT DOES NOT DRINK

## 2023-12-01 ASSESSMENT — SOCIAL DETERMINANTS OF HEALTH (SDOH): HOW HARD IS IT FOR YOU TO PAY FOR THE VERY BASICS LIKE FOOD, HOUSING, MEDICAL CARE, AND HEATING?: NOT HARD AT ALL

## 2023-12-05 DIAGNOSIS — F33.2 SEVERE EPISODE OF RECURRENT MAJOR DEPRESSIVE DISORDER, WITHOUT PSYCHOTIC FEATURES (HCC): ICD-10-CM

## 2023-12-05 LAB
DATE LAST DOSE: ABNORMAL
DOSE AMOUNT: ABNORMAL UNITS
DOSE DATE/TIME: ABNORMAL
LITHIUM SERPL-SCNC: 0.54 MMOL/L (ref 0.6–1.2)

## 2023-12-07 ENCOUNTER — CARE COORDINATION (OUTPATIENT)
Dept: OTHER | Facility: CLINIC | Age: 53
End: 2023-12-07

## 2023-12-07 NOTE — CARE COORDINATION
MSW TRACYM attempted to contact patient. No answer. Discreet VM left. ANITRA MOLINAM will make another attempt in 2 weeks if call is not returned.

## 2023-12-12 DIAGNOSIS — G47.00 INSOMNIA, UNSPECIFIED TYPE: Primary | ICD-10-CM

## 2023-12-12 RX ORDER — ZOLPIDEM TARTRATE 12.5 MG/1
12.5 TABLET, FILM COATED, EXTENDED RELEASE ORAL NIGHTLY PRN
Qty: 30 TABLET | Refills: 0 | Status: SHIPPED | OUTPATIENT
Start: 2023-12-12 | End: 2024-01-11

## 2023-12-13 ENCOUNTER — OFFICE VISIT (OUTPATIENT)
Age: 53
End: 2023-12-13
Payer: COMMERCIAL

## 2023-12-13 VITALS
SYSTOLIC BLOOD PRESSURE: 112 MMHG | HEIGHT: 63 IN | WEIGHT: 166.4 LBS | RESPIRATION RATE: 17 BRPM | DIASTOLIC BLOOD PRESSURE: 85 MMHG | HEART RATE: 89 BPM | BODY MASS INDEX: 29.48 KG/M2 | OXYGEN SATURATION: 98 % | TEMPERATURE: 98 F

## 2023-12-13 DIAGNOSIS — F33.2 SEVERE EPISODE OF RECURRENT MAJOR DEPRESSIVE DISORDER, WITHOUT PSYCHOTIC FEATURES (HCC): Primary | ICD-10-CM

## 2023-12-13 DIAGNOSIS — G47.00 INSOMNIA, UNSPECIFIED TYPE: ICD-10-CM

## 2023-12-13 DIAGNOSIS — F41.1 GENERALIZED ANXIETY DISORDER: ICD-10-CM

## 2023-12-13 PROCEDURE — 90833 PSYTX W PT W E/M 30 MIN: CPT | Performed by: NURSE PRACTITIONER

## 2023-12-13 PROCEDURE — 99214 OFFICE O/P EST MOD 30 MIN: CPT | Performed by: NURSE PRACTITIONER

## 2023-12-13 ASSESSMENT — PATIENT HEALTH QUESTIONNAIRE - PHQ9
1. LITTLE INTEREST OR PLEASURE IN DOING THINGS: 3
3. TROUBLE FALLING OR STAYING ASLEEP: 0
6. FEELING BAD ABOUT YOURSELF - OR THAT YOU ARE A FAILURE OR HAVE LET YOURSELF OR YOUR FAMILY DOWN: 3
SUM OF ALL RESPONSES TO PHQ QUESTIONS 1-9: 21
SUM OF ALL RESPONSES TO PHQ QUESTIONS 1-9: 19
10. IF YOU CHECKED OFF ANY PROBLEMS, HOW DIFFICULT HAVE THESE PROBLEMS MADE IT FOR YOU TO DO YOUR WORK, TAKE CARE OF THINGS AT HOME, OR GET ALONG WITH OTHER PEOPLE: 2
8. MOVING OR SPEAKING SO SLOWLY THAT OTHER PEOPLE COULD HAVE NOTICED. OR THE OPPOSITE, BEING SO FIGETY OR RESTLESS THAT YOU HAVE BEEN MOVING AROUND A LOT MORE THAN USUAL: 3
4. FEELING TIRED OR HAVING LITTLE ENERGY: 1
5. POOR APPETITE OR OVEREATING: 3
SUM OF ALL RESPONSES TO PHQ QUESTIONS 1-9: 21
SUM OF ALL RESPONSES TO PHQ9 QUESTIONS 1 & 2: 6
7. TROUBLE CONCENTRATING ON THINGS, SUCH AS READING THE NEWSPAPER OR WATCHING TELEVISION: 3
9. THOUGHTS THAT YOU WOULD BE BETTER OFF DEAD, OR OF HURTING YOURSELF: 2
2. FEELING DOWN, DEPRESSED OR HOPELESS: 3
SUM OF ALL RESPONSES TO PHQ QUESTIONS 1-9: 21

## 2023-12-13 ASSESSMENT — ANXIETY QUESTIONNAIRES
IF YOU CHECKED OFF ANY PROBLEMS ON THIS QUESTIONNAIRE, HOW DIFFICULT HAVE THESE PROBLEMS MADE IT FOR YOU TO DO YOUR WORK, TAKE CARE OF THINGS AT HOME, OR GET ALONG WITH OTHER PEOPLE: VERY DIFFICULT
6. BECOMING EASILY ANNOYED OR IRRITABLE: 1
7. FEELING AFRAID AS IF SOMETHING AWFUL MIGHT HAPPEN: 1
4. TROUBLE RELAXING: 3
2. NOT BEING ABLE TO STOP OR CONTROL WORRYING: 3
3. WORRYING TOO MUCH ABOUT DIFFERENT THINGS: 3
GAD7 TOTAL SCORE: 12
1. FEELING NERVOUS, ANXIOUS, OR ON EDGE: 1
5. BEING SO RESTLESS THAT IT IS HARD TO SIT STILL: 0

## 2023-12-14 RX ORDER — BUSPIRONE HYDROCHLORIDE 10 MG/1
10 TABLET ORAL 3 TIMES DAILY
Qty: 90 TABLET | Refills: 1 | Status: SHIPPED | OUTPATIENT
Start: 2023-12-14 | End: 2024-02-12

## 2023-12-15 ENCOUNTER — CARE COORDINATION (OUTPATIENT)
Dept: OTHER | Facility: CLINIC | Age: 53
End: 2023-12-15

## 2023-12-15 DIAGNOSIS — R45.851 SUICIDAL IDEATION: ICD-10-CM

## 2023-12-15 DIAGNOSIS — F33.2 SEVERE EPISODE OF RECURRENT MAJOR DEPRESSIVE DISORDER, WITHOUT PSYCHOTIC FEATURES (HCC): Primary | ICD-10-CM

## 2023-12-15 RX ORDER — LITHIUM CARBONATE 300 MG/1
300 CAPSULE ORAL
Qty: 90 CAPSULE | Refills: 1 | Status: SHIPPED | OUTPATIENT
Start: 2023-12-15 | End: 2024-02-13

## 2023-12-15 NOTE — CARE COORDINATION
Ambulatory Care Coordination Note    ACM attempted to reach patient for care management follow up call regarding recent medication changes. HIPAA compliant message left requesting a return phone call at patient convenience.      Plan for follow-up call in 10-14 days    Future Appointments   Date Time Provider [address] [place] [address]   12/20/2023  8:30 AM Reyes Erickson APRN - CNP [facility] BS AMB   1/3/2024  1:00 PM Hazel Quintero DO [facility] BS AMB

## 2023-12-18 ENCOUNTER — TELEPHONE (OUTPATIENT)
Age: 53
End: 2023-12-18

## 2023-12-18 NOTE — TELEPHONE ENCOUNTER
Patient is requesting a call back asap regarding her labs being  and she wants to take care of them today

## 2023-12-19 DIAGNOSIS — F33.2 SEVERE EPISODE OF RECURRENT MAJOR DEPRESSIVE DISORDER, WITHOUT PSYCHOTIC FEATURES (HCC): ICD-10-CM

## 2023-12-19 LAB
ALBUMIN SERPL-MCNC: 3.8 G/DL (ref 3.5–5)
ALBUMIN/GLOB SERPL: 1.1 (ref 1.1–2.2)
ALP SERPL-CCNC: 60 U/L (ref 45–117)
ALT SERPL-CCNC: 62 U/L (ref 12–78)
ANION GAP SERPL CALC-SCNC: 5 MMOL/L (ref 5–15)
AST SERPL-CCNC: 45 U/L (ref 15–37)
BILIRUB SERPL-MCNC: 0.3 MG/DL (ref 0.2–1)
BUN SERPL-MCNC: 21 MG/DL (ref 6–20)
BUN/CREAT SERPL: 25 (ref 12–20)
CALCIUM SERPL-MCNC: 9.3 MG/DL (ref 8.5–10.1)
CHLORIDE SERPL-SCNC: 106 MMOL/L (ref 97–108)
CO2 SERPL-SCNC: 28 MMOL/L (ref 21–32)
CREAT SERPL-MCNC: 0.85 MG/DL (ref 0.55–1.02)
DATE LAST DOSE: NORMAL
DOSE AMOUNT: NORMAL UNITS
DOSE DATE/TIME: NORMAL
GLOBULIN SER CALC-MCNC: 3.4 G/DL (ref 2–4)
GLUCOSE SERPL-MCNC: 104 MG/DL (ref 65–100)
LITHIUM SERPL-SCNC: 0.99 MMOL/L (ref 0.6–1.2)
POTASSIUM SERPL-SCNC: 4.3 MMOL/L (ref 3.5–5.1)
PROT SERPL-MCNC: 7.2 G/DL (ref 6.4–8.2)
SODIUM SERPL-SCNC: 139 MMOL/L (ref 136–145)

## 2023-12-26 DIAGNOSIS — F41.1 GENERALIZED ANXIETY DISORDER: ICD-10-CM

## 2023-12-27 ENCOUNTER — CARE COORDINATION (OUTPATIENT)
Dept: OTHER | Facility: CLINIC | Age: 53
End: 2023-12-27

## 2023-12-27 RX ORDER — BUSPIRONE HYDROCHLORIDE 10 MG/1
TABLET ORAL
Qty: 90 TABLET | Refills: 0 | OUTPATIENT
Start: 2023-12-27

## 2023-12-27 NOTE — CARE COORDINATION
MSW ACM attempted to contact patient. No answer. Discreet VM was left. MSW TRACYM will close SW program due to lack of contact.

## 2023-12-28 ENCOUNTER — CARE COORDINATION (OUTPATIENT)
Dept: OTHER | Facility: CLINIC | Age: 53
End: 2023-12-28

## 2023-12-28 NOTE — CARE COORDINATION
Ambulatory Care Coordination Note    ACM attempted to reach patient for care management follow up call regarding recent ED visit for depression. HIPAA compliant message left requesting a return phone call at patient convenience.      Plan for follow-up call in 7-10 days    Future Appointments   Date Time Provider [address]  [facility]   1/3/2024  8:30 AM Hakan Erickson APRN - CNP [facility] BS AMB   1/3/2024  1:00 PM DO NINA Shields BS AMB

## 2024-01-03 ENCOUNTER — OFFICE VISIT (OUTPATIENT)
Dept: PRIMARY CARE CLINIC | Facility: CLINIC | Age: 54
End: 2024-01-03
Payer: COMMERCIAL

## 2024-01-03 VITALS
SYSTOLIC BLOOD PRESSURE: 110 MMHG | HEART RATE: 81 BPM | BODY MASS INDEX: 29.52 KG/M2 | OXYGEN SATURATION: 98 % | DIASTOLIC BLOOD PRESSURE: 78 MMHG | RESPIRATION RATE: 17 BRPM | HEIGHT: 63 IN

## 2024-01-03 DIAGNOSIS — G47.00 INSOMNIA, UNSPECIFIED TYPE: Primary | ICD-10-CM

## 2024-01-03 DIAGNOSIS — F32.A DEPRESSION, UNSPECIFIED DEPRESSION TYPE: ICD-10-CM

## 2024-01-03 DIAGNOSIS — G47.00 INSOMNIA, UNSPECIFIED TYPE: ICD-10-CM

## 2024-01-03 DIAGNOSIS — F41.1 GENERALIZED ANXIETY DISORDER: ICD-10-CM

## 2024-01-03 DIAGNOSIS — E03.9 HYPOTHYROIDISM, UNSPECIFIED TYPE: ICD-10-CM

## 2024-01-03 PROBLEM — F33.1 MODERATE EPISODE OF RECURRENT MAJOR DEPRESSIVE DISORDER (HCC): Status: RESOLVED | Noted: 2018-09-28 | Resolved: 2024-01-03

## 2024-01-03 PROBLEM — F33.2 MAJOR DEPRESSIVE DISORDER, RECURRENT, SEVERE W/O PSYCHOTIC BEHAVIOR (HCC): Status: RESOLVED | Noted: 2023-09-03 | Resolved: 2024-01-03

## 2024-01-03 PROBLEM — F33.2 SEVERE EPISODE OF RECURRENT MAJOR DEPRESSIVE DISORDER, WITHOUT PSYCHOTIC FEATURES (HCC): Status: RESOLVED | Noted: 2023-11-03 | Resolved: 2024-01-03

## 2024-01-03 PROCEDURE — 3074F SYST BP LT 130 MM HG: CPT | Performed by: FAMILY MEDICINE

## 2024-01-03 PROCEDURE — 3078F DIAST BP <80 MM HG: CPT | Performed by: FAMILY MEDICINE

## 2024-01-03 PROCEDURE — 99214 OFFICE O/P EST MOD 30 MIN: CPT | Performed by: FAMILY MEDICINE

## 2024-01-03 RX ORDER — BUSPIRONE HYDROCHLORIDE 10 MG/1
TABLET ORAL
Qty: 90 TABLET | Refills: 0 | OUTPATIENT
Start: 2024-01-03

## 2024-01-03 RX ORDER — ZOLPIDEM TARTRATE 12.5 MG/1
12.5 TABLET, FILM COATED, EXTENDED RELEASE ORAL NIGHTLY PRN
Qty: 30 TABLET | Refills: 0 | Status: SHIPPED | OUTPATIENT
Start: 2024-01-12 | End: 2024-02-11

## 2024-01-03 RX ORDER — LEVOTHYROXINE SODIUM 0.05 MG/1
50 TABLET ORAL DAILY
Qty: 90 TABLET | Refills: 3 | Status: SHIPPED | OUTPATIENT
Start: 2024-01-03 | End: 2024-01-06 | Stop reason: DRUGHIGH

## 2024-01-03 ASSESSMENT — ENCOUNTER SYMPTOMS: SHORTNESS OF BREATH: 0

## 2024-01-03 NOTE — PROGRESS NOTES
extended release tablet; Take 1 tablet by mouth nightly as needed for Sleep for up to 30 days. Max Daily Amount: 12.5 mg    Hypothyroidism, unspecified type  -     levothyroxine (SYNTHROID) 50 MCG tablet; Take 1 tablet by mouth Daily  -     TSH; Future    Depression, unspecified depression type  -     Vitamin B12; Future  -     Vitamin D 25 Hydroxy; Future  -     CBC; Future       No follow-up provider specified.    I have discussed the diagnosis with the patient and the intended plan as seen in the above orders. The patient has received an after-visit summary and questions were answered concerning future plans.  I have discussed medication side effects and warnings with the patient as well.    Dat Arnett, DO

## 2024-01-04 DIAGNOSIS — F32.A DEPRESSION, UNSPECIFIED DEPRESSION TYPE: ICD-10-CM

## 2024-01-04 DIAGNOSIS — E55.9 VITAMIN D DEFICIENCY, UNSPECIFIED: ICD-10-CM

## 2024-01-04 DIAGNOSIS — D75.839 THROMBOCYTOSIS: Primary | ICD-10-CM

## 2024-01-04 DIAGNOSIS — E03.9 HYPOTHYROIDISM, UNSPECIFIED TYPE: ICD-10-CM

## 2024-01-05 ENCOUNTER — CARE COORDINATION (OUTPATIENT)
Dept: OTHER | Facility: CLINIC | Age: 54
End: 2024-01-05

## 2024-01-05 LAB
25(OH)D3 SERPL-MCNC: 11.6 NG/ML (ref 30–100)
ERYTHROCYTE [DISTWIDTH] IN BLOOD BY AUTOMATED COUNT: 13.1 % (ref 11.5–14.5)
HCT VFR BLD AUTO: 40.7 % (ref 35–47)
HGB BLD-MCNC: 12.6 G/DL (ref 11.5–16)
MCH RBC QN AUTO: 29.6 PG (ref 26–34)
MCHC RBC AUTO-ENTMCNC: 31 G/DL (ref 30–36.5)
MCV RBC AUTO: 95.8 FL (ref 80–99)
NRBC # BLD: 0 K/UL (ref 0–0.01)
NRBC BLD-RTO: 0 PER 100 WBC
PLATELET # BLD AUTO: 421 K/UL (ref 150–400)
PMV BLD AUTO: 10.1 FL (ref 8.9–12.9)
RBC # BLD AUTO: 4.25 M/UL (ref 3.8–5.2)
TSH SERPL DL<=0.05 MIU/L-ACNC: 4.12 UIU/ML (ref 0.36–3.74)
VIT B12 SERPL-MCNC: 401 PG/ML (ref 193–986)
WBC # BLD AUTO: 7.5 K/UL (ref 3.6–11)

## 2024-01-05 NOTE — CARE COORDINATION
Ambulatory Care Coordination Note    ACM attempted to reach patient for care management follow up call regarding mental health concerns. HIPAA compliant message left requesting a return phone call at patient convenience.     Plan for follow-up call in 7-10 days    No future appointments.

## 2024-01-06 RX ORDER — LEVOTHYROXINE SODIUM 0.07 MG/1
75 TABLET ORAL DAILY
Qty: 90 TABLET | Refills: 1 | Status: SHIPPED | OUTPATIENT
Start: 2024-01-06 | End: 2024-01-07 | Stop reason: SDUPTHER

## 2024-01-07 ENCOUNTER — TELEPHONE (OUTPATIENT)
Dept: PRIMARY CARE CLINIC | Facility: CLINIC | Age: 54
End: 2024-01-07

## 2024-01-07 DIAGNOSIS — E03.9 HYPOTHYROIDISM, UNSPECIFIED TYPE: Primary | ICD-10-CM

## 2024-01-07 DIAGNOSIS — E55.9 VITAMIN D DEFICIENCY DISEASE: ICD-10-CM

## 2024-01-07 RX ORDER — LEVOTHYROXINE SODIUM 0.07 MG/1
75 TABLET ORAL DAILY
Qty: 30 TABLET | Refills: 0 | Status: SHIPPED | OUTPATIENT
Start: 2024-01-07

## 2024-01-07 RX ORDER — CHOLECALCIFEROL (VITAMIN D3) 1250 MCG
50000 CAPSULE ORAL
Qty: 4 CAPSULE | Refills: 0 | Status: SHIPPED | OUTPATIENT
Start: 2024-01-07

## 2024-01-10 RX ORDER — LEVOTHYROXINE SODIUM 0.05 MG/1
50 TABLET ORAL
Qty: 90 TABLET | Refills: 0 | OUTPATIENT
Start: 2024-01-10

## 2024-01-12 ENCOUNTER — CARE COORDINATION (OUTPATIENT)
Dept: OTHER | Facility: CLINIC | Age: 54
End: 2024-01-12

## 2024-01-12 NOTE — CARE COORDINATION
Ambulatory Care Coordination Note    ACM attempted to reach patient for care management follow up call regarding depression and suicidal thoughts, ED visit on 12/20/24. HIPAA compliant message left requesting a return phone call at patient convenience.     Plan for follow-up call in 10-14 days    Future Appointments   Date Time Provider Department Center   1/17/2024  3:30 PM Antione Pearce, PT [facility]

## 2024-01-15 ENCOUNTER — HOSPITAL ENCOUNTER (EMERGENCY)
Facility: HOSPITAL | Age: 54
Discharge: HOME | End: 2024-01-15
Attending: EMERGENCY MEDICINE
Payer: COMMERCIAL

## 2024-01-15 VITALS
HEART RATE: 82 BPM | SYSTOLIC BLOOD PRESSURE: 115 MMHG | BODY MASS INDEX: 28.79 KG/M2 | RESPIRATION RATE: 14 BRPM | DIASTOLIC BLOOD PRESSURE: 81 MMHG | WEIGHT: 162.48 LBS | HEIGHT: 63 IN | OXYGEN SATURATION: 98 % | TEMPERATURE: 98.5 F

## 2024-01-15 DIAGNOSIS — R11.0 NAUSEA: Primary | ICD-10-CM

## 2024-01-15 LAB
ALBUMIN SERPL-MCNC: 3.6 G/DL (ref 3.5–5)
ALBUMIN/GLOB SERPL: 0.9 (ref 1.1–2.2)
ALP SERPL-CCNC: 53 U/L (ref 45–117)
ALT SERPL-CCNC: 52 U/L (ref 12–78)
ANION GAP SERPL CALC-SCNC: 8 MMOL/L (ref 5–15)
AST SERPL-CCNC: 22 U/L (ref 15–37)
BASOPHILS # BLD: 0.1 K/UL (ref 0–0.1)
BASOPHILS NFR BLD: 1 % (ref 0–1)
BILIRUB SERPL-MCNC: 0.4 MG/DL (ref 0.2–1)
BUN SERPL-MCNC: 17 MG/DL (ref 6–20)
BUN/CREAT SERPL: 18 (ref 12–20)
CALCIUM SERPL-MCNC: 9.5 MG/DL (ref 8.5–10.1)
CHLORIDE SERPL-SCNC: 101 MMOL/L (ref 97–108)
CO2 SERPL-SCNC: 27 MMOL/L (ref 21–32)
CREAT SERPL-MCNC: 0.96 MG/DL (ref 0.55–1.02)
DIFFERENTIAL METHOD BLD: ABNORMAL
EOSINOPHIL # BLD: 0.3 K/UL (ref 0–0.4)
EOSINOPHIL NFR BLD: 3 % (ref 0–7)
ERYTHROCYTE [DISTWIDTH] IN BLOOD BY AUTOMATED COUNT: 13 % (ref 11.5–14.5)
GLOBULIN SER CALC-MCNC: 3.9 G/DL (ref 2–4)
GLUCOSE SERPL-MCNC: 114 MG/DL (ref 65–100)
HCT VFR BLD AUTO: 42 % (ref 35–47)
HGB BLD-MCNC: 13.8 G/DL (ref 11.5–16)
IMM GRANULOCYTES # BLD AUTO: 0 K/UL (ref 0–0.04)
IMM GRANULOCYTES NFR BLD AUTO: 0 % (ref 0–0.5)
LYMPHOCYTES # BLD: 2 K/UL (ref 0.8–3.5)
LYMPHOCYTES NFR BLD: 19 % (ref 12–49)
MCH RBC QN AUTO: 29.6 PG (ref 26–34)
MCHC RBC AUTO-ENTMCNC: 32.9 G/DL (ref 30–36.5)
MCV RBC AUTO: 89.9 FL (ref 80–99)
MONOCYTES # BLD: 0.6 K/UL (ref 0–1)
MONOCYTES NFR BLD: 6 % (ref 5–13)
NEUTS SEG # BLD: 7.3 K/UL (ref 1.8–8)
NEUTS SEG NFR BLD: 71 % (ref 32–75)
NRBC # BLD: 0 K/UL (ref 0–0.01)
NRBC BLD-RTO: 0 PER 100 WBC
PLATELET # BLD AUTO: 420 K/UL (ref 150–400)
PMV BLD AUTO: 9.7 FL (ref 8.9–12.9)
POTASSIUM SERPL-SCNC: 3.9 MMOL/L (ref 3.5–5.1)
PROT SERPL-MCNC: 7.5 G/DL (ref 6.4–8.2)
RBC # BLD AUTO: 4.67 M/UL (ref 3.8–5.2)
SODIUM SERPL-SCNC: 136 MMOL/L (ref 136–145)
WBC # BLD AUTO: 10.3 K/UL (ref 3.6–11)

## 2024-01-15 PROCEDURE — 99284 EMERGENCY DEPT VISIT MOD MDM: CPT

## 2024-01-15 PROCEDURE — 96374 THER/PROPH/DIAG INJ IV PUSH: CPT

## 2024-01-15 PROCEDURE — 80053 COMPREHEN METABOLIC PANEL: CPT

## 2024-01-15 PROCEDURE — 36415 COLL VENOUS BLD VENIPUNCTURE: CPT

## 2024-01-15 PROCEDURE — 2580000003 HC RX 258: Performed by: EMERGENCY MEDICINE

## 2024-01-15 PROCEDURE — 85025 COMPLETE CBC W/AUTO DIFF WBC: CPT

## 2024-01-15 PROCEDURE — 6360000002 HC RX W HCPCS: Performed by: EMERGENCY MEDICINE

## 2024-01-15 RX ORDER — 0.9 % SODIUM CHLORIDE 0.9 %
1000 INTRAVENOUS SOLUTION INTRAVENOUS ONCE
Status: COMPLETED | OUTPATIENT
Start: 2024-01-15 | End: 2024-01-15

## 2024-01-15 RX ORDER — ONDANSETRON 4 MG/1
4 TABLET, ORALLY DISINTEGRATING ORAL 3 TIMES DAILY PRN
Qty: 21 TABLET | Refills: 0 | Status: SHIPPED | OUTPATIENT
Start: 2024-01-15

## 2024-01-15 RX ORDER — ONDANSETRON 2 MG/ML
4 INJECTION INTRAMUSCULAR; INTRAVENOUS ONCE
Status: COMPLETED | OUTPATIENT
Start: 2024-01-15 | End: 2024-01-15

## 2024-01-15 RX ADMIN — ONDANSETRON 4 MG: 2 INJECTION INTRAMUSCULAR; INTRAVENOUS at 12:12

## 2024-01-15 RX ADMIN — SODIUM CHLORIDE 1000 ML: 9 INJECTION, SOLUTION INTRAVENOUS at 12:18

## 2024-01-15 ASSESSMENT — PAIN DESCRIPTION - DESCRIPTORS: DESCRIPTORS: DULL;ACHING

## 2024-01-15 ASSESSMENT — PAIN SCALES - GENERAL: PAINLEVEL_OUTOF10: 3

## 2024-01-15 ASSESSMENT — PAIN DESCRIPTION - LOCATION: LOCATION: ABDOMEN

## 2024-01-15 ASSESSMENT — PAIN DESCRIPTION - ORIENTATION: ORIENTATION: LOWER

## 2024-01-15 NOTE — DISCHARGE INSTRUCTIONS
Your symptoms may be due to your new medicine, Trintellix, but we do not recommend stopping this suddenly.  Will we will prescribe you a nausea medicine and recommend that you discuss with your psychiatrist what medicine may be best for you.

## 2024-01-15 NOTE — ED TRIAGE NOTES
ED triage note: Ambulatory with a steady gait. Patient reports was just started on trintellix Wednesday and has had nausea since yesterday.

## 2024-01-15 NOTE — ED PROVIDER NOTES
[facility]  EMERGENCY DEPARTMENT ENCOUNTER      Pt Name: Cathryn Beaulieu  MRN: [id number]  Birthdate 1970  Date of evaluation: 1/15/2024  Provider: Ajay Branham MD      HISTORY OF PRESENT ILLNESS      53-year-old female with a history of anxiety, depression, asthma presents to the emergency department chief complaint of nausea.  She was started on Trintellix about a week ago, symptoms began yesterday.. Reports some lower abdominal pain without dysuria or frequency.     The history is provided by the patient and medical records.           Nursing Notes were reviewed.    REVIEW OF SYSTEMS         Review of Systems        PAST MEDICAL HISTORY     Past Medical History:   Diagnosis Date    Anxiety     Asthma     Back pain, acute     Depression     Endocrine disease     hypothyroid    H/O seasonal allergies     Hypothyroidism     Maxillary sinus fracture (HCC) 2016    Neck pain, musculoskeletal          SURGICAL HISTORY       Past Surgical History:   Procedure Laterality Date    CERVICAL DISCECTOMY Left 2015    CERVICAL FUSION      CERVICAL FUSION Left 2015      SECTION      COLONOSCOPY N/A 2019    COLONOSCOPY performed by Mohesn Tracy MD at [facility] ENDOSCOPY    COLONOSCOPY,DIAGNOSTIC  2019         GYN  1998    laproscopsy     WISDOM TOOTH EXTRACTION           CURRENT MEDICATIONS       Previous Medications    ALBUTEROL SULFATE HFA (PROVENTIL;VENTOLIN;PROAIR) 108 (90 BASE) MCG/ACT INHALER    Inhale 2 puffs into the lungs every 4 hours as needed    ALBUTEROL SULFATE HFA (VENTOLIN HFA) 108 (90 BASE) MCG/ACT INHALER    Inhale into the lungs every 6 hours as needed for Wheezing or Shortness of Breath Indications: Asthma    BIOTIN 1000 MCG TABS    Take 1 tablet by mouth daily    BUSPIRONE (BUSPAR) 10 MG TABLET    Take 1 tablet by mouth 3 times daily for 180 doses    CHOLECALCIFEROL (VITAMIN D3) 1.25 MG (65026 UT) CAPS    Take 1 capsule by mouth every 7 days    EPINEPHRINE

## 2024-01-26 ENCOUNTER — CARE COORDINATION (OUTPATIENT)
Dept: OTHER | Facility: CLINIC | Age: 54
End: 2024-01-26
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
## 2024-02-04 DIAGNOSIS — E03.9 HYPOTHYROIDISM, UNSPECIFIED TYPE: ICD-10-CM

## 2024-02-05 NOTE — TELEPHONE ENCOUNTER
Requested Prescriptions     Pending Prescriptions Disp Refills    levothyroxine (SYNTHROID) 75 MCG tablet [Pharmacy Med Name: LEVOTHYROXINE 0.075MG (75MCG) TABS] 90 tablet 0     Sig: Take 1 tablet by mouth daily        Last Visit 1/3/24  Last Refill 1/7/24

## 2024-02-06 DIAGNOSIS — E03.9 HYPOTHYROIDISM, UNSPECIFIED TYPE: ICD-10-CM

## 2024-02-06 RX ORDER — LEVOTHYROXINE SODIUM 0.07 MG/1
75 TABLET ORAL DAILY
Qty: 90 TABLET | Refills: 0 | Status: SHIPPED | OUTPATIENT
Start: 2024-02-06

## 2024-02-06 RX ORDER — LEVOTHYROXINE SODIUM 0.07 MG/1
75 TABLET ORAL DAILY
Qty: 90 TABLET | Refills: 0 | OUTPATIENT
Start: 2024-02-06 | End: 2024-05-05

## 2024-02-08 ENCOUNTER — OFFICE VISIT (OUTPATIENT)
Age: 54
End: 2024-02-08

## 2024-02-08 ENCOUNTER — CARE COORDINATION (OUTPATIENT)
Dept: OTHER | Facility: CLINIC | Age: 54
End: 2024-02-08

## 2024-02-08 VITALS
OXYGEN SATURATION: 97 % | TEMPERATURE: 98.4 F | DIASTOLIC BLOOD PRESSURE: 87 MMHG | SYSTOLIC BLOOD PRESSURE: 127 MMHG | BODY MASS INDEX: 29.16 KG/M2 | HEART RATE: 76 BPM | RESPIRATION RATE: 18 BRPM | HEIGHT: 63 IN | WEIGHT: 164.6 LBS

## 2024-02-08 DIAGNOSIS — J00 ACUTE NASOPHARYNGITIS: Primary | ICD-10-CM

## 2024-02-08 DIAGNOSIS — R51.9 ACUTE NONINTRACTABLE HEADACHE, UNSPECIFIED HEADACHE TYPE: ICD-10-CM

## 2024-02-08 DIAGNOSIS — R11.0 NAUSEATED: ICD-10-CM

## 2024-02-08 DIAGNOSIS — E55.9 VITAMIN D DEFICIENCY DISEASE: ICD-10-CM

## 2024-02-08 LAB
Lab: NORMAL
QC PASS/FAIL: NORMAL
SARS-COV-2 RDRP RESP QL NAA+PROBE: NEGATIVE

## 2024-02-08 RX ORDER — CHOLECALCIFEROL (VITAMIN D3) 1250 MCG
50000 CAPSULE ORAL
Qty: 8 CAPSULE | Refills: 0 | Status: SHIPPED | OUTPATIENT
Start: 2024-02-08 | End: 2024-02-09 | Stop reason: SDUPTHER

## 2024-02-08 RX ORDER — DOXEPIN HYDROCHLORIDE 10 MG/1
10 CAPSULE ORAL NIGHTLY
COMMUNITY

## 2024-02-08 ASSESSMENT — ENCOUNTER SYMPTOMS
RHINORRHEA: 1
COUGH: 0
SORE THROAT: 0
SHORTNESS OF BREATH: 0
ABDOMINAL PAIN: 0
SINUS PRESSURE: 0
SINUS PAIN: 0

## 2024-02-08 NOTE — PATIENT INSTRUCTIONS
Results for orders placed or performed in visit on 02/08/24   POCT COVID-19 Rapid, NAAT   Result Value Ref Range    SARS-COV-2, RdRp gene Negative Negative    Lot Number [id number]     QC Pass/Fail pass       You have been diagnosed with an Upper respiratory viral infection. It is important to monitor your fever and take Tylenol and/or ibuprofen to keep your fever down and reduce body aches. For nasal congestion, Flonase nasal spray may be used for nasal stuffiness. To dry up nasal secretions you may use Sudafed, if you do not have high blood pressure. For people with hypertension, use Coricidin HBP.    It is also important to maintain good hydration, drink at least 64 ounces of fluid, water, juice, gingerale and gatorade are good choices. Avoid drinks with caffeine as they do not hydrate. Monitor for good urine output.     If fever persists, you develop abdominal pain, vomiting or shortness of breath, or do not  improve, please call PCP or go to nearest ED.     Your fever usually resolves in 48 to 72 hours. Other symptoms, such as cough and nasal stuffiness usually resolve in 7 to 10 days, but may last longer.    Thank you for coming in to the [facility] today. I hope you are feeling better soon!

## 2024-02-08 NOTE — CARE COORDINATION
Ambulatory Care Coordination Note    ACM attempted to reach patient for care management follow up call regarding behavioral health symptoms and nausea. HIPAA compliant message left requesting a return phone call at patient convenience.     Plan for follow-up call in 10-14 days    Future Appointments   Date Time Provider Department Center   11/14/2024 11:20 AM Anastacia Gamble [facility]B

## 2024-02-08 NOTE — PROGRESS NOTES
Cathryn Beaulieu (:  1970) is a 53 y.o. female,New patient, here for evaluation of the following chief complaint(s):  Headache (headache, nausea, sneezing since yesterday /)        Assessment      Viral Upper Respiratory Illness/ Naso pharyngitis  Headache  Low grade fever  Plan    Covid testing was negative. Most likely has a viral URI. Findings consistent with a viral illness, such as influenza, with symptoms for past 48 hours. Covid  testing was negative.There is no evidence of purulent sinus discharge, adventitious breath sounds or other findings to suggest a bacterial component. Recommendation to treat fever, with appropriate antipyretics along with adequate oral hydration, and rest were reviewed. Patient should monitor symptoms, if development of lethargy or abdominal pain present he should seek re-evaluation. Otherwise, I reviewed acute symptoms, especially fever, usually begin to resolve in 72 hours, though milder symptoms, particularly runny nose, cough, fatigue may last 7 to 10 days.      Subjective      Headache    53-year-old patient presents to clinic reporting she developed a headache and associated nausea yesterday.  She is having some burning in her nasal passages with some slight runny nose and sneezing.  Patient denies any sore throat, cough or chills.  She states she may be running a low-grade fever this morning.  She has been taking Advil for her symptoms.  Patient reports that people in her office have been testing positive for COVID.  Patient reports that she did have COVID vaccines x 2 and the initial outbreak of COVID and also had an experience with having COVID the first year with relatively mild symptoms.  Patient does have some risk factors including asthma.  No diabetes.  Patient states she would like to have COVID testing is not yet decided whether she wants to take the Paxlovid.  Review of Systems    Review of Systems   Constitutional:  Positive for appetite change and fatigue.

## 2024-02-09 DIAGNOSIS — E55.9 VITAMIN D DEFICIENCY DISEASE: ICD-10-CM

## 2024-02-09 RX ORDER — CHOLECALCIFEROL (VITAMIN D3) 1250 MCG
50000 CAPSULE ORAL
Qty: 8 CAPSULE | Refills: 0 | Status: SHIPPED | OUTPATIENT
Start: 2024-02-09

## 2024-02-21 ENCOUNTER — CARE COORDINATION (OUTPATIENT)
Dept: OTHER | Facility: CLINIC | Age: 54
End: 2024-02-21

## 2024-02-21 NOTE — CARE COORDINATION
Ambulatory Care Coordination Note    ACM attempted to reach patient for care management follow up call regarding ED visit in January. HIPAA compliant message left requesting a return phone call at patient convenience.     Plan for follow-up call in 10-14 days    Future Appointments   Date Time Provider Department Center   11/14/2024 11:20 AM Anastacia Gamble [facility]

## 2024-03-01 DIAGNOSIS — E55.9 VITAMIN D DEFICIENCY, UNSPECIFIED: ICD-10-CM

## 2024-03-01 DIAGNOSIS — E03.9 HYPOTHYROIDISM, UNSPECIFIED TYPE: ICD-10-CM

## 2024-03-01 LAB
25(OH)D3 SERPL-MCNC: 51 NG/ML (ref 30–100)
TSH SERPL DL<=0.05 MIU/L-ACNC: 0.05 UIU/ML (ref 0.36–3.74)

## 2024-03-04 ENCOUNTER — PATIENT MESSAGE (OUTPATIENT)
Dept: PRIMARY CARE CLINIC | Facility: CLINIC | Age: 54
End: 2024-03-04

## 2024-03-04 DIAGNOSIS — E03.9 HYPOTHYROIDISM, UNSPECIFIED TYPE: ICD-10-CM

## 2024-03-04 RX ORDER — LEVOTHYROXINE SODIUM 0.05 MG/1
50 TABLET ORAL DAILY
Qty: 30 TABLET | Refills: 1 | Status: SHIPPED | OUTPATIENT
Start: 2024-03-04 | End: 2024-03-05 | Stop reason: SDUPTHER

## 2024-03-05 RX ORDER — LEVOTHYROXINE SODIUM 0.05 MG/1
50 TABLET ORAL DAILY
Qty: 30 TABLET | Refills: 1 | Status: SHIPPED | OUTPATIENT
Start: 2024-03-05

## 2024-03-06 ENCOUNTER — CARE COORDINATION (OUTPATIENT)
Dept: OTHER | Facility: CLINIC | Age: 54
End: 2024-03-06

## 2024-03-06 NOTE — CARE COORDINATION
Ambulatory Care Coordination Note  3/6/2024    Patient Current Location:  [facility] contacted the patient by telephone. Verified name and  with patient as identifiers. Provided introduction to self, and explanation of the ACM role.     Challenges to be reviewed by the provider   Additional needs identified to be addressed with provider: No  none               Method of communication with provider: none.    ACM: WEN SALDAÑA RN    Patient returned this ACM call. The patient states that she had an appointment this morning and was on her way back to work when she returned the call. The patient patient reports that she feels like she has finally found the right medication and is not feeling over medicated. She is taking Trintellix and has gone to an OBGYN and was prescribed a hormone patch. She reports that she feels like herself again.  This ACM will outreach the patient again later this month/early next month to assess any future care management needs.      Lab Results       None            Care Coordination Interventions    Referral from Primary Care Provider: No  Suggested Interventions and Community Resources  Behavorial Health: In Process          Goals Addressed                   This Visit's Progress     Behavioral Health   Improving     I will work towards the following Behavioral Health goals: I will continue to follow up with my psychologist /counselor and/or psychiatrist., I will take my medications daily as prescribed., I will work on improving sleep habits to have consistent sleep/awake time., and develop effective coping skills    Barriers: fear of failure and lack of support  Plan for overcoming my barriers: medication, therapy, and coping skill development  Confidence: 6/10  Anticipated Goal Completion Date: 10/1/2023              Future Appointments   Date Time Provider Department Center   2024 11:20 AM Anastacia Gamble [facility]B

## 2024-03-06 NOTE — CARE COORDINATION
Ambulatory Care Coordination Note    ACM attempted to reach patient for care management follow up call regarding behavioral health concerns. HIPAA compliant message left requesting a return phone call at patient convenience.           Plan for follow-up call in 7-10 days    Future Appointments   Date Time Provider Department Center   11/14/2024 11:20 AM Anastacia Gamble [facility]

## 2024-03-22 ENCOUNTER — PATIENT MESSAGE (OUTPATIENT)
Dept: PRIMARY CARE CLINIC | Facility: CLINIC | Age: 54
End: 2024-03-22

## 2024-03-22 DIAGNOSIS — E03.9 HYPOTHYROIDISM, UNSPECIFIED TYPE: Primary | ICD-10-CM

## 2024-03-27 ENCOUNTER — CARE COORDINATION (OUTPATIENT)
Dept: OTHER | Facility: CLINIC | Age: 54
End: 2024-03-27

## 2024-03-27 NOTE — CARE COORDINATION
Ambulatory Care Coordination Note  3/27/2024    Patient has graduated from the Complex Case Management  program on 3/27/24.  Patient/family has the ability to self-manage at this time.  Care management goals have been completed. No further Ambulatory Care Manager follow up scheduled.     Goals Addressed                   This Visit's Progress     COMPLETED: Behavioral Health        I will work towards the following Behavioral Health goals: I will continue to follow up with my psychologist /counselor and/or psychiatrist., I will take my medications daily as prescribed., I will work on improving sleep habits to have consistent sleep/awake time., and develop effective coping skills    Barriers: fear of failure and lack of support  Plan for overcoming my barriers: medication, therapy, and coping skill development  Confidence: 6/10  Anticipated Goal Completion Date: 10/1/2023              Patient has Ambulatory Care Manager's contact information for any further questions, concerns, or needs.  Patients upcoming visits:    Future Appointments   Date Time Provider Department Center   11/14/2024 11:20 AM Anastacia Gamble [facility]

## 2024-04-18 LAB
CHOLEST SERPL-MCNC: 257 MG/DL
GLUCOSE SERPL-MCNC: 93 MG/DL (ref 65–100)
HDLC SERPL-MCNC: 60 MG/DL
LDLC SERPL CALC-MCNC: 177.8 MG/DL (ref 0–100)
TRIGL SERPL-MCNC: 96 MG/DL

## 2024-04-25 ENCOUNTER — TELEPHONE (OUTPATIENT)
Dept: PRIMARY CARE CLINIC | Facility: CLINIC | Age: 54
End: 2024-04-25

## 2024-04-25 NOTE — TELEPHONE ENCOUNTER
----- Message from Cathryn Beaulieu sent at 4/25/2024 11:03 AM EDT -----  Regarding: Labs  Contact: [phone]  Labs done 04/18/24.  Do you advise Cholesterol Medication? I have started taking Fish Oil. My labs should be in [facility] from [facility].     Thank you

## 2024-05-06 DIAGNOSIS — E03.9 HYPOTHYROIDISM, UNSPECIFIED TYPE: ICD-10-CM

## 2024-05-06 LAB — TSH SERPL DL<=0.05 MIU/L-ACNC: 1.35 UIU/ML (ref 0.36–3.74)

## 2024-05-07 ENCOUNTER — TELEPHONE (OUTPATIENT)
Dept: PRIMARY CARE CLINIC | Facility: CLINIC | Age: 54
End: 2024-05-07

## 2024-05-07 DIAGNOSIS — E03.9 HYPOTHYROIDISM, UNSPECIFIED TYPE: ICD-10-CM

## 2024-05-07 RX ORDER — LEVOTHYROXINE SODIUM 0.05 MG/1
50 TABLET ORAL DAILY
Qty: 45 TABLET | Refills: 1 | Status: SHIPPED | OUTPATIENT
Start: 2024-05-07

## 2024-05-07 RX ORDER — LEVOTHYROXINE SODIUM 0.07 MG/1
75 TABLET ORAL DAILY
Qty: 45 TABLET | Refills: 1 | Status: SHIPPED | OUTPATIENT
Start: 2024-05-07

## 2024-05-07 NOTE — TELEPHONE ENCOUNTER
----- Message from Cathryn Beaulieu sent at 5/7/2024  2:07 PM EDT -----  Regarding: TSH  Contact: [phone]  I had my TSH lab done yesterday, I alternate 75mcg and 50mcg dose. If I need to stay on these doses I will need refills send to [facility].

## 2024-05-14 ASSESSMENT — SLEEP AND FATIGUE QUESTIONNAIRES
ESS TOTAL SCORE: 2
DO YOU HAVE DIFFICULTY BEING AS ACTIVE AS YOU WANT TO BE IN THE MORNING BECAUSE YOU ARE SLEEPY OR TIRED: NO
DO YOU HAVE DIFFICULTY BEING AS ACTIVE AS YOU WANT TO BE IN THE EVENING BECAUSE YOU ARE SLEEPY OR TIRED: NO
SELECT ANY OF THE FOLLOWING BEHAVIORS OBSERVED WHILE YOU ARE ASLEEP: LOUD SNORING
HOW LIKELY ARE YOU TO NOD OFF OR FALL ASLEEP WHILE WATCHING TV: SLIGHT CHANCE OF DOZING
AVERAGE NUMBER OF SLEEP HOURS: 5
DO YOU HAVE PROBLEMS WITH FREQUENT AWAKENINGS AT NIGHT: YES
HOW LIKELY ARE YOU TO NOD OFF OR FALL ASLEEP WHILE LYING DOWN TO REST IN THE AFTERNOON WHEN CIRCUMSTANCES PERMIT: SLIGHT CHANCE OF DOZING
HOW LIKELY ARE YOU TO NOD OFF OR FALL ASLEEP WHILE SITTING QUIETLY AFTER LUNCH WITHOUT ALCOHOL: WOULD NEVER DOZE
HOW LIKELY ARE YOU TO NOD OFF OR FALL ASLEEP WHEN YOU ARE A PASSENGER IN A CAR FOR AN HOUR WITHOUT A BREAK: WOULD NEVER DOZE
HOW LIKELY ARE YOU TO NOD OFF OR FALL ASLEEP IN A CAR, WHILE STOPPED FOR A FEW MINUTES IN TRAFFIC: WOULD NEVER DOZE
HOW LIKELY ARE YOU TO NOD OFF OR FALL ASLEEP WHILE SITTING AND TALKING TO SOMEONE: WOULD NEVER DOZE
HOW LIKELY ARE YOU TO NOD OFF OR FALL ASLEEP WHILE SITTING INACTIVE IN A PUBLIC PLACE: WOULD NEVER DOZE
DO YOU HAVE DIFFICULTY OPERATING A MOTOR VEHICLE FOR LONG DISTANCES (GREATER THAN 100 MILES) BECAUSE YOU BECOME SLEEPY: NO
DO YOU TAKE NAPS: YES
DO YOU HAVE DIFFICULTY VISITING YOUR FAMILY OR FRIENDS IN THEIR HOME BECAUSE YOU BECOME SLEEPY OR TIRED: NO
HOW MANY NAPS DO YOU TAKE PER WEEK: 1
FOSQ SCORE: 20
HOW LIKELY ARE YOU TO NOD OFF OR FALL ASLEEP WHEN YOU ARE A PASSENGER IN A CAR FOR AN HOUR WITHOUT A BREAK: WOULD NEVER DOZE
WHAT TIME DO YOU USUALLY GO TO BED: 22:00
DO YOU GET TOO LITTLE SLEEP AT NIGHT: YES
SELECT ANY OF THE FOLLOWING BEHAVIORS OBSERVED WHILE YOU ARE ASLEEP: GETTING OUT OF THE BED WHILE STILL ASLEEP
ARE YOU BOTHERED BY WAKING UP TOO EARLY AND NOT BEING ABLE TO GET BACK TO SLEEP: YES
HOW LIKELY ARE YOU TO NOD OFF OR FALL ASLEEP WHILE SITTING AND TALKING TO SOMEONE: WOULD NEVER DOZE
HOW LONG DO YOU NAP: 10 TO 15 MINUTES
DO YOU GET TOO LITTLE SLEEP AT NIGHT: YES
HOW LIKELY ARE YOU TO NOD OFF OR FALL ASLEEP IN A CAR, WHILE STOPPED FOR A FEW MINUTES IN TRAFFIC: WOULD NEVER DOZE
NUMBER OF TIMES YOU WAKE PER NIGHT: 1
HOW LIKELY ARE YOU TO NOD OFF OR FALL ASLEEP WHILE LYING DOWN TO REST IN THE AFTERNOON WHEN CIRCUMSTANCES PERMIT: SLIGHT CHANCE OF DOZING
AVERAGE NUMBER OF SLEEP HOURS: 5
DO YOU HAVE DIFFICULTY WATCHING A MOVIE OR VIDEO BECAUSE YOU BECOME SLEEPY OR TIRED: NO
DO YOU HAVE DIFFICULTY CONCENTRATING ON THE THINGS YOU DO BECAUSE YOU ARE SLEEPY OR TIRED: NO
DO YOU HAVE DIFFICULTY OPERATING A MOTOR VEHICLE FOR SHORT DISTANCES (LESS THAN 100 MILES) BECAUSE YOU BECOME SLEEPY: NO
SELECT ANY OF THE FOLLOWING BEHAVIORS OBSERVED WHILE YOU ARE ASLEEP: TWITCHING OF LEGS OR FEET
DO YOU WORK SHIFTS: NO
HOW LIKELY ARE YOU TO NOD OFF OR FALL ASLEEP WHILE WATCHING TV: SLIGHT CHANCE OF DOZING
HOW LIKELY ARE YOU TO NOD OFF OR FALL ASLEEP WHILE SITTING QUIETLY AFTER LUNCH WITHOUT ALCOHOL: WOULD NEVER DOZE
HAS YOUR MOOD BEEN AFFECTED BECAUSE YOU ARE SLEEPY OR TIRED: NO
HOW LIKELY ARE YOU TO NOD OFF OR FALL ASLEEP WHILE SITTING AND READING: WOULD NEVER DOZE
ARE YOU BOTHERED BY WAKING UP TOO EARLY AND NOT BEING ABLE TO GET BACK TO SLEEP: YES
HOW LIKELY ARE YOU TO NOD OFF OR FALL ASLEEP WHILE SITTING AND READING: WOULD NEVER DOZE
SELECT ANY OF THE FOLLOWING BEHAVIORS OBSERVED WHILE YOU ARE ASLEEP: SLEEPWALKING
DO YOU GENERALLY HAVE DIFFICULTY REMEMBERING THINGS BECAUSE YOU ARE SLEEPY OR TIRED: NO
HAS YOUR RELATIONSHIP WITH FAMILY, FRIENDS OR WORK COLLEAGUES BEEN AFFECTED BECAUSE YOU ARE SLEEPY OR TIRED: NO
HOW LIKELY ARE YOU TO NOD OFF OR FALL ASLEEP WHILE SITTING INACTIVE IN A PUBLIC PLACE: WOULD NEVER DOZE

## 2024-05-15 ENCOUNTER — OFFICE VISIT (OUTPATIENT)
Age: 54
End: 2024-05-15
Payer: COMMERCIAL

## 2024-05-15 VITALS
HEIGHT: 63 IN | OXYGEN SATURATION: 97 % | HEART RATE: 71 BPM | TEMPERATURE: 98.3 F | WEIGHT: 177.8 LBS | BODY MASS INDEX: 31.5 KG/M2 | DIASTOLIC BLOOD PRESSURE: 97 MMHG | SYSTOLIC BLOOD PRESSURE: 144 MMHG

## 2024-05-15 DIAGNOSIS — F32.A DEPRESSION, UNSPECIFIED DEPRESSION TYPE: ICD-10-CM

## 2024-05-15 DIAGNOSIS — G47.33 OSA (OBSTRUCTIVE SLEEP APNEA): Primary | ICD-10-CM

## 2024-05-15 PROCEDURE — 99214 OFFICE O/P EST MOD 30 MIN: CPT | Performed by: NURSE PRACTITIONER

## 2024-05-15 PROCEDURE — 3077F SYST BP >= 140 MM HG: CPT | Performed by: NURSE PRACTITIONER

## 2024-05-15 PROCEDURE — 3080F DIAST BP >= 90 MM HG: CPT | Performed by: NURSE PRACTITIONER

## 2024-05-15 RX ORDER — METHYLPREDNISOLONE 4 MG/1
TABLET ORAL
COMMUNITY
Start: 2024-01-09 | End: 2024-05-17

## 2024-05-15 RX ORDER — ESTRADIOL/NORETHINDRONE ACETATE TRANSDERMAL SYSTEM .05; .14 MG/D; MG/D
1 PATCH, EXTENDED RELEASE TRANSDERMAL
COMMUNITY
Start: 2024-04-18

## 2024-05-15 RX ORDER — SUVOREXANT 20 MG/1
1 TABLET, FILM COATED ORAL NIGHTLY
COMMUNITY
Start: 2024-04-16

## 2024-05-15 RX ORDER — FLUTICASONE FUROATE, UMECLIDINIUM BROMIDE AND VILANTEROL TRIFENATATE 100; 62.5; 25 UG/1; UG/1; UG/1
1 POWDER RESPIRATORY (INHALATION) PRN
COMMUNITY

## 2024-05-15 RX ORDER — DESONIDE 0.5 MG/G
OINTMENT TOPICAL
COMMUNITY
Start: 2012-10-18 | End: 2024-05-17

## 2024-05-15 RX ORDER — FLUOXETINE HYDROCHLORIDE 40 MG/1
1 CAPSULE ORAL
COMMUNITY
Start: 2023-01-18 | End: 2024-05-17

## 2024-05-15 RX ORDER — DOXYCYCLINE HYCLATE 100 MG/1
CAPSULE ORAL
COMMUNITY
Start: 2024-02-19 | End: 2024-05-17

## 2024-05-15 RX ORDER — ACETAMINOPHEN 160 MG
TABLET,DISINTEGRATING ORAL DAILY
COMMUNITY

## 2024-05-15 RX ORDER — LORAZEPAM 1 MG/1
1 CAPSULE, EXTENDED RELEASE ORAL DAILY
COMMUNITY
End: 2024-05-17

## 2024-05-15 RX ORDER — VILAZODONE HYDROCHLORIDE 10 MG/1
10 TABLET ORAL DAILY
COMMUNITY
Start: 2023-03-28 | End: 2024-05-17

## 2024-05-15 RX ORDER — ESTROGEN,CON/M-PROGEST ACET 0.45-1.5MG
TABLET ORAL
COMMUNITY
End: 2024-05-17

## 2024-05-15 RX ORDER — CLONAZEPAM 1 MG/1
1 TABLET ORAL
COMMUNITY
Start: 2023-04-14 | End: 2024-05-17

## 2024-05-15 NOTE — PROGRESS NOTES
[address]   [place] [address]   Tel.  [phone]   Fax. [phone]  [address]   [place] [address]   Tel.  [phone]   Fax. [phone] [address] [place] [address]   Tel.  [phone]   Fax. [phone]     Cathryn Beaulieu (: 1970) is a 53 y.o. female, new patient, seen for positive airway pressure follow-up and evaluation.  She was last seen by me on 10/23/2020, prior notes and sleep testing reviewed in detail.  Home sleep test 2020 showed AHI of 10.0/hr with a lowest SpO2 of 89%, duration of SpO2 < 88% 0 min.  Weight at time of sleep testing 182 pounds.    ASSESSMENT/PLAN:   Diagnosis Orders   1. GODWIN (obstructive sleep apnea)  SLEEP STUDY UNATTENDED, 4 CHANNEL      2. Depression, unspecified depression type        3. Adult BMI 31.0-31.9 kg/sq m            AHI = 10.0(2020).  On ? APAP :  ? cmH2O. Set up ?.    She is not compliant with PAP therapy although when used PAP shows benefit to the patient and remains necessary for control of her sleep apnea. She will bring device in for download.      Follow-up and Dispositions    Return if symptoms worsen or fail to improve.         Sleep Apnea- weight loss since prior sleep testing.  She would like to consider oral appliance therapy is presence of sleep apnea continues.    Orders Placed This Encounter   Procedures    SLEEP STUDY UNATTENDED, 4 CHANNEL     Standing Status:   Future     Standing Expiration Date:   5/15/2025     Scheduling Instructions:      Please route to Dr. Maldonado for interp.       * all her questions were addressed    2. H/O Depression - continue on her current regimen.     3. Encouraged continued weight management program through appropriate diet and exercise regimen as significant weight reduction has been shown to reduce severity of obstructive sleep apnea.     SUBJECTIVE/OBJECTIVE:    She  is seen today for follow up on PAP device and reports not using the

## 2024-05-16 NOTE — PATIENT INSTRUCTIONS
[address]  [place] [address]  Tel.  [phone]  Fax. [phone] [address]  [place] [address]  Tel.  [phone]  Fax. [phone] [address] [place] [address]  Tel.  [phone]  Fax. [phone]     Sleep Apnea: After Your Visit  Your Care Instructions  Sleep apnea occurs when you frequently stop breathing for 10 seconds or longer during sleep. It can be mild to severe, based on the number of times per hour that you stop breathing or have slowed breathing. Blocked or narrowed airways in your nose, mouth, or throat can cause sleep apnea. Your airway can become blocked when your throat muscles and tongue relax during sleep.  Sleep apnea is common, occurring in 1 out of 20 individuals.  Individuals having any of the following characteristics should be evaluated and treated right away due to high risk and detrimental consequences from untreated sleep apnea:  Obesity  Congestive Heart failure  Atrial Fibrillation  Uncontrolled Hypertension  Type II Diabetes  Night-time Arrhythmias  Stroke  Pulmonary Hypertension  High-risk Driving Populations (pilots, truck drivers, etc.)  Patients Considering Weight-loss Surgery    How do you know you have sleep apnea?  You probably have sleep apnea if you answer 'yes' to 3 or more of the following questions:  S - Have you been told that you Snore?   T - Are you often Tired during the day?  O - Has anyone Observed you stop breathing while sleeping?  P- Do you have (or are being treated for) high blood Pressure?    B - Are you obese (Body Mass Index > 35)?  A - Is your Age 50 years old or older?  N - Is your Neck size greater than 16 inches?  G - Are you male Gender?  A sleep physician can prescribe a breathing device that prevents tissues in the throat from blocking your airway. Or your doctor may recommend using a dental device (oral breathing device) to help keep your airway open. In some cases, surgery may

## 2024-05-17 ENCOUNTER — OFFICE VISIT (OUTPATIENT)
Dept: PRIMARY CARE CLINIC | Facility: CLINIC | Age: 54
End: 2024-05-17
Payer: COMMERCIAL

## 2024-05-17 VITALS
TEMPERATURE: 97.1 F | HEIGHT: 63 IN | DIASTOLIC BLOOD PRESSURE: 87 MMHG | WEIGHT: 173 LBS | OXYGEN SATURATION: 95 % | BODY MASS INDEX: 30.65 KG/M2 | RESPIRATION RATE: 16 BRPM | HEART RATE: 70 BPM | SYSTOLIC BLOOD PRESSURE: 131 MMHG

## 2024-05-17 DIAGNOSIS — R51.9 ACHING HEADACHE: Primary | ICD-10-CM

## 2024-05-17 DIAGNOSIS — R58 ECCHYMOSIS: ICD-10-CM

## 2024-05-17 PROCEDURE — 96372 THER/PROPH/DIAG INJ SC/IM: CPT | Performed by: FAMILY MEDICINE

## 2024-05-17 PROCEDURE — 99214 OFFICE O/P EST MOD 30 MIN: CPT | Performed by: FAMILY MEDICINE

## 2024-05-17 PROCEDURE — 3075F SYST BP GE 130 - 139MM HG: CPT | Performed by: FAMILY MEDICINE

## 2024-05-17 PROCEDURE — 3079F DIAST BP 80-89 MM HG: CPT | Performed by: FAMILY MEDICINE

## 2024-05-17 RX ORDER — KETOROLAC TROMETHAMINE 30 MG/ML
60 INJECTION, SOLUTION INTRAMUSCULAR; INTRAVENOUS ONCE
Status: COMPLETED | OUTPATIENT
Start: 2024-05-17 | End: 2024-05-17

## 2024-05-17 RX ORDER — BUTALBITAL, ACETAMINOPHEN AND CAFFEINE 50; 325; 40 MG/1; MG/1; MG/1
1 TABLET ORAL EVERY 4 HOURS PRN
Qty: 30 TABLET | Refills: 0 | Status: SHIPPED | OUTPATIENT
Start: 2024-05-17

## 2024-05-17 RX ORDER — BUTALBITAL, ACETAMINOPHEN AND CAFFEINE 50; 325; 40 MG/1; MG/1; MG/1
1 TABLET ORAL EVERY 4 HOURS PRN
Qty: 180 TABLET | Refills: 3 | Status: SHIPPED | OUTPATIENT
Start: 2024-05-17 | End: 2024-05-17

## 2024-05-17 RX ADMIN — KETOROLAC TROMETHAMINE 60 MG: 30 INJECTION, SOLUTION INTRAMUSCULAR; INTRAVENOUS at 09:05

## 2024-05-17 ASSESSMENT — PATIENT HEALTH QUESTIONNAIRE - PHQ9
2. FEELING DOWN, DEPRESSED OR HOPELESS: NOT AT ALL
SUM OF ALL RESPONSES TO PHQ9 QUESTIONS 1 & 2: 0
SUM OF ALL RESPONSES TO PHQ QUESTIONS 1-9: 0
SUM OF ALL RESPONSES TO PHQ QUESTIONS 1-9: 0
1. LITTLE INTEREST OR PLEASURE IN DOING THINGS: NOT AT ALL
SUM OF ALL RESPONSES TO PHQ QUESTIONS 1-9: 0
SUM OF ALL RESPONSES TO PHQ QUESTIONS 1-9: 0

## 2024-05-17 NOTE — PROGRESS NOTES
Subjective  Cathryn Beaulieu is an 53 y.o. female who presents for headaches.     S/p frontal bilateral headaches. Painscale 6/10 today. Started several days ago. Associated photophobia, nausea. No focal neuro symptoms.   She had an eye exam earlier this week that was unremarkable.    No acute illness.   No uri symptoms.     She has had similar headaches previously. Put on HRT patch earlier this week, and she thinks this may have triggered her headache.     She's tried multiple otc meds without improvement.  Previously fioricet and toradol have helped her Has.     In the past few weeks she's had some small unprovoked bruising spots on extremities. This has resolved.  No other signs of bleeding.     Allergies - reviewed:   Allergies   Allergen Reactions    Latex Anaphylaxis    Bee Venom Anaphylaxis and Other (See Comments)     Other reaction(s): Not Reported This Time    Ginger Hives    Soy Hives     Soy sauce    Cephaeline      Pt. States no allergy    Ginger     Soybean Oil Hives     Only soy sauce    Varicella Zoster Immune Globulin Swelling    Cephalexin Rash         Medications - reviewed:   Current Outpatient Medications   Medication Sig    butalbital-acetaminophen-caffeine (FIORICET, ESGIC) -40 MG per tablet Take 1 tablet by mouth every 4 hours as needed for Headaches    COMBIPATCH 0.05-0.14 MG/DAY Place 1 patch onto the skin Twice a Week    fluticasone-umeclidin-vilant (TRELEGY ELLIPTA) 100-62.5-25 MCG/ACT AEPB inhaler Inhale 1 puff into the lungs as needed    SINGULAIR 10 MG tablet Take 1 tablet by mouth nightly    BELSOMRA 20 MG TABS Take 1 tablet by mouth nightly. at bedtime    Cholecalciferol (VITAMIN D3) 50 MCG (2000 UT) CAPS Take by mouth daily    levothyroxine (SYNTHROID) 50 MCG tablet Take 1 tablet by mouth Daily Alternate 75mcg and 50mcg dose    levothyroxine (SYNTHROID) 75 MCG tablet Take 1 tablet by mouth daily Alternate 75mg and 50mg dose    VORTIoxetine HBr (TRINTELLIX) 20 MG TABS tablet Take

## 2024-05-17 NOTE — PROGRESS NOTES
\"Have you been to the ER, urgent care clinic since your last visit?  Hospitalized since your last visit?\"    NO    “Have you seen or consulted any other health care providers outside of [facility] since your last visit?”    NO            Click Here for Release of Records Request

## 2024-05-18 LAB
APTT PPP: 27.2 SEC (ref 22.1–31)
BASOPHILS # BLD: 0.1 K/UL (ref 0–0.1)
BASOPHILS NFR BLD: 1 % (ref 0–1)
DIFFERENTIAL METHOD BLD: NORMAL
EOSINOPHIL # BLD: 0.3 K/UL (ref 0–0.4)
EOSINOPHIL NFR BLD: 4 % (ref 0–7)
ERYTHROCYTE [DISTWIDTH] IN BLOOD BY AUTOMATED COUNT: 13.8 % (ref 11.5–14.5)
HCT VFR BLD AUTO: 38.8 % (ref 35–47)
HGB BLD-MCNC: 12.8 G/DL (ref 11.5–16)
IMM GRANULOCYTES # BLD AUTO: 0 K/UL (ref 0–0.04)
IMM GRANULOCYTES NFR BLD AUTO: 0 % (ref 0–0.5)
INR PPP: 1 (ref 0.9–1.1)
LYMPHOCYTES # BLD: 2.4 K/UL (ref 0.8–3.5)
LYMPHOCYTES NFR BLD: 37 % (ref 12–49)
MCH RBC QN AUTO: 29.7 PG (ref 26–34)
MCHC RBC AUTO-ENTMCNC: 33 G/DL (ref 30–36.5)
MCV RBC AUTO: 90 FL (ref 80–99)
MONOCYTES # BLD: 0.5 K/UL (ref 0–1)
MONOCYTES NFR BLD: 8 % (ref 5–13)
NEUTS SEG # BLD: 3.3 K/UL (ref 1.8–8)
NEUTS SEG NFR BLD: 50 % (ref 32–75)
NRBC # BLD: 0 K/UL (ref 0–0.01)
NRBC BLD-RTO: 0 PER 100 WBC
PERIPHERAL SMEAR, MD REVIEW: NORMAL
PLATELET # BLD AUTO: 355 K/UL (ref 150–400)
PMV BLD AUTO: 9.9 FL (ref 8.9–12.9)
PROTHROMBIN TIME: 10.1 SEC (ref 9–11.1)
RBC # BLD AUTO: 4.31 M/UL (ref 3.8–5.2)
THERAPEUTIC RANGE: NORMAL SECS (ref 58–77)
WBC # BLD AUTO: 6.5 K/UL (ref 3.6–11)

## 2024-05-21 ENCOUNTER — CARE COORDINATION (OUTPATIENT)
Dept: OTHER | Facility: CLINIC | Age: 54
End: 2024-05-21

## 2024-05-21 NOTE — CARE COORDINATION
Ambulatory Care Coordination Note  5/21/2024    Incoming message from the patient requesting outreach to assist with care management needs. Left a discreet message and will attempt to connect with the patient again the following day.

## 2024-05-23 ENCOUNTER — CARE COORDINATION (OUTPATIENT)
Dept: OTHER | Facility: CLINIC | Age: 54
End: 2024-05-23

## 2024-05-23 NOTE — CARE COORDINATION
Ambulatory Care Coordination Note  2024    Patient Current Location:  Home: [address]  [facility] [address]     ACM contacted the patient by telephone. Verified name and  with patient as identifiers. Provided introduction to self, and explanation of the ACM role.     Challenges to be reviewed by the provider   Additional needs identified to be addressed with provider: No  none               Method of communication with provider: none.    ACM: WEN SALDAÑA RN    Patient initially outreached this ACM via, this ACM contacted the patient to assess needs. The patient voices that she is feeling better mentally, but is now very stressed about some of her medical bills that she acquired during her difficult times. The patient states that she has spoken with a representative that is assisting her with payment of her medical bills. The patient reports that she will reach out if this attempt is unsuccessful, for assistance.     Lab Results       None                 Goals Addressed    None         Future Appointments   Date Time Provider Department Center   2024  4[facility]   2024 11:20 AM Anastacia Gamble [facility] BS AMB

## 2024-05-28 ENCOUNTER — TELEPHONE (OUTPATIENT)
Dept: PRIMARY CARE CLINIC | Facility: CLINIC | Age: 54
End: 2024-05-28

## 2024-05-28 DIAGNOSIS — M25.59 PAIN IN OTHER JOINT: Primary | ICD-10-CM

## 2024-05-28 DIAGNOSIS — M25.59 PAIN IN OTHER JOINT: ICD-10-CM

## 2024-05-28 NOTE — TELEPHONE ENCOUNTER
----- Message from Cathryn Beaulieu sent at 5/28/2024 11:46 AM EDT -----  Regarding: Referral to Rheumatolgy  Contact: [phone]  I am having a lot of body pain and joint pain. I am not sure if it is menopause or arthritis. I would like to go to see Dr. Juarez in [facility] office. Let me know if I need an appointment for this.  Thank you

## 2024-05-29 ENCOUNTER — HOSPITAL ENCOUNTER (OUTPATIENT)
Facility: HOSPITAL | Age: 54
Discharge: HOME | End: 2024-06-01
Payer: COMMERCIAL

## 2024-05-29 ENCOUNTER — PROCEDURE VISIT (OUTPATIENT)
Age: 54
End: 2024-05-29

## 2024-05-29 DIAGNOSIS — G47.33 OSA (OBSTRUCTIVE SLEEP APNEA): Primary | ICD-10-CM

## 2024-05-29 DIAGNOSIS — G47.33 OSA (OBSTRUCTIVE SLEEP APNEA): ICD-10-CM

## 2024-05-29 LAB
ANA SER QL: NEGATIVE
ERYTHROCYTE [SEDIMENTATION RATE] IN BLOOD: 16 MM/HR (ref 0–30)

## 2024-05-29 PROCEDURE — 95800 SLP STDY UNATTENDED: CPT | Performed by: INTERNAL MEDICINE

## 2024-05-29 NOTE — PROGRESS NOTES
S>Cathryn Beaulieu is a 54 y.o. female seen today to receive a home sleep testing unit (WatchPAT).    Patient was educated on proper hookup and operation of the WatchPAT HST via detailed instruction sheet .  Instruction forms with after hours contact and documentation were signed.    O>    There were no vitals taken for this visit.      A>  No diagnosis found.      P>  General information regarding operations and maintenance of the device was provided.  Follow-up appointment was made to return the WatchPAT HST. She will be contacted once the results have been reviewed.  She was asked to contact our office for any problems regarding her home sleep test study.

## 2024-05-30 LAB
CCP IGA+IGG SERPL IA-ACNC: 5 UNITS (ref 0–19)
RHEUMATOID FACT SERPL-ACNC: <10 IU/ML

## 2024-06-04 ENCOUNTER — TELEPHONE (OUTPATIENT)
Age: 54
End: 2024-06-04

## 2024-06-04 NOTE — TELEPHONE ENCOUNTER
Patient called to see if office received referral advise referral was received on May 28, 2024 advise please allow time for physician to go over and someone would give her a call to let her know what would be the next step patient verbally understood. sdh

## 2024-06-04 NOTE — TELEPHONE ENCOUNTER
Type Date User Summary Attachment   Provider Comments 06/04/2024  3:54 PM Shanice Puente Per patient's lab report: Following up on labs that Dr. Tubbs ordered. Your RA panel is negative. NICKIE is negative. ESR is normal. -   Note:  Per patient's lab report: Following up on labs that Dr. Tubbs ordered. Your RA panel is negative. NICKIE is negative. ESR is normal.   __     Please be advised of the following - Dr Juarez is currently only taking new Pediatric Patients, unless the adult patient is needing an appointment urgently (after a p2p consult is completed between him and the referring physician).  YORDAN Alvarez is only accepting new patients with autoimmune/inflammatory diseases. This patient might not be scheduled with our office at this time due to these parameters set by the physicians.

## 2024-06-06 ENCOUNTER — CLINICAL DOCUMENTATION (OUTPATIENT)
Age: 54
End: 2024-06-06

## 2024-06-06 ENCOUNTER — TELEPHONE (OUTPATIENT)
Age: 54
End: 2024-06-06

## 2024-06-06 DIAGNOSIS — G47.33 OSA (OBSTRUCTIVE SLEEP APNEA): Primary | ICD-10-CM

## 2024-06-06 NOTE — PROGRESS NOTES
Cathryn Beaulieu is to be contacted by sleep technologists regarding results of WatchPAT Testing which was indicative of a failed  test.     Patient has had a negative or inconclusive home sleep apnea test,  she should return for repeat attended testing due to presence of significant risk factors for Obstructive Sleep Apnea .      Encounter Diagnosis   Name Primary?    GODWIN (obstructive sleep apnea) Yes       Orders Placed This Encounter   Procedures    SLEEP STUDY FULL     Standing Status:   Future     Standing Expiration Date:   6/6/2025     Scheduling Instructions:      Adult PSG in supine position

## 2024-06-06 NOTE — TELEPHONE ENCOUNTER
Sent fax to referring provider's office letting them know that we are currently not accepting referrals for non-inflammatory conditions and we are unable to accommodate patient at this time. Fax confirmation confirmed

## 2024-06-10 ENCOUNTER — CARE COORDINATION (OUTPATIENT)
Dept: OTHER | Facility: CLINIC | Age: 54
End: 2024-06-10

## 2024-06-10 ENCOUNTER — TELEPHONE (OUTPATIENT)
Age: 54
End: 2024-06-10

## 2024-06-10 NOTE — CARE COORDINATION
Ambulatory Care Coordination Note     6/10/2024 2:07 PM     Patient Current Location:  [facility] contacted the patient by telephone. Verified name and  with patient as identifiers.         ACM: WEN SALDAÑA RN     Challenges to be reviewed by the provider   Additional needs identified to be addressed with provider No  none               Method of communication with provider: none.    Care Summary Note: Telephonic outreach to the patient, left a message and the patient returned the call within a few minutes. Patient reports that she is feeling well. She is concerned about claims that were not applied to her deductible, but is working with a hospital rep to get the claims covered. Encouraged the patient to contact this ACM with further questions. Patient requests that this ACM outreach again in 2-3 weeks to determine CM needs and assistance.     Offered patient enrollment in the Remote Patient Monitoring (RPM) program for in-home monitoring: Patient is not eligible for RPM program because: insurance coverage.     Assessments Completed:   No changes since last call    Medications Reviewed:   Patient denies any changes with medications and reports taking all medications as prescribed.    Advance Care Planning:   Not reviewed during this call     Care Planning:   Not completed during this call    PCP/Specialist follow up:   Future Appointments         Provider Specialty Dept Phone    2024 11:20 AM Anastacia Gamble PSYD Neurology [phone]            Follow Up:   Plan for next ACM outreach in approximately 3 weeks to complete:  - Discuss pending claims and where the process is.  .   patient  is agreeable to this plan.    Ambulatory Care Coordination Note

## 2024-06-10 NOTE — TELEPHONE ENCOUNTER
Reviewed sleep study results.  Patient will to come in for in-lab study.    Please schedule in lab sleep study.  Patient wants to make sure the insurance will cover the study.    Thanks

## 2024-06-25 ENCOUNTER — OFFICE VISIT (OUTPATIENT)
Dept: PRIMARY CARE CLINIC | Facility: CLINIC | Age: 54
End: 2024-06-25
Payer: COMMERCIAL

## 2024-06-25 ENCOUNTER — HOSPITAL ENCOUNTER (OUTPATIENT)
Facility: HOSPITAL | Age: 54
Discharge: HOME | End: 2024-06-28
Payer: COMMERCIAL

## 2024-06-25 VITALS
SYSTOLIC BLOOD PRESSURE: 139 MMHG | RESPIRATION RATE: 17 BRPM | DIASTOLIC BLOOD PRESSURE: 92 MMHG | HEART RATE: 63 BPM | OXYGEN SATURATION: 99 %

## 2024-06-25 DIAGNOSIS — M25.561 RECURRENT PAIN OF RIGHT KNEE: Primary | ICD-10-CM

## 2024-06-25 DIAGNOSIS — M25.561 RECURRENT PAIN OF RIGHT KNEE: ICD-10-CM

## 2024-06-25 LAB — URATE SERPL-MCNC: 4.7 MG/DL (ref 2.6–6)

## 2024-06-25 PROCEDURE — 73562 X-RAY EXAM OF KNEE 3: CPT

## 2024-06-25 PROCEDURE — 3080F DIAST BP >= 90 MM HG: CPT | Performed by: FAMILY MEDICINE

## 2024-06-25 PROCEDURE — 3075F SYST BP GE 130 - 139MM HG: CPT | Performed by: FAMILY MEDICINE

## 2024-06-25 PROCEDURE — 99213 OFFICE O/P EST LOW 20 MIN: CPT | Performed by: FAMILY MEDICINE

## 2024-06-25 RX ORDER — NAPROXEN 250 MG/1
250 TABLET ORAL 2 TIMES DAILY WITH MEALS
Qty: 60 TABLET | Refills: 2 | Status: SHIPPED | OUTPATIENT
Start: 2024-06-25

## 2024-06-25 SDOH — ECONOMIC STABILITY: FOOD INSECURITY: WITHIN THE PAST 12 MONTHS, YOU WORRIED THAT YOUR FOOD WOULD RUN OUT BEFORE YOU GOT MONEY TO BUY MORE.: NEVER TRUE

## 2024-06-25 SDOH — ECONOMIC STABILITY: FOOD INSECURITY: WITHIN THE PAST 12 MONTHS, THE FOOD YOU BOUGHT JUST DIDN'T LAST AND YOU DIDN'T HAVE MONEY TO GET MORE.: NEVER TRUE

## 2024-06-25 SDOH — ECONOMIC STABILITY: INCOME INSECURITY: HOW HARD IS IT FOR YOU TO PAY FOR THE VERY BASICS LIKE FOOD, HOUSING, MEDICAL CARE, AND HEATING?: NOT HARD AT ALL

## 2024-06-25 NOTE — PROGRESS NOTES
Health Decision Maker has been checked with the patient          AI form was signed    Chief Complaint   Patient presents with    Joint Pain     Has been going on since around May 28. Completed lab work on May 28th, labs were normal however patient is still experiencing joint pain. Has tried advil, aleve, and the Glucosum-chondroitin for Joint pain - which is not helping.      Other     Therapy Form to be completed.        \"Have you been to the ER, urgent care clinic since your last visit?  Hospitalized since your last visit?\"    NO    “Have you seen or consulted any other health care providers outside of [facility] since your last visit?”    NO      Vitals:    06/25/24 1041   BP: (!) 149/96   Site: Left Upper Arm   Position: Sitting   Cuff Size: Medium Adult   Pulse: 63   Resp: 17   SpO2: 99%      Depression: Not at risk (5/17/2024)    PHQ-2     PHQ-2 Score: 0              Click Here for Release of Records Request        Chart reviewed: immunizations are documented.   Immunization History   Administered Date(s) Administered    COVID-19, PFIZER PURPLE top, DILUTE for use, (age 12 y+), 30mcg/0.3mL 03/04/2021, 03/25/2021, 12/21/2021    Influenza Virus Vaccine 10/10/2014, 10/08/2015, 10/30/2018, 10/05/2020, 10/21/2021    Pneumococcal, PCV20, PREVNAR 20, (age 6w+), IM, 0.5mL 01/29/2024    TDaP, ADACEL (age 10y-64y), BOOSTRIX (age 10y+), IM, 0.5mL 07/01/2006, 06/23/2014    Zoster Recombinant (Shingrix) 09/27/2021

## 2024-07-08 ENCOUNTER — CARE COORDINATION (OUTPATIENT)
Dept: OTHER | Facility: CLINIC | Age: 54
End: 2024-07-08

## 2024-07-08 NOTE — CARE COORDINATION
Ambulatory Care Coordination Note     7/8/2024 12:07 PM     Patient outreach attempt by this AC today to perform care management follow up . [facility] was unable to reach the patient by telephone today; left voice message requesting a return phone call to this AC.     ACM: WEN SALDAÑA RN     Care Summary Note: Telephonic outreach attempt to follow up with the patient to address email request.     PCP/Specialist follow up:   Future Appointments         Provider Specialty Dept Phone    7/10/2024 12:00 PM [facility] Radiology [phone]    8/7/2024 10:00 AM Dain Juarez MD Rheumatology [phone]    9/13/2024 8:30 PM BEDROOM 1 [facility] Sleep Medicine [phone]    11/14/2024 11:20 AM Anastacia Gamble PSYD Neurology [phone]            Follow Up:   Plan for next AC outreach in approximately 1 week to complete:  Benefit and coverage questions.

## 2024-07-11 ENCOUNTER — OFFICE VISIT (OUTPATIENT)
Dept: PRIMARY CARE CLINIC | Facility: CLINIC | Age: 54
End: 2024-07-11
Payer: COMMERCIAL

## 2024-07-11 ENCOUNTER — TELEPHONE (OUTPATIENT)
Dept: PRIMARY CARE CLINIC | Facility: CLINIC | Age: 54
End: 2024-07-11

## 2024-07-11 VITALS
SYSTOLIC BLOOD PRESSURE: 137 MMHG | BODY MASS INDEX: 31.89 KG/M2 | HEIGHT: 63 IN | HEART RATE: 71 BPM | OXYGEN SATURATION: 97 % | WEIGHT: 180 LBS | DIASTOLIC BLOOD PRESSURE: 88 MMHG | TEMPERATURE: 97.3 F | RESPIRATION RATE: 16 BRPM

## 2024-07-11 DIAGNOSIS — R51.9 ACHING HEADACHE: Primary | ICD-10-CM

## 2024-07-11 PROCEDURE — 96372 THER/PROPH/DIAG INJ SC/IM: CPT | Performed by: FAMILY MEDICINE

## 2024-07-11 PROCEDURE — 3079F DIAST BP 80-89 MM HG: CPT | Performed by: FAMILY MEDICINE

## 2024-07-11 PROCEDURE — 3075F SYST BP GE 130 - 139MM HG: CPT | Performed by: FAMILY MEDICINE

## 2024-07-11 PROCEDURE — 99213 OFFICE O/P EST LOW 20 MIN: CPT | Performed by: FAMILY MEDICINE

## 2024-07-11 RX ORDER — ONDANSETRON 4 MG/1
4 TABLET, FILM COATED ORAL DAILY PRN
Qty: 30 TABLET | Refills: 0 | Status: SHIPPED | OUTPATIENT
Start: 2024-07-11

## 2024-07-11 RX ORDER — KETOROLAC TROMETHAMINE 30 MG/ML
60 INJECTION, SOLUTION INTRAMUSCULAR; INTRAVENOUS ONCE
Status: COMPLETED | OUTPATIENT
Start: 2024-07-11 | End: 2024-07-11

## 2024-07-11 RX ORDER — ONDANSETRON 4 MG/1
4 TABLET, FILM COATED ORAL DAILY PRN
Qty: 30 TABLET | Refills: 0 | Status: SHIPPED | OUTPATIENT
Start: 2024-07-11 | End: 2024-07-11 | Stop reason: SDUPTHER

## 2024-07-11 RX ADMIN — KETOROLAC TROMETHAMINE 60 MG: 30 INJECTION, SOLUTION INTRAMUSCULAR; INTRAVENOUS at 11:14

## 2024-07-11 SDOH — ECONOMIC STABILITY: INCOME INSECURITY: HOW HARD IS IT FOR YOU TO PAY FOR THE VERY BASICS LIKE FOOD, HOUSING, MEDICAL CARE, AND HEATING?: NOT HARD AT ALL

## 2024-07-11 SDOH — ECONOMIC STABILITY: FOOD INSECURITY: WITHIN THE PAST 12 MONTHS, THE FOOD YOU BOUGHT JUST DIDN'T LAST AND YOU DIDN'T HAVE MONEY TO GET MORE.: NEVER TRUE

## 2024-07-11 SDOH — ECONOMIC STABILITY: FOOD INSECURITY: WITHIN THE PAST 12 MONTHS, YOU WORRIED THAT YOUR FOOD WOULD RUN OUT BEFORE YOU GOT MONEY TO BUY MORE.: NEVER TRUE

## 2024-07-11 ASSESSMENT — PATIENT HEALTH QUESTIONNAIRE - PHQ9
SUM OF ALL RESPONSES TO PHQ QUESTIONS 1-9: 0
SUM OF ALL RESPONSES TO PHQ9 QUESTIONS 1 & 2: 0
1. LITTLE INTEREST OR PLEASURE IN DOING THINGS: NOT AT ALL
2. FEELING DOWN, DEPRESSED OR HOPELESS: NOT AT ALL

## 2024-07-11 NOTE — PROGRESS NOTES
\"Have you been to the ER, urgent care clinic since your last visit?  Hospitalized since your last visit?\"    NO    “Have you seen or consulted any other health care providers outside of [facility] since your last visit?”    NO            Click Here for Release of Records Request

## 2024-07-11 NOTE — PROGRESS NOTES
Subjective  Cathryn Beaulieu is an 54 y.o. female who presents for headache.     C/o bilateral headache, aching mild-moderate pain. Ongoing for the past few days.   She's had an increase in headaches recently and has an MRI scheduled for evaluation.   Today's headache is similar to other HA episodes.  Some nausea. No vomiting. No fever. No vision changes or focal neuro symptoms.   Using prn Fioricet which has not helped with this HA episode.       Allergies - reviewed:   Allergies   Allergen Reactions    Latex Anaphylaxis    Bee Venom Anaphylaxis and Other (See Comments)     Other reaction(s): Not Reported This Time    Ginger Hives    Soy Hives     Soy sauce    Cephaeline      Pt. States no allergy    Ginger     Soybean Oil Hives     Only soy sauce    Varicella Zoster Immune Globulin Swelling    Cephalexin Rash         Medications - reviewed:   Current Outpatient Medications   Medication Sig    ondansetron (ZOFRAN) 4 MG tablet Take 1 tablet by mouth daily as needed for Nausea or Vomiting    naproxen (NAPROSYN) 250 MG tablet Take 1 tablet by mouth 2 times daily (with meals)    butalbital-acetaminophen-caffeine (FIORICET, ESGIC) -40 MG per tablet Take 1 tablet by mouth every 4 hours as needed for Headaches    COMBIPATCH 0.05-0.14 MG/DAY Place 1 patch onto the skin Twice a Week    fluticasone-umeclidin-vilant (TRELEGY ELLIPTA) 100-62.5-25 MCG/ACT AEPB inhaler Inhale 1 puff into the lungs as needed    SINGULAIR 10 MG tablet Take 1 tablet by mouth nightly    BELSOMRA 20 MG TABS Take 1 tablet by mouth nightly. at bedtime    Cholecalciferol (VITAMIN D3) 50 MCG (2000 UT) CAPS Take by mouth daily    levothyroxine (SYNTHROID) 50 MCG tablet Take 1 tablet by mouth Daily Alternate 75mcg and 50mcg dose    levothyroxine (SYNTHROID) 75 MCG tablet Take 1 tablet by mouth daily Alternate 75mg and 50mg dose    VORTIoxetine HBr (TRINTELLIX) 20 MG TABS tablet Take 1 tablet by mouth daily    ondansetron (ZOFRAN-ODT) 4 MG

## 2024-07-23 RX ORDER — ONDANSETRON 4 MG/1
TABLET, FILM COATED ORAL
Qty: 30 TABLET | Refills: 0 | Status: SHIPPED | OUTPATIENT
Start: 2024-07-23

## 2024-07-24 ENCOUNTER — HOSPITAL ENCOUNTER (OUTPATIENT)
Age: 54
Discharge: HOME | End: 2024-07-27
Payer: COMMERCIAL

## 2024-07-24 DIAGNOSIS — R51.9 ACHING HEADACHE: ICD-10-CM

## 2024-07-24 PROCEDURE — 6360000004 HC RX CONTRAST MEDICATION: Performed by: STUDENT IN AN ORGANIZED HEALTH CARE EDUCATION/TRAINING PROGRAM

## 2024-07-24 PROCEDURE — A9579 GAD-BASE MR CONTRAST NOS,1ML: HCPCS | Performed by: STUDENT IN AN ORGANIZED HEALTH CARE EDUCATION/TRAINING PROGRAM

## 2024-07-24 PROCEDURE — 70553 MRI BRAIN STEM W/O & W/DYE: CPT

## 2024-07-24 RX ADMIN — GADOTERIDOL 17 ML: 279.3 INJECTION, SOLUTION INTRAVENOUS at 08:52

## 2024-07-30 NOTE — PATIENT INSTRUCTIONS
Thank you for visiting [facility]!      For future medication refills, we require updated lab results and an upcoming appointment to be in our system prior to refilling prescriptions.  Without these two things, your refill will be DENIED.  If you miss your upcoming appointment, your refill will also be DENIED.      We appreciate your understanding of this critical clinical aspect of our practice. -Dr. Juarez & Roxie, NP

## 2024-08-07 ENCOUNTER — OFFICE VISIT (OUTPATIENT)
Age: 54
End: 2024-08-07
Payer: COMMERCIAL

## 2024-08-07 VITALS
HEART RATE: 81 BPM | WEIGHT: 185 LBS | RESPIRATION RATE: 16 BRPM | OXYGEN SATURATION: 97 % | BODY MASS INDEX: 32.77 KG/M2 | TEMPERATURE: 97.8 F | DIASTOLIC BLOOD PRESSURE: 91 MMHG | SYSTOLIC BLOOD PRESSURE: 148 MMHG

## 2024-08-07 DIAGNOSIS — M19.09 OSTEOARTHRITIS OF OTHER SITE, UNSPECIFIED OSTEOARTHRITIS TYPE: Primary | ICD-10-CM

## 2024-08-07 PROCEDURE — 3077F SYST BP >= 140 MM HG: CPT | Performed by: PEDIATRICS

## 2024-08-07 PROCEDURE — 3080F DIAST BP >= 90 MM HG: CPT | Performed by: PEDIATRICS

## 2024-08-07 PROCEDURE — 99204 OFFICE O/P NEW MOD 45 MIN: CPT | Performed by: PEDIATRICS

## 2024-08-07 RX ORDER — LORAZEPAM 1 MG/1
CAPSULE, EXTENDED RELEASE ORAL
COMMUNITY

## 2024-08-07 ASSESSMENT — PATIENT HEALTH QUESTIONNAIRE - PHQ9
8. MOVING OR SPEAKING SO SLOWLY THAT OTHER PEOPLE COULD HAVE NOTICED. OR THE OPPOSITE, BEING SO FIGETY OR RESTLESS THAT YOU HAVE BEEN MOVING AROUND A LOT MORE THAN USUAL: NOT AT ALL
2. FEELING DOWN, DEPRESSED OR HOPELESS: NOT AT ALL
SUM OF ALL RESPONSES TO PHQ QUESTIONS 1-9: 0
1. LITTLE INTEREST OR PLEASURE IN DOING THINGS: NOT AT ALL
7. TROUBLE CONCENTRATING ON THINGS, SUCH AS READING THE NEWSPAPER OR WATCHING TELEVISION: NOT AT ALL
5. POOR APPETITE OR OVEREATING: NOT AT ALL
SUM OF ALL RESPONSES TO PHQ9 QUESTIONS 1 & 2: 0
SUM OF ALL RESPONSES TO PHQ QUESTIONS 1-9: 0
6. FEELING BAD ABOUT YOURSELF - OR THAT YOU ARE A FAILURE OR HAVE LET YOURSELF OR YOUR FAMILY DOWN: NOT AT ALL
SUM OF ALL RESPONSES TO PHQ QUESTIONS 1-9: 0
3. TROUBLE FALLING OR STAYING ASLEEP: NOT AT ALL
9. THOUGHTS THAT YOU WOULD BE BETTER OFF DEAD, OR OF HURTING YOURSELF: NOT AT ALL
4. FEELING TIRED OR HAVING LITTLE ENERGY: NOT AT ALL
10. IF YOU CHECKED OFF ANY PROBLEMS, HOW DIFFICULT HAVE THESE PROBLEMS MADE IT FOR YOU TO DO YOUR WORK, TAKE CARE OF THINGS AT HOME, OR GET ALONG WITH OTHER PEOPLE: NOT DIFFICULT AT ALL
SUM OF ALL RESPONSES TO PHQ QUESTIONS 1-9: 0

## 2024-08-07 NOTE — PROGRESS NOTES
CHIEF COMPLAINT  The patient was sent for rheumatology consultation by Dr. Arnett for evaluation of joint pain.    HISTORY OF PRESENT ILLNESS  This is a 54 y.o.  female.  Today, the patient complains of pain in the joints.  Location: knee, ankle, thumb  Severity:  4 on a scale of 0-10  Timing: intermittently   Duration:  3 months  Modifying factors:   Context/Associated signs and symptoms: About 3 months ago the patient started to have intermittent joint pain in her knees, bilateral thumbs, ankles. She has pain 3-4x per week which improves with activity. She has daily morning stiffness in her thumbs, knees, ankles lasting about an hour. She will also have stiffness after sitting for long periods of time. She mentions intermittent burning sensation in her joints. She has tried diclofenac and naproxen which did not help.     RHEUMATOLOGY REVIEW OF SYSTEMS   Positives as per HPI  Negatives as follows:  CONSTITUTlONAL:  Denies unexplained persistent fevers, weight change, chronic fatigue  HEAD/EYES:   Denies eye redness, blurry vision or sudden loss of vision, dry eyes, HA, temporal artery pain  ENT:    Denies oral/nasal ulcers, recurrent sinus infections, dry mouth  RESPIRATORY:  No pleuritic pain, history of pleural effusions, hemoptysis, exertional dyspnea  CARDIOVASCULAR:  Denies chest pain, history of pericardial effusions  GASTRO:   Denies heartburn, esophageal dysmotility, abdominal pain, nausea, vomiting, diarrhea, blood in the stool  HEMATOLOGIC:  No easy bruising, purpura, swollen lymph nodes  SKIN:    Denies alopecia, ulcers, nodules, sun sensitivity, unexplained persistent rash   VASCULAR:   Denies edema, cyanosis, raynaud phenomenon  NEUROLOGIC:  Denies specific muscle weakness, paresthesias   PSYCHIATRIC:  No sleep disturbance / snoring, depression, anxiety  MSK:    No SI joint pain, persistent joint swelling    MEDICAL AND SOCIAL HISTORY  This was reviewed with the patient and reviewed in the

## 2024-08-07 NOTE — PROGRESS NOTES
Chief Complaint   Patient presents with    Joint Pain     1. Have you been to the ER, urgent care clinic since your last visit?  Hospitalized since your last visit?No    2. Have you seen or consulted any other health care providers outside of the [facility] since your last visit?  Include any pap smears or colon screening. No

## 2024-08-08 ENCOUNTER — CARE COORDINATION (OUTPATIENT)
Dept: OTHER | Facility: CLINIC | Age: 54
End: 2024-08-08

## 2024-08-08 NOTE — CARE COORDINATION
Ambulatory Care Coordination Note     8/8/2024 2:34 PM     Patient outreach attempt by this AC today to perform care management follow up . M was unable to reach the patient by telephone today; left voice message requesting a return phone call to this ACM.     ACM: WEN SALDAÑA RN     Care Summary Note: Telephonic outreach attempt to contact the patient to discuss recent concerns regarding coverage for her providers.     PCP/Specialist follow up:   Future Appointments         Provider Specialty Dept Phone    9/13/2024 8:30 PM BEDROOM 1 [facility] Sleep Medicine [phone]    11/14/2024 11:20 AM Anastacia Gamble PSYD Neurology [phone]            Follow Up:   Plan for next AC outreach in approximately 2 weeks to complete:  - goal progression.

## 2024-08-19 DIAGNOSIS — E03.9 HYPOTHYROIDISM, UNSPECIFIED TYPE: ICD-10-CM

## 2024-08-19 RX ORDER — LEVOTHYROXINE SODIUM 0.05 MG/1
50 TABLET ORAL DAILY
Qty: 45 TABLET | Refills: 2 | Status: SHIPPED | OUTPATIENT
Start: 2024-08-19

## 2024-08-19 NOTE — TELEPHONE ENCOUNTER
PCP: Florencia Arnett DO    Last Visit 6/25/2024   Future Appointments   Date Time Provider Department Center   9/13/2024  8:30 PM [facility]OM 1 [facility]    11/14/2024 11:20 AM Anastacia Gamble PSYD [id number]       Requested Prescriptions      No prescriptions requested or ordered in this encounter         Other Comments: Last Refill -5/7/2024  Levothyroxine 50mcg

## 2024-08-25 ENCOUNTER — HOSPITAL ENCOUNTER (OUTPATIENT)
Facility: HOSPITAL | Age: 54
Discharge: HOME | End: 2024-08-28
Attending: INTERNAL MEDICINE
Payer: COMMERCIAL

## 2024-08-25 VITALS
OXYGEN SATURATION: 95 % | TEMPERATURE: 98.1 F | HEIGHT: 63 IN | WEIGHT: 185 LBS | HEART RATE: 79 BPM | DIASTOLIC BLOOD PRESSURE: 96 MMHG | SYSTOLIC BLOOD PRESSURE: 138 MMHG | BODY MASS INDEX: 32.78 KG/M2

## 2024-08-25 DIAGNOSIS — G47.33 OSA (OBSTRUCTIVE SLEEP APNEA): ICD-10-CM

## 2024-08-25 PROCEDURE — 95810 POLYSOM 6/> YRS 4/> PARAM: CPT | Performed by: INTERNAL MEDICINE

## 2024-08-28 ENCOUNTER — CARE COORDINATION (OUTPATIENT)
Dept: OTHER | Facility: CLINIC | Age: 54
End: 2024-08-28

## 2024-08-28 NOTE — CARE COORDINATION
Ambulatory Care Coordination Note     8/28/2024 12:54 PM     Patient outreach attempt by this AC today to perform care management follow up . [facility] was unable to reach the patient by telephone today; left voice message requesting a return phone call to this ACM.  Will send a LTFU letter.      ACM: WEN SALDAÑA RN     Care Summary Note: Telephonic outreach attempt to follow up with the patient and inquire regarding claims questions that the patient brought forward. Will send a LTFU letter. If no response, will close the program in 2 weeks.     PCP/Specialist follow up:   Future Appointments         Provider Specialty Dept Phone    11/14/2024 11:20 AM Anastacai Gamble PSYD Neurology [phone]            Follow Up:   Plan for next AC outreach in approximately 2 weeks to complete:  - goal progression.

## 2024-08-31 ENCOUNTER — TELEPHONE (OUTPATIENT)
Age: 54
End: 2024-08-31

## 2024-08-31 ENCOUNTER — CLINICAL DOCUMENTATION (OUTPATIENT)
Age: 54
End: 2024-08-31

## 2024-09-05 NOTE — TELEPHONE ENCOUNTER
Left message for patient to call back and schedule an in person or virtual visit to discuss results of sleep testing and other concerns.

## 2024-09-06 NOTE — TELEPHONE ENCOUNTER
Called left message for patient to call back and schedule an in person or virtual visit to discuss results of sleep testing and other concerns.   2nd attempt.

## 2024-09-06 NOTE — TELEPHONE ENCOUNTER
Called left message for patient to call back and schedule an in person or virtual visit to discuss results of sleep testing and other concerns.

## 2024-09-09 ENCOUNTER — OFFICE VISIT (OUTPATIENT)
Dept: PRIMARY CARE CLINIC | Facility: CLINIC | Age: 54
End: 2024-09-09
Payer: COMMERCIAL

## 2024-09-09 ENCOUNTER — TELEPHONE (OUTPATIENT)
Dept: PRIMARY CARE CLINIC | Facility: CLINIC | Age: 54
End: 2024-09-09

## 2024-09-09 VITALS
DIASTOLIC BLOOD PRESSURE: 97 MMHG | RESPIRATION RATE: 16 BRPM | OXYGEN SATURATION: 97 % | HEIGHT: 63 IN | TEMPERATURE: 97.2 F | HEART RATE: 73 BPM | SYSTOLIC BLOOD PRESSURE: 154 MMHG | BODY MASS INDEX: 32.77 KG/M2

## 2024-09-09 DIAGNOSIS — M54.31 SCIATICA OF RIGHT SIDE: Primary | ICD-10-CM

## 2024-09-09 PROCEDURE — 99213 OFFICE O/P EST LOW 20 MIN: CPT

## 2024-09-09 PROCEDURE — 96372 THER/PROPH/DIAG INJ SC/IM: CPT

## 2024-09-09 PROCEDURE — 3080F DIAST BP >= 90 MM HG: CPT

## 2024-09-09 PROCEDURE — 3077F SYST BP >= 140 MM HG: CPT

## 2024-09-09 RX ORDER — KETOROLAC TROMETHAMINE 30 MG/ML
60 INJECTION, SOLUTION INTRAMUSCULAR; INTRAVENOUS ONCE
Status: COMPLETED | OUTPATIENT
Start: 2024-09-09 | End: 2024-09-09

## 2024-09-09 RX ORDER — CYCLOBENZAPRINE HCL 10 MG
10 TABLET ORAL 3 TIMES DAILY PRN
Qty: 30 TABLET | Refills: 0 | Status: SHIPPED | OUTPATIENT
Start: 2024-09-09 | End: 2024-09-19

## 2024-09-09 RX ADMIN — KETOROLAC TROMETHAMINE 60 MG: 30 INJECTION, SOLUTION INTRAMUSCULAR; INTRAVENOUS at 12:20

## 2024-09-09 SDOH — ECONOMIC STABILITY: FOOD INSECURITY: WITHIN THE PAST 12 MONTHS, THE FOOD YOU BOUGHT JUST DIDN'T LAST AND YOU DIDN'T HAVE MONEY TO GET MORE.: PATIENT DECLINED

## 2024-09-09 SDOH — ECONOMIC STABILITY: INCOME INSECURITY: HOW HARD IS IT FOR YOU TO PAY FOR THE VERY BASICS LIKE FOOD, HOUSING, MEDICAL CARE, AND HEATING?: PATIENT DECLINED

## 2024-09-09 SDOH — ECONOMIC STABILITY: FOOD INSECURITY: WITHIN THE PAST 12 MONTHS, YOU WORRIED THAT YOUR FOOD WOULD RUN OUT BEFORE YOU GOT MONEY TO BUY MORE.: PATIENT DECLINED

## 2024-09-09 ASSESSMENT — PATIENT HEALTH QUESTIONNAIRE - PHQ9
SUM OF ALL RESPONSES TO PHQ QUESTIONS 1-9: 0
6. FEELING BAD ABOUT YOURSELF - OR THAT YOU ARE A FAILURE OR HAVE LET YOURSELF OR YOUR FAMILY DOWN: NOT AT ALL
SUM OF ALL RESPONSES TO PHQ9 QUESTIONS 1 & 2: 0
SUM OF ALL RESPONSES TO PHQ QUESTIONS 1-9: 0
7. TROUBLE CONCENTRATING ON THINGS, SUCH AS READING THE NEWSPAPER OR WATCHING TELEVISION: NOT AT ALL
9. THOUGHTS THAT YOU WOULD BE BETTER OFF DEAD, OR OF HURTING YOURSELF: NOT AT ALL
3. TROUBLE FALLING OR STAYING ASLEEP: NOT AT ALL
4. FEELING TIRED OR HAVING LITTLE ENERGY: NOT AT ALL
1. LITTLE INTEREST OR PLEASURE IN DOING THINGS: NOT AT ALL
5. POOR APPETITE OR OVEREATING: NOT AT ALL
2. FEELING DOWN, DEPRESSED OR HOPELESS: NOT AT ALL
8. MOVING OR SPEAKING SO SLOWLY THAT OTHER PEOPLE COULD HAVE NOTICED. OR THE OPPOSITE, BEING SO FIGETY OR RESTLESS THAT YOU HAVE BEEN MOVING AROUND A LOT MORE THAN USUAL: NOT AT ALL
SUM OF ALL RESPONSES TO PHQ QUESTIONS 1-9: 0
SUM OF ALL RESPONSES TO PHQ QUESTIONS 1-9: 0

## 2024-09-09 ASSESSMENT — ENCOUNTER SYMPTOMS
ABDOMINAL PAIN: 0
BLOOD IN STOOL: 0
CONSTIPATION: 0
NAUSEA: 0
DIARRHEA: 0
SHORTNESS OF BREATH: 0
COUGH: 0
BACK PAIN: 1
WHEEZING: 0
VOMITING: 0

## 2024-09-17 ENCOUNTER — CARE COORDINATION (OUTPATIENT)
Dept: OTHER | Facility: CLINIC | Age: 54
End: 2024-09-17

## 2024-09-17 DIAGNOSIS — M51.26 LUMBAR HERNIATED DISC: Primary | ICD-10-CM

## 2024-09-18 ENCOUNTER — TELEMEDICINE (OUTPATIENT)
Age: 54
End: 2024-09-18
Payer: COMMERCIAL

## 2024-09-18 DIAGNOSIS — G47.33 OSA (OBSTRUCTIVE SLEEP APNEA): Primary | ICD-10-CM

## 2024-09-18 DIAGNOSIS — I10 PRIMARY HYPERTENSION: ICD-10-CM

## 2024-09-18 DIAGNOSIS — M51.26 LUMBAR HERNIATED DISC: Primary | ICD-10-CM

## 2024-09-18 PROCEDURE — 99214 OFFICE O/P EST MOD 30 MIN: CPT | Performed by: INTERNAL MEDICINE

## 2024-09-18 ASSESSMENT — SLEEP AND FATIGUE QUESTIONNAIRES
HOW LIKELY ARE YOU TO NOD OFF OR FALL ASLEEP WHILE SITTING AND TALKING TO SOMEONE: WOULD NEVER DOZE
HOW LIKELY ARE YOU TO NOD OFF OR FALL ASLEEP IN A CAR, WHILE STOPPED FOR A FEW MINUTES IN TRAFFIC: WOULD NEVER DOZE
DO YOU HAVE DIFFICULTY VISITING YOUR FAMILY OR FRIENDS IN THEIR HOME BECAUSE YOU BECOME SLEEPY OR TIRED: NO
DO YOU HAVE DIFFICULTY OPERATING A MOTOR VEHICLE FOR SHORT DISTANCES (LESS THAN 100 MILES) BECAUSE YOU BECOME SLEEPY: NO
HOW LIKELY ARE YOU TO NOD OFF OR FALL ASLEEP WHILE LYING DOWN TO REST IN THE AFTERNOON WHEN CIRCUMSTANCES PERMIT: SLIGHT CHANCE OF DOZING
HAS YOUR RELATIONSHIP WITH FAMILY, FRIENDS OR WORK COLLEAGUES BEEN AFFECTED BECAUSE YOU ARE SLEEPY OR TIRED: NO
HAS YOUR MOOD BEEN AFFECTED BECAUSE YOU ARE SLEEPY OR TIRED: YES, MODERATE
DO YOU HAVE DIFFICULTY BEING AS ACTIVE AS YOU WANT TO BE IN THE EVENING BECAUSE YOU ARE SLEEPY OR TIRED: NO
HOW LIKELY ARE YOU TO NOD OFF OR FALL ASLEEP WHILE SITTING QUIETLY AFTER LUNCH WITHOUT ALCOHOL: WOULD NEVER DOZE
HOW LIKELY ARE YOU TO NOD OFF OR FALL ASLEEP WHEN YOU ARE A PASSENGER IN A CAR FOR AN HOUR WITHOUT A BREAK: WOULD NEVER DOZE
HOW LIKELY ARE YOU TO NOD OFF OR FALL ASLEEP WHILE SITTING AND READING: SLIGHT CHANCE OF DOZING
DO YOU HAVE DIFFICULTY WATCHING A MOVIE OR VIDEO BECAUSE YOU BECOME SLEEPY OR TIRED: YES, A LITTLE
HOW LIKELY ARE YOU TO NOD OFF OR FALL ASLEEP WHILE SITTING INACTIVE IN A PUBLIC PLACE: WOULD NEVER DOZE
DO YOU HAVE DIFFICULTY CONCENTRATING ON THE THINGS YOU DO BECAUSE YOU ARE SLEEPY OR TIRED: YES, A LITTLE
DO YOU GENERALLY HAVE DIFFICULTY REMEMBERING THINGS BECAUSE YOU ARE SLEEPY OR TIRED: NO
DO YOU HAVE DIFFICULTY OPERATING A MOTOR VEHICLE FOR LONG DISTANCES (GREATER THAN 100 MILES) BECAUSE YOU BECOME SLEEPY: NO
ESS TOTAL SCORE: 3
FOSQ SCORE: 17.5
HOW LIKELY ARE YOU TO NOD OFF OR FALL ASLEEP WHILE WATCHING TV: SLIGHT CHANCE OF DOZING
DO YOU HAVE DIFFICULTY BEING AS ACTIVE AS YOU WANT TO BE IN THE MORNING BECAUSE YOU ARE SLEEPY OR TIRED: YES, LITTLE

## 2024-09-25 ENCOUNTER — HOSPITAL ENCOUNTER (OUTPATIENT)
Facility: HOSPITAL | Age: 54
Setting detail: RECURRING SERIES
Discharge: HOME | End: 2024-09-28
Payer: COMMERCIAL

## 2024-09-25 PROCEDURE — 97140 MANUAL THERAPY 1/> REGIONS: CPT | Performed by: PHYSICAL THERAPIST

## 2024-09-25 PROCEDURE — 97110 THERAPEUTIC EXERCISES: CPT | Performed by: PHYSICAL THERAPIST

## 2024-09-25 PROCEDURE — 97162 PT EVAL MOD COMPLEX 30 MIN: CPT | Performed by: PHYSICAL THERAPIST

## 2024-10-01 ENCOUNTER — HOSPITAL ENCOUNTER (OUTPATIENT)
Facility: HOSPITAL | Age: 54
Setting detail: RECURRING SERIES
Discharge: HOME | End: 2024-10-04
Payer: COMMERCIAL

## 2024-10-01 PROCEDURE — 97110 THERAPEUTIC EXERCISES: CPT

## 2024-10-01 PROCEDURE — 97140 MANUAL THERAPY 1/> REGIONS: CPT

## 2024-10-01 NOTE — PROGRESS NOTES
PHYSICAL THERAPY - DAILY TREATMENT NOTE (updated 3/23)      Date: 10/1/2024          Patient Name:  Cathryn Beaulieu :  1970   Medical   Diagnosis:  Other low back pain [M54.59] Treatment Diagnosis:  M54.59  OTHER LOWER BACK PAIN    Referral Source:  Florencia Arnett DO Insurance:   Payor: [facility] / Plan: [employer] EMPL[employer]YEES / Product Type: *No Product type* /                     Patient  verified yes     Visit #   Current  / Total 2 30   Time   In / Out 230 310   Total Treatment Time 50   Total Timed Codes 40         SUBJECTIVE    Pain Level (0-10 scale): 6    Any medication changes, allergies to medications, adverse drug reactions, diagnosis change, or new procedure performed?: [x] No    [] Yes (see summary sheet for update)  Medications: Verified on Patient Summary List    Subjective functional status/changes:     Patient reports her pain was so bad after last visit she needed a hydrocodone.    OBJECTIVE      Therapeutic Procedures:  Tx Min Billable or 1:1 Min (if diff from Tx Min) Procedure, Rationale, Specifics   30  11778 Therapeutic Exercise (timed):  increase ROM, strength, coordination, balance, and proprioception to improve patient's ability to progress to PLOF and address remaining functional goals. (see flow sheet as applicable)     Details if applicable:     10  82276 Manual Therapy (timed):  decrease pain, increase ROM, and increase tissue extensibility to improve patient's ability to progress to PLOF and address remaining functional goals.  The manual therapy interventions were performed at a separate and distinct time from the therapeutic activities interventions . (see flow sheet as applicable)     Details if applicable:  Prone PA and UPA L3-5 GR I/II for pain relief, gentle STM R lumbar paraspinals, glut med, glut max.                  40     Total Total         Modalities Rationale:     decrease pain to improve patient's ability to progress to PLOF and address remaining functional

## 2024-10-03 ENCOUNTER — HOSPITAL ENCOUNTER (OUTPATIENT)
Facility: HOSPITAL | Age: 54
Setting detail: RECURRING SERIES
Discharge: HOME | End: 2024-10-06
Payer: COMMERCIAL

## 2024-10-03 PROCEDURE — 97140 MANUAL THERAPY 1/> REGIONS: CPT | Performed by: PHYSICAL THERAPIST

## 2024-10-03 PROCEDURE — 97110 THERAPEUTIC EXERCISES: CPT | Performed by: PHYSICAL THERAPIST

## 2024-10-03 NOTE — PROGRESS NOTES
PHYSICAL THERAPY - DAILY TREATMENT NOTE (updated 3/23)      Date: 10/3/2024          Patient Name:  Cathryn Beaulieu :  1970   Medical   Diagnosis:  Other low back pain [M54.59] Treatment Diagnosis:  M54.59  OTHER LOWER BACK PAIN    Referral Source:  Florencia Arnett DO Insurance:   Payor: [facility] / Plan: [employer] [facility] EMPLOYEES / Product Type: *No Product type* /                     Patient  verified yes     Visit #   Current  / Total 3 30   Time   In / Out 456  PM   Total Treatment Time 44   Total Timed Codes 34         SUBJECTIVE    Pain Level (0-10 scale): 5/10    Any medication changes, allergies to medications, adverse drug reactions, diagnosis change, or new procedure performed?: [x] No    [] Yes (see summary sheet for update)  Medications: Verified on Patient Summary List    Subjective functional status/changes:     Patient reports that things are a little better, but still has increased pain sitting.     OBJECTIVE      Therapeutic Procedures:  Tx Min Billable or 1:1 Min (if diff from Tx Min) Procedure, Rationale, Specifics   24  74140 Therapeutic Exercise (timed):  increase ROM, strength, coordination, balance, and proprioception to improve patient's ability to progress to PLOF and address remaining functional goals. (see flow sheet as applicable)     Details if applicable:     10  99580 Manual Therapy (timed):  decrease pain, increase ROM, and increase tissue extensibility to improve patient's ability to progress to PLOF and address remaining functional goals.  The manual therapy interventions were performed at a separate and distinct time from the therapeutic activities interventions . (see flow sheet as applicable)     Details if applicable:  Prone PA and UPA L3-5 GR I/II for pain relief, gentle STM R lumbar paraspinals, glut med, glut max.                  34     Total Total         Modalities Rationale:     decrease pain to improve patient's ability to progress to PLOF and address remaining

## 2024-10-04 DIAGNOSIS — Z12.31 ENCOUNTER FOR SCREENING MAMMOGRAM FOR MALIGNANT NEOPLASM OF BREAST: Primary | ICD-10-CM

## 2024-10-07 ENCOUNTER — HOSPITAL ENCOUNTER (OUTPATIENT)
Facility: HOSPITAL | Age: 54
Setting detail: RECURRING SERIES
Discharge: HOME | End: 2024-10-10
Payer: COMMERCIAL

## 2024-10-07 PROCEDURE — 97140 MANUAL THERAPY 1/> REGIONS: CPT

## 2024-10-07 PROCEDURE — 97110 THERAPEUTIC EXERCISES: CPT

## 2024-10-07 NOTE — PROGRESS NOTES
Unattended,             type/location:       []  w/ice    []  w/heat        min [] Estim Attended,             type/location:       []  w/ice   []  w/heat         []  w/US   []  TENS insruct            min []  Mechanical Traction,        type/lbs:        []  pro      []  sup           []  int       []  cont            []  before manual           []  after manual     min []  Ultrasound,         settings/location:     10 min  unbilled [x]  Ice     []  Heat            location/position: low back prone         min []  Vasopneumatic Device,      press/temp:   pre-treatment girth :    post-treatment girth :    measured at (landmark       location) :   If using vaso (only need to measure limb vaso being performed on)        min []  Other:        Skin assessment post-treatment (if applicable):    [x]  intact    []  redness- no adverse reaction                 []redness - adverse reaction:          [x]  Patient Education billed concurrently with other procedures   [x] Review HEP    [] Progressed/Changed HEP, detail:    [] Other detail:         Other Objective/Functional Measures  TTP R lumbar paraspinals, R glutes    Pain Level at end of session (0-10 scale): 2/10      Assessment   Pt experienced increased pain with standing elliptical may try treadmill next visit. Otherwise good tolerance to treatment without increased pain.    Patient will continue to benefit from skilled PT / OT services to modify and progress therapeutic interventions, analyze and address functional mobility deficits, analyze and address ROM deficits, analyze and address strength deficits, analyze and address soft tissue restrictions, analyze and cue for proper movement patterns, and analyze and modify for postural abnormalities to address functional deficits and attain remaining goals.    Progress toward goals / Updated goals:  []  See Progress Note/Recertification     NT    PLAN  Yes  Continue plan of care  Re-Cert Due: n/a  []  Upgrade activities as

## 2024-10-08 ENCOUNTER — APPOINTMENT (OUTPATIENT)
Facility: HOSPITAL | Age: 54
End: 2024-10-08
Payer: COMMERCIAL

## 2024-10-09 ENCOUNTER — APPOINTMENT (OUTPATIENT)
Facility: HOSPITAL | Age: 54
End: 2024-10-09
Payer: COMMERCIAL

## 2024-10-10 ENCOUNTER — TRANSCRIBE ORDERS (OUTPATIENT)
Facility: HOSPITAL | Age: 54
End: 2024-10-10

## 2024-10-10 ENCOUNTER — HOSPITAL ENCOUNTER (OUTPATIENT)
Facility: HOSPITAL | Age: 54
Discharge: HOME | End: 2024-10-13
Attending: PHYSICAL MEDICINE & REHABILITATION
Payer: COMMERCIAL

## 2024-10-10 DIAGNOSIS — M47.816 LUMBAR SPONDYLOSIS: ICD-10-CM

## 2024-10-10 DIAGNOSIS — M54.16 LUMBAR RADICULOPATHY: Primary | ICD-10-CM

## 2024-10-10 DIAGNOSIS — M54.16 LUMBAR RADICULOPATHY: ICD-10-CM

## 2024-10-10 PROCEDURE — 72148 MRI LUMBAR SPINE W/O DYE: CPT

## 2024-10-14 ENCOUNTER — HOSPITAL ENCOUNTER (OUTPATIENT)
Facility: HOSPITAL | Age: 54
Setting detail: RECURRING SERIES
End: 2024-10-14
Payer: COMMERCIAL

## 2024-10-15 ENCOUNTER — OFFICE VISIT (OUTPATIENT)
Dept: PRIMARY CARE CLINIC | Facility: CLINIC | Age: 54
End: 2024-10-15
Payer: COMMERCIAL

## 2024-10-15 VITALS
OXYGEN SATURATION: 98 % | TEMPERATURE: 97.6 F | DIASTOLIC BLOOD PRESSURE: 79 MMHG | SYSTOLIC BLOOD PRESSURE: 117 MMHG | HEIGHT: 63 IN | WEIGHT: 196.4 LBS | RESPIRATION RATE: 16 BRPM | HEART RATE: 65 BPM | BODY MASS INDEX: 34.8 KG/M2

## 2024-10-15 DIAGNOSIS — R60.0 LOCALIZED EDEMA: ICD-10-CM

## 2024-10-15 DIAGNOSIS — E03.9 HYPOTHYROIDISM, UNSPECIFIED TYPE: ICD-10-CM

## 2024-10-15 DIAGNOSIS — I10 PRIMARY HYPERTENSION: ICD-10-CM

## 2024-10-15 DIAGNOSIS — R60.0 LOCALIZED EDEMA: Primary | ICD-10-CM

## 2024-10-15 PROCEDURE — 3074F SYST BP LT 130 MM HG: CPT | Performed by: FAMILY MEDICINE

## 2024-10-15 PROCEDURE — 3078F DIAST BP <80 MM HG: CPT | Performed by: FAMILY MEDICINE

## 2024-10-15 PROCEDURE — 99214 OFFICE O/P EST MOD 30 MIN: CPT | Performed by: FAMILY MEDICINE

## 2024-10-15 RX ORDER — HYDROCODONE BITARTRATE AND ACETAMINOPHEN 5; 325 MG/1; MG/1
1 TABLET ORAL EVERY 6 HOURS PRN
COMMUNITY

## 2024-10-15 RX ORDER — METHOCARBAMOL 500 MG/1
500 TABLET, FILM COATED ORAL 2 TIMES DAILY PRN
COMMUNITY
Start: 2024-10-02 | End: 2024-10-30

## 2024-10-15 SDOH — ECONOMIC STABILITY: INCOME INSECURITY: HOW HARD IS IT FOR YOU TO PAY FOR THE VERY BASICS LIKE FOOD, HOUSING, MEDICAL CARE, AND HEATING?: NOT HARD AT ALL

## 2024-10-15 SDOH — ECONOMIC STABILITY: FOOD INSECURITY: WITHIN THE PAST 12 MONTHS, THE FOOD YOU BOUGHT JUST DIDN'T LAST AND YOU DIDN'T HAVE MONEY TO GET MORE.: NEVER TRUE

## 2024-10-15 SDOH — ECONOMIC STABILITY: FOOD INSECURITY: WITHIN THE PAST 12 MONTHS, YOU WORRIED THAT YOUR FOOD WOULD RUN OUT BEFORE YOU GOT MONEY TO BUY MORE.: NEVER TRUE

## 2024-10-15 ASSESSMENT — PATIENT HEALTH QUESTIONNAIRE - PHQ9
6. FEELING BAD ABOUT YOURSELF - OR THAT YOU ARE A FAILURE OR HAVE LET YOURSELF OR YOUR FAMILY DOWN: NOT AT ALL
SUM OF ALL RESPONSES TO PHQ9 QUESTIONS 1 & 2: 0
9. THOUGHTS THAT YOU WOULD BE BETTER OFF DEAD, OR OF HURTING YOURSELF: NOT AT ALL
4. FEELING TIRED OR HAVING LITTLE ENERGY: NOT AT ALL
SUM OF ALL RESPONSES TO PHQ QUESTIONS 1-9: 0
5. POOR APPETITE OR OVEREATING: NOT AT ALL
1. LITTLE INTEREST OR PLEASURE IN DOING THINGS: NOT AT ALL
2. FEELING DOWN, DEPRESSED OR HOPELESS: NOT AT ALL
SUM OF ALL RESPONSES TO PHQ QUESTIONS 1-9: 0
7. TROUBLE CONCENTRATING ON THINGS, SUCH AS READING THE NEWSPAPER OR WATCHING TELEVISION: NOT AT ALL
SUM OF ALL RESPONSES TO PHQ QUESTIONS 1-9: 0
SUM OF ALL RESPONSES TO PHQ QUESTIONS 1-9: 0
8. MOVING OR SPEAKING SO SLOWLY THAT OTHER PEOPLE COULD HAVE NOTICED. OR THE OPPOSITE, BEING SO FIGETY OR RESTLESS THAT YOU HAVE BEEN MOVING AROUND A LOT MORE THAN USUAL: NOT AT ALL
3. TROUBLE FALLING OR STAYING ASLEEP: NOT AT ALL
10. IF YOU CHECKED OFF ANY PROBLEMS, HOW DIFFICULT HAVE THESE PROBLEMS MADE IT FOR YOU TO DO YOUR WORK, TAKE CARE OF THINGS AT HOME, OR GET ALONG WITH OTHER PEOPLE: NOT DIFFICULT AT ALL

## 2024-10-15 NOTE — PROGRESS NOTES
Health Decision Maker has been checked with the patient          Chief Complaint   Patient presents with    Leg Swelling     1 week and then came back ongoing 3 days.     Back Pain       \"Have you been to the ER, urgent care clinic since your last visit?  Hospitalized since your last visit?\"    NO    “Have you seen or consulted any other health care providers outside of [facility] since your last visit?”    [facility] FOR BACK PAIN.                 Click Here for Release of Records Request  
Creatinine Urine Ratio      3. Hypothyroidism, unspecified type  Comprehensive Metabolic Panel    TSH    Brain Natriuretic Peptide    Microalbumin / Creatinine Urine Ratio              I have discussed the diagnosis with the patient and the intended plan as seen in the above orders. Patient verbalized understanding of the plan and agrees with the plan. I have discussed medication side effects and warnings with the patient as well. Informed patient to return to the office if new symptoms arise.        Yancy Tubbs, DO

## 2024-10-16 ENCOUNTER — APPOINTMENT (OUTPATIENT)
Facility: HOSPITAL | Age: 54
End: 2024-10-16
Payer: COMMERCIAL

## 2024-10-16 LAB
ALBUMIN SERPL-MCNC: 3.6 G/DL (ref 3.5–5)
ALBUMIN/GLOB SERPL: 1.1 (ref 1.1–2.2)
ALP SERPL-CCNC: 62 U/L (ref 45–117)
ALT SERPL-CCNC: 39 U/L (ref 12–78)
ANION GAP SERPL CALC-SCNC: 4 MMOL/L (ref 2–12)
AST SERPL-CCNC: 28 U/L (ref 15–37)
BILIRUB SERPL-MCNC: 0.2 MG/DL (ref 0.2–1)
BUN SERPL-MCNC: 22 MG/DL (ref 6–20)
BUN/CREAT SERPL: 27 (ref 12–20)
CALCIUM SERPL-MCNC: 9.2 MG/DL (ref 8.5–10.1)
CHLORIDE SERPL-SCNC: 107 MMOL/L (ref 97–108)
CO2 SERPL-SCNC: 27 MMOL/L (ref 21–32)
CREAT SERPL-MCNC: 0.82 MG/DL (ref 0.55–1.02)
CREAT UR-MCNC: 133 MG/DL
GLOBULIN SER CALC-MCNC: 3.4 G/DL (ref 2–4)
GLUCOSE SERPL-MCNC: 91 MG/DL (ref 65–100)
MICROALBUMIN UR-MCNC: 1.02 MG/DL
MICROALBUMIN/CREAT UR-RTO: 8 MG/G (ref 0–30)
NT PRO BNP: 90 PG/ML
POTASSIUM SERPL-SCNC: 4.1 MMOL/L (ref 3.5–5.1)
PROT SERPL-MCNC: 7 G/DL (ref 6.4–8.2)
SODIUM SERPL-SCNC: 138 MMOL/L (ref 136–145)
SPECIMEN HOLD: NORMAL
TSH SERPL DL<=0.05 MIU/L-ACNC: 2.01 UIU/ML (ref 0.36–3.74)

## 2024-10-29 ENCOUNTER — APPOINTMENT (OUTPATIENT)
Facility: HOSPITAL | Age: 54
End: 2024-10-29
Payer: COMMERCIAL

## 2024-10-29 ENCOUNTER — HOSPITAL ENCOUNTER (EMERGENCY)
Facility: HOSPITAL | Age: 54
Discharge: HOME | End: 2024-10-29
Attending: STUDENT IN AN ORGANIZED HEALTH CARE EDUCATION/TRAINING PROGRAM
Payer: COMMERCIAL

## 2024-10-29 VITALS
WEIGHT: 191.8 LBS | BODY MASS INDEX: 33.98 KG/M2 | OXYGEN SATURATION: 98 % | SYSTOLIC BLOOD PRESSURE: 152 MMHG | HEART RATE: 67 BPM | RESPIRATION RATE: 18 BRPM | TEMPERATURE: 98 F | DIASTOLIC BLOOD PRESSURE: 85 MMHG

## 2024-10-29 DIAGNOSIS — Z92.241 S/P EPIDURAL STEROID INJECTION: Primary | ICD-10-CM

## 2024-10-29 DIAGNOSIS — T41.3X5A: ICD-10-CM

## 2024-10-29 LAB
ALBUMIN SERPL-MCNC: 3.5 G/DL (ref 3.5–5)
ALBUMIN/GLOB SERPL: 0.9 (ref 1.1–2.2)
ALP SERPL-CCNC: 61 U/L (ref 45–117)
ALT SERPL-CCNC: 24 U/L (ref 12–78)
ANION GAP SERPL CALC-SCNC: 8 MMOL/L (ref 2–12)
AST SERPL-CCNC: 22 U/L (ref 15–37)
BASOPHILS # BLD: 0.1 K/UL (ref 0–0.1)
BASOPHILS NFR BLD: 2 % (ref 0–1)
BILIRUB SERPL-MCNC: 0.3 MG/DL (ref 0.2–1)
BUN SERPL-MCNC: 17 MG/DL (ref 6–20)
BUN/CREAT SERPL: 19 (ref 12–20)
CALCIUM SERPL-MCNC: 9.2 MG/DL (ref 8.5–10.1)
CHLORIDE SERPL-SCNC: 103 MMOL/L (ref 97–108)
CO2 SERPL-SCNC: 27 MMOL/L (ref 21–32)
CREAT SERPL-MCNC: 0.91 MG/DL (ref 0.55–1.02)
DIFFERENTIAL METHOD BLD: ABNORMAL
EKG ATRIAL RATE: 75 BPM
EKG DIAGNOSIS: NORMAL
EKG P AXIS: 66 DEGREES
EKG P-R INTERVAL: 136 MS
EKG Q-T INTERVAL: 384 MS
EKG QRS DURATION: 76 MS
EKG QTC CALCULATION (BAZETT): 428 MS
EKG R AXIS: 44 DEGREES
EKG T AXIS: 36 DEGREES
EKG VENTRICULAR RATE: 75 BPM
EOSINOPHIL # BLD: 0.3 K/UL (ref 0–0.4)
EOSINOPHIL NFR BLD: 5 % (ref 0–7)
ERYTHROCYTE [DISTWIDTH] IN BLOOD BY AUTOMATED COUNT: 12.6 % (ref 11.5–14.5)
GLOBULIN SER CALC-MCNC: 3.7 G/DL (ref 2–4)
GLUCOSE SERPL-MCNC: 111 MG/DL (ref 65–100)
HCT VFR BLD AUTO: 39.3 % (ref 35–47)
HGB BLD-MCNC: 12.9 G/DL (ref 11.5–16)
IMM GRANULOCYTES # BLD AUTO: 0 K/UL (ref 0–0.04)
IMM GRANULOCYTES NFR BLD AUTO: 0 % (ref 0–0.5)
LYMPHOCYTES # BLD: 1.8 K/UL (ref 0.8–3.5)
LYMPHOCYTES NFR BLD: 31 % (ref 12–49)
MCH RBC QN AUTO: 29.9 PG (ref 26–34)
MCHC RBC AUTO-ENTMCNC: 32.8 G/DL (ref 30–36.5)
MCV RBC AUTO: 91.2 FL (ref 80–99)
MONOCYTES # BLD: 0.4 K/UL (ref 0–1)
MONOCYTES NFR BLD: 8 % (ref 5–13)
NEUTS SEG # BLD: 3.2 K/UL (ref 1.8–8)
NEUTS SEG NFR BLD: 54 % (ref 32–75)
NRBC # BLD: 0 K/UL (ref 0–0.01)
NRBC BLD-RTO: 0 PER 100 WBC
PLATELET # BLD AUTO: 319 K/UL (ref 150–400)
PMV BLD AUTO: 9.7 FL (ref 8.9–12.9)
POTASSIUM SERPL-SCNC: 4 MMOL/L (ref 3.5–5.1)
PROT SERPL-MCNC: 7.2 G/DL (ref 6.4–8.2)
RBC # BLD AUTO: 4.31 M/UL (ref 3.8–5.2)
SODIUM SERPL-SCNC: 138 MMOL/L (ref 136–145)
TROPONIN I SERPL HS-MCNC: 8 NG/L (ref 0–51)
WBC # BLD AUTO: 5.7 K/UL (ref 3.6–11)

## 2024-10-29 PROCEDURE — 84484 ASSAY OF TROPONIN QUANT: CPT

## 2024-10-29 PROCEDURE — 80053 COMPREHEN METABOLIC PANEL: CPT

## 2024-10-29 PROCEDURE — 36415 COLL VENOUS BLD VENIPUNCTURE: CPT

## 2024-10-29 PROCEDURE — 85025 COMPLETE CBC W/AUTO DIFF WBC: CPT

## 2024-10-29 PROCEDURE — 72132 CT LUMBAR SPINE W/DYE: CPT

## 2024-10-29 PROCEDURE — 6360000002 HC RX W HCPCS: Performed by: STUDENT IN AN ORGANIZED HEALTH CARE EDUCATION/TRAINING PROGRAM

## 2024-10-29 PROCEDURE — 93010 ELECTROCARDIOGRAM REPORT: CPT | Performed by: INTERNAL MEDICINE

## 2024-10-29 PROCEDURE — 2580000003 HC RX 258: Performed by: STUDENT IN AN ORGANIZED HEALTH CARE EDUCATION/TRAINING PROGRAM

## 2024-10-29 PROCEDURE — 96375 TX/PRO/DX INJ NEW DRUG ADDON: CPT

## 2024-10-29 PROCEDURE — 6360000004 HC RX CONTRAST MEDICATION: Performed by: STUDENT IN AN ORGANIZED HEALTH CARE EDUCATION/TRAINING PROGRAM

## 2024-10-29 PROCEDURE — 96374 THER/PROPH/DIAG INJ IV PUSH: CPT

## 2024-10-29 PROCEDURE — 93005 ELECTROCARDIOGRAM TRACING: CPT | Performed by: STUDENT IN AN ORGANIZED HEALTH CARE EDUCATION/TRAINING PROGRAM

## 2024-10-29 PROCEDURE — 99285 EMERGENCY DEPT VISIT HI MDM: CPT

## 2024-10-29 RX ORDER — PROCHLORPERAZINE EDISYLATE 5 MG/ML
10 INJECTION INTRAMUSCULAR; INTRAVENOUS EVERY 6 HOURS PRN
Status: DISCONTINUED | OUTPATIENT
Start: 2024-10-29 | End: 2024-10-29 | Stop reason: HOSPADM

## 2024-10-29 RX ORDER — IOPAMIDOL 755 MG/ML
100 INJECTION, SOLUTION INTRAVASCULAR
Status: COMPLETED | OUTPATIENT
Start: 2024-10-29 | End: 2024-10-29

## 2024-10-29 RX ORDER — KETOROLAC TROMETHAMINE 30 MG/ML
15 INJECTION, SOLUTION INTRAMUSCULAR; INTRAVENOUS
Status: COMPLETED | OUTPATIENT
Start: 2024-10-29 | End: 2024-10-29

## 2024-10-29 RX ORDER — 0.9 % SODIUM CHLORIDE 0.9 %
1000 INTRAVENOUS SOLUTION INTRAVENOUS ONCE
Status: COMPLETED | OUTPATIENT
Start: 2024-10-29 | End: 2024-10-29

## 2024-10-29 RX ORDER — DIPHENHYDRAMINE HYDROCHLORIDE 50 MG/ML
25 INJECTION INTRAMUSCULAR; INTRAVENOUS
Status: COMPLETED | OUTPATIENT
Start: 2024-10-29 | End: 2024-10-29

## 2024-10-29 RX ORDER — CYCLOBENZAPRINE HCL 5 MG
5 TABLET ORAL 2 TIMES DAILY PRN
Qty: 20 TABLET | Refills: 0 | Status: SHIPPED | OUTPATIENT
Start: 2024-10-29 | End: 2024-11-08

## 2024-10-29 RX ADMIN — KETOROLAC TROMETHAMINE 15 MG: 30 INJECTION, SOLUTION INTRAMUSCULAR at 16:20

## 2024-10-29 RX ADMIN — DIPHENHYDRAMINE HYDROCHLORIDE 25 MG: 50 INJECTION INTRAMUSCULAR; INTRAVENOUS at 16:16

## 2024-10-29 RX ADMIN — SODIUM CHLORIDE 1000 ML: 9 INJECTION, SOLUTION INTRAVENOUS at 16:14

## 2024-10-29 RX ADMIN — PROCHLORPERAZINE EDISYLATE 10 MG: 5 INJECTION INTRAMUSCULAR; INTRAVENOUS at 16:18

## 2024-10-29 RX ADMIN — IOPAMIDOL 100 ML: 755 INJECTION, SOLUTION INTRAVENOUS at 17:33

## 2024-10-29 ASSESSMENT — PAIN DESCRIPTION - ORIENTATION: ORIENTATION: LOWER

## 2024-10-29 ASSESSMENT — PAIN SCALES - GENERAL
PAINLEVEL_OUTOF10: 8
PAINLEVEL_OUTOF10: 4

## 2024-10-29 ASSESSMENT — PAIN DESCRIPTION - LOCATION
LOCATION: BACK
LOCATION: BACK

## 2024-10-29 ASSESSMENT — PAIN - FUNCTIONAL ASSESSMENT: PAIN_FUNCTIONAL_ASSESSMENT: 0-10

## 2024-10-29 ASSESSMENT — PAIN DESCRIPTION - DESCRIPTORS: DESCRIPTORS: ACHING;SHARP

## 2024-10-29 NOTE — ED TRIAGE NOTES
Patient states she had a spinal injection in her lumbar back last Thursday. States that since the injection, she has had worse back pain with nausea and dizziness.

## 2024-10-29 NOTE — DISCHARGE INSTRUCTIONS
Follow-up closely with your spine doctors, if you have progressive symptoms especially new weakness in the legs please return immediately or if you have difficulty with bowel or bladder function.

## 2024-10-29 NOTE — ED PROVIDER NOTES
HPI    54 y.o. female presenting with worsening pain and numbness and dizziness and headache which happened several days after receiving us spinal epidural injection.  She had a spinal epidural injection at L4-L5 and also had some peripheral injection in the sacroiliac joint she states she developed no symptoms positive or negative no pain relief after the injection which was performed last Thursday and then 3 days ago she developed nausea, gradual onset pressure-like headache similar to previous migraines that she has had, frontal, as well as worsening pain in that area and shooting pain down the leg.  Denies bowel or bladder dysfunction, no new weakness.  She has no chronic weakness in the lower EXTR  Cathryn Beaulieu   has a past medical history of Anxiety, Asthma, Back pain, acute, Chronic bronchitis (HCC), Depression, Endocrine disease, H/O seasonal allergies, Hypothyroidism, Maxillary sinus fracture, and Neck pain, musculoskeletal.       Physical Exam  Vitals reviewed.   Constitutional:       General: She is not in acute distress.     Appearance: Normal appearance.   HENT:      Head: Normocephalic.      Mouth/Throat:      Mouth: Mucous membranes are moist.   Eyes:      Extraocular Movements: Extraocular movements intact.      Pupils: Pupils are equal, round, and reactive to light.   Cardiovascular:      Pulses: Normal pulses.   Pulmonary:      Effort: Pulmonary effort is normal. No respiratory distress.   Abdominal:      General: Abdomen is flat.   Musculoskeletal:      Cervical back: Neck supple. No rigidity.      Right lower leg: No edema.      Left lower leg: No edema.      Comments: Examined her entire spine, there is no step-off or deformity, she has tenderness over the right SI joint where apparently she was injected, there is no hematoma or ecchymosis.  She has normal range of motion of bilateral hips, knees, normal sensation light touch bilateral lower extremities and normal strength in the bilateral

## 2024-12-02 RX ORDER — ONDANSETRON 4 MG/1
4 TABLET, ORALLY DISINTEGRATING ORAL 3 TIMES DAILY PRN
Qty: 30 TABLET | Refills: 0 | Status: SHIPPED | OUTPATIENT
Start: 2024-12-02

## 2024-12-06 ENCOUNTER — HOSPITAL ENCOUNTER (OUTPATIENT)
Facility: HOSPITAL | Age: 54
Discharge: HOME | End: 2024-12-09
Payer: COMMERCIAL

## 2024-12-06 DIAGNOSIS — Z12.31 ENCOUNTER FOR SCREENING MAMMOGRAM FOR MALIGNANT NEOPLASM OF BREAST: ICD-10-CM

## 2024-12-06 PROCEDURE — 77063 BREAST TOMOSYNTHESIS BI: CPT

## 2025-01-13 ENCOUNTER — HOSPITAL ENCOUNTER (EMERGENCY)
Facility: HOSPITAL | Age: 55
Discharge: HOME | End: 2025-01-13
Attending: STUDENT IN AN ORGANIZED HEALTH CARE EDUCATION/TRAINING PROGRAM
Payer: COMMERCIAL

## 2025-01-13 VITALS
DIASTOLIC BLOOD PRESSURE: 85 MMHG | WEIGHT: 195.33 LBS | OXYGEN SATURATION: 95 % | RESPIRATION RATE: 16 BRPM | TEMPERATURE: 98.6 F | HEART RATE: 72 BPM | BODY MASS INDEX: 34.61 KG/M2 | SYSTOLIC BLOOD PRESSURE: 138 MMHG | HEIGHT: 63 IN

## 2025-01-13 DIAGNOSIS — S09.90XA INJURY OF HEAD, INITIAL ENCOUNTER: Primary | ICD-10-CM
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
## 2025-01-13 DIAGNOSIS — M54.2 NECK PAIN: ICD-10-CM

## 2025-01-13 PROCEDURE — 6370000000 HC RX 637 (ALT 250 FOR IP): Performed by: STUDENT IN AN ORGANIZED HEALTH CARE EDUCATION/TRAINING PROGRAM

## 2025-01-13 PROCEDURE — 99283 EMERGENCY DEPT VISIT LOW MDM: CPT

## 2025-01-13 RX ORDER — IBUPROFEN 600 MG/1
600 TABLET, FILM COATED ORAL
Status: COMPLETED | OUTPATIENT
Start: 2025-01-13 | End: 2025-01-13

## 2025-01-13 RX ORDER — LIDOCAINE 50 MG/G
1 PATCH TOPICAL DAILY
Qty: 10 PATCH | Refills: 0 | Status: SHIPPED | OUTPATIENT
Start: 2025-01-13 | End: 2025-01-23

## 2025-01-13 RX ORDER — ZOLPIDEM TARTRATE 5 MG/1
5 TABLET ORAL NIGHTLY PRN
COMMUNITY

## 2025-01-13 RX ORDER — LIDOCAINE 4 G/G
1 PATCH TOPICAL
Status: DISCONTINUED | OUTPATIENT
Start: 2025-01-13 | End: 2025-01-13 | Stop reason: HOSPADM

## 2025-01-13 RX ORDER — IBUPROFEN 600 MG/1
600 TABLET, FILM COATED ORAL EVERY 8 HOURS PRN
Qty: 40 TABLET | Refills: 0 | Status: SHIPPED | OUTPATIENT
Start: 2025-01-13

## 2025-01-13 RX ADMIN — IBUPROFEN 600 MG: 600 TABLET, FILM COATED ORAL at 08:35

## 2025-01-13 ASSESSMENT — PAIN SCALES - GENERAL: PAINLEVEL_OUTOF10: 6

## 2025-01-13 ASSESSMENT — PAIN DESCRIPTION - DESCRIPTORS: DESCRIPTORS: ACHING

## 2025-01-13 ASSESSMENT — PAIN DESCRIPTION - LOCATION: LOCATION: HEAD;SHOULDER

## 2025-01-13 NOTE — ED TRIAGE NOTES
ED triage note: ambulatory with a steady gait accompanied. Patient reports was getting out of the bath tub last night, slipped on water and hit right side of head on cabinet. Reports neck pain, back of shoulders, and head pain. Denies LOC. Denies blood thinners.

## 2025-01-13 NOTE — ED PROVIDER NOTES
[facility]  EMERGENCY DEPARTMENT ENCOUNTER      Pt Name: Cathryn Beaulieu  MRN: [id number]  Birthdate 1970  Date of evaluation: 2025  Provider: Jacques Mckenzie MD    CHIEF COMPLAINT       Chief Complaint   Patient presents with    Fall       HISTORY OF PRESENT ILLNESS    HPI    Fall.    Nursing notes reviewed.    REVIEW OF SYSTEMS     Review of Systems  Unless otherwise stated, a complete review of systems was asked of the patient. Pertinent positives are noted in the HPI section.    PAST MEDICAL HISTORY     Past Medical History:   Diagnosis Date    Anxiety     Asthma     Back pain, acute     Chronic bronchitis (HCC)     Depression     Endocrine disease     hypothyroid    H/O seasonal allergies     Hypothyroidism     Maxillary sinus fracture     Neck pain, musculoskeletal        SURGICAL HISTORY       Past Surgical History:   Procedure Laterality Date    CERVICAL DISCECTOMY Left 2015    CERVICAL FUSION      CERVICAL FUSION Left 2015      SECTION      COLONOSCOPY N/A 2019    COLONOSCOPY performed by Mohsen Tracy MD at [facility] ENDOSCOPY    COLONOSCOPY,DIAGNOSTIC  2019         GYN  1998    laproscopsy     WISDOM TOOTH EXTRACTION         CURRENT MEDICATIONS       Previous Medications    ALBUTEROL SULFATE HFA (VENTOLIN HFA) 108 (90 BASE) MCG/ACT INHALER    Inhale into the lungs every 6 hours as needed for Wheezing or Shortness of Breath Indications: Asthma    BIOTIN 1000 MCG TABS    Take 1 tablet by mouth daily    BUTALBITAL-ACETAMINOPHEN-CAFFEINE (FIORICET, ESGIC) -40 MG PER TABLET    Take 1 tablet by mouth every 4 hours as needed for Headaches    CHOLECALCIFEROL (VITAMIN D3) 50 MCG (2000 UT) CAPS    Take by mouth daily    COMBIPATCH 0.05-0.14 MG/DAY    Place 1 patch onto the skin Twice a Week    DICLOFENAC SODIUM (VOLTAREN) 1 % GEL    Apply 2 g topically 4 times daily    EPINEPHRINE (EPIPEN) 0.3 MG/0.3ML SOAJ INJECTION    Inject into the

## 2025-01-14 ENCOUNTER — CARE COORDINATION (OUTPATIENT)
Dept: OTHER | Facility: CLINIC | Age: 55
End: 2025-01-14

## 2025-01-14 NOTE — CARE COORDINATION
Ambulatory Care Coordination Note     1/14/2025 9:27 AM     Patient outreach attempt by this ACM today to offer care management services. ACM was unable to reach the patient by telephone today;   left voice message requesting a return phone call to this ACM.     ACM: Kelin Bai RN     Care Summary Note: Will attempt to reach again for enrollment.    PCP/Specialist follow up:   Future Appointments         Provider Specialty Dept Phone    1/23/2025 8:40 AM Joaquin Seaman MD Orthopedic Surgery [phone]            Follow Up:   Plan for next ACM outreach in approximately 1-2 days  to complete:  - outreach attempt to offer care management services.

## 2025-01-15 ENCOUNTER — CARE COORDINATION (OUTPATIENT)
Dept: OTHER | Facility: CLINIC | Age: 55
End: 2025-01-15

## 2025-01-15 NOTE — CARE COORDINATION
Ambulatory Care Coordination Note     1/15/2025 3:51 PM     Patient outreach attempt by this ACM today to offer care management services. ACM was unable to reach the patient by telephone today;   left voice message requesting a return phone call to this ACM.     ACM: Kelin Bai RN     Care Summary Note: Will continue to reach out.    PCP/Specialist follow up:   Future Appointments         Provider Specialty Dept Phone    1/23/2025 8:40 AM Joaquin Seaman MD Orthopedic Surgery [phone]            Follow Up:   Plan for next ACM outreach in approximately 1-2 days  to complete:  - outreach attempt to offer care management services.

## 2025-01-15 NOTE — CARE COORDINATION
Ambulatory Care Coordination Note     1/15/2025 4:47 PM     Patient Current Location:  Home: [address] [address]     This patient was received as a referral from Population health report .    Patient contacted the ACM by telephone. Verified name and  with patient as identifiers. Provided introduction to self, and explanation of the ACM role.   Patient accepted care management services at this time.          ACM: Kelin Bai RN     Challenges to be reviewed by the provider   Additional needs identified to be addressed with provider No  none               Method of communication with provider: none.    Utilization: N/A - Initial Call     Care Summary Note: Patient states she is feeling better.  Reviewed d/c instructions, red flags.  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  BP has been elevated - 161/112.  [facility] ED tried to have her pay $1500 co pay.  Advised patient about NAL and cost savings for using service prior to going to the ED, copay $200/$400.  Encouraged her to let me know if this is billed to her.  She was appreciative.    Offered patient enrollment in the Remote Patient Monitoring (RPM) program for in-home monitoring: Patient is not eligible for RPM program because: insurance coverage.     Assessments Completed:   Ambulatory Care Coordination Assessment    Care Coordination Protocol  Week 1 - Initial Assessment     Do you have all of your prescriptions and are they filled?: Yes  Barriers to medication adherence: None  Are you able to afford your medications?: Yes  How often do you have trouble taking your medications the way you have been told to take them?: Sometimes I take them as prescribed.     Do you have Home O2 Therapy?: No      Ability to seek help/take action for Emergent Urgent situations i.e. fire, crime, inclement weather or health crisis.: Independent  Ability to ambulate to restroom: Independent  Ability handle personal

## 2025-01-16 ENCOUNTER — TELEPHONE (OUTPATIENT)
Dept: PRIMARY CARE CLINIC | Facility: CLINIC | Age: 55
End: 2025-01-16

## 2025-01-16 NOTE — TELEPHONE ENCOUNTER
Tried to scan new insurance card into patient's chart but the scanner wasn't working and gave an error message

## 2025-01-17 ENCOUNTER — OFFICE VISIT (OUTPATIENT)
Dept: PRIMARY CARE CLINIC | Facility: CLINIC | Age: 55
End: 2025-01-17
Payer: COMMERCIAL

## 2025-01-17 ENCOUNTER — HOSPITAL ENCOUNTER (OUTPATIENT)
Facility: HOSPITAL | Age: 55
Discharge: HOME | End: 2025-01-20
Payer: COMMERCIAL

## 2025-01-17 VITALS
DIASTOLIC BLOOD PRESSURE: 102 MMHG | RESPIRATION RATE: 19 BRPM | SYSTOLIC BLOOD PRESSURE: 151 MMHG | OXYGEN SATURATION: 96 % | HEART RATE: 79 BPM

## 2025-01-17 DIAGNOSIS — T14.90XA INJURY: ICD-10-CM

## 2025-01-17 DIAGNOSIS — M25.532 LEFT WRIST PAIN: ICD-10-CM

## 2025-01-17 DIAGNOSIS — M25.471 RIGHT ANKLE SWELLING: ICD-10-CM

## 2025-01-17 DIAGNOSIS — I10 PRIMARY HYPERTENSION: ICD-10-CM

## 2025-01-17 DIAGNOSIS — S06.0X0A CONCUSSION WITHOUT LOSS OF CONSCIOUSNESS, INITIAL ENCOUNTER: Primary | ICD-10-CM

## 2025-01-17 PROCEDURE — 73610 X-RAY EXAM OF ANKLE: CPT

## 2025-01-17 PROCEDURE — 73110 X-RAY EXAM OF WRIST: CPT

## 2025-01-17 PROCEDURE — 3079F DIAST BP 80-89 MM HG: CPT | Performed by: FAMILY MEDICINE

## 2025-01-17 PROCEDURE — 3077F SYST BP >= 140 MM HG: CPT | Performed by: FAMILY MEDICINE

## 2025-01-17 PROCEDURE — 99214 OFFICE O/P EST MOD 30 MIN: CPT | Performed by: FAMILY MEDICINE

## 2025-01-17 RX ORDER — AMLODIPINE BESYLATE 5 MG/1
5 TABLET ORAL DAILY
COMMUNITY
End: 2025-01-17 | Stop reason: SDUPTHER

## 2025-01-17 RX ORDER — AMLODIPINE BESYLATE 5 MG/1
5 TABLET ORAL DAILY
Qty: 30 TABLET | Refills: 3 | Status: SHIPPED | OUTPATIENT
Start: 2025-01-17

## 2025-01-17 SDOH — ECONOMIC STABILITY: FOOD INSECURITY: WITHIN THE PAST 12 MONTHS, THE FOOD YOU BOUGHT JUST DIDN'T LAST AND YOU DIDN'T HAVE MONEY TO GET MORE.: NEVER TRUE

## 2025-01-17 SDOH — ECONOMIC STABILITY: FOOD INSECURITY: WITHIN THE PAST 12 MONTHS, YOU WORRIED THAT YOUR FOOD WOULD RUN OUT BEFORE YOU GOT MONEY TO BUY MORE.: NEVER TRUE

## 2025-01-17 NOTE — PROGRESS NOTES
Health Decision Maker has been checked with the patient          AI form was signed    Chief Complaint   Patient presents with    Follow-up     From ER, concussion and fall.     Hypertension     Patients Bp was elevated at GYN appt, and Dentist office on the same day. Around 160/100.        \"Have you been to the ER, urgent care clinic since your last visit?  Hospitalized since your last visit?\"    Yes [facility] Er for Concussion     “Have you seen or consulted any other health care providers outside of [facility] since your last visit?”    NO      There were no vitals filed for this visit.   Depression: Not at risk (10/15/2024)    PHQ-2     PHQ-2 Score: 0              Click Here for Release of Records Request        Chart reviewed: immunizations are documented.   Immunization History   Administered Date(s) Administered    COVID-19, PFIZER PURPLE top, DILUTE for use, (age 12 y+), 30mcg/0.3mL 03/04/2021, 03/25/2021, 12/21/2021    Influenza Virus Vaccine 10/10/2014, 10/08/2015, 10/30/2018, 10/05/2020, 10/21/2021    Pneumococcal, PCV20, PREVNAR 20, (age 6w+), IM, 0.5mL 01/29/2024    TDaP, ADACEL (age 10y-64y), BOOSTRIX (age 10y+), IM, 0.5mL 07/01/2006, 06/23/2014    Zoster Recombinant (Shingrix) 09/27/2021      
summary and questions were answered concerning future plans.  I have discussed medication side effects and warnings with the patient as well.    Dat Arnett, DO

## 2025-01-22 ENCOUNTER — CARE COORDINATION (OUTPATIENT)
Dept: OTHER | Facility: CLINIC | Age: 55
End: 2025-01-22

## 2025-01-22 NOTE — CARE COORDINATION
Ambulatory Care Coordination Note     1/22/2025 10:27 AM     Patient outreach attempt by this ACM today to perform care management follow up . ACM was unable to reach the patient by telephone today;   voicemail full and unable to leave a message.   [employer] message sent requesting patient to contact this ACM.     ACM: Kelin Bai RN     Care Summary Note: Unable to leave voicemail message for patient, sent Lost to Follow up letter via my chart.  See below future apps.    PCP/Specialist follow up:   Future Appointments         Provider Specialty Dept Phone    1/23/2025 8:40 AM Joaquin Seaman MD Orthopedic Surgery [phone]            Follow Up:   Plan for next ACM outreach in approximately 3 weeks to complete:  May resolve if no call back from patient .

## 2025-01-24 DIAGNOSIS — E03.9 HYPOTHYROIDISM, UNSPECIFIED TYPE: ICD-10-CM

## 2025-01-24 RX ORDER — LEVOTHYROXINE SODIUM 50 UG/1
50 TABLET ORAL DAILY
Qty: 45 TABLET | Refills: 1 | Status: SHIPPED | OUTPATIENT
Start: 2025-01-24

## 2025-01-24 RX ORDER — LEVOTHYROXINE SODIUM 75 UG/1
75 TABLET ORAL DAILY
Qty: 45 TABLET | Refills: 1 | Status: SHIPPED | OUTPATIENT
Start: 2025-01-24

## 2025-01-28 DIAGNOSIS — I10 PRIMARY HYPERTENSION: ICD-10-CM

## 2025-01-28 RX ORDER — AMLODIPINE BESYLATE 10 MG/1
10 TABLET ORAL DAILY
Qty: 90 TABLET | Refills: 2 | Status: SHIPPED | OUTPATIENT
Start: 2025-01-28

## 2025-02-12 ENCOUNTER — CARE COORDINATION (OUTPATIENT)
Dept: OTHER | Facility: CLINIC | Age: 55
End: 2025-02-12

## 2025-02-12 NOTE — CARE COORDINATION
Ambulatory Care Coordination Note     2025 11:43 AM     Patient Current Location:  Home: [address] [address]     ACM contacted the patient by telephone. Verified name and  with patient as identifiers.         ACM: Kelin Bai RN     Challenges to be reviewed by the provider   Additional needs identified to be addressed with provider No  none               Method of communication with provider: none.    Utilization: Has the patient been discharged from the hospital since your last call? no    Care Summary Note: Patient states she is still having some issues due to her concussion. She was supposed to begin PT yesterday but it was cancelled due to the weather.  States her BP was uncontrolled and she has had to begin taking medication for that.  Offered assistance, patient could not identify needs.  Would like a call back in a few weeks to discuss therapy.    Offered patient enrollment in the Remote Patient Monitoring (RPM) program for in-home monitoring: Patient is not eligible for RPM program because: insurance coverage.     Assessments Completed:   No changes since last call    Medications Reviewed:   Completed during a previous call     Advance Care Planning:   Not reviewed during this call     Care Planning:   Not completed during this call    PCP/Specialist follow up:   Future Appointments         Provider Specialty Dept Phone    2025 3:30 PM (Arrive by 3:00 PM) Barbra Toscano PT Physical Therapy [phone]    2025 4:20 PM Joaquin Seaman MD Orthopedic Surgery [phone]            Follow Up:   Plan for next ACM outreach in approximately 2 weeks to complete:  - check in - therapy? BP improved? .   Patient  is agreeable to this plan.

## 2025-02-18 ENCOUNTER — HOSPITAL ENCOUNTER (OUTPATIENT)
Facility: HOSPITAL | Age: 55
Setting detail: RECURRING SERIES
Discharge: HOME | End: 2025-02-21
Payer: COMMERCIAL

## 2025-02-18 PROCEDURE — 97162 PT EVAL MOD COMPLEX 30 MIN: CPT | Performed by: PHYSICAL THERAPIST

## 2025-02-18 PROCEDURE — 97535 SELF CARE MNGMENT TRAINING: CPT | Performed by: PHYSICAL THERAPIST

## 2025-02-18 NOTE — THERAPY EVALUATION
Physical Therapy at [facility],   a part of [facility]  [address] [address]  Phone: [phone]  Fax: [phone]         PHYSICAL THERAPY - EVALUATION/PLAN OF CARE NOTE (updated 3/23)      Date: 2025          Patient Name:  Cathryn Beaulieu :  1970   Medical   Diagnosis:  Concussion without loss of consciousness, initial encounter [S06.0X0A] Treatment Diagnosis:  R42       Dizziness and giddiness    Referral Source:  Florencia Arnett DO Provider #:  [id number]                Insurance: Payor: [employer] EMPLO[employer]ES / Plan: [employer] [facility] EMPLOYEES / Product Type: *No Product type* /      Patient  verified yes     Visit #   Current  / Total 1 30   Time   In / Out 350  PM   Total Treatment Time 42   Total Timed Codes 10         SUBJECTIVE  Pain Level (0-10 scale): /10 HA - pt worked all day and typically has increased symptoms at end of workday  []constant []intermittent []improving []worsening []no change since onset    Any medication changes, allergies to medications, adverse drug reactions, diagnosis change, or new procedure performed?: [x] No    [] Yes (see summary sheet for update)  Medications: Verified on Patient Summary List    Subjective functional status/changes:     Pt reports that on 25 she was getting out of tub and hit R posterior head on cabinet. She immediately had a headache and felt and \"heaviness\", reduced focus, increased fatigue with screens, and light sensitivity. She notes her headaches are decreasing. Had dizziness and nausea at first, but these have seemed to resolve. 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    Start of Care: 2025  Onset Date: 26  Current symptoms/Complaints: headaches, difficulty concentrating   Mechanism of Injury: fall  PLOF: walking, hiking  Limitations to PLOF/Activity or Recreational Limitations: fatigue,

## 2025-02-26 ENCOUNTER — HOSPITAL ENCOUNTER (OUTPATIENT)
Facility: HOSPITAL | Age: 55
Setting detail: RECURRING SERIES
Discharge: HOME | End: 2025-03-01
Payer: COMMERCIAL

## 2025-02-26 PROCEDURE — 97110 THERAPEUTIC EXERCISES: CPT | Performed by: PHYSICAL THERAPIST

## 2025-02-26 PROCEDURE — 97112 NEUROMUSCULAR REEDUCATION: CPT | Performed by: PHYSICAL THERAPIST

## 2025-02-26 NOTE — PROGRESS NOTES
PHYSICAL THERAPY - MEDICARE DAILY TREATMENT NOTE (updated 3/23)      Date: 2025          Patient Name:  Cathryn Beaulieu :  1970   Medical   Diagnosis:  Concussion without loss of consciousness, initial encounter [S06.0X0A] Treatment Diagnosis:  R42       Dizziness and giddiness    Referral Source:  Florencia Arnett DO Insurance:   [facility]: [facility] [employer] [facility]ES / Plan: [facility] EMPLOYEES / Product Type: *No Product type* /                     Patient  verified yes     Visit #   Current  / Total 2 30   Time   In / Out 106  PM   Total Treatment Time 30   Total Timed Codes 30   1:1 Treatment Time 30      [facility] Totals Reminder:  bill using total billable   min of TIMED therapeutic procedures and modalities.   8-22 min = 1 unit; 23-37 min = 2 units; 38-52 min = 3 units; 53-67 min = 4 units; 68-82 min = 5 units            SUBJECTIVE    Pain Level (0-10 scale): 0    Any medication changes, allergies to medications, adverse drug reactions, diagnosis change, or new procedure performed?: [x] No    [] Yes (see summary sheet for update)  Medications: Verified on Patient Summary List    Subjective functional status/changes:     Pt reports she didn't work today and does not have any symptoms currently. She has been taking more rest breaks as advised and has not been as symptomatic at the end of the work day.     OBJECTIVE      Therapeutic Procedures:  Tx Min Billable or 1:1 Min (if diff from Tx Min) Procedure, Rationale, Specifics   10  26201 Therapeutic Exercise (timed):  increase ROM, strength, coordination, balance, and proprioception to improve patient's ability to progress to PLOF and address remaining functional goals. (see flow sheet as applicable)     Details if applicable:  see flow sheet - chin tuck, cervical rotation, LS str   20  60004 Neuromuscular Re-Education (timed):  improve balance, coordination, kinesthetic sense, posture, core stability and proprioception to

## 2025-02-27 ENCOUNTER — CARE COORDINATION (OUTPATIENT)
Dept: OTHER | Facility: CLINIC | Age: 55
End: 2025-02-27

## 2025-02-27 NOTE — CARE COORDINATION
Ambulatory Care Coordination Note     2/27/2025 10:25 AM     Patient outreach attempt by this ACM today to perform care management follow up . ACM was unable to reach the patient by telephone today;   left voice message requesting a return phone call to this ACM.     ACM: Kelin Bai RN     Care Summary Note: Will continue to monitor.  Per chart review, patient continues to participate in PT.    PCP/Specialist follow up:   Future Appointments         Provider Specialty Dept Phone    3/5/2025 1:30 PM Barbra Toscano, YARI Physical Therapy [phone]    4/29/2025 4:20 PM Joaquin Seaman MD Orthopedic Surgery [phone]            Follow Up:   Plan for next AC outreach in approximately 2 weeks to complete:  Check in .

## 2025-03-03 ENCOUNTER — APPOINTMENT (OUTPATIENT)
Facility: HOSPITAL | Age: 55
End: 2025-03-03
Payer: COMMERCIAL

## 2025-03-03 ENCOUNTER — TELEPHONE (OUTPATIENT)
Dept: PRIMARY CARE CLINIC | Facility: CLINIC | Age: 55
End: 2025-03-03

## 2025-03-05 ENCOUNTER — HOSPITAL ENCOUNTER (OUTPATIENT)
Facility: HOSPITAL | Age: 55
Setting detail: RECURRING SERIES
Discharge: HOME | End: 2025-03-08
Payer: COMMERCIAL

## 2025-03-05 PROCEDURE — 97110 THERAPEUTIC EXERCISES: CPT | Performed by: PHYSICAL THERAPIST

## 2025-03-05 PROCEDURE — 97112 NEUROMUSCULAR REEDUCATION: CPT | Performed by: PHYSICAL THERAPIST

## 2025-03-05 NOTE — PROGRESS NOTES
care  Re-Cert Due: 30 visits  []  Upgrade activities as tolerated  []  Discharge due to:  []  Other:      Barbra Toscano, PT       3/5/2025       1:37 PM

## 2025-03-12 ENCOUNTER — CARE COORDINATION (OUTPATIENT)
Dept: OTHER | Facility: CLINIC | Age: 55
End: 2025-03-12

## 2025-03-12 ENCOUNTER — HOSPITAL ENCOUNTER (OUTPATIENT)
Facility: HOSPITAL | Age: 55
Setting detail: RECURRING SERIES
Discharge: HOME | End: 2025-03-15
Payer: COMMERCIAL

## 2025-03-12 PROCEDURE — 97112 NEUROMUSCULAR REEDUCATION: CPT | Performed by: PHYSICAL THERAPIST

## 2025-03-12 NOTE — CARE COORDINATION
Ambulatory Care Coordination Note     3/12/2025 10:35 AM     Patient outreach attempt by this AC today to perform care management follow up . ACM was unable to reach the patient by telephone today;   left voice message requesting a return phone call to this ACM.  Yakazt message sent requesting patient to contact this ACM.     ACM: Kelin Bai RN     Care Summary Note: Per chart review, patient continues to complete physical therapy.  Will monitor.    PCP/Specialist follow up:   Future Appointments         Provider Specialty Dept Phone    3/12/2025 1:00 PM Barbra Toscano, [facility] Physical Therapy [phone]    4/29/2025 4:20 PM Joaquin Seaman MD Orthopedic Surgery [phone]            Follow Up:   Plan for next AC outreach in approximately 2 weeks to complete:  Resolve if no call back .

## 2025-03-12 NOTE — PROGRESS NOTES
PHYSICAL THERAPY - MEDICARE DAILY TREATMENT NOTE (updated 3/23)      Date: 3/12/2025          Patient Name:  Cathryn Beaulieu :  1970   Medical   Diagnosis:  Concussion without loss of consciousness, initial encounter [S06.0X0A] Treatment Diagnosis:  R42       Dizziness and giddiness    Referral Source:  Florencia Arnett DO Insurance:   [facility]ES / Plan: [facility] EMPLOYEES / Product Type: *No Product type* /                     Patient  verified yes     Visit #   Current  / Total 4 30   Time   In / Out 111  PM   Total Treatment Time 24   Total Timed Codes 24   1:1 Treatment Time 24      [facility] Totals Reminder:  bill using total billable   min of TIMED therapeutic procedures and modalities.   8-22 min = 1 unit; 23-37 min = 2 units; 38-52 min = 3 units; 53-67 min = 4 units; 68-82 min = 5 units            SUBJECTIVE    Pain Level (0-10 scale): 0    Any medication changes, allergies to medications, adverse drug reactions, diagnosis change, or new procedure performed?: [x] No    [] Yes (see summary sheet for update)  Medications: Verified on Patient Summary List    Subjective functional status/changes:     Pt reports that her eyes hurt secondary to pollen and was unable to wear contacts. Notes that headaches are pretty much gone, but still feels forgetful.     OBJECTIVE      Therapeutic Procedures:  Tx Min Billable or 1:1 Min (if diff from Tx Min) Procedure, Rationale, Specifics   4  01289 Therapeutic Exercise (timed):  increase ROM, strength, coordination, balance, and proprioception to improve patient's ability to progress to PLOF and address remaining functional goals. (see flow sheet as applicable)     Details if applicable:  see flow sheet - chin tuck, cervical rotation, LS str   20  62227 Neuromuscular Re-Education (timed):  improve balance, coordination, kinesthetic sense, posture, core stability and proprioception to improve patient's ability to develop

## 2025-03-13 RX ORDER — ALBUTEROL SULFATE 90 UG/1
2 INHALANT RESPIRATORY (INHALATION) 4 TIMES DAILY PRN
Qty: 18 G | Refills: 1 | Status: SHIPPED | OUTPATIENT
Start: 2025-03-13

## 2025-03-13 RX ORDER — ALBUTEROL SULFATE 90 UG/1
2 INHALANT RESPIRATORY (INHALATION) 4 TIMES DAILY PRN
Qty: 54 G | Refills: 1 | Status: SHIPPED | OUTPATIENT
Start: 2025-03-13 | End: 2025-03-13

## 2025-03-13 RX ORDER — ALBUTEROL SULFATE 90 UG/1
2 INHALANT RESPIRATORY (INHALATION) 4 TIMES DAILY PRN
Qty: 18 G | Refills: 1 | Status: SHIPPED | OUTPATIENT
Start: 2025-03-13 | End: 2025-03-13 | Stop reason: SDUPTHER

## 2025-03-21 ENCOUNTER — CARE COORDINATION (OUTPATIENT)
Dept: OTHER | Facility: CLINIC | Age: 55
End: 2025-03-21

## 2025-03-21 NOTE — CARE COORDINATION
Ambulatory Care Coordination Note     3/21/2025 11:29 AM     Patient outreach attempt by this ACM today to perform care management follow up . ACM was unable to reach the patient by telephone today;   left voice message requesting a return phone call to this ACM.     ACM: Kelin Bai RN     Care Summary Note: Patient left voicemail for this ACM yesterday 3/20/25.  Returned her call, left voicemail message for patient.    PCP/Specialist follow up:   Future Appointments         Provider Specialty Dept Phone    4/29/2025 4:20 PM Joaquin Seaman MD Orthopedic Surgery [phone]            Follow Up:   Plan for next ACM outreach in approximately 2 weeks to complete:  Resolve if no call back from patient .

## 2025-04-04 ENCOUNTER — CARE COORDINATION (OUTPATIENT)
Dept: OTHER | Facility: CLINIC | Age: 55
End: 2025-04-04

## 2025-04-04 NOTE — CARE COORDINATION
Ambulatory Care Coordination Note     4/4/2025 9:41 AM     Patient Current Location:  [place]     Patient contacted the [facility] by telephone. Left voicemail message.  Patient states she is doing well.  No further needs.    Patient graduated from the High Risk Care Management program on 4/4/2025.  Patient verbalizes confidence in the ability to self-manage at this time..  Care management goals have been completed. No further Ambulatory Care Manager follow up scheduled.      ACM: Kelin Bai RN     Challenges to be reviewed by the provider   Additional needs identified to be addressed with provider No  none               Method of communication with provider: none.    Utilization: Patient has not had any utilization since our last call.     Care Summary Note: After last outreach by [facility], patient left voicemail message stating she is doing well.  No further needs at this time.    Offered patient enrollment in the Remote Patient Monitoring (RPM) program for in-home monitoring: Patient is not eligible for RPM program because: insurance coverage.     Assessments Completed:   No changes since last call    Medications Reviewed:   Completed during a previous call     Advance Care Planning:   Not reviewed during this call     Care Planning:   Not completed during this call    PCP/Specialist follow up:   Future Appointments         Provider Specialty Dept Phone    4/29/2025 4:20 PM Joaquin Seaman MD Orthopedic Surgery [phone]            Follow Up:   No further Ambulatory Care Management follow-up scheduled at this time.  Patient  has Ambulatory Care Manager's contact information for any further questions, concerns or needs.

## 2025-04-07 ENCOUNTER — TELEPHONE (OUTPATIENT)
Age: 55
End: 2025-04-07

## 2025-04-07 DIAGNOSIS — G47.33 OSA (OBSTRUCTIVE SLEEP APNEA): Primary | ICD-10-CM

## 2025-04-07 NOTE — TELEPHONE ENCOUNTER
Patient called stating she requested for her sleep records sent to [facility] however they only received the office notes. They also need a referral to dentistry sent along with the sleep study. Informed her I would go ahead and send the sleep study and will send a message to Dr. Maldonado about the referral.

## 2025-04-07 NOTE — TELEPHONE ENCOUNTER
Encounter Diagnosis   Name Primary?    GODWIN (obstructive sleep apnea) Yes         Orders Placed This Encounter   Procedures    Amb External Referral To Dentistry     Referral Priority:   Routine     Referral Type:   Consult for Advice and Opinion     Referral Reason:   Specialty Services Required     Referred to Provider:   Andrea Up DDS     Requested Specialty:   Dentistry     Number of Visits Requested:   1

## 2025-04-10 ENCOUNTER — TELEPHONE (OUTPATIENT)
Age: 55
End: 2025-04-10

## 2025-04-10 DIAGNOSIS — G47.33 OSA (OBSTRUCTIVE SLEEP APNEA): Primary | ICD-10-CM

## 2025-04-10 NOTE — TELEPHONE ENCOUNTER
Patient called to check status of referral to dentistry and stated to please have the referral for Dr. Andrea Montano.

## 2025-04-15 NOTE — TELEPHONE ENCOUNTER
Encounter Diagnosis   Name Primary?    GODWIN (obstructive sleep apnea) Yes         Orders Placed This Encounter   Procedures    Amb External Referral To Dentistry     Referral Priority:   Routine     Referral Type:   Consult for Advice and Opinion     Referral Reason:   Specialty Services Required     Referred to Provider:   Andrea Montano Jr., ISMAEL     Requested Specialty:   Dentistry General Practice     Number of Visits Requested:   1

## 2025-05-01 ENCOUNTER — OFFICE VISIT (OUTPATIENT)
Age: 55
End: 2025-05-01

## 2025-05-01 VITALS
RESPIRATION RATE: 16 BRPM | HEART RATE: 73 BPM | BODY MASS INDEX: 34.29 KG/M2 | WEIGHT: 193.6 LBS | OXYGEN SATURATION: 96 % | TEMPERATURE: 98 F | DIASTOLIC BLOOD PRESSURE: 108 MMHG | SYSTOLIC BLOOD PRESSURE: 164 MMHG

## 2025-05-01 DIAGNOSIS — B96.89 ACUTE BACTERIAL SINUSITIS: Primary | ICD-10-CM

## 2025-05-01 DIAGNOSIS — J01.90 ACUTE BACTERIAL SINUSITIS: Primary | ICD-10-CM

## 2025-05-01 DIAGNOSIS — J45.20 MILD INTERMITTENT ASTHMA WITHOUT COMPLICATION: ICD-10-CM

## 2025-05-01 DIAGNOSIS — H66.001 NON-RECURRENT ACUTE SUPPURATIVE OTITIS MEDIA OF RIGHT EAR WITHOUT SPONTANEOUS RUPTURE OF TYMPANIC MEMBRANE: ICD-10-CM

## 2025-05-01 RX ORDER — FLUTICASONE FUROATE, UMECLIDINIUM BROMIDE AND VILANTEROL TRIFENATATE 100; 62.5; 25 UG/1; UG/1; UG/1
1 POWDER RESPIRATORY (INHALATION) DAILY
Qty: 1 EACH | Refills: 0 | Status: SHIPPED | OUTPATIENT
Start: 2025-05-01

## 2025-05-01 NOTE — PATIENT INSTRUCTIONS
Symptoms consistent with acute bacterial sinusitis with a right-sided acute otitis media  Augmentin as ordered, 2x per day for 10 days  Continue daily Nasonex plus Allegra for seasonal allergies  I have also refilled your Trelegy inhaler, please use once daily  Saline sinus rinses, hot tea with honey, throat lozenges  Lots of fluids, plenty of rest  Return if symptoms persist or worsen  ED with any severe shortness of breath, chest pain, or if unable to tolerate food or fluids

## 2025-05-01 NOTE — PROGRESS NOTES
Cathryn Beaulieu (:  1970) is a 54 y.o. female,Established patient, here for evaluation of the following chief complaint(s):  Sinusitis (Pt presents for sinus pressure, possible fever, congestion, bilateral ear popping x 6 weeks)      Assessment & Plan :  Visit Diagnoses and Associated Orders         Acute bacterial sinusitis    -  Primary    amoxicillin-clavulanate (AUGMENTIN) 875-125 MG per tablet [40911]             Non-recurrent acute suppurative otitis media of right ear without spontaneous rupture of tympanic membrane               Mild intermittent asthma without complication        fluticasone-umeclidin-vilant (TRELEGY ELLIPTA) 100-62.5-25 MCG/ACT AEPB inhaler [537990]                   Symptoms consistent with acute bacterial sinusitis with a right-sided acute otitis media  Augmentin as ordered, 2x per day for 10 days  Continue daily Nasonex plus Allegra for seasonal allergies  I have also refilled your Trelegy inhaler, please use once daily  Saline sinus rinses, hot tea with honey, throat lozenges  Lots of fluids, plenty of rest  Return if symptoms persist or worsen  ED with any severe shortness of breath, chest pain, or if unable to tolerate food or fluids       Subjective :  HPI     54 y.o. female presents with symptoms of sinus congestion, facial pain and pressure, bilateral ear pain which is ongoing and worsening over the past 6 weeks.  She states that she feels like her symptoms began as seasonal allergies and have persisted and even worsened.  She did report a possible low-grade fever just this morning.  Additionally she does report some ongoing general fatigue and malaise.  Denies any significant body aches or chills.  Denies any drainage or discharge from the ears but does report significant pain, pressure and fullness.  Reports thick yellow to green mucus discharge from her nose.  Notes some postnasal drip with scratchy throat.  She does report a mild dry cough, but denies any shortness of

## 2025-05-15 ENCOUNTER — TELEPHONE (OUTPATIENT)
Age: 55
End: 2025-05-15

## 2025-05-16 ENCOUNTER — TELEPHONE (OUTPATIENT)
Age: 55
End: 2025-05-16

## 2025-05-16 DIAGNOSIS — G47.33 OSA (OBSTRUCTIVE SLEEP APNEA): Primary | ICD-10-CM

## 2025-05-16 NOTE — TELEPHONE ENCOUNTER
Patient has failed a CPAP trial in the past, she should return for titration as ordered at her last visit on 9/18/2024 if she would like to continue with PAP therapy.    Encounter Diagnosis   Name Primary?    GODWIN (obstructive sleep apnea) Yes         Orders Placed This Encounter   Procedures    SLEEP LAB (PAP TITRATION)     Standing Status:   Future     Expected Date:   5/16/2025     Expiration Date:   5/16/2026    DME Order for (Specify) as OP     Diagnosis: (G47.33) GODWIN (obstructive sleep apnea)  (primary encounter diagnosis)     Replacement Supplies for Positive Airway Pressure Therapy Device:   Duration of need: 99 months.      Nasal Pillows Combo Mask (Replace) 2 per month.   Nasal Pillows (Replace) 2 per month.      Headgear 1 every 6 months.     Tubing with heating element 1 every 3 months.     Filter(s) Disposable 2 per month.   Filter(s) Non-Disposable 1 every 6 months.      Water Chamber for Humidifier (Replace) 1 every 6 months.      Berhane Maldonado MD, FAASM; NPI: [id number]    Electronically signed. Date:- 5/16/2025

## 2025-05-16 NOTE — TELEPHONE ENCOUNTER
Schedule visit with myself or one of the NP's to discuss ongoing treatment of moderate obstructive sleep apnea.

## 2025-05-16 NOTE — TELEPHONE ENCOUNTER
Patient has agreed to restart PAP therapy.  Supply order needed to forward to [facility]  Yearly follow up scheduled with Kendal Collier NP.

## 2025-06-06 ENCOUNTER — OFFICE VISIT (OUTPATIENT)
Dept: PRIMARY CARE CLINIC | Facility: CLINIC | Age: 55
End: 2025-06-06
Payer: COMMERCIAL

## 2025-06-06 VITALS
OXYGEN SATURATION: 99 % | DIASTOLIC BLOOD PRESSURE: 78 MMHG | WEIGHT: 191 LBS | HEIGHT: 63 IN | BODY MASS INDEX: 33.84 KG/M2 | RESPIRATION RATE: 18 BRPM | TEMPERATURE: 97.9 F | SYSTOLIC BLOOD PRESSURE: 129 MMHG | HEART RATE: 83 BPM

## 2025-06-06 DIAGNOSIS — R79.9 HIGH BLOOD UREA NITROGEN (BUN): Primary | ICD-10-CM

## 2025-06-06 DIAGNOSIS — E78.5 HYPERLIPIDEMIA, UNSPECIFIED HYPERLIPIDEMIA TYPE: ICD-10-CM

## 2025-06-06 DIAGNOSIS — E03.9 HYPOTHYROIDISM, UNSPECIFIED TYPE: ICD-10-CM

## 2025-06-06 DIAGNOSIS — G25.81 RESTLESS LEG SYNDROME: ICD-10-CM

## 2025-06-06 DIAGNOSIS — R23.3 EASY BRUISING: ICD-10-CM

## 2025-06-06 DIAGNOSIS — Z00.00 WELL WOMAN EXAM (NO GYNECOLOGICAL EXAM): ICD-10-CM

## 2025-06-06 PROCEDURE — 3078F DIAST BP <80 MM HG: CPT | Performed by: FAMILY MEDICINE

## 2025-06-06 PROCEDURE — 99213 OFFICE O/P EST LOW 20 MIN: CPT | Performed by: FAMILY MEDICINE

## 2025-06-06 PROCEDURE — 3074F SYST BP LT 130 MM HG: CPT | Performed by: FAMILY MEDICINE

## 2025-06-06 PROCEDURE — 99396 PREV VISIT EST AGE 40-64: CPT | Performed by: FAMILY MEDICINE

## 2025-06-06 ASSESSMENT — PATIENT HEALTH QUESTIONNAIRE - PHQ9
SUM OF ALL RESPONSES TO PHQ QUESTIONS 1-9: 0
2. FEELING DOWN, DEPRESSED OR HOPELESS: NOT AT ALL
6. FEELING BAD ABOUT YOURSELF - OR THAT YOU ARE A FAILURE OR HAVE LET YOURSELF OR YOUR FAMILY DOWN: NOT AT ALL
1. LITTLE INTEREST OR PLEASURE IN DOING THINGS: NOT AT ALL
4. FEELING TIRED OR HAVING LITTLE ENERGY: NOT AT ALL
SUM OF ALL RESPONSES TO PHQ QUESTIONS 1-9: 0
10. IF YOU CHECKED OFF ANY PROBLEMS, HOW DIFFICULT HAVE THESE PROBLEMS MADE IT FOR YOU TO DO YOUR WORK, TAKE CARE OF THINGS AT HOME, OR GET ALONG WITH OTHER PEOPLE: NOT DIFFICULT AT ALL
9. THOUGHTS THAT YOU WOULD BE BETTER OFF DEAD, OR OF HURTING YOURSELF: NOT AT ALL
7. TROUBLE CONCENTRATING ON THINGS, SUCH AS READING THE NEWSPAPER OR WATCHING TELEVISION: NOT AT ALL
SUM OF ALL RESPONSES TO PHQ QUESTIONS 1-9: 0
8. MOVING OR SPEAKING SO SLOWLY THAT OTHER PEOPLE COULD HAVE NOTICED. OR THE OPPOSITE, BEING SO FIGETY OR RESTLESS THAT YOU HAVE BEEN MOVING AROUND A LOT MORE THAN USUAL: NOT AT ALL
3. TROUBLE FALLING OR STAYING ASLEEP: NOT AT ALL
SUM OF ALL RESPONSES TO PHQ QUESTIONS 1-9: 0
5. POOR APPETITE OR OVEREATING: NOT AT ALL

## 2025-06-06 NOTE — PROGRESS NOTES
HPI     Chief Complaint   Patient presents with    Annual Exam     Labs in Media from April     She is a 55 y.o. female who presents for annual exam.     Normal diet  Walking 3x weekly   Social alcohol use, no tobacco  Declines std testing  Wants to hold on Tdap vaccine  Had allergy to shingles vaccine   UTD on PAP, mammo and colon cancer screening    ASCVD 3.4%    She has been having restless leg symptoms. Thought to be 2/2 chronic back issues. Recently had another injection in her back. No saddle anesthesia/ urinary incontinence. She is followed by back specialist.     Notes easy bruising. Had PT/ INR and PTT in past that were normal. Did have PPH with pregnancy. No epistaxis. She has not seen Heme.    Had labs outside our system. Of note BUN/ Cr was elevated. She does drink a protein shake in the morning. No blood in stool.     Taking her BP meds.     Reviewed PmHx, RxHx, FmHx, SocHx, AllgHx and updated and dated in the chart.    Physical Exam:  /78   Pulse 83   Temp 97.9 °F (36.6 °C) (Oral)   Resp 18   Ht 1.6 m (5' 3\")   Wt 86.6 kg (191 lb)   SpO2 99%   BMI 33.83 kg/m²   Physical Exam  Vitals and nursing note reviewed.   Constitutional:       General: She is not in acute distress.     Appearance: Normal appearance. She is not ill-appearing.   HENT:      Right Ear: Tympanic membrane normal.      Left Ear: Tympanic membrane normal.      Nose: Nose normal. No rhinorrhea.      Mouth/Throat:      Mouth: Mucous membranes are moist.      Pharynx: No posterior oropharyngeal erythema.   Eyes:      General: No scleral icterus.  Cardiovascular:      Rate and Rhythm: Normal rate and regular rhythm.      Heart sounds: No murmur heard.  Pulmonary:      Effort: Pulmonary effort is normal. No respiratory distress.      Breath sounds: Normal breath sounds. No wheezing, rhonchi or rales.   Abdominal:      General: There is no distension.      Palpations: Abdomen is soft.      Tenderness: There is no abdominal

## 2025-06-06 NOTE — PROGRESS NOTES
Health Decision Maker has been checked with the patient      AI form WAS signed    Chief Complaint   Patient presents with    Annual Exam       \"Have you been to the ER, urgent care clinic since your last visit?  Hospitalized since your last visit?\"    URGENT CARE 5/01/2025    “Have you seen or consulted any other health care providers outside of [facility] since your last visit?”    NO      There were no vitals filed for this visit.   Depression: Not at risk (10/15/2024)    PHQ-2     PHQ-2 Score: 0              Click Here for Release of Records Request    Chart reviewed: immunizations are documented.   Immunization History   Administered Date(s) Administered    COVID-19, PFIZER PURPLE top, DILUTE for use, (age 12 y+), 30mcg/0.3mL 03/04/2021, 03/25/2021, 12/21/2021    Influenza Virus Vaccine 10/10/2014, 10/08/2015, 10/30/2018, 10/05/2020, 10/21/2021    Pneumococcal, PCV20, PREVNAR 20, (age 6w+), IM, 0.5mL 01/29/2024    TDaP, ADACEL (age 10y-64y), BOOSTRIX (age 10y+), IM, 0.5mL 07/01/2006, 06/23/2014    Zoster Recombinant (Shingrix) 09/27/2021

## 2025-06-18 NOTE — ED NOTES
Dr. Clyde Izaguirre reviewed discharge instructions with the patient. The patient verbalized understanding. Patient ambulated out of the emergency department without assistance. Patient is in no apparent distress. Would defer to MD on hand call this week. Dr. Traore has no availability.

## 2025-06-27 DIAGNOSIS — R23.3 EASY BRUISING: ICD-10-CM

## 2025-06-27 DIAGNOSIS — R79.9 HIGH BLOOD UREA NITROGEN (BUN): ICD-10-CM

## 2025-06-27 DIAGNOSIS — G25.81 RESTLESS LEG SYNDROME: ICD-10-CM

## 2025-06-27 DIAGNOSIS — E03.9 HYPOTHYROIDISM, UNSPECIFIED TYPE: ICD-10-CM

## 2025-06-29 LAB
ANION GAP SERPL CALC-SCNC: 9 MMOL/L (ref 2–12)
APTT PPP: 28.6 SEC (ref 22.1–31)
BASOPHILS # BLD: 0.08 K/UL (ref 0–0.1)
BASOPHILS NFR BLD: 1.3 % (ref 0–1)
BUN SERPL-MCNC: 24 MG/DL (ref 6–20)
BUN/CREAT SERPL: 23 (ref 12–20)
CALCIUM SERPL-MCNC: 9.4 MG/DL (ref 8.5–10.1)
CHLORIDE SERPL-SCNC: 105 MMOL/L (ref 97–108)
CO2 SERPL-SCNC: 24 MMOL/L (ref 21–32)
CREAT SERPL-MCNC: 1.04 MG/DL (ref 0.55–1.02)
DIFFERENTIAL METHOD BLD: ABNORMAL
EOSINOPHIL # BLD: 0.41 K/UL (ref 0–0.4)
EOSINOPHIL NFR BLD: 6.5 % (ref 0–7)
ERYTHROCYTE [DISTWIDTH] IN BLOOD BY AUTOMATED COUNT: 12.5 % (ref 11.5–14.5)
FERRITIN SERPL-MCNC: 41 NG/ML (ref 8–252)
GLUCOSE SERPL-MCNC: 97 MG/DL (ref 65–100)
HCT VFR BLD AUTO: 41.3 % (ref 35–47)
HGB BLD-MCNC: 12.8 G/DL (ref 11.5–16)
IMM GRANULOCYTES # BLD AUTO: 0.01 K/UL (ref 0–0.04)
IMM GRANULOCYTES NFR BLD AUTO: 0.2 % (ref 0–0.5)
INR PPP: 1 (ref 0.9–1.1)
LYMPHOCYTES # BLD: 1.75 K/UL (ref 0.8–3.5)
LYMPHOCYTES NFR BLD: 27.8 % (ref 12–49)
MAGNESIUM SERPL-MCNC: 2 MG/DL (ref 1.6–2.4)
MCH RBC QN AUTO: 29.4 PG (ref 26–34)
MCHC RBC AUTO-ENTMCNC: 31 G/DL (ref 30–36.5)
MCV RBC AUTO: 94.9 FL (ref 80–99)
MONOCYTES # BLD: 0.62 K/UL (ref 0–1)
MONOCYTES NFR BLD: 9.8 % (ref 5–13)
NEUTS SEG # BLD: 3.43 K/UL (ref 1.8–8)
NEUTS SEG NFR BLD: 54.4 % (ref 32–75)
NRBC # BLD: 0 K/UL (ref 0–0.01)
NRBC BLD-RTO: 0 PER 100 WBC
PLATELET # BLD AUTO: 373 K/UL (ref 150–400)
PMV BLD AUTO: 10.4 FL (ref 8.9–12.9)
POTASSIUM SERPL-SCNC: 4.2 MMOL/L (ref 3.5–5.1)
PROTHROMBIN TIME: 10.2 SEC (ref 9.2–11.2)
RBC # BLD AUTO: 4.35 M/UL (ref 3.8–5.2)
SODIUM SERPL-SCNC: 138 MMOL/L (ref 136–145)
THERAPEUTIC RANGE: NORMAL SECS (ref 58–77)
TSH SERPL DL<=0.05 MIU/L-ACNC: 4.61 UIU/ML (ref 0.36–3.74)
WBC # BLD AUTO: 6.3 K/UL (ref 3.6–11)

## 2025-06-30 ENCOUNTER — RESULTS FOLLOW-UP (OUTPATIENT)
Dept: PRIMARY CARE CLINIC | Facility: CLINIC | Age: 55
End: 2025-06-30

## 2025-06-30 DIAGNOSIS — E03.9 HYPOTHYROIDISM, UNSPECIFIED TYPE: ICD-10-CM

## 2025-06-30 RX ORDER — LEVOTHYROXINE SODIUM 75 UG/1
75 TABLET ORAL DAILY
Qty: 90 TABLET | Refills: 1 | Status: SHIPPED | OUTPATIENT
Start: 2025-06-30

## 2025-07-22 ENCOUNTER — OFFICE VISIT (OUTPATIENT)
Dept: PRIMARY CARE CLINIC | Facility: CLINIC | Age: 55
End: 2025-07-22
Payer: COMMERCIAL

## 2025-07-22 VITALS
HEART RATE: 80 BPM | TEMPERATURE: 97.8 F | HEIGHT: 63 IN | DIASTOLIC BLOOD PRESSURE: 89 MMHG | OXYGEN SATURATION: 98 % | WEIGHT: 192 LBS | SYSTOLIC BLOOD PRESSURE: 133 MMHG | BODY MASS INDEX: 34.02 KG/M2 | RESPIRATION RATE: 18 BRPM

## 2025-07-22 DIAGNOSIS — R06.00 DYSPNEA, UNSPECIFIED TYPE: ICD-10-CM

## 2025-07-22 DIAGNOSIS — J02.9 SORE THROAT: ICD-10-CM

## 2025-07-22 DIAGNOSIS — R07.9 CHEST PAIN, UNSPECIFIED TYPE: ICD-10-CM

## 2025-07-22 DIAGNOSIS — J02.0 STREP PHARYNGITIS: Primary | ICD-10-CM

## 2025-07-22 LAB
GROUP A STREP ANTIGEN, POC: POSITIVE
VALID INTERNAL CONTROL, POC: YES

## 2025-07-22 PROCEDURE — 93000 ELECTROCARDIOGRAM COMPLETE: CPT | Performed by: FAMILY MEDICINE

## 2025-07-22 PROCEDURE — 3079F DIAST BP 80-89 MM HG: CPT | Performed by: FAMILY MEDICINE

## 2025-07-22 PROCEDURE — 99213 OFFICE O/P EST LOW 20 MIN: CPT | Performed by: FAMILY MEDICINE

## 2025-07-22 PROCEDURE — 3075F SYST BP GE 130 - 139MM HG: CPT | Performed by: FAMILY MEDICINE

## 2025-07-22 PROCEDURE — 87880 STREP A ASSAY W/OPTIC: CPT | Performed by: FAMILY MEDICINE

## 2025-07-22 RX ORDER — AMOXICILLIN 500 MG/1
500 CAPSULE ORAL 2 TIMES DAILY
Qty: 20 CAPSULE | Refills: 0 | Status: SHIPPED | OUTPATIENT
Start: 2025-07-22 | End: 2025-08-01

## 2025-08-14 ENCOUNTER — OFFICE VISIT (OUTPATIENT)
Dept: PRIMARY CARE CLINIC | Facility: CLINIC | Age: 55
End: 2025-08-14

## 2025-08-14 ENCOUNTER — HOSPITAL ENCOUNTER (OUTPATIENT)
Facility: HOSPITAL | Age: 55
Discharge: HOME | End: 2025-08-17
Payer: COMMERCIAL

## 2025-08-14 VITALS
HEART RATE: 93 BPM | WEIGHT: 199.2 LBS | RESPIRATION RATE: 17 BRPM | BODY MASS INDEX: 35.3 KG/M2 | TEMPERATURE: 97.5 F | OXYGEN SATURATION: 98 % | DIASTOLIC BLOOD PRESSURE: 86 MMHG | SYSTOLIC BLOOD PRESSURE: 122 MMHG | HEIGHT: 63 IN

## 2025-08-14 DIAGNOSIS — J02.0 STREP PHARYNGITIS: Primary | ICD-10-CM

## 2025-08-14 DIAGNOSIS — R53.81 MALAISE: ICD-10-CM

## 2025-08-14 DIAGNOSIS — R06.00 DYSPNEA, UNSPECIFIED TYPE: ICD-10-CM

## 2025-08-14 LAB
GROUP A STREP ANTIGEN, POC: POSITIVE
VALID INTERNAL CONTROL, POC: YES

## 2025-08-14 PROCEDURE — 71046 X-RAY EXAM CHEST 2 VIEWS: CPT

## 2025-08-14 RX ORDER — AZITHROMYCIN 250 MG/1
TABLET, FILM COATED ORAL
Qty: 6 TABLET | Refills: 0 | Status: SHIPPED | OUTPATIENT
Start: 2025-08-14 | End: 2025-08-24

## 2025-09-02 ENCOUNTER — TELEPHONE (OUTPATIENT)
Age: 55
End: 2025-09-02

## (undated) DEVICE — SYR 10ML LUER LOK 1/5ML GRAD --

## (undated) DEVICE — NEONATAL-ADULT SPO2 SENSOR: Brand: NELLCOR

## (undated) DEVICE — SOLIDIFIER MEDC 1200ML -- CONVERT TO 356117

## (undated) DEVICE — NEEDLE HYPO 18GA L1.5IN PNK S STL HUB POLYPR SHLD REG BVL

## (undated) DEVICE — Device

## (undated) DEVICE — KENDALL RADIOLUCENT FOAM MONITORING ELECTRODE RECTANGULAR SHAPE: Brand: KENDALL

## (undated) DEVICE — BASIN EMSIS 16OZ GRAPHITE PLAS KID SHP MOLD GRAD FOR ORAL

## (undated) DEVICE — Z DISCONTINUED PER MEDLINE LINE GAS SAMPLING O2/CO2 LNG AD 13 FT NSL W/ TBNG FILTERLINE

## (undated) DEVICE — CATH IV AUTOGRD BC PNK 20GA 25 -- INSYTE

## (undated) DEVICE — TOWEL 4 PLY TISS 19X30 SUE WHT

## (undated) DEVICE — SET ADMIN 16ML TBNG L100IN 2 Y INJ SITE IV PIGGY BK DISP

## (undated) DEVICE — SYR 3ML LL TIP 1/10ML GRAD --

## (undated) DEVICE — 1200 GUARD II KIT W/5MM TUBE W/O VAC TUBE: Brand: GUARDIAN